# Patient Record
Sex: FEMALE | Race: BLACK OR AFRICAN AMERICAN | Employment: OTHER | URBAN - METROPOLITAN AREA
[De-identification: names, ages, dates, MRNs, and addresses within clinical notes are randomized per-mention and may not be internally consistent; named-entity substitution may affect disease eponyms.]

---

## 2018-03-29 ENCOUNTER — IP HISTORICAL/CONVERTED ENCOUNTER (OUTPATIENT)
Dept: OTHER | Age: 78
End: 2018-03-29

## 2018-10-29 ENCOUNTER — OP HISTORICAL/CONVERTED ENCOUNTER (OUTPATIENT)
Dept: OTHER | Age: 78
End: 2018-10-29

## 2018-11-16 ENCOUNTER — OP HISTORICAL/CONVERTED ENCOUNTER (OUTPATIENT)
Dept: OTHER | Age: 78
End: 2018-11-16

## 2020-01-01 ENCOUNTER — APPOINTMENT (OUTPATIENT)
Dept: INTERVENTIONAL RADIOLOGY/VASCULAR | Age: 80
DRG: 180 | End: 2020-01-01
Attending: INTERNAL MEDICINE
Payer: MEDICARE

## 2020-01-01 ENCOUNTER — APPOINTMENT (OUTPATIENT)
Dept: GENERAL RADIOLOGY | Age: 80
End: 2020-01-01
Attending: EMERGENCY MEDICINE
Payer: MEDICARE

## 2020-01-01 ENCOUNTER — APPOINTMENT (OUTPATIENT)
Dept: GENERAL RADIOLOGY | Age: 80
DRG: 180 | End: 2020-01-01
Attending: INTERNAL MEDICINE
Payer: MEDICARE

## 2020-01-01 ENCOUNTER — APPOINTMENT (OUTPATIENT)
Dept: GENERAL RADIOLOGY | Age: 80
DRG: 180 | End: 2020-01-01
Attending: EMERGENCY MEDICINE
Payer: MEDICARE

## 2020-01-01 ENCOUNTER — HOSPITAL ENCOUNTER (INPATIENT)
Age: 80
LOS: 28 days | Discharge: NURSING, REHAB OR LONG-TERM CARE | DRG: 180 | End: 2020-12-01
Attending: EMERGENCY MEDICINE | Admitting: FAMILY MEDICINE
Payer: MEDICARE

## 2020-01-01 ENCOUNTER — APPOINTMENT (OUTPATIENT)
Dept: NON INVASIVE DIAGNOSTICS | Age: 80
DRG: 180 | End: 2020-01-01
Attending: INTERNAL MEDICINE
Payer: MEDICARE

## 2020-01-01 ENCOUNTER — HOSPITAL ENCOUNTER (EMERGENCY)
Age: 80
Discharge: HOME | End: 2020-10-27
Payer: MEDICARE

## 2020-01-01 ENCOUNTER — APPOINTMENT (OUTPATIENT)
Dept: CT IMAGING | Age: 80
End: 2020-01-01
Attending: PHYSICIAN ASSISTANT
Payer: MEDICARE

## 2020-01-01 ENCOUNTER — APPOINTMENT (OUTPATIENT)
Dept: CT IMAGING | Age: 80
DRG: 180 | End: 2020-01-01
Attending: FAMILY MEDICINE
Payer: MEDICARE

## 2020-01-01 VITALS
DIASTOLIC BLOOD PRESSURE: 107 MMHG | WEIGHT: 140 LBS | OXYGEN SATURATION: 96 % | TEMPERATURE: 98.3 F | HEIGHT: 62 IN | HEART RATE: 110 BPM | BODY MASS INDEX: 25.76 KG/M2 | RESPIRATION RATE: 16 BRPM | SYSTOLIC BLOOD PRESSURE: 166 MMHG

## 2020-01-01 VITALS
OXYGEN SATURATION: 100 % | BODY MASS INDEX: 25.76 KG/M2 | SYSTOLIC BLOOD PRESSURE: 138 MMHG | TEMPERATURE: 98.8 F | HEART RATE: 79 BPM | DIASTOLIC BLOOD PRESSURE: 86 MMHG | HEIGHT: 62 IN | WEIGHT: 140 LBS | RESPIRATION RATE: 18 BRPM
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
## 2020-01-01 DIAGNOSIS — E87.1 HYPONATREMIA: Primary | ICD-10-CM

## 2020-01-01 DIAGNOSIS — M25.511 ACUTE PAIN OF RIGHT SHOULDER: ICD-10-CM

## 2020-01-01 DIAGNOSIS — J90 PLEURAL EFFUSION: ICD-10-CM

## 2020-01-01 LAB
ALBUMIN SERPL-MCNC: 1.5 G/DL (ref 3.5–5)
ALBUMIN SERPL-MCNC: 1.6 G/DL (ref 3.5–5)
ALBUMIN SERPL-MCNC: 1.7 G/DL (ref 3.5–5)
ALBUMIN SERPL-MCNC: 1.8 G/DL (ref 3.5–5)
ALBUMIN SERPL-MCNC: 2.1 G/DL (ref 3.5–5)
ALBUMIN SERPL-MCNC: 2.5 G/DL (ref 3.5–5)
ALBUMIN/GLOB SERPL: 0.3 {RATIO} (ref 1.1–2.2)
ALBUMIN/GLOB SERPL: 0.4 {RATIO} (ref 1.1–2.2)
ALBUMIN/GLOB SERPL: 0.5 {RATIO} (ref 1.1–2.2)
ALP SERPL-CCNC: 63 U/L (ref 45–117)
ALP SERPL-CCNC: 68 U/L (ref 45–117)
ALP SERPL-CCNC: 70 U/L (ref 45–117)
ALP SERPL-CCNC: 71 U/L (ref 45–117)
ALP SERPL-CCNC: 78 U/L (ref 45–117)
ALP SERPL-CCNC: 78 U/L (ref 45–117)
ALP SERPL-CCNC: 85 U/L (ref 45–117)
ALP SERPL-CCNC: 98 U/L (ref 45–117)
ALT SERPL-CCNC: 27 U/L (ref 12–78)
ALT SERPL-CCNC: 27 U/L (ref 12–78)
ALT SERPL-CCNC: 30 U/L (ref 12–78)
ALT SERPL-CCNC: 34 U/L (ref 12–78)
ALT SERPL-CCNC: 37 U/L (ref 12–78)
ALT SERPL-CCNC: 39 U/L (ref 12–78)
ALT SERPL-CCNC: 46 U/L (ref 12–78)
ALT SERPL-CCNC: 68 U/L (ref 12–78)
ANION GAP SERPL CALC-SCNC: 10 MMOL/L (ref 5–15)
ANION GAP SERPL CALC-SCNC: 11 MMOL/L (ref 5–15)
ANION GAP SERPL CALC-SCNC: 2 MMOL/L (ref 5–15)
ANION GAP SERPL CALC-SCNC: 4 MMOL/L (ref 5–15)
ANION GAP SERPL CALC-SCNC: 5 MMOL/L (ref 5–15)
ANION GAP SERPL CALC-SCNC: 6 MMOL/L (ref 5–15)
APPEARANCE UR: CLEAR
APPEARANCE UR: CLEAR
AST SERPL W P-5'-P-CCNC: 115 U/L (ref 15–37)
AST SERPL W P-5'-P-CCNC: 23 U/L (ref 15–37)
AST SERPL W P-5'-P-CCNC: 28 U/L (ref 15–37)
AST SERPL W P-5'-P-CCNC: 31 U/L (ref 15–37)
AST SERPL W P-5'-P-CCNC: 39 U/L (ref 15–37)
AST SERPL W P-5'-P-CCNC: 42 U/L (ref 15–37)
AST SERPL W P-5'-P-CCNC: 46 U/L (ref 15–37)
AST SERPL W P-5'-P-CCNC: 49 U/L (ref 15–37)
ATRIAL RATE: 121 BPM
ATRIAL RATE: 136 BPM
ATRIAL RATE: 357 BPM
ATRIAL RATE: 71 BPM
BACTERIA SPEC CULT: NORMAL
BACTERIA URNS QL MICRO: NEGATIVE /HPF
BACTERIA URNS QL MICRO: NEGATIVE /HPF
BASOPHILS # BLD: 0 K/UL (ref 0–0.1)
BASOPHILS # BLD: 0.1 K/UL (ref 0–0.1)
BASOPHILS NFR BLD: 0 % (ref 0–1)
BASOPHILS NFR BLD: 1 % (ref 0–1)
BASOPHILS NFR BLD: 1 % (ref 0–1)
BILIRUB SERPL-MCNC: 0.4 MG/DL (ref 0.2–1)
BILIRUB SERPL-MCNC: 0.5 MG/DL (ref 0.2–1)
BILIRUB SERPL-MCNC: 0.6 MG/DL (ref 0.2–1)
BILIRUB UR QL: NEGATIVE
BILIRUB UR QL: NEGATIVE
BNP SERPL-MCNC: 1127 PG/ML
BNP SERPL-MCNC: 267 PG/ML
BUN SERPL-MCNC: 14 MG/DL (ref 6–20)
BUN SERPL-MCNC: 14 MG/DL (ref 6–20)
BUN SERPL-MCNC: 15 MG/DL (ref 6–20)
BUN SERPL-MCNC: 16 MG/DL (ref 6–20)
BUN SERPL-MCNC: 16 MG/DL (ref 6–20)
BUN SERPL-MCNC: 20 MG/DL (ref 6–20)
BUN SERPL-MCNC: 5 MG/DL (ref 6–20)
BUN SERPL-MCNC: 6 MG/DL (ref 6–20)
BUN SERPL-MCNC: 6 MG/DL (ref 6–20)
BUN SERPL-MCNC: 7 MG/DL (ref 6–20)
BUN SERPL-MCNC: 9 MG/DL (ref 6–20)
BUN/CREAT SERPL: 10 (ref 12–20)
BUN/CREAT SERPL: 10 (ref 12–20)
BUN/CREAT SERPL: 12 (ref 12–20)
BUN/CREAT SERPL: 12 (ref 12–20)
BUN/CREAT SERPL: 15 (ref 12–20)
BUN/CREAT SERPL: 15 (ref 12–20)
BUN/CREAT SERPL: 16 (ref 12–20)
BUN/CREAT SERPL: 16 (ref 12–20)
BUN/CREAT SERPL: 19 (ref 12–20)
BUN/CREAT SERPL: 25 (ref 12–20)
BUN/CREAT SERPL: 9 (ref 12–20)
CA-I BLD-MCNC: 10.1 MG/DL (ref 8.5–10.1)
CA-I BLD-MCNC: 10.1 MG/DL (ref 8.5–10.1)
CA-I BLD-MCNC: 10.2 MG/DL (ref 8.5–10.1)
CA-I BLD-MCNC: 10.4 MG/DL (ref 8.5–10.1)
CA-I BLD-MCNC: 10.4 MG/DL (ref 8.5–10.1)
CA-I BLD-MCNC: 9.8 MG/DL (ref 8.5–10.1)
CA-I BLD-MCNC: 9.9 MG/DL (ref 8.5–10.1)
CALCULATED P AXIS, ECG09: 40 DEGREES
CALCULATED R AXIS, ECG10: 66 DEGREES
CALCULATED R AXIS, ECG10: 70 DEGREES
CALCULATED R AXIS, ECG10: 77 DEGREES
CALCULATED R AXIS, ECG10: 86 DEGREES
CALCULATED T AXIS, ECG11: -134 DEGREES
CALCULATED T AXIS, ECG11: 108 DEGREES
CALCULATED T AXIS, ECG11: 57 DEGREES
CALCULATED T AXIS, ECG11: 98 DEGREES
CHLORIDE SERPL-SCNC: 100 MMOL/L (ref 97–108)
CHLORIDE SERPL-SCNC: 103 MMOL/L (ref 97–108)
CHLORIDE SERPL-SCNC: 105 MMOL/L (ref 97–108)
CHLORIDE SERPL-SCNC: 105 MMOL/L (ref 97–108)
CHLORIDE SERPL-SCNC: 106 MMOL/L (ref 97–108)
CHLORIDE SERPL-SCNC: 107 MMOL/L (ref 97–108)
CHLORIDE SERPL-SCNC: 109 MMOL/L (ref 97–108)
CHLORIDE SERPL-SCNC: 82 MMOL/L (ref 97–108)
CHLORIDE SERPL-SCNC: 97 MMOL/L (ref 97–108)
CHLORIDE SERPL-SCNC: 98 MMOL/L (ref 97–108)
CHLORIDE SERPL-SCNC: 99 MMOL/L (ref 97–108)
CO2 SERPL-SCNC: 25 MMOL/L (ref 21–32)
CO2 SERPL-SCNC: 29 MMOL/L (ref 21–32)
CO2 SERPL-SCNC: 30 MMOL/L (ref 21–32)
CO2 SERPL-SCNC: 30 MMOL/L (ref 21–32)
CO2 SERPL-SCNC: 31 MMOL/L (ref 21–32)
CO2 SERPL-SCNC: 33 MMOL/L (ref 21–32)
CO2 SERPL-SCNC: 33 MMOL/L (ref 21–32)
COLOR UR: ABNORMAL
COLOR UR: ABNORMAL
CREAT SERPL-MCNC: 0.51 MG/DL (ref 0.55–1.02)
CREAT SERPL-MCNC: 0.53 MG/DL (ref 0.55–1.02)
CREAT SERPL-MCNC: 0.58 MG/DL (ref 0.55–1.02)
CREAT SERPL-MCNC: 0.62 MG/DL (ref 0.55–1.02)
CREAT SERPL-MCNC: 0.64 MG/DL (ref 0.55–1.02)
CREAT SERPL-MCNC: 0.72 MG/DL (ref 0.55–1.02)
CREAT SERPL-MCNC: 0.85 MG/DL (ref 0.55–1.02)
CREAT SERPL-MCNC: 0.87 MG/DL (ref 0.55–1.02)
CREAT SERPL-MCNC: 1 MG/DL (ref 0.55–1.02)
CREAT SERPL-MCNC: 1.2 MG/DL (ref 0.55–1.02)
CREAT SERPL-MCNC: 1.25 MG/DL (ref 0.55–1.02)
DIAGNOSIS, 93000: NORMAL
DIFFERENTIAL METHOD BLD: ABNORMAL
ECHO AV PEAK GRADIENT: 7 MMHG
ECHO EST RA PRESSURE: 3 MMHG
ECHO LV EDV A2C: 34.3 CM3
ECHO LV EJECTION FRACTION A2C: 27 %
ECHO LV EJECTION FRACTION BIPLANE: 60.1 % (ref 55–100)
ECHO LV ESV A2C: 13.7 CM3
ECHO LV INTERNAL DIMENSION DIASTOLIC: 3.25 CM (ref 3.9–5.3)
ECHO LV INTERNAL DIMENSION SYSTOLIC: 2.39 CM
ECHO LV IVSD: 1.03 CM (ref 0.6–0.9)
ECHO LV MASS 2D: 102.3 G (ref 67–162)
ECHO LV MASS INDEX 2D: 62.3 G/M2 (ref 43–95)
ECHO LV POSTERIOR WALL DIASTOLIC: 1.11 CM (ref 0.6–0.9)
ECHO LVOT PEAK GRADIENT: 3 MMHG
ECHO MV A VELOCITY: 103 CM/S
ECHO MV E DECELERATION TIME (DT): 137 MS
ECHO MV E VELOCITY: 60.5 CM/S
ECHO MV E/A RATIO: 0.59
ECHO PV PEAK INSTANTANEOUS GRADIENT SYSTOLIC: 4 MMHG
ECHO PV REGURGITANT MAX VELOCITY: 128 CM/S
ECHO PV REGURGITANT MAX VELOCITY: 87.6 CM/S
ECHO PV REGURGITANT MAX VELOCITY: 98 CM/S
ECHO PVEIN A DURATION: 123 MS
ECHO PVEIN A VELOCITY: 30.2 CM/S
ECHO RIGHT VENTRICULAR SYSTOLIC PRESSURE (RVSP): 39 MMHG
ECHO RV INTERNAL DIMENSION: 3.53 CM
ECHO TV MAX VELOCITY: 300 CM/S
ECHO TV REGURGITANT PEAK GRADIENT: 36 MMHG
EOSINOPHIL # BLD: 0 K/UL (ref 0–0.4)
EOSINOPHIL # BLD: 0.1 K/UL (ref 0–0.4)
EOSINOPHIL # BLD: 0.2 K/UL (ref 0–0.4)
EOSINOPHIL # BLD: 0.4 K/UL (ref 0–0.4)
EOSINOPHIL NFR BLD: 0 % (ref 0–7)
EOSINOPHIL NFR BLD: 1 % (ref 0–7)
EOSINOPHIL NFR BLD: 10 % (ref 0–7)
EOSINOPHIL NFR BLD: 4 % (ref 0–7)
EOSINOPHIL NFR FLD MANUAL: 0 %
ERYTHROCYTE [DISTWIDTH] IN BLOOD BY AUTOMATED COUNT: 16.4 % (ref 11.5–14.5)
ERYTHROCYTE [DISTWIDTH] IN BLOOD BY AUTOMATED COUNT: 16.7 % (ref 11.5–14.5)
ERYTHROCYTE [DISTWIDTH] IN BLOOD BY AUTOMATED COUNT: 16.9 % (ref 11.5–14.5)
ERYTHROCYTE [DISTWIDTH] IN BLOOD BY AUTOMATED COUNT: 17 % (ref 11.5–14.5)
ERYTHROCYTE [DISTWIDTH] IN BLOOD BY AUTOMATED COUNT: 17.1 % (ref 11.5–14.5)
ERYTHROCYTE [DISTWIDTH] IN BLOOD BY AUTOMATED COUNT: 18.2 % (ref 11.5–14.5)
ERYTHROCYTE [DISTWIDTH] IN BLOOD BY AUTOMATED COUNT: 18.3 % (ref 11.5–14.5)
ERYTHROCYTE [DISTWIDTH] IN BLOOD BY AUTOMATED COUNT: 18.3 % (ref 11.5–14.5)
ERYTHROCYTE [DISTWIDTH] IN BLOOD BY AUTOMATED COUNT: 18.6 % (ref 11.5–14.5)
GLOBULIN SER CALC-MCNC: 3.9 G/DL (ref 2–4)
GLOBULIN SER CALC-MCNC: 4 G/DL (ref 2–4)
GLOBULIN SER CALC-MCNC: 4 G/DL (ref 2–4)
GLOBULIN SER CALC-MCNC: 4.3 G/DL (ref 2–4)
GLOBULIN SER CALC-MCNC: 4.4 G/DL (ref 2–4)
GLOBULIN SER CALC-MCNC: 4.5 G/DL (ref 2–4)
GLOBULIN SER CALC-MCNC: 4.7 G/DL (ref 2–4)
GLOBULIN SER CALC-MCNC: 5.5 G/DL (ref 2–4)
GLUCOSE BLD STRIP.AUTO-MCNC: 110 MG/DL (ref 65–100)
GLUCOSE BLD STRIP.AUTO-MCNC: 220 MG/DL (ref 65–100)
GLUCOSE BLD STRIP.AUTO-MCNC: 70 MG/DL (ref 65–100)
GLUCOSE BLD STRIP.AUTO-MCNC: 72 MG/DL (ref 65–100)
GLUCOSE BLD STRIP.AUTO-MCNC: 75 MG/DL (ref 65–100)
GLUCOSE BLD STRIP.AUTO-MCNC: 79 MG/DL (ref 65–100)
GLUCOSE BLD STRIP.AUTO-MCNC: 80 MG/DL (ref 65–100)
GLUCOSE BLD STRIP.AUTO-MCNC: 81 MG/DL (ref 65–100)
GLUCOSE BLD STRIP.AUTO-MCNC: 84 MG/DL (ref 65–100)
GLUCOSE BLD STRIP.AUTO-MCNC: 84 MG/DL (ref 65–100)
GLUCOSE BLD STRIP.AUTO-MCNC: 86 MG/DL (ref 65–100)
GLUCOSE BLD STRIP.AUTO-MCNC: 98 MG/DL (ref 65–100)
GLUCOSE SERPL-MCNC: 128 MG/DL (ref 65–100)
GLUCOSE SERPL-MCNC: 132 MG/DL (ref 65–100)
GLUCOSE SERPL-MCNC: 66 MG/DL (ref 65–100)
GLUCOSE SERPL-MCNC: 67 MG/DL (ref 65–100)
GLUCOSE SERPL-MCNC: 69 MG/DL (ref 65–100)
GLUCOSE SERPL-MCNC: 75 MG/DL (ref 65–100)
GLUCOSE SERPL-MCNC: 78 MG/DL (ref 65–100)
GLUCOSE SERPL-MCNC: 91 MG/DL (ref 65–100)
GLUCOSE SERPL-MCNC: 91 MG/DL (ref 65–100)
GLUCOSE SERPL-MCNC: 92 MG/DL (ref 65–100)
GLUCOSE SERPL-MCNC: 94 MG/DL (ref 65–100)
GLUCOSE UR STRIP.AUTO-MCNC: NEGATIVE MG/DL
GLUCOSE UR STRIP.AUTO-MCNC: NORMAL MG/DL
GRAM STN SPEC: NORMAL
GRAM STN SPEC: NORMAL
HCT VFR BLD AUTO: 34.3 % (ref 35–47)
HCT VFR BLD AUTO: 35.8 % (ref 35–47)
HCT VFR BLD AUTO: 35.9 % (ref 35–47)
HCT VFR BLD AUTO: 36 % (ref 35–47)
HCT VFR BLD AUTO: 36.5 % (ref 35–47)
HCT VFR BLD AUTO: 38.4 % (ref 35–47)
HCT VFR BLD AUTO: 39.2 % (ref 35–47)
HCT VFR BLD AUTO: 40.2 % (ref 35–47)
HCT VFR BLD AUTO: 44.1 % (ref 35–47)
HGB BLD-MCNC: 10.8 G/DL (ref 11.5–16)
HGB BLD-MCNC: 11.3 G/DL (ref 11.5–16)
HGB BLD-MCNC: 11.3 G/DL (ref 11.5–16)
HGB BLD-MCNC: 11.4 G/DL (ref 11.5–16)
HGB BLD-MCNC: 11.6 G/DL (ref 11.5–16)
HGB BLD-MCNC: 12.4 G/DL (ref 11.5–16)
HGB BLD-MCNC: 12.7 G/DL (ref 11.5–16)
HGB BLD-MCNC: 13.2 G/DL (ref 11.5–16)
HGB BLD-MCNC: 15.2 G/DL (ref 11.5–16)
HGB UR QL STRIP: NEGATIVE
HGB UR QL STRIP: NEGATIVE
HYALINE CASTS URNS QL MICRO: ABNORMAL /LPF (ref 0–5)
HYALINE CASTS URNS QL MICRO: ABNORMAL /LPF (ref 0–5)
IMM GRANULOCYTES # BLD AUTO: 0 K/UL
IMM GRANULOCYTES # BLD AUTO: 0 K/UL (ref 0–0.04)
IMM GRANULOCYTES # BLD AUTO: 0.1 K/UL (ref 0–0.04)
IMM GRANULOCYTES # BLD AUTO: 0.1 K/UL (ref 0–0.04)
IMM GRANULOCYTES NFR BLD AUTO: 0 %
IMM GRANULOCYTES NFR BLD AUTO: 0 % (ref 0–0.5)
IMM GRANULOCYTES NFR BLD AUTO: 0 % (ref 0–0.5)
IMM GRANULOCYTES NFR BLD AUTO: 1 % (ref 0–0.5)
KETONES UR QL STRIP.AUTO: NEGATIVE MG/DL
KETONES UR QL STRIP.AUTO: NEGATIVE MG/DL
LEUKOCYTE ESTERASE UR QL STRIP.AUTO: NEGATIVE
LEUKOCYTE ESTERASE UR QL STRIP.AUTO: NEGATIVE
LYMPHOCYTES # BLD: 0.5 K/UL (ref 0.8–3.5)
LYMPHOCYTES # BLD: 0.5 K/UL (ref 0.8–3.5)
LYMPHOCYTES # BLD: 0.6 K/UL (ref 0.8–3.5)
LYMPHOCYTES # BLD: 0.7 K/UL (ref 0.8–3.5)
LYMPHOCYTES # BLD: 0.8 K/UL (ref 0.8–3.5)
LYMPHOCYTES # BLD: 1.3 K/UL (ref 0.8–3.5)
LYMPHOCYTES NFR BLD: 10 % (ref 12–49)
LYMPHOCYTES NFR BLD: 12 % (ref 12–49)
LYMPHOCYTES NFR BLD: 14 % (ref 12–49)
LYMPHOCYTES NFR BLD: 17 % (ref 12–49)
LYMPHOCYTES NFR BLD: 5 % (ref 12–49)
LYMPHOCYTES NFR BLD: 7 % (ref 12–49)
LYMPHOCYTES NFR FLD: 70 %
MAGNESIUM SERPL-MCNC: 1.9 MG/DL (ref 1.6–2.4)
MCH RBC QN AUTO: 24.5 PG (ref 26–34)
MCH RBC QN AUTO: 24.5 PG (ref 26–34)
MCH RBC QN AUTO: 24.9 PG (ref 26–34)
MCH RBC QN AUTO: 25 PG (ref 26–34)
MCH RBC QN AUTO: 25 PG (ref 26–34)
MCH RBC QN AUTO: 25.2 PG (ref 26–34)
MCH RBC QN AUTO: 25.3 PG (ref 26–34)
MCH RBC QN AUTO: 25.5 PG (ref 26–34)
MCH RBC QN AUTO: 25.9 PG (ref 26–34)
MCHC RBC AUTO-ENTMCNC: 31.4 G/DL (ref 30–36.5)
MCHC RBC AUTO-ENTMCNC: 31.5 G/DL (ref 30–36.5)
MCHC RBC AUTO-ENTMCNC: 31.6 G/DL (ref 30–36.5)
MCHC RBC AUTO-ENTMCNC: 31.8 G/DL (ref 30–36.5)
MCHC RBC AUTO-ENTMCNC: 31.8 G/DL (ref 30–36.5)
MCHC RBC AUTO-ENTMCNC: 32.3 G/DL (ref 30–36.5)
MCHC RBC AUTO-ENTMCNC: 32.4 G/DL (ref 30–36.5)
MCHC RBC AUTO-ENTMCNC: 32.8 G/DL (ref 30–36.5)
MCHC RBC AUTO-ENTMCNC: 34.5 G/DL (ref 30–36.5)
MCV RBC AUTO: 75 FL (ref 80–99)
MCV RBC AUTO: 77 FL (ref 80–99)
MCV RBC AUTO: 77.2 FL (ref 80–99)
MCV RBC AUTO: 77.2 FL (ref 80–99)
MCV RBC AUTO: 78 FL (ref 80–99)
MCV RBC AUTO: 78.7 FL (ref 80–99)
MCV RBC AUTO: 78.9 FL (ref 80–99)
MCV RBC AUTO: 79.5 FL (ref 80–99)
MCV RBC AUTO: 79.9 FL (ref 80–99)
MESOTHL CELL NFR FLD: 21 %
MONOCYTES # BLD: 0.5 K/UL (ref 0–1)
MONOCYTES # BLD: 0.7 K/UL (ref 0–1)
MONOCYTES # BLD: 0.8 K/UL (ref 0–1)
MONOCYTES # BLD: 0.8 K/UL (ref 0–1)
MONOCYTES # BLD: 1.3 K/UL (ref 0–1)
MONOCYTES # BLD: 1.3 K/UL (ref 0–1)
MONOCYTES NFR BLD: 10 % (ref 5–13)
MONOCYTES NFR BLD: 10 % (ref 5–13)
MONOCYTES NFR BLD: 12 % (ref 5–13)
MONOCYTES NFR BLD: 12 % (ref 5–13)
MONOCYTES NFR BLD: 14 % (ref 5–13)
MONOCYTES NFR BLD: 16 % (ref 5–13)
MONOCYTES NFR FLD: 7 %
NEUTROPHILS NFR FLD: 2 %
NEUTS BAND # FLD: 0 %
NEUTS SEG # BLD: 2.4 K/UL (ref 1.8–8)
NEUTS SEG # BLD: 2.8 K/UL (ref 1.8–8)
NEUTS SEG # BLD: 6.2 K/UL (ref 1.8–8)
NEUTS SEG # BLD: 6.8 K/UL (ref 1.8–8)
NEUTS SEG # BLD: 7.8 K/UL (ref 1.8–8)
NEUTS SEG # BLD: 8.6 K/UL (ref 1.8–8)
NEUTS SEG NFR BLD: 60 % (ref 32–75)
NEUTS SEG NFR BLD: 65 % (ref 32–75)
NEUTS SEG NFR BLD: 76 % (ref 32–75)
NEUTS SEG NFR BLD: 78 % (ref 32–75)
NEUTS SEG NFR BLD: 80 % (ref 32–75)
NEUTS SEG NFR BLD: 82 % (ref 32–75)
NITRITE UR QL STRIP.AUTO: NEGATIVE
NITRITE UR QL STRIP.AUTO: NEGATIVE
P-R INTERVAL, ECG05: 124 MS
P-R INTERVAL, ECG05: 224 MS
PERFORMED BY, TECHID: ABNORMAL
PERFORMED BY, TECHID: ABNORMAL
PERFORMED BY, TECHID: NORMAL
PH UR STRIP: 5 [PH] (ref 5–8)
PH UR STRIP: 6.5 [PH] (ref 5–8)
PLATELET # BLD AUTO: 278 K/UL (ref 150–400)
PLATELET # BLD AUTO: 280 K/UL (ref 150–400)
PLATELET # BLD AUTO: 296 K/UL (ref 150–400)
PLATELET # BLD AUTO: 321 K/UL (ref 150–400)
PLATELET # BLD AUTO: 322 K/UL (ref 150–400)
PLATELET # BLD AUTO: 326 K/UL (ref 150–400)
PLATELET # BLD AUTO: 330 K/UL (ref 150–400)
PLATELET # BLD AUTO: 367 K/UL (ref 150–400)
PLATELET # BLD AUTO: 432 K/UL (ref 150–400)
PMV BLD AUTO: 10 FL (ref 8.9–12.9)
PMV BLD AUTO: 10.1 FL (ref 8.9–12.9)
PMV BLD AUTO: 10.2 FL (ref 8.9–12.9)
PMV BLD AUTO: 10.3 FL (ref 8.9–12.9)
PMV BLD AUTO: 10.4 FL (ref 8.9–12.9)
PMV BLD AUTO: 10.7 FL (ref 8.9–12.9)
PMV BLD AUTO: 10.8 FL (ref 8.9–12.9)
PMV BLD AUTO: 11.4 FL (ref 8.9–12.9)
PMV BLD AUTO: 11.8 FL (ref 8.9–12.9)
POTASSIUM SERPL-SCNC: 2.4 MMOL/L (ref 3.5–5.1)
POTASSIUM SERPL-SCNC: 2.9 MMOL/L (ref 3.5–5.1)
POTASSIUM SERPL-SCNC: 3 MMOL/L (ref 3.5–5.1)
POTASSIUM SERPL-SCNC: 3.2 MMOL/L (ref 3.5–5.1)
POTASSIUM SERPL-SCNC: 3.3 MMOL/L (ref 3.5–5.1)
POTASSIUM SERPL-SCNC: 3.6 MMOL/L (ref 3.5–5.1)
POTASSIUM SERPL-SCNC: 3.8 MMOL/L (ref 3.5–5.1)
POTASSIUM SERPL-SCNC: 3.9 MMOL/L (ref 3.5–5.1)
POTASSIUM SERPL-SCNC: 4.3 MMOL/L (ref 3.5–5.1)
PROT SERPL-MCNC: 5.5 G/DL (ref 6.4–8.2)
PROT SERPL-MCNC: 5.7 G/DL (ref 6.4–8.2)
PROT SERPL-MCNC: 5.8 G/DL (ref 6.4–8.2)
PROT SERPL-MCNC: 6.2 G/DL (ref 6.4–8.2)
PROT SERPL-MCNC: 6.2 G/DL (ref 6.4–8.2)
PROT SERPL-MCNC: 6.3 G/DL (ref 6.4–8.2)
PROT SERPL-MCNC: 6.4 G/DL (ref 6.4–8.2)
PROT SERPL-MCNC: 8 G/DL (ref 6.4–8.2)
PROT UR STRIP-MCNC: NEGATIVE MG/DL
PROT UR STRIP-MCNC: NEGATIVE MG/DL
Q-T INTERVAL, ECG07: 308 MS
Q-T INTERVAL, ECG07: 324 MS
Q-T INTERVAL, ECG07: 344 MS
Q-T INTERVAL, ECG07: 400 MS
QRS DURATION, ECG06: 66 MS
QRS DURATION, ECG06: 68 MS
QRS DURATION, ECG06: 74 MS
QRS DURATION, ECG06: 76 MS
QTC CALCULATION (BEZET), ECG08: 434 MS
QTC CALCULATION (BEZET), ECG08: 457 MS
QTC CALCULATION (BEZET), ECG08: 463 MS
QTC CALCULATION (BEZET), ECG08: 488 MS
RBC # BLD AUTO: 4.4 M/UL (ref 3.8–5.2)
RBC # BLD AUTO: 4.48 M/UL (ref 3.8–5.2)
RBC # BLD AUTO: 4.53 M/UL (ref 3.8–5.2)
RBC # BLD AUTO: 4.56 M/UL (ref 3.8–5.2)
RBC # BLD AUTO: 4.74 M/UL (ref 3.8–5.2)
RBC # BLD AUTO: 4.87 M/UL (ref 3.8–5.2)
RBC # BLD AUTO: 5.08 M/UL (ref 3.8–5.2)
RBC # BLD AUTO: 5.21 M/UL (ref 3.8–5.2)
RBC # BLD AUTO: 5.88 M/UL (ref 3.8–5.2)
RBC #/AREA URNS HPF: ABNORMAL /HPF (ref 0–5)
RBC #/AREA URNS HPF: ABNORMAL /HPF (ref 0–5)
RBC MORPH BLD: ABNORMAL
RBC MORPH BLD: ABNORMAL
SARS-COV-2, COV2: NORMAL
SARS-COV-2, COV2NT: NOT DETECTED
SODIUM SERPL-SCNC: 123 MMOL/L (ref 136–145)
SODIUM SERPL-SCNC: 132 MMOL/L (ref 136–145)
SODIUM SERPL-SCNC: 135 MMOL/L (ref 136–145)
SODIUM SERPL-SCNC: 135 MMOL/L (ref 136–145)
SODIUM SERPL-SCNC: 136 MMOL/L (ref 136–145)
SODIUM SERPL-SCNC: 138 MMOL/L (ref 136–145)
SODIUM SERPL-SCNC: 139 MMOL/L (ref 136–145)
SODIUM SERPL-SCNC: 141 MMOL/L (ref 136–145)
SODIUM SERPL-SCNC: 141 MMOL/L (ref 136–145)
SODIUM SERPL-SCNC: 142 MMOL/L (ref 136–145)
SODIUM SERPL-SCNC: 146 MMOL/L (ref 136–145)
SP GR UR REFRACTOMETRY: 1.01 (ref 1–1.03)
SP GR UR REFRACTOMETRY: 1.02 (ref 1–1.03)
SPECIAL REQUESTS,SREQ: NORMAL
TROPONIN I SERPL-MCNC: 0.06 NG/ML
TROPONIN I SERPL-MCNC: 0.1 NG/ML
UA: UC IF INDICATED,UAUC: ABNORMAL
UROBILINOGEN UR QL STRIP.AUTO: 4 EU/DL (ref 0.1–1)
UROBILINOGEN UR QL STRIP.AUTO: NORMAL EU/DL (ref 0.1–1)
VENTRICULAR RATE, ECG03: 120 BPM
VENTRICULAR RATE, ECG03: 121 BPM
VENTRICULAR RATE, ECG03: 136 BPM
VENTRICULAR RATE, ECG03: 71 BPM
WBC # BLD AUTO: 11.2 K/UL (ref 3.6–11)
WBC # BLD AUTO: 4 K/UL (ref 3.6–11)
WBC # BLD AUTO: 4.2 K/UL (ref 3.6–11)
WBC # BLD AUTO: 7.7 K/UL (ref 3.6–11)
WBC # BLD AUTO: 7.7 K/UL (ref 3.6–11)
WBC # BLD AUTO: 7.9 K/UL (ref 3.6–11)
WBC # BLD AUTO: 8.2 K/UL (ref 3.6–11)
WBC # BLD AUTO: 8.9 K/UL (ref 3.6–11)
WBC # BLD AUTO: 9.6 K/UL (ref 3.6–11)
WBC URNS QL MICRO: ABNORMAL /HPF (ref 0–4)
WBC URNS QL MICRO: ABNORMAL /HPF (ref 0–4)

## 2020-01-01 PROCEDURE — 97530 THERAPEUTIC ACTIVITIES: CPT

## 2020-01-01 PROCEDURE — 97110 THERAPEUTIC EXERCISES: CPT

## 2020-01-01 PROCEDURE — 74011000258 HC RX REV CODE- 258: Performed by: FAMILY MEDICINE

## 2020-01-01 PROCEDURE — 87205 SMEAR GRAM STAIN: CPT

## 2020-01-01 PROCEDURE — 74011250637 HC RX REV CODE- 250/637: Performed by: FAMILY MEDICINE

## 2020-01-01 PROCEDURE — 74011250637 HC RX REV CODE- 250/637: Performed by: INTERNAL MEDICINE

## 2020-01-01 PROCEDURE — 36415 COLL VENOUS BLD VENIPUNCTURE: CPT

## 2020-01-01 PROCEDURE — 74011250636 HC RX REV CODE- 250/636: Performed by: FAMILY MEDICINE

## 2020-01-01 PROCEDURE — C1729 CATH, DRAINAGE: HCPCS

## 2020-01-01 PROCEDURE — 94640 AIRWAY INHALATION TREATMENT: CPT

## 2020-01-01 PROCEDURE — 80053 COMPREHEN METABOLIC PANEL: CPT

## 2020-01-01 PROCEDURE — 93005 ELECTROCARDIOGRAM TRACING: CPT

## 2020-01-01 PROCEDURE — 87086 URINE CULTURE/COLONY COUNT: CPT

## 2020-01-01 PROCEDURE — 88305 TISSUE EXAM BY PATHOLOGIST: CPT

## 2020-01-01 PROCEDURE — 71045 X-RAY EXAM CHEST 1 VIEW: CPT

## 2020-01-01 PROCEDURE — 73030 X-RAY EXAM OF SHOULDER: CPT

## 2020-01-01 PROCEDURE — 84484 ASSAY OF TROPONIN QUANT: CPT

## 2020-01-01 PROCEDURE — 77010033678 HC OXYGEN DAILY

## 2020-01-01 PROCEDURE — 65270000029 HC RM PRIVATE

## 2020-01-01 PROCEDURE — 81001 URINALYSIS AUTO W/SCOPE: CPT

## 2020-01-01 PROCEDURE — 74011250637 HC RX REV CODE- 250/637: Performed by: PSYCHIATRY & NEUROLOGY

## 2020-01-01 PROCEDURE — 82962 GLUCOSE BLOOD TEST: CPT

## 2020-01-01 PROCEDURE — 96374 THER/PROPH/DIAG INJ IV PUSH: CPT

## 2020-01-01 PROCEDURE — 74011000250 HC RX REV CODE- 250: Performed by: INTERNAL MEDICINE

## 2020-01-01 PROCEDURE — 70450 CT HEAD/BRAIN W/O DYE: CPT

## 2020-01-01 PROCEDURE — 94760 N-INVAS EAR/PLS OXIMETRY 1: CPT

## 2020-01-01 PROCEDURE — 97165 OT EVAL LOW COMPLEX 30 MIN: CPT

## 2020-01-01 PROCEDURE — 99283 EMERGENCY DEPT VISIT LOW MDM: CPT

## 2020-01-01 PROCEDURE — 97163 PT EVAL HIGH COMPLEX 45 MIN: CPT

## 2020-01-01 PROCEDURE — 74011250637 HC RX REV CODE- 250/637: Performed by: EMERGENCY MEDICINE

## 2020-01-01 PROCEDURE — 74011250636 HC RX REV CODE- 250/636: Performed by: INTERNAL MEDICINE

## 2020-01-01 PROCEDURE — 51798 US URINE CAPACITY MEASURE: CPT

## 2020-01-01 PROCEDURE — 85025 COMPLETE CBC W/AUTO DIFF WBC: CPT

## 2020-01-01 PROCEDURE — 88342 IMHCHEM/IMCYTCHM 1ST ANTB: CPT

## 2020-01-01 PROCEDURE — 80048 BASIC METABOLIC PNL TOTAL CA: CPT

## 2020-01-01 PROCEDURE — 83880 ASSAY OF NATRIURETIC PEPTIDE: CPT

## 2020-01-01 PROCEDURE — 83735 ASSAY OF MAGNESIUM: CPT

## 2020-01-01 PROCEDURE — 87635 SARS-COV-2 COVID-19 AMP PRB: CPT

## 2020-01-01 PROCEDURE — 93041 RHYTHM ECG TRACING: CPT

## 2020-01-01 PROCEDURE — 85027 COMPLETE CBC AUTOMATED: CPT

## 2020-01-01 PROCEDURE — 73502 X-RAY EXAM HIP UNI 2-3 VIEWS: CPT

## 2020-01-01 PROCEDURE — 88341 IMHCHEM/IMCYTCHM EA ADD ANTB: CPT

## 2020-01-01 PROCEDURE — 0W993ZZ DRAINAGE OF RIGHT PLEURAL CAVITY, PERCUTANEOUS APPROACH: ICD-10-PCS | Performed by: RADIOLOGY

## 2020-01-01 PROCEDURE — 93306 TTE W/DOPPLER COMPLETE: CPT

## 2020-01-01 PROCEDURE — 88108 CYTOPATH CONCENTRATE TECH: CPT

## 2020-01-01 PROCEDURE — 99285 EMERGENCY DEPT VISIT HI MDM: CPT

## 2020-01-01 PROCEDURE — 74011636637 HC RX REV CODE- 636/637: Performed by: INTERNAL MEDICINE

## 2020-01-01 PROCEDURE — 74011250636 HC RX REV CODE- 250/636: Performed by: EMERGENCY MEDICINE

## 2020-01-01 PROCEDURE — 71250 CT THORAX DX C-: CPT

## 2020-01-01 PROCEDURE — 71046 X-RAY EXAM CHEST 2 VIEWS: CPT

## 2020-01-01 RX ORDER — AMOXICILLIN AND CLAVULANATE POTASSIUM 875; 125 MG/1; MG/1
1 TABLET, FILM COATED ORAL 2 TIMES DAILY
Qty: 20 TAB | Refills: 0 | Status: SHIPPED | OUTPATIENT
Start: 2020-01-01

## 2020-01-01 RX ORDER — DEXTROSE MONOHYDRATE AND SODIUM CHLORIDE 5; .9 G/100ML; G/100ML
50 INJECTION, SOLUTION INTRAVENOUS CONTINUOUS
Status: DISCONTINUED | OUTPATIENT
Start: 2020-01-01 | End: 2020-01-01

## 2020-01-01 RX ORDER — POTASSIUM CHLORIDE 7.45 MG/ML
10 INJECTION INTRAVENOUS 4 TIMES DAILY
Status: DISCONTINUED | OUTPATIENT
Start: 2020-01-01 | End: 2020-01-01

## 2020-01-01 RX ORDER — ATORVASTATIN CALCIUM 10 MG/1
10 TABLET, FILM COATED ORAL DAILY
Qty: 30 TAB | Refills: 1 | Status: SHIPPED | OUTPATIENT
Start: 2020-01-01 | End: 2021-01-01 | Stop reason: ALTCHOICE

## 2020-01-01 RX ORDER — POTASSIUM CHLORIDE 750 MG/1
40 TABLET, FILM COATED, EXTENDED RELEASE ORAL
Status: COMPLETED | OUTPATIENT
Start: 2020-01-01 | End: 2020-01-01

## 2020-01-01 RX ORDER — IPRATROPIUM BROMIDE AND ALBUTEROL SULFATE 2.5; .5 MG/3ML; MG/3ML
3 SOLUTION RESPIRATORY (INHALATION)
Status: DISCONTINUED | OUTPATIENT
Start: 2020-01-01 | End: 2020-01-01

## 2020-01-01 RX ORDER — ATORVASTATIN CALCIUM 40 MG/1
40 TABLET, FILM COATED ORAL DAILY
COMMUNITY

## 2020-01-01 RX ORDER — DILTIAZEM HYDROCHLORIDE 60 MG/1
120 CAPSULE, EXTENDED RELEASE ORAL EVERY 12 HOURS
Status: DISCONTINUED | OUTPATIENT
Start: 2020-01-01 | End: 2020-01-01 | Stop reason: HOSPADM

## 2020-01-01 RX ORDER — PREDNISONE 10 MG/1
10 TABLET ORAL
Qty: 30 TAB | Refills: 0 | Status: SHIPPED | OUTPATIENT
Start: 2020-01-01 | End: 2021-01-01

## 2020-01-01 RX ORDER — IPRATROPIUM BROMIDE AND ALBUTEROL SULFATE 2.5; .5 MG/3ML; MG/3ML
3 SOLUTION RESPIRATORY (INHALATION)
Qty: 30 NEBULE | Refills: 0 | Status: SHIPPED | OUTPATIENT
Start: 2020-01-01 | End: 2021-01-01

## 2020-01-01 RX ORDER — SODIUM CHLORIDE 9 MG/ML
75 INJECTION, SOLUTION INTRAVENOUS CONTINUOUS
Status: DISCONTINUED | OUTPATIENT
Start: 2020-01-01 | End: 2020-01-01

## 2020-01-01 RX ORDER — FUROSEMIDE 10 MG/ML
40 INJECTION INTRAMUSCULAR; INTRAVENOUS ONCE
Status: COMPLETED | OUTPATIENT
Start: 2020-01-01 | End: 2020-01-01

## 2020-01-01 RX ORDER — ACETYLCYSTEINE 200 MG/ML
600 SOLUTION ORAL; RESPIRATORY (INHALATION)
Status: DISCONTINUED | OUTPATIENT
Start: 2020-01-01 | End: 2020-01-01

## 2020-01-01 RX ORDER — IPRATROPIUM BROMIDE AND ALBUTEROL SULFATE 2.5; .5 MG/3ML; MG/3ML
3 SOLUTION RESPIRATORY (INHALATION)
Qty: 30 NEBULE | Refills: 1 | Status: SHIPPED | OUTPATIENT
Start: 2020-01-01 | End: 2021-01-01 | Stop reason: ALTCHOICE

## 2020-01-01 RX ORDER — ONDANSETRON 2 MG/ML
4 INJECTION INTRAMUSCULAR; INTRAVENOUS
Status: CANCELLED | OUTPATIENT
Start: 2020-01-01

## 2020-01-01 RX ORDER — HYDRALAZINE HYDROCHLORIDE 25 MG/1
25 TABLET, FILM COATED ORAL 2 TIMES DAILY
Status: CANCELLED | OUTPATIENT
Start: 2020-01-01

## 2020-01-01 RX ORDER — MEGESTROL ACETATE 40 MG/ML
400 SUSPENSION ORAL 2 TIMES DAILY
Qty: 30 BOTTLE | Refills: 1 | Status: SHIPPED | OUTPATIENT
Start: 2020-01-01 | End: 2021-01-01 | Stop reason: ALTCHOICE

## 2020-01-01 RX ORDER — ATORVASTATIN CALCIUM 10 MG/1
10 TABLET, FILM COATED ORAL DAILY
Status: DISCONTINUED | OUTPATIENT
Start: 2020-01-01 | End: 2020-01-01 | Stop reason: HOSPADM

## 2020-01-01 RX ORDER — METOPROLOL TARTRATE 50 MG/1
50 TABLET ORAL EVERY 12 HOURS
Status: DISCONTINUED | OUTPATIENT
Start: 2020-01-01 | End: 2020-01-01 | Stop reason: HOSPADM

## 2020-01-01 RX ORDER — CARVEDILOL 3.12 MG/1
6.25 TABLET ORAL 2 TIMES DAILY WITH MEALS
Status: CANCELLED | OUTPATIENT
Start: 2020-01-01

## 2020-01-01 RX ORDER — HYDRALAZINE HYDROCHLORIDE 100 MG/1
25 TABLET, FILM COATED ORAL 2 TIMES DAILY
COMMUNITY
End: 2020-01-01

## 2020-01-01 RX ORDER — FERROUS SULFATE 325(65) MG
325 TABLET, DELAYED RELEASE (ENTERIC COATED) ORAL 2 TIMES DAILY
COMMUNITY

## 2020-01-01 RX ORDER — MEGESTROL ACETATE 40 MG/ML
400 SUSPENSION ORAL 2 TIMES DAILY
Status: DISCONTINUED | OUTPATIENT
Start: 2020-01-01 | End: 2020-01-01 | Stop reason: HOSPADM

## 2020-01-01 RX ORDER — ACETAMINOPHEN 325 MG/1
650 TABLET ORAL
Status: DISCONTINUED | OUTPATIENT
Start: 2020-01-01 | End: 2020-01-01 | Stop reason: HOSPADM

## 2020-01-01 RX ORDER — HEPARIN SODIUM 10000 [USP'U]/ML
5000 INJECTION, SOLUTION INTRAVENOUS; SUBCUTANEOUS EVERY 12 HOURS
Status: DISCONTINUED | OUTPATIENT
Start: 2020-01-01 | End: 2020-01-01

## 2020-01-01 RX ORDER — ENOXAPARIN SODIUM 100 MG/ML
40 INJECTION SUBCUTANEOUS DAILY
Status: CANCELLED | OUTPATIENT
Start: 2020-01-01

## 2020-01-01 RX ORDER — SPIRONOLACTONE 25 MG/1
25 TABLET ORAL DAILY
Status: CANCELLED | OUTPATIENT
Start: 2020-01-01

## 2020-01-01 RX ORDER — CARVEDILOL 6.25 MG/1
6.25 TABLET ORAL 2 TIMES DAILY WITH MEALS
COMMUNITY
End: 2021-01-01 | Stop reason: ALTCHOICE

## 2020-01-01 RX ORDER — ACETYLCYSTEINE 200 MG/ML
600 SOLUTION ORAL; RESPIRATORY (INHALATION) 2 TIMES DAILY
Status: DISCONTINUED | OUTPATIENT
Start: 2020-01-01 | End: 2020-01-01

## 2020-01-01 RX ORDER — PREDNISONE 5 MG/1
10 TABLET ORAL
Status: DISCONTINUED | OUTPATIENT
Start: 2020-01-01 | End: 2020-01-01 | Stop reason: HOSPADM

## 2020-01-01 RX ORDER — SPIRONOLACTONE 25 MG/1
25 TABLET ORAL DAILY
COMMUNITY
End: 2021-01-01

## 2020-01-01 RX ORDER — MIRTAZAPINE 15 MG/1
15 TABLET, ORALLY DISINTEGRATING ORAL
Qty: 30 TAB | Refills: 0 | Status: SHIPPED | OUTPATIENT
Start: 2020-01-01

## 2020-01-01 RX ORDER — GUAIFENESIN 600 MG/1
600 TABLET, EXTENDED RELEASE ORAL EVERY 12 HOURS
Status: DISCONTINUED | OUTPATIENT
Start: 2020-01-01 | End: 2020-01-01 | Stop reason: HOSPADM

## 2020-01-01 RX ORDER — ATORVASTATIN CALCIUM 40 MG/1
40 TABLET, FILM COATED ORAL DAILY
Status: CANCELLED | OUTPATIENT
Start: 2020-01-01

## 2020-01-01 RX ORDER — POLYETHYLENE GLYCOL 3350 17 G/17G
17 POWDER, FOR SOLUTION ORAL DAILY PRN
Status: CANCELLED | OUTPATIENT
Start: 2020-01-01

## 2020-01-01 RX ORDER — FUROSEMIDE 10 MG/ML
40 INJECTION INTRAMUSCULAR; INTRAVENOUS
Status: COMPLETED | OUTPATIENT
Start: 2020-01-01 | End: 2020-01-01

## 2020-01-01 RX ORDER — DEXTROSE MONOHYDRATE 50 MG/ML
50 INJECTION, SOLUTION INTRAVENOUS CONTINUOUS
Status: DISCONTINUED | OUTPATIENT
Start: 2020-01-01 | End: 2020-01-01 | Stop reason: CLARIF

## 2020-01-01 RX ORDER — MIRTAZAPINE 15 MG/1
15 TABLET, ORALLY DISINTEGRATING ORAL
Status: DISCONTINUED | OUTPATIENT
Start: 2020-01-01 | End: 2020-01-01 | Stop reason: HOSPADM

## 2020-01-01 RX ORDER — METOPROLOL TARTRATE 25 MG/1
25 TABLET, FILM COATED ORAL EVERY 12 HOURS
Status: DISCONTINUED | OUTPATIENT
Start: 2020-01-01 | End: 2020-01-01

## 2020-01-01 RX ORDER — ESCITALOPRAM OXALATE 5 MG/1
5 TABLET ORAL DAILY
Qty: 30 TAB | Refills: 1 | Status: SHIPPED | OUTPATIENT
Start: 2020-01-01 | End: 2021-01-01 | Stop reason: ALTCHOICE

## 2020-01-01 RX ORDER — IPRATROPIUM BROMIDE AND ALBUTEROL SULFATE 2.5; .5 MG/3ML; MG/3ML
3 SOLUTION RESPIRATORY (INHALATION)
Status: DISCONTINUED | OUTPATIENT
Start: 2020-01-01 | End: 2020-01-01 | Stop reason: HOSPADM

## 2020-01-01 RX ORDER — DILTIAZEM HYDROCHLORIDE 120 MG/1
120 CAPSULE, EXTENDED RELEASE ORAL EVERY 12 HOURS
Qty: 30 CAP | Refills: 0 | Status: SHIPPED | OUTPATIENT
Start: 2020-01-01 | End: 2021-01-01 | Stop reason: ALTCHOICE

## 2020-01-01 RX ORDER — LABETALOL HCL 20 MG/4 ML
20 SYRINGE (ML) INTRAVENOUS
Status: COMPLETED | OUTPATIENT
Start: 2020-01-01 | End: 2020-01-01

## 2020-01-01 RX ORDER — SODIUM CHLORIDE TAB 1 GM 1 G
1 TAB MISCELLANEOUS
Status: COMPLETED | OUTPATIENT
Start: 2020-01-01 | End: 2020-01-01

## 2020-01-01 RX ORDER — ACETAMINOPHEN 650 MG/1
650 SUPPOSITORY RECTAL
Status: CANCELLED | OUTPATIENT
Start: 2020-01-01

## 2020-01-01 RX ORDER — METOPROLOL TARTRATE 50 MG/1
50 TABLET ORAL EVERY 12 HOURS
Qty: 30 TAB | Refills: 1 | Status: SHIPPED | OUTPATIENT
Start: 2020-01-01 | End: 2021-01-01 | Stop reason: ALTCHOICE

## 2020-01-01 RX ORDER — ACETAMINOPHEN 325 MG/1
650 TABLET ORAL
Status: CANCELLED | OUTPATIENT
Start: 2020-01-01

## 2020-01-01 RX ORDER — POTASSIUM CHLORIDE 7.45 MG/ML
10 INJECTION INTRAVENOUS
Status: COMPLETED | OUTPATIENT
Start: 2020-01-01 | End: 2020-01-01

## 2020-01-01 RX ORDER — ESCITALOPRAM OXALATE 10 MG/1
5 TABLET ORAL DAILY
Status: DISCONTINUED | OUTPATIENT
Start: 2020-01-01 | End: 2020-01-01 | Stop reason: HOSPADM

## 2020-01-01 RX ORDER — FUROSEMIDE 40 MG/1
40 TABLET ORAL 2 TIMES DAILY
COMMUNITY
End: 2020-01-01

## 2020-01-01 RX ORDER — ONDANSETRON 4 MG/1
4 TABLET, ORALLY DISINTEGRATING ORAL
Status: CANCELLED | OUTPATIENT
Start: 2020-01-01

## 2020-01-01 RX ORDER — POTASSIUM CHLORIDE 750 MG/1
CAPSULE, EXTENDED RELEASE ORAL 2 TIMES DAILY
COMMUNITY
End: 2020-01-01

## 2020-01-01 RX ORDER — AMLODIPINE BESYLATE 5 MG/1
5 TABLET ORAL DAILY
Status: DISCONTINUED | OUTPATIENT
Start: 2020-01-01 | End: 2020-01-01

## 2020-01-01 RX ADMIN — HEPARIN SODIUM 5000 UNITS: 10000 INJECTION, SOLUTION INTRAVENOUS; SUBCUTANEOUS at 20:15

## 2020-01-01 RX ADMIN — PIPERACILLIN AND TAZOBACTAM 3.38 G: 3; .375 INJECTION, POWDER, LYOPHILIZED, FOR SOLUTION INTRAVENOUS at 05:53

## 2020-01-01 RX ADMIN — ESCITALOPRAM OXALATE 5 MG: 10 TABLET ORAL at 09:11

## 2020-01-01 RX ADMIN — PIPERACILLIN AND TAZOBACTAM 3.38 G: 3; .375 INJECTION, POWDER, LYOPHILIZED, FOR SOLUTION INTRAVENOUS at 05:01

## 2020-01-01 RX ADMIN — IPRATROPIUM BROMIDE AND ALBUTEROL SULFATE 3 ML: .5; 3 SOLUTION RESPIRATORY (INHALATION) at 08:17

## 2020-01-01 RX ADMIN — METOPROLOL TARTRATE 50 MG: 50 TABLET, FILM COATED ORAL at 08:32

## 2020-01-01 RX ADMIN — IPRATROPIUM BROMIDE AND ALBUTEROL SULFATE 3 ML: .5; 3 SOLUTION RESPIRATORY (INHALATION) at 08:30

## 2020-01-01 RX ADMIN — ACETYLCYSTEINE 600 MG: 200 SOLUTION ORAL; RESPIRATORY (INHALATION) at 01:25

## 2020-01-01 RX ADMIN — PIPERACILLIN AND TAZOBACTAM 3.38 G: 3; .375 INJECTION, POWDER, LYOPHILIZED, FOR SOLUTION INTRAVENOUS at 05:02

## 2020-01-01 RX ADMIN — PIPERACILLIN AND TAZOBACTAM 3.38 G: 3; .375 INJECTION, POWDER, LYOPHILIZED, FOR SOLUTION INTRAVENOUS at 04:59

## 2020-01-01 RX ADMIN — IPRATROPIUM BROMIDE AND ALBUTEROL SULFATE 3 ML: .5; 3 SOLUTION RESPIRATORY (INHALATION) at 08:40

## 2020-01-01 RX ADMIN — PIPERACILLIN AND TAZOBACTAM 3.38 G: 3; .375 INJECTION, POWDER, LYOPHILIZED, FOR SOLUTION INTRAVENOUS at 13:29

## 2020-01-01 RX ADMIN — METOPROLOL TARTRATE 50 MG: 50 TABLET, FILM COATED ORAL at 08:57

## 2020-01-01 RX ADMIN — PIPERACILLIN AND TAZOBACTAM 3.38 G: 3; .375 INJECTION, POWDER, LYOPHILIZED, FOR SOLUTION INTRAVENOUS at 20:11

## 2020-01-01 RX ADMIN — PIPERACILLIN AND TAZOBACTAM 3.38 G: 3; .375 INJECTION, POWDER, LYOPHILIZED, FOR SOLUTION INTRAVENOUS at 20:35

## 2020-01-01 RX ADMIN — IPRATROPIUM BROMIDE AND ALBUTEROL SULFATE 3 ML: .5; 3 SOLUTION RESPIRATORY (INHALATION) at 08:23

## 2020-01-01 RX ADMIN — PIPERACILLIN AND TAZOBACTAM 3.38 G: 3; .375 INJECTION, POWDER, LYOPHILIZED, FOR SOLUTION INTRAVENOUS at 21:36

## 2020-01-01 RX ADMIN — MEGESTROL ACETATE 400 MG: 40 SUSPENSION ORAL at 08:55

## 2020-01-01 RX ADMIN — ESCITALOPRAM OXALATE 5 MG: 10 TABLET ORAL at 18:18

## 2020-01-01 RX ADMIN — METOPROLOL TARTRATE 25 MG: 25 TABLET, FILM COATED ORAL at 20:35

## 2020-01-01 RX ADMIN — DILTIAZEM HYDROCHLORIDE 120 MG: 60 CAPSULE, EXTENDED RELEASE ORAL at 08:51

## 2020-01-01 RX ADMIN — HEPARIN SODIUM 5000 UNITS: 10000 INJECTION, SOLUTION INTRAVENOUS; SUBCUTANEOUS at 08:47

## 2020-01-01 RX ADMIN — GUAIFENESIN 600 MG: 600 TABLET, EXTENDED RELEASE ORAL at 20:49

## 2020-01-01 RX ADMIN — METOPROLOL TARTRATE 50 MG: 50 TABLET, FILM COATED ORAL at 08:37

## 2020-01-01 RX ADMIN — MOMETASONE FUROATE AND FORMOTEROL FUMARATE DIHYDRATE 2 PUFF: 200; 5 AEROSOL RESPIRATORY (INHALATION) at 08:03

## 2020-01-01 RX ADMIN — IPRATROPIUM BROMIDE AND ALBUTEROL SULFATE 3 ML: .5; 3 SOLUTION RESPIRATORY (INHALATION) at 07:29

## 2020-01-01 RX ADMIN — IPRATROPIUM BROMIDE AND ALBUTEROL SULFATE 3 ML: .5; 3 SOLUTION RESPIRATORY (INHALATION) at 10:19

## 2020-01-01 RX ADMIN — PREDNISONE 10 MG: 5 TABLET ORAL at 09:02

## 2020-01-01 RX ADMIN — METOPROLOL TARTRATE 25 MG: 25 TABLET, FILM COATED ORAL at 09:29

## 2020-01-01 RX ADMIN — PIPERACILLIN AND TAZOBACTAM 3.38 G: 3; .375 INJECTION, POWDER, LYOPHILIZED, FOR SOLUTION INTRAVENOUS at 21:22

## 2020-01-01 RX ADMIN — PIPERACILLIN AND TAZOBACTAM 3.38 G: 3; .375 INJECTION, POWDER, LYOPHILIZED, FOR SOLUTION INTRAVENOUS at 13:12

## 2020-01-01 RX ADMIN — MEGESTROL ACETATE 400 MG: 40 SUSPENSION ORAL at 22:23

## 2020-01-01 RX ADMIN — HEPARIN SODIUM 5000 UNITS: 10000 INJECTION, SOLUTION INTRAVENOUS; SUBCUTANEOUS at 09:00

## 2020-01-01 RX ADMIN — PREDNISONE 10 MG: 5 TABLET ORAL at 08:51

## 2020-01-01 RX ADMIN — METOPROLOL TARTRATE 50 MG: 50 TABLET, FILM COATED ORAL at 20:49

## 2020-01-01 RX ADMIN — IPRATROPIUM BROMIDE AND ALBUTEROL SULFATE 3 ML: .5; 3 SOLUTION RESPIRATORY (INHALATION) at 01:07

## 2020-01-01 RX ADMIN — PIPERACILLIN AND TAZOBACTAM 3.38 G: 3; .375 INJECTION, POWDER, LYOPHILIZED, FOR SOLUTION INTRAVENOUS at 20:15

## 2020-01-01 RX ADMIN — PREDNISONE 10 MG: 5 TABLET ORAL at 08:23

## 2020-01-01 RX ADMIN — PIPERACILLIN AND TAZOBACTAM 3.38 G: 3; .375 INJECTION, POWDER, LYOPHILIZED, FOR SOLUTION INTRAVENOUS at 05:49

## 2020-01-01 RX ADMIN — PIPERACILLIN AND TAZOBACTAM 3.38 G: 3; .375 INJECTION, POWDER, LYOPHILIZED, FOR SOLUTION INTRAVENOUS at 06:28

## 2020-01-01 RX ADMIN — ATORVASTATIN CALCIUM 10 MG: 10 TABLET, FILM COATED ORAL at 08:50

## 2020-01-01 RX ADMIN — DILTIAZEM HYDROCHLORIDE 120 MG: 60 CAPSULE, EXTENDED RELEASE ORAL at 11:07

## 2020-01-01 RX ADMIN — ACETYLCYSTEINE 600 MG: 200 SOLUTION ORAL; RESPIRATORY (INHALATION) at 19:29

## 2020-01-01 RX ADMIN — APIXABAN 2.5 MG: 2.5 TABLET, FILM COATED ORAL at 20:10

## 2020-01-01 RX ADMIN — METOPROLOL TARTRATE 50 MG: 50 TABLET, FILM COATED ORAL at 20:56

## 2020-01-01 RX ADMIN — PIPERACILLIN AND TAZOBACTAM 3.38 G: 3; .375 INJECTION, POWDER, LYOPHILIZED, FOR SOLUTION INTRAVENOUS at 22:33

## 2020-01-01 RX ADMIN — METOPROLOL TARTRATE 50 MG: 50 TABLET, FILM COATED ORAL at 09:16

## 2020-01-01 RX ADMIN — DILTIAZEM HYDROCHLORIDE 120 MG: 60 CAPSULE, EXTENDED RELEASE ORAL at 08:47

## 2020-01-01 RX ADMIN — POTASSIUM CHLORIDE 40 MEQ: 750 TABLET, FILM COATED, EXTENDED RELEASE ORAL at 17:33

## 2020-01-01 RX ADMIN — ACETYLCYSTEINE 200 MG: 200 SOLUTION ORAL; RESPIRATORY (INHALATION) at 10:19

## 2020-01-01 RX ADMIN — METOPROLOL TARTRATE 50 MG: 50 TABLET, FILM COATED ORAL at 20:41

## 2020-01-01 RX ADMIN — MOMETASONE FUROATE AND FORMOTEROL FUMARATE DIHYDRATE 2 PUFF: 200; 5 AEROSOL RESPIRATORY (INHALATION) at 19:32

## 2020-01-01 RX ADMIN — METOPROLOL TARTRATE 50 MG: 50 TABLET, FILM COATED ORAL at 21:01

## 2020-01-01 RX ADMIN — IPRATROPIUM BROMIDE AND ALBUTEROL SULFATE 3 ML: .5; 3 SOLUTION RESPIRATORY (INHALATION) at 14:30

## 2020-01-01 RX ADMIN — MEGESTROL ACETATE 400 MG: 40 SUSPENSION ORAL at 20:02

## 2020-01-01 RX ADMIN — HEPARIN SODIUM 5000 UNITS: 10000 INJECTION, SOLUTION INTRAVENOUS; SUBCUTANEOUS at 09:09

## 2020-01-01 RX ADMIN — MEGESTROL ACETATE 400 MG: 40 SUSPENSION ORAL at 09:16

## 2020-01-01 RX ADMIN — DILTIAZEM HYDROCHLORIDE 120 MG: 60 CAPSULE, EXTENDED RELEASE ORAL at 20:01

## 2020-01-01 RX ADMIN — METOPROLOL TARTRATE 50 MG: 50 TABLET, FILM COATED ORAL at 09:48

## 2020-01-01 RX ADMIN — PIPERACILLIN AND TAZOBACTAM 3.38 G: 3; .375 INJECTION, POWDER, LYOPHILIZED, FOR SOLUTION INTRAVENOUS at 04:40

## 2020-01-01 RX ADMIN — MEGESTROL ACETATE 400 MG: 40 SUSPENSION ORAL at 09:03

## 2020-01-01 RX ADMIN — METOPROLOL TARTRATE 50 MG: 50 TABLET, FILM COATED ORAL at 22:00

## 2020-01-01 RX ADMIN — IPRATROPIUM BROMIDE AND ALBUTEROL SULFATE 3 ML: .5; 3 SOLUTION RESPIRATORY (INHALATION) at 08:35

## 2020-01-01 RX ADMIN — ACETYLCYSTEINE 600 MG: 200 SOLUTION ORAL; RESPIRATORY (INHALATION) at 20:17

## 2020-01-01 RX ADMIN — ACETYLCYSTEINE 600 MG: 200 SOLUTION ORAL; RESPIRATORY (INHALATION) at 01:07

## 2020-01-01 RX ADMIN — HEPARIN SODIUM 5000 UNITS: 10000 INJECTION, SOLUTION INTRAVENOUS; SUBCUTANEOUS at 10:12

## 2020-01-01 RX ADMIN — METOPROLOL TARTRATE 50 MG: 50 TABLET, FILM COATED ORAL at 09:02

## 2020-01-01 RX ADMIN — MEGESTROL ACETATE 400 MG: 40 SUSPENSION ORAL at 23:51

## 2020-01-01 RX ADMIN — ACETYLCYSTEINE 600 MG: 200 SOLUTION ORAL; RESPIRATORY (INHALATION) at 08:17

## 2020-01-01 RX ADMIN — PIPERACILLIN AND TAZOBACTAM 3.38 G: 3; .375 INJECTION, POWDER, LYOPHILIZED, FOR SOLUTION INTRAVENOUS at 20:24

## 2020-01-01 RX ADMIN — ESCITALOPRAM OXALATE 5 MG: 10 TABLET ORAL at 09:12

## 2020-01-01 RX ADMIN — MEGESTROL ACETATE 400 MG: 40 SUSPENSION ORAL at 20:10

## 2020-01-01 RX ADMIN — PIPERACILLIN AND TAZOBACTAM 3.38 G: 3; .375 INJECTION, POWDER, LYOPHILIZED, FOR SOLUTION INTRAVENOUS at 15:11

## 2020-01-01 RX ADMIN — METOPROLOL TARTRATE 50 MG: 50 TABLET, FILM COATED ORAL at 08:23

## 2020-01-01 RX ADMIN — IPRATROPIUM BROMIDE AND ALBUTEROL SULFATE 3 ML: .5; 3 SOLUTION RESPIRATORY (INHALATION) at 14:15

## 2020-01-01 RX ADMIN — ATORVASTATIN CALCIUM 10 MG: 10 TABLET, FILM COATED ORAL at 11:07

## 2020-01-01 RX ADMIN — HEPARIN SODIUM 5000 UNITS: 10000 INJECTION, SOLUTION INTRAVENOUS; SUBCUTANEOUS at 12:54

## 2020-01-01 RX ADMIN — METOPROLOL TARTRATE 50 MG: 50 TABLET, FILM COATED ORAL at 20:45

## 2020-01-01 RX ADMIN — PIPERACILLIN AND TAZOBACTAM 3.38 G: 3; .375 INJECTION, POWDER, LYOPHILIZED, FOR SOLUTION INTRAVENOUS at 19:19

## 2020-01-01 RX ADMIN — IPRATROPIUM BROMIDE AND ALBUTEROL SULFATE 3 ML: .5; 3 SOLUTION RESPIRATORY (INHALATION) at 20:05

## 2020-01-01 RX ADMIN — MEGESTROL ACETATE 400 MG: 40 SUSPENSION ORAL at 09:28

## 2020-01-01 RX ADMIN — MEGESTROL ACETATE 400 MG: 40 SUSPENSION ORAL at 08:32

## 2020-01-01 RX ADMIN — HEPARIN SODIUM 5000 UNITS: 10000 INJECTION, SOLUTION INTRAVENOUS; SUBCUTANEOUS at 20:35

## 2020-01-01 RX ADMIN — PIPERACILLIN AND TAZOBACTAM 3.38 G: 3; .375 INJECTION, POWDER, LYOPHILIZED, FOR SOLUTION INTRAVENOUS at 05:22

## 2020-01-01 RX ADMIN — ATORVASTATIN CALCIUM 10 MG: 10 TABLET, FILM COATED ORAL at 08:24

## 2020-01-01 RX ADMIN — DILTIAZEM HYDROCHLORIDE 120 MG: 60 CAPSULE, EXTENDED RELEASE ORAL at 11:13

## 2020-01-01 RX ADMIN — DILTIAZEM HYDROCHLORIDE 120 MG: 60 CAPSULE, EXTENDED RELEASE ORAL at 20:49

## 2020-01-01 RX ADMIN — PIPERACILLIN AND TAZOBACTAM 3.38 G: 3; .375 INJECTION, POWDER, LYOPHILIZED, FOR SOLUTION INTRAVENOUS at 22:10

## 2020-01-01 RX ADMIN — MEGESTROL ACETATE 400 MG: 40 SUSPENSION ORAL at 21:09

## 2020-01-01 RX ADMIN — PIPERACILLIN AND TAZOBACTAM 3.38 G: 3; .375 INJECTION, POWDER, LYOPHILIZED, FOR SOLUTION INTRAVENOUS at 11:52

## 2020-01-01 RX ADMIN — PIPERACILLIN AND TAZOBACTAM 3.38 G: 3; .375 INJECTION, POWDER, LYOPHILIZED, FOR SOLUTION INTRAVENOUS at 16:07

## 2020-01-01 RX ADMIN — MOMETASONE FUROATE AND FORMOTEROL FUMARATE DIHYDRATE 2 PUFF: 200; 5 AEROSOL RESPIRATORY (INHALATION) at 09:27

## 2020-01-01 RX ADMIN — APIXABAN 2.5 MG: 2.5 TABLET, FILM COATED ORAL at 08:24

## 2020-01-01 RX ADMIN — METOPROLOL TARTRATE 50 MG: 50 TABLET, FILM COATED ORAL at 09:11

## 2020-01-01 RX ADMIN — PIPERACILLIN AND TAZOBACTAM 3.38 G: 3; .375 INJECTION, POWDER, LYOPHILIZED, FOR SOLUTION INTRAVENOUS at 12:18

## 2020-01-01 RX ADMIN — ACETYLCYSTEINE 600 MG: 200 SOLUTION ORAL; RESPIRATORY (INHALATION) at 14:09

## 2020-01-01 RX ADMIN — PIPERACILLIN AND TAZOBACTAM 3.38 G: 3; .375 INJECTION, POWDER, LYOPHILIZED, FOR SOLUTION INTRAVENOUS at 05:13

## 2020-01-01 RX ADMIN — ESCITALOPRAM OXALATE 5 MG: 10 TABLET ORAL at 08:51

## 2020-01-01 RX ADMIN — PIPERACILLIN AND TAZOBACTAM 3.38 G: 3; .375 INJECTION, POWDER, LYOPHILIZED, FOR SOLUTION INTRAVENOUS at 06:07

## 2020-01-01 RX ADMIN — HEPARIN SODIUM 5000 UNITS: 10000 INJECTION, SOLUTION INTRAVENOUS; SUBCUTANEOUS at 21:22

## 2020-01-01 RX ADMIN — PIPERACILLIN AND TAZOBACTAM 3.38 G: 3; .375 INJECTION, POWDER, LYOPHILIZED, FOR SOLUTION INTRAVENOUS at 15:01

## 2020-01-01 RX ADMIN — MEGESTROL ACETATE 400 MG: 40 SUSPENSION ORAL at 08:37

## 2020-01-01 RX ADMIN — PIPERACILLIN AND TAZOBACTAM 3.38 G: 3; .375 INJECTION, POWDER, LYOPHILIZED, FOR SOLUTION INTRAVENOUS at 14:06

## 2020-01-01 RX ADMIN — POTASSIUM CHLORIDE 40 MEQ: 750 TABLET, FILM COATED, EXTENDED RELEASE ORAL at 16:48

## 2020-01-01 RX ADMIN — PIPERACILLIN AND TAZOBACTAM 3.38 G: 3; .375 INJECTION, POWDER, LYOPHILIZED, FOR SOLUTION INTRAVENOUS at 20:36

## 2020-01-01 RX ADMIN — PIPERACILLIN AND TAZOBACTAM 3.38 G: 3; .375 INJECTION, POWDER, LYOPHILIZED, FOR SOLUTION INTRAVENOUS at 20:18

## 2020-01-01 RX ADMIN — APIXABAN 2.5 MG: 2.5 TABLET, FILM COATED ORAL at 20:01

## 2020-01-01 RX ADMIN — ESCITALOPRAM OXALATE 5 MG: 10 TABLET ORAL at 09:16

## 2020-01-01 RX ADMIN — ACETYLCYSTEINE 600 MG: 200 SOLUTION ORAL; RESPIRATORY (INHALATION) at 20:40

## 2020-01-01 RX ADMIN — ACETYLCYSTEINE 600 MG: 200 SOLUTION ORAL; RESPIRATORY (INHALATION) at 08:40

## 2020-01-01 RX ADMIN — METOPROLOL TARTRATE 50 MG: 50 TABLET, FILM COATED ORAL at 22:20

## 2020-01-01 RX ADMIN — PIPERACILLIN AND TAZOBACTAM 3.38 G: 3; .375 INJECTION, POWDER, LYOPHILIZED, FOR SOLUTION INTRAVENOUS at 12:57

## 2020-01-01 RX ADMIN — METOPROLOL TARTRATE 50 MG: 50 TABLET, FILM COATED ORAL at 22:16

## 2020-01-01 RX ADMIN — PIPERACILLIN AND TAZOBACTAM 3.38 G: 3; .375 INJECTION, POWDER, LYOPHILIZED, FOR SOLUTION INTRAVENOUS at 14:09

## 2020-01-01 RX ADMIN — ESCITALOPRAM OXALATE 5 MG: 10 TABLET ORAL at 11:07

## 2020-01-01 RX ADMIN — METOPROLOL TARTRATE 50 MG: 50 TABLET, FILM COATED ORAL at 08:27

## 2020-01-01 RX ADMIN — MEGESTROL ACETATE 400 MG: 40 SUSPENSION ORAL at 09:10

## 2020-01-01 RX ADMIN — PIPERACILLIN AND TAZOBACTAM 3.38 G: 3; .375 INJECTION, POWDER, LYOPHILIZED, FOR SOLUTION INTRAVENOUS at 20:45

## 2020-01-01 RX ADMIN — METOPROLOL TARTRATE 25 MG: 25 TABLET, FILM COATED ORAL at 22:33

## 2020-01-01 RX ADMIN — HEPARIN SODIUM 5000 UNITS: 10000 INJECTION, SOLUTION INTRAVENOUS; SUBCUTANEOUS at 11:00

## 2020-01-01 RX ADMIN — ESCITALOPRAM OXALATE 5 MG: 10 TABLET ORAL at 09:00

## 2020-01-01 RX ADMIN — PIPERACILLIN AND TAZOBACTAM 3.38 G: 3; .375 INJECTION, POWDER, LYOPHILIZED, FOR SOLUTION INTRAVENOUS at 12:54

## 2020-01-01 RX ADMIN — HEPARIN SODIUM 5000 UNITS: 10000 INJECTION, SOLUTION INTRAVENOUS; SUBCUTANEOUS at 22:21

## 2020-01-01 RX ADMIN — METOPROLOL TARTRATE 25 MG: 25 TABLET, FILM COATED ORAL at 10:21

## 2020-01-01 RX ADMIN — PIPERACILLIN AND TAZOBACTAM 3.38 G: 3; .375 INJECTION, POWDER, LYOPHILIZED, FOR SOLUTION INTRAVENOUS at 09:16

## 2020-01-01 RX ADMIN — DILTIAZEM HYDROCHLORIDE 120 MG: 60 CAPSULE, EXTENDED RELEASE ORAL at 08:50

## 2020-01-01 RX ADMIN — HEPARIN SODIUM 5000 UNITS: 10000 INJECTION, SOLUTION INTRAVENOUS; SUBCUTANEOUS at 08:32

## 2020-01-01 RX ADMIN — METOPROLOL TARTRATE 50 MG: 50 TABLET, FILM COATED ORAL at 18:18

## 2020-01-01 RX ADMIN — APIXABAN 2.5 MG: 2.5 TABLET, FILM COATED ORAL at 21:01

## 2020-01-01 RX ADMIN — Medication 1 G: at 21:36

## 2020-01-01 RX ADMIN — POTASSIUM CHLORIDE 10 MEQ: 7.46 INJECTION, SOLUTION INTRAVENOUS at 12:56

## 2020-01-01 RX ADMIN — HEPARIN SODIUM 5000 UNITS: 10000 INJECTION, SOLUTION INTRAVENOUS; SUBCUTANEOUS at 11:49

## 2020-01-01 RX ADMIN — HEPARIN SODIUM 5000 UNITS: 10000 INJECTION, SOLUTION INTRAVENOUS; SUBCUTANEOUS at 23:25

## 2020-01-01 RX ADMIN — SODIUM CHLORIDE 75 ML/HR: 9 INJECTION, SOLUTION INTRAVENOUS at 13:12

## 2020-01-01 RX ADMIN — MOMETASONE FUROATE AND FORMOTEROL FUMARATE DIHYDRATE 2 PUFF: 200; 5 AEROSOL RESPIRATORY (INHALATION) at 19:46

## 2020-01-01 RX ADMIN — IPRATROPIUM BROMIDE AND ALBUTEROL SULFATE 3 ML: .5; 3 SOLUTION RESPIRATORY (INHALATION) at 19:35

## 2020-01-01 RX ADMIN — MEGESTROL ACETATE 400 MG: 40 SUSPENSION ORAL at 08:28

## 2020-01-01 RX ADMIN — HEPARIN SODIUM 5000 UNITS: 10000 INJECTION, SOLUTION INTRAVENOUS; SUBCUTANEOUS at 22:00

## 2020-01-01 RX ADMIN — HEPARIN SODIUM 5000 UNITS: 10000 INJECTION, SOLUTION INTRAVENOUS; SUBCUTANEOUS at 09:28

## 2020-01-01 RX ADMIN — METOPROLOL TARTRATE 50 MG: 50 TABLET, FILM COATED ORAL at 08:51

## 2020-01-01 RX ADMIN — MEGESTROL ACETATE 400 MG: 40 SUSPENSION ORAL at 20:42

## 2020-01-01 RX ADMIN — MEGESTROL ACETATE 400 MG: 40 SUSPENSION ORAL at 11:05

## 2020-01-01 RX ADMIN — ACETYLCYSTEINE 600 MG: 200 SOLUTION ORAL; RESPIRATORY (INHALATION) at 07:29

## 2020-01-01 RX ADMIN — MEGESTROL ACETATE 400 MG: 40 SUSPENSION ORAL at 23:02

## 2020-01-01 RX ADMIN — MIRTAZAPINE 15 MG: 15 TABLET, ORALLY DISINTEGRATING ORAL at 22:35

## 2020-01-01 RX ADMIN — GUAIFENESIN 600 MG: 600 TABLET, EXTENDED RELEASE ORAL at 09:02

## 2020-01-01 RX ADMIN — HEPARIN SODIUM 5000 UNITS: 10000 INJECTION, SOLUTION INTRAVENOUS; SUBCUTANEOUS at 10:22

## 2020-01-01 RX ADMIN — METOPROLOL TARTRATE 50 MG: 50 TABLET, FILM COATED ORAL at 08:47

## 2020-01-01 RX ADMIN — PIPERACILLIN AND TAZOBACTAM 3.38 G: 3; .375 INJECTION, POWDER, LYOPHILIZED, FOR SOLUTION INTRAVENOUS at 18:18

## 2020-01-01 RX ADMIN — DILTIAZEM HYDROCHLORIDE 120 MG: 60 CAPSULE, EXTENDED RELEASE ORAL at 09:02

## 2020-01-01 RX ADMIN — AMLODIPINE BESYLATE 5 MG: 5 TABLET ORAL at 08:47

## 2020-01-01 RX ADMIN — HEPARIN SODIUM 5000 UNITS: 10000 INJECTION, SOLUTION INTRAVENOUS; SUBCUTANEOUS at 09:16

## 2020-01-01 RX ADMIN — ATORVASTATIN CALCIUM 10 MG: 10 TABLET, FILM COATED ORAL at 23:01

## 2020-01-01 RX ADMIN — MEGESTROL ACETATE 400 MG: 40 SUSPENSION ORAL at 08:51

## 2020-01-01 RX ADMIN — HEPARIN SODIUM 5000 UNITS: 10000 INJECTION, SOLUTION INTRAVENOUS; SUBCUTANEOUS at 11:02

## 2020-01-01 RX ADMIN — IPRATROPIUM BROMIDE AND ALBUTEROL SULFATE 3 ML: .5; 3 SOLUTION RESPIRATORY (INHALATION) at 20:17

## 2020-01-01 RX ADMIN — MEGESTROL ACETATE 400 MG: 40 SUSPENSION ORAL at 22:16

## 2020-01-01 RX ADMIN — FUROSEMIDE 40 MG: 10 INJECTION, SOLUTION INTRAMUSCULAR; INTRAVENOUS at 17:06

## 2020-01-01 RX ADMIN — POTASSIUM CHLORIDE 10 MEQ: 7.46 INJECTION, SOLUTION INTRAVENOUS at 14:50

## 2020-01-01 RX ADMIN — DILTIAZEM HYDROCHLORIDE 120 MG: 60 CAPSULE, EXTENDED RELEASE ORAL at 08:23

## 2020-01-01 RX ADMIN — ACETYLCYSTEINE 600 MG: 200 SOLUTION ORAL; RESPIRATORY (INHALATION) at 08:23

## 2020-01-01 RX ADMIN — SODIUM CHLORIDE 75 ML/HR: 9 INJECTION, SOLUTION INTRAVENOUS at 11:00

## 2020-01-01 RX ADMIN — ACETYLCYSTEINE 600 MG: 200 SOLUTION ORAL; RESPIRATORY (INHALATION) at 08:36

## 2020-01-01 RX ADMIN — GUAIFENESIN 600 MG: 600 TABLET, EXTENDED RELEASE ORAL at 08:23

## 2020-01-01 RX ADMIN — PIPERACILLIN AND TAZOBACTAM 3.38 G: 3; .375 INJECTION, POWDER, LYOPHILIZED, FOR SOLUTION INTRAVENOUS at 04:27

## 2020-01-01 RX ADMIN — IPRATROPIUM BROMIDE AND ALBUTEROL SULFATE 3 ML: .5; 3 SOLUTION RESPIRATORY (INHALATION) at 13:25

## 2020-01-01 RX ADMIN — IPRATROPIUM BROMIDE AND ALBUTEROL SULFATE 3 ML: .5; 3 SOLUTION RESPIRATORY (INHALATION) at 16:35

## 2020-01-01 RX ADMIN — DEXTROSE AND SODIUM CHLORIDE 50 ML/HR: 5; 900 INJECTION, SOLUTION INTRAVENOUS at 05:58

## 2020-01-01 RX ADMIN — HEPARIN SODIUM 5000 UNITS: 10000 INJECTION, SOLUTION INTRAVENOUS; SUBCUTANEOUS at 23:06

## 2020-01-01 RX ADMIN — APIXABAN 2.5 MG: 2.5 TABLET, FILM COATED ORAL at 09:02

## 2020-01-01 RX ADMIN — HEPARIN SODIUM 5000 UNITS: 10000 INJECTION, SOLUTION INTRAVENOUS; SUBCUTANEOUS at 22:33

## 2020-01-01 RX ADMIN — MOMETASONE FUROATE AND FORMOTEROL FUMARATE DIHYDRATE 2 PUFF: 200; 5 AEROSOL RESPIRATORY (INHALATION) at 20:14

## 2020-01-01 RX ADMIN — POTASSIUM CHLORIDE 10 MEQ: 7.46 INJECTION, SOLUTION INTRAVENOUS at 11:19

## 2020-01-01 RX ADMIN — METOPROLOL TARTRATE 50 MG: 50 TABLET, FILM COATED ORAL at 22:21

## 2020-01-01 RX ADMIN — PIPERACILLIN AND TAZOBACTAM 3.38 G: 3; .375 INJECTION, POWDER, LYOPHILIZED, FOR SOLUTION INTRAVENOUS at 22:00

## 2020-01-01 RX ADMIN — HEPARIN SODIUM 5000 UNITS: 10000 INJECTION, SOLUTION INTRAVENOUS; SUBCUTANEOUS at 21:09

## 2020-01-01 RX ADMIN — HEPARIN SODIUM 5000 UNITS: 10000 INJECTION, SOLUTION INTRAVENOUS; SUBCUTANEOUS at 23:09

## 2020-01-01 RX ADMIN — MEGESTROL ACETATE 400 MG: 40 SUSPENSION ORAL at 09:12

## 2020-01-01 RX ADMIN — PIPERACILLIN AND TAZOBACTAM 3.38 G: 3; .375 INJECTION, POWDER, LYOPHILIZED, FOR SOLUTION INTRAVENOUS at 23:02

## 2020-01-01 RX ADMIN — HEPARIN SODIUM 5000 UNITS: 10000 INJECTION, SOLUTION INTRAVENOUS; SUBCUTANEOUS at 08:56

## 2020-01-01 RX ADMIN — ESCITALOPRAM OXALATE 5 MG: 10 TABLET ORAL at 09:28

## 2020-01-01 RX ADMIN — ACETYLCYSTEINE 600 MG: 200 SOLUTION ORAL; RESPIRATORY (INHALATION) at 20:44

## 2020-01-01 RX ADMIN — METOPROLOL TARTRATE 25 MG: 25 TABLET, FILM COATED ORAL at 20:23

## 2020-01-01 RX ADMIN — PIPERACILLIN AND TAZOBACTAM 3.38 G: 3; .375 INJECTION, POWDER, LYOPHILIZED, FOR SOLUTION INTRAVENOUS at 05:47

## 2020-01-01 RX ADMIN — HEPARIN SODIUM 5000 UNITS: 10000 INJECTION, SOLUTION INTRAVENOUS; SUBCUTANEOUS at 20:45

## 2020-01-01 RX ADMIN — IPRATROPIUM BROMIDE AND ALBUTEROL SULFATE 3 ML: .5; 3 SOLUTION RESPIRATORY (INHALATION) at 15:06

## 2020-01-01 RX ADMIN — METOPROLOL TARTRATE 50 MG: 50 TABLET, FILM COATED ORAL at 20:35

## 2020-01-01 RX ADMIN — GUAIFENESIN 600 MG: 600 TABLET, EXTENDED RELEASE ORAL at 21:01

## 2020-01-01 RX ADMIN — APIXABAN 2.5 MG: 2.5 TABLET, FILM COATED ORAL at 20:49

## 2020-01-01 RX ADMIN — METOPROLOL TARTRATE 50 MG: 50 TABLET, FILM COATED ORAL at 20:10

## 2020-01-01 RX ADMIN — ACETYLCYSTEINE 600 MG: 200 SOLUTION ORAL; RESPIRATORY (INHALATION) at 14:15

## 2020-01-01 RX ADMIN — HEPARIN SODIUM 5000 UNITS: 10000 INJECTION, SOLUTION INTRAVENOUS; SUBCUTANEOUS at 11:13

## 2020-01-01 RX ADMIN — ESCITALOPRAM OXALATE 5 MG: 10 TABLET ORAL at 08:24

## 2020-01-01 RX ADMIN — METOPROLOL TARTRATE 50 MG: 50 TABLET, FILM COATED ORAL at 09:08

## 2020-01-01 RX ADMIN — METOPROLOL TARTRATE 50 MG: 50 TABLET, FILM COATED ORAL at 09:12

## 2020-01-01 RX ADMIN — DILTIAZEM HYDROCHLORIDE 120 MG: 60 CAPSULE, EXTENDED RELEASE ORAL at 20:41

## 2020-01-01 RX ADMIN — HEPARIN SODIUM 5000 UNITS: 10000 INJECTION, SOLUTION INTRAVENOUS; SUBCUTANEOUS at 08:37

## 2020-01-01 RX ADMIN — MEGESTROL ACETATE 400 MG: 40 SUSPENSION ORAL at 08:46

## 2020-01-01 RX ADMIN — PIPERACILLIN AND TAZOBACTAM 3.38 G: 3; .375 INJECTION, POWDER, LYOPHILIZED, FOR SOLUTION INTRAVENOUS at 16:24

## 2020-01-01 RX ADMIN — METOPROLOL TARTRATE 50 MG: 50 TABLET, FILM COATED ORAL at 20:01

## 2020-01-01 RX ADMIN — HEPARIN SODIUM 5000 UNITS: 10000 INJECTION, SOLUTION INTRAVENOUS; SUBCUTANEOUS at 20:23

## 2020-01-01 RX ADMIN — MOMETASONE FUROATE AND FORMOTEROL FUMARATE DIHYDRATE 2 PUFF: 200; 5 AEROSOL RESPIRATORY (INHALATION) at 08:13

## 2020-01-01 RX ADMIN — IPRATROPIUM BROMIDE AND ALBUTEROL SULFATE 3 ML: .5; 3 SOLUTION RESPIRATORY (INHALATION) at 01:25

## 2020-01-01 RX ADMIN — AMLODIPINE BESYLATE 5 MG: 5 TABLET ORAL at 23:01

## 2020-01-01 RX ADMIN — METOPROLOL TARTRATE 25 MG: 25 TABLET, FILM COATED ORAL at 20:14

## 2020-01-01 RX ADMIN — METOPROLOL TARTRATE 50 MG: 50 TABLET, FILM COATED ORAL at 21:46

## 2020-01-01 RX ADMIN — METOPROLOL TARTRATE 25 MG: 25 TABLET, FILM COATED ORAL at 08:56

## 2020-01-01 RX ADMIN — MEGESTROL ACETATE 400 MG: 40 SUSPENSION ORAL at 08:27

## 2020-01-01 RX ADMIN — POTASSIUM CHLORIDE 10 MEQ: 7.46 INJECTION, SOLUTION INTRAVENOUS at 15:00

## 2020-01-01 RX ADMIN — METOPROLOL TARTRATE 25 MG: 25 TABLET, FILM COATED ORAL at 09:28

## 2020-01-01 RX ADMIN — ESCITALOPRAM OXALATE 5 MG: 10 TABLET ORAL at 08:47

## 2020-01-01 RX ADMIN — IPRATROPIUM BROMIDE AND ALBUTEROL SULFATE 3 ML: .5; 3 SOLUTION RESPIRATORY (INHALATION) at 20:40

## 2020-01-01 RX ADMIN — HEPARIN SODIUM 5000 UNITS: 10000 INJECTION, SOLUTION INTRAVENOUS; SUBCUTANEOUS at 09:11

## 2020-01-01 RX ADMIN — IPRATROPIUM BROMIDE AND ALBUTEROL SULFATE 3 ML: .5; 3 SOLUTION RESPIRATORY (INHALATION) at 20:44

## 2020-01-01 RX ADMIN — HEPARIN SODIUM 5000 UNITS: 10000 INJECTION, SOLUTION INTRAVENOUS; SUBCUTANEOUS at 09:12

## 2020-01-01 RX ADMIN — PIPERACILLIN AND TAZOBACTAM 3.38 G: 3; .375 INJECTION, POWDER, LYOPHILIZED, FOR SOLUTION INTRAVENOUS at 05:58

## 2020-01-01 RX ADMIN — MEGESTROL ACETATE 400 MG: 40 SUSPENSION ORAL at 20:56

## 2020-01-01 RX ADMIN — HEPARIN SODIUM 5000 UNITS: 10000 INJECTION, SOLUTION INTRAVENOUS; SUBCUTANEOUS at 23:01

## 2020-01-01 RX ADMIN — ACETYLCYSTEINE 600 MG: 200 SOLUTION ORAL; RESPIRATORY (INHALATION) at 19:35

## 2020-01-01 RX ADMIN — HEPARIN SODIUM 5000 UNITS: 10000 INJECTION, SOLUTION INTRAVENOUS; SUBCUTANEOUS at 20:20

## 2020-01-01 RX ADMIN — APIXABAN 2.5 MG: 2.5 TABLET, FILM COATED ORAL at 08:51

## 2020-01-01 RX ADMIN — METOPROLOL TARTRATE 50 MG: 50 TABLET, FILM COATED ORAL at 09:28

## 2020-01-01 RX ADMIN — PIPERACILLIN AND TAZOBACTAM 3.38 G: 3; .375 INJECTION, POWDER, LYOPHILIZED, FOR SOLUTION INTRAVENOUS at 04:09

## 2020-01-01 RX ADMIN — POTASSIUM CHLORIDE 40 MEQ: 750 TABLET, FILM COATED, EXTENDED RELEASE ORAL at 12:27

## 2020-01-01 RX ADMIN — ATORVASTATIN CALCIUM 10 MG: 10 TABLET, FILM COATED ORAL at 08:47

## 2020-01-01 RX ADMIN — METOPROLOL TARTRATE 25 MG: 25 TABLET, FILM COATED ORAL at 11:01

## 2020-01-01 RX ADMIN — GUAIFENESIN 600 MG: 600 TABLET, EXTENDED RELEASE ORAL at 08:51

## 2020-01-01 RX ADMIN — PIPERACILLIN AND TAZOBACTAM 3.38 G: 3; .375 INJECTION, POWDER, LYOPHILIZED, FOR SOLUTION INTRAVENOUS at 05:19

## 2020-01-01 RX ADMIN — PIPERACILLIN AND TAZOBACTAM 3.38 G: 3; .375 INJECTION, POWDER, LYOPHILIZED, FOR SOLUTION INTRAVENOUS at 15:14

## 2020-01-01 RX ADMIN — ACETYLCYSTEINE 600 MG: 200 SOLUTION ORAL; RESPIRATORY (INHALATION) at 08:31

## 2020-01-01 RX ADMIN — ATORVASTATIN CALCIUM 10 MG: 10 TABLET, FILM COATED ORAL at 09:02

## 2020-01-01 RX ADMIN — METOPROLOL TARTRATE 50 MG: 50 TABLET, FILM COATED ORAL at 23:01

## 2020-01-01 RX ADMIN — METOPROLOL TARTRATE 25 MG: 25 TABLET, FILM COATED ORAL at 10:12

## 2020-01-01 RX ADMIN — GUAIFENESIN 600 MG: 600 TABLET, EXTENDED RELEASE ORAL at 20:01

## 2020-01-01 RX ADMIN — METOPROLOL TARTRATE 50 MG: 50 TABLET, FILM COATED ORAL at 20:15

## 2020-01-01 RX ADMIN — FUROSEMIDE 40 MG: 10 INJECTION, SOLUTION INTRAMUSCULAR; INTRAVENOUS at 20:20

## 2020-01-01 RX ADMIN — ESCITALOPRAM OXALATE 5 MG: 10 TABLET ORAL at 11:13

## 2020-01-01 RX ADMIN — HEPARIN SODIUM 5000 UNITS: 10000 INJECTION, SOLUTION INTRAVENOUS; SUBCUTANEOUS at 09:48

## 2020-01-01 RX ADMIN — DILTIAZEM HYDROCHLORIDE 120 MG: 60 CAPSULE, EXTENDED RELEASE ORAL at 21:01

## 2020-01-01 RX ADMIN — IPRATROPIUM BROMIDE AND ALBUTEROL SULFATE 3 ML: .5; 3 SOLUTION RESPIRATORY (INHALATION) at 14:20

## 2020-01-01 RX ADMIN — MEGESTROL ACETATE 400 MG: 40 SUSPENSION ORAL at 21:57

## 2020-01-01 RX ADMIN — ACETYLCYSTEINE 600 MG: 200 SOLUTION ORAL; RESPIRATORY (INHALATION) at 20:05

## 2020-01-01 RX ADMIN — IPRATROPIUM BROMIDE AND ALBUTEROL SULFATE 3 ML: .5; 3 SOLUTION RESPIRATORY (INHALATION) at 19:27

## 2020-01-01 RX ADMIN — PIPERACILLIN AND TAZOBACTAM 3.38 G: 3; .375 INJECTION, POWDER, LYOPHILIZED, FOR SOLUTION INTRAVENOUS at 15:13

## 2020-01-01 RX ADMIN — MEGESTROL ACETATE 400 MG: 40 SUSPENSION ORAL at 21:49

## 2020-01-01 RX ADMIN — METOPROLOL TARTRATE 50 MG: 50 TABLET, FILM COATED ORAL at 11:07

## 2020-01-01 RX ADMIN — IPRATROPIUM BROMIDE AND ALBUTEROL SULFATE 3 ML: .5; 3 SOLUTION RESPIRATORY (INHALATION) at 14:09

## 2020-01-01 RX ADMIN — HEPARIN SODIUM 5000 UNITS: 10000 INJECTION, SOLUTION INTRAVENOUS; SUBCUTANEOUS at 13:12

## 2020-01-01 RX ADMIN — ATORVASTATIN CALCIUM 10 MG: 10 TABLET, FILM COATED ORAL at 08:51

## 2020-01-01 RX ADMIN — PIPERACILLIN AND TAZOBACTAM 3.38 G: 3; .375 INJECTION, POWDER, LYOPHILIZED, FOR SOLUTION INTRAVENOUS at 12:21

## 2020-01-01 RX ADMIN — AMLODIPINE BESYLATE 5 MG: 5 TABLET ORAL at 11:07

## 2020-01-01 RX ADMIN — ACETYLCYSTEINE 600 MG: 200 SOLUTION ORAL; RESPIRATORY (INHALATION) at 15:05

## 2020-01-01 RX ADMIN — MEGESTROL ACETATE 400 MG: 40 SUSPENSION ORAL at 21:01

## 2020-01-01 RX ADMIN — DILTIAZEM HYDROCHLORIDE 120 MG: 60 CAPSULE, EXTENDED RELEASE ORAL at 22:16

## 2020-01-01 RX ADMIN — METOPROLOL TARTRATE 25 MG: 25 TABLET, FILM COATED ORAL at 20:11

## 2020-01-01 RX ADMIN — LABETALOL HYDROCHLORIDE 20 MG: 5 INJECTION, SOLUTION INTRAVENOUS at 11:38

## 2020-01-01 RX ADMIN — METOPROLOL TARTRATE 50 MG: 50 TABLET, FILM COATED ORAL at 21:49

## 2020-01-01 RX ADMIN — METOPROLOL TARTRATE 50 MG: 50 TABLET, FILM COATED ORAL at 11:49

## 2020-01-01 RX ADMIN — ESCITALOPRAM OXALATE 5 MG: 10 TABLET ORAL at 08:27

## 2020-01-01 RX ADMIN — METOPROLOL TARTRATE 25 MG: 25 TABLET, FILM COATED ORAL at 09:00

## 2020-01-01 RX ADMIN — MEGESTROL ACETATE 400 MG: 40 SUSPENSION ORAL at 21:46

## 2020-01-01 RX ADMIN — DILTIAZEM HYDROCHLORIDE 120 MG: 60 CAPSULE, EXTENDED RELEASE ORAL at 20:10

## 2020-01-01 RX ADMIN — PIPERACILLIN AND TAZOBACTAM 3.38 G: 3; .375 INJECTION, POWDER, LYOPHILIZED, FOR SOLUTION INTRAVENOUS at 20:22

## 2020-01-01 RX ADMIN — METOPROLOL TARTRATE 25 MG: 25 TABLET, FILM COATED ORAL at 19:19

## 2020-10-26 NOTE — ED TRIAGE NOTES
[facility] was called today d/t a fall that was unwitnessed around 10 am; c/o right shoulder pain unknown LOC and unsure if on blood thinners BG 105fa

## 2020-10-26 NOTE — ED PROVIDER NOTES
EMERGENCY DEPARTMENT HISTORY AND PHYSICAL EXAM 
 
 
Date: 10/26/2020 Patient Name: Destini Burton History of Presenting Illness Chief Complaint Patient presents with  Fall  Shoulder Pain History Provided By: Patient HPI: Destini Burton, 79 y.o. female with a past medical history significant for dementia, a-fib on eliquis, CAD, CHF, HTN, hyperlipidemia presents to the ED with cc of gradual worsening, constant right shoulder pain which started around 10 AM today status post unwitnessed ground-level fall. Patient reports she tripped and fell. Symptoms exacerbated with movement. Alleviated with rest.  She has not taken any medications for symptoms. She is not having any other complaints at this time. Per chart review, patient is on Eliquis for A. fib. Denies any tobacco, all, illicit drug use. Patient denies fever, chills, chest pain, shortness of breath, nausea, vomiting, diarrhea, back pain, neck pain. There are no other complaints, changes, or physical findings at this time. PCP: Gary Carpenter MD 
 
No current facility-administered medications on file prior to encounter. No current outpatient medications on file prior to encounter. Past History Past Medical History: 
Past Medical History:  
Diagnosis Date  Dementia ([facility] [address]  Heart failure ([facility] [address] Past Surgical History: No past surgical history on file. Family History: No family history on file. Social History: 
Social History Tobacco Use  Smoking status: Not on file Substance Use Topics  Alcohol use: Not on file  Drug use: Not on file Allergies: 
No Known Allergies Review of Systems Review of Systems Constitutional: Negative for chills, fatigue and fever. HENT: Negative. Respiratory: Negative for cough, chest tightness, shortness of breath and wheezing. Cardiovascular: Negative for chest pain and palpitations. Gastrointestinal: Negative for abdominal pain, diarrhea, nausea and vomiting. Genitourinary: Negative for frequency and urgency. Musculoskeletal: Positive for arthralgias (R shoulder). Negative for back pain, neck pain and neck stiffness. Skin: Negative for rash. Neurological: Negative for dizziness, weakness, light-headedness and headaches. Psychiatric/Behavioral: Negative. All other systems reviewed and are negative. Physical Exam  
 
Physical Exam 
Vitals signs and nursing note reviewed. Constitutional:   
   General: She is not in acute distress. Appearance: Normal appearance. She is well-developed. She is not ill-appearing, toxic-appearing or diaphoretic. Comments: Elderly AA female, A&Ox3 HENT:  
   Head: Normocephalic and atraumatic. Nose: Nose normal. No congestion or rhinorrhea. Mouth/Throat:  
   Mouth: Mucous membranes are moist.  
   Pharynx: Oropharynx is clear. No oropharyngeal exudate or posterior oropharyngeal erythema. Eyes:  
   General: No scleral icterus. Conjunctiva/sclera: Conjunctivae normal.  
   Pupils: Pupils are equal, round, and reactive to light. Neck: Musculoskeletal: Normal range of motion and neck supple. No neck rigidity or muscular tenderness. Comments: No midline tenderness. No step-off. No paraspinal tenderness Cardiovascular:  
   Rate and Rhythm: Normal rate and regular rhythm. Pulses:     
     Radial pulses are 2+ on the right side and 2+ on the left side. Dorsalis pedis pulses are 2+ on the right side and 2+ on the left side. Heart sounds: No murmur. No friction rub. No gallop. Pulmonary:  
   Effort: Pulmonary effort is normal. No tachypnea, accessory muscle usage, respiratory distress or retractions. Breath sounds: Normal breath sounds. No stridor. No decreased breath sounds, wheezing, rhonchi or rales. Chest:  
   Chest wall: No tenderness. Abdominal: General: Bowel sounds are normal. There is no distension. Palpations: Abdomen is soft. There is no mass. Tenderness: There is no abdominal tenderness. There is no right CVA tenderness, left CVA tenderness, guarding or rebound. Musculoskeletal: Normal range of motion. General: No deformity. Right lower leg: No edema. Left lower leg: No edema. Comments: Back: No midline tenderness. No step-off. No paraspinal tenderness Right shoulder tenderness to palpation with slight decreased range of motion secondary to pain. No swelling. No tenderness to the right clavicle. 2+ radial pulse bilaterally. Capillary fill less than 2 seconds. Sensation intact distally Gross palpation of all other extremities reveal no tenderness with good range of motion. No tenderness to hips. Able to move both legs off of the bed and move all extremities spontaneously without tenderness. Skin: 
   General: Skin is warm and dry. Capillary Refill: Capillary refill takes less than 2 seconds. Coloration: Skin is not jaundiced or pale. Findings: No bruising, erythema or rash. Neurological:  
   General: No focal deficit present. Mental Status: She is alert and oriented to person, place, and time. Mental status is at baseline. Sensory: Sensation is intact. Motor: Motor function is intact. Psychiatric:     
   Mood and Affect: Mood normal.     
   Behavior: Behavior normal. Behavior is cooperative. Thought Content: Thought content normal.     
   Judgment: Judgment normal.  
 
 
 
Diagnostic Study Results Labs - No results found for this or any previous visit (from the past 12 hour(s)). Radiologic Studies -  
CT Results  (Last 48 hours) 10/26/20 1957  CT HEAD WO CONT Final result Impression:  Impression: No acute abnormality demonstrated. Narrative:  Dose Reduction: All CT scans at this facility are performed using dose reduction optimization  
techniques as appropriate to a performed exam including the following: Automated  
exposure control, adjustments of the mA and/or kV according to patient size, or  
use of iterative reconstruction technique. CT of the head without contrast. Axial images of the head were obtained and  
enhanced in sagittal and coronal reconstructions are created. No prior films for  
comparison. Ventricles are normal in size shape and position. No shift of the  
midline or mass is seen. No pathologic extra-axial fluid collection is  
identified. There is no evidence of acute hemorrhage or infarction. No bony  
abnormality is seen. An empty sella is incidentally noted. The exam is otherwise  
unremarkable. XR Results (most recent): 
Results from Hospital Encounter encounter on 10/26/20 XR SHOULDER RT AP/LAT MIN 2 V Narrative 3 views of the right shoulder reveal diffuse osteopenia. There is mild 
degenerative subluxation of the humerus with respect to the glenoid. No fracture 
or dislocation is identified. Impression IMPRESSION: Degenerative changes. Medical Decision Making I am the first provider for this patient. I reviewed the vital signs, available nursing notes, past medical history, past surgical history, family history and social history. Vital Signs-Reviewed the patient's vital signs. Patient Vitals for the past 12 hrs: 
 Temp Pulse Resp BP SpO2  
10/26/20 2124 98.3 °F (36.8 °C) (!) 110 16 (!) 166/107 96 % 10/26/20 1611 97.9 °F (36.6 °C) (!) 114 16 (!) 202/116 93 % Records Reviewed: Nursing Notes and Old Medical Records The patient presents with R shoulder pain, fall with a differential diagnosis of fx, sprain, strain, contusion, ICH Provider Notes (Medical Decision Making): MDM Number of Diagnoses or Management Options Acute pain of right shoulder:  
Fall, initial encounter: Diagnosis management comments:  
 
67-year-old here for evaluation of right shoulder pain after fall. History of dementia but is ANO x3 and able to give me a very complete history. Is on Eliquis. X-ray right shoulder which was unremarkable. CT head was also unremarkable. Will place patient in right upper extremity sling and discharged home. Recommend Tylenol for symptomatic relief. Amount and/or Complexity of Data Reviewed Tests in the radiology section of CPT®: ordered and reviewed Review and summarize past medical records: yes Patient Progress Patient progress: stable ED Course:  
Initial assessment performed. The patients presenting problems have been discussed, and they are in agreement with the care plan formulated and outlined with them. I have encouraged them to ask questions as they arise throughout their visit. PLAN: 
1. There are no discharge medications for this patient. 2.  
Follow-up Information Follow up With Specialties Details Why Contact [address] EMERGENCY DEPT Emergency Medicine  As needed, If symptoms worsen [address] [address] [phone] Primary Care  In 2 days As needed Return to ED if worse Diagnosis Clinical Impression: 1. Fall, initial encounter 2. Acute pain of right shoulder DISCHARGE NOTE: 
Pt has been reexamined. Pt has no new complaints, changes, or physical findings. Care plan outlined and precautions discussed. All available results reviewed with pt. All medications reviewed with pt. All of pts questions and concerns addressed. Pt agrees to f/u as instructed and agrees to return to ED upon further deterioration. Pt is ready to go home. Please note that this dictation was completed with [employer] the [employer] voice recognition software.   Quite often unanticipated grammatical, syntax, homophones, and other interpretive errors are inadvertently transcribed by the computer software. Please disregard these errors. Please excuse any errors that have escaped final proofreading. Thank you.  
HUSEYIN Rock

## 2020-10-27 NOTE — ED NOTES
Past Medical History:  
Diagnosis Date  Dementia ([facility] [address]  Heart failure ([facility] [address] No past surgical history on file. Pt with PMH of dementia presents to ED for right arm and shoulder pain s/p fall, oriented to room and call bell, assessment completed, provider at bedside for eval, xray completed, plan of care reviewed, call bell within reach, side rails up x2, will continue to monitor. 9:32 PM 
Sling applied to right arm as ordered.

## 2020-11-03 PROBLEM — E87.1 HYPONATREMIA: Status: ACTIVE | Noted: 2020-01-01

## 2020-11-03 NOTE — Clinical Note
One step catheter removed, manual pressure held at site, bandaid applied, dry and intact. Total amt drained 1260ml.

## 2020-11-03 NOTE — PROGRESS NOTES
CM asked to see pt by MD. Family requesting placement in a SNF for rehab, d/y weakness & decreased mobility. Pt lives alone & unable to care for herself. Choice form & list reviewed & signed. Pt/family requesting Stephanie d/t pt being there before & facility close to family. Referral sent via [employer].

## 2020-11-03 NOTE — ED PROVIDER NOTES
EMERGENCY DEPARTMENT HISTORY AND PHYSICAL EXAM 
 
 
 
Date: 11/3/2020 Patient Name: Clarence Bolaños History of Presenting Illness Chief Complaint Patient presents with  Lethargy History Provided By: Patient HPI: Clarence Bolaños, 79 y.o. female with past medical history of dementia, CHF, hyperlipidemia, and hypertension who presents with generalized lethargy. Patient fell about a week ago and was seen here. She was discharged home in a sling on her right arm. Having failure to thrive. She has not been eating or drinking as much. She is not been able to stand on her own to get to the bathroom. She lives alone currently but does have family that stops by however they feel it is unsafe for her to be alone in her home. PCP: Andrew Rasmussen MD 
 
 
 
Past History Past Medical History: 
Past Medical History:  
Diagnosis Date  Anemia  Dementia ([address]  Heart failure ([facility] [address]  High cholesterol  Hypertension  Low blood potassium Past Surgical History: 
History reviewed. No pertinent surgical history. Family History: 
History reviewed. No pertinent family history. Social History: 
Social History Tobacco Use  Smoking status: Never Smoker  Smokeless tobacco: Never Used Substance Use Topics  Alcohol use: Not Currently  Drug use: Never Allergies: 
No Known Allergies Review of Systems Review of Systems Unable to perform ROS: Dementia Physical Exam  
General: No acute distressed. Well-nourished. Skin: No rash. Head: Normocephalic. Atraumatic. Eye: No proptosis or conjunctival injections. Respiratory: No apparent respiratory distress. Gastrointestinal: Nondistended. Musculoskeletal: No obvious bony deformities. Psychiatric: Cooperative. Appropriate mood and affect. Diagnostic Study Results Labs - Recent Results (from the past 24 hour(s)) URINALYSIS W/ REFLEX CULTURE  Collection Time: 11/03/20  2:45 PM  
 Specimen: Urine Result Value Ref Range Color Yellow/Straw Appearance Clear Clear Specific gravity 1.010 1.003 - 1.030    
 pH (UA) 6.5 5.0 - 8.0 Protein Negative Negative mg/dL Glucose Normal (A) Negative mg/dL Ketone Negative Negative mg/dL Bilirubin Negative Negative Blood Negative Negative Urobilinogen Normal 0.1 - 1.0 EU/dL Nitrites Negative Negative Leukocyte Esterase Negative Negative WBC 0-4 0 - 4 /hpf  
 RBC 0-5 0 - 5 /hpf Bacteria Negative Negative /hpf  
 UA:UC IF INDICATED Culture not indicated by UA result Culture not indicated by UA result Hyaline cast 5-10 0 - 5 /lpf  
CBC WITH AUTOMATED DIFF Collection Time: 11/03/20  3:00 PM  
Result Value Ref Range WBC 9.6 3.6 - 11.0 K/uL  
 RBC 5.88 (H) 3.80 - 5.20 M/uL  
 HGB 15.2 11.5 - 16.0 g/dL HCT 44.1 35.0 - 47.0 % MCV 75.0 (L) 80.0 - 99.0 FL  
 MCH 25.9 (L) 26.0 - 34.0 PG  
 MCHC 34.5 30.0 - 36.5 g/dL  
 RDW 16.4 (H) 11.5 - 14.5 % PLATELET 752 (H) 985 - 400 K/uL MPV 10.4 8.9 - 12.9 FL  
 NEUTROPHILS 80 (H) 32 - 75 % LYMPHOCYTES 5 (L) 12 - 49 % MONOCYTES 14 (H) 5 - 13 % EOSINOPHILS 0 0 - 7 % BASOPHILS 0 0 - 1 % IMMATURE GRANULOCYTES 1 (H) 0.0 - 0.5 % ABS. NEUTROPHILS 7.8 1.8 - 8.0 K/UL  
 ABS. LYMPHOCYTES 0.5 (L) 0.8 - 3.5 K/UL  
 ABS. MONOCYTES 1.3 (H) 0.0 - 1.0 K/UL  
 ABS. EOSINOPHILS 0.0 0.0 - 0.4 K/UL  
 ABS. BASOPHILS 0.0 0.0 - 0.1 K/UL  
 ABS. IMM. GRANS. 0.1 (H) 0.00 - 0.04 K/UL  
 DF AUTOMATED METABOLIC PANEL, COMPREHENSIVE Collection Time: 11/03/20  3:00 PM  
Result Value Ref Range Sodium 123 (L) 136 - 145 mmol/L Potassium 4.3 3.5 - 5.1 mmol/L Chloride 82 (L) 97 - 108 mmol/L  
 CO2 30 21 - 32 mmol/L Anion gap 11 5 - 15 mmol/L Glucose 91 65 - 100 mg/dL BUN 14 6 - 20 mg/dL Creatinine 1.20 (H) 0.55 - 1.02 mg/dL BUN/Creatinine ratio 12 12 - 20 GFR est AA 53 (L) >60 ml/min/1.73m2 GFR est non-AA 43 (L) >60 ml/min/1.73m2 Calcium 10.4 (H) 8.5 - 10.1 mg/dL Bilirubin, total 0.6 0.2 - 1.0 mg/dL AST (SGOT) 115 (H) 15 - 37 U/L  
 ALT (SGPT) 68 12 - 78 U/L Alk. phosphatase 98 45 - 117 U/L Protein, total 8.0 6.4 - 8.2 g/dL Albumin 2.5 (L) 3.5 - 5.0 g/dL Globulin 5.5 (H) 2.0 - 4.0 g/dL A-G Ratio 0.5 (L) 1.1 - 2.2 Radiologic Studies -  
XR CHEST SNGL V Final Result XR HIP RT W OR WO PELV 2-3 VWS    (Results Pending) CT Results  (Last 48 hours) None CXR Results  (Last 48 hours) 11/03/20 1433  XR CHEST SNGL V Final result Narrative:  Chest single view. Comparison single view chest March 29, 2018. Right-sided Port-A-Cath stable position. New opacification of the lower two thirds right hemithorax. This is likely  
related to a combination of large volume pleural fluid and atelectatic lung. Left lung is aerated. Cardiac and mediastinal structures unchanged. No  
pneumothorax. Nonacute rib fractures noted. Medical Decision Making and ED Course I reviewed the available vital signs, nursing notes, past medical history, past surgical history, family history, and social history. Vital Signs - Reviewed the patient's vital signs. Patient Vitals for the past 12 hrs: 
 Pulse Resp BP SpO2  
11/03/20 1407 72 30 117/75 96 % Medical Decision Making:  
Presented with lethargy and failure to thrive. The  differential diagnosis is urinary tract infection, electrolyte abnormality, deconditioning. Work-up shows hyponatremia. Patient given 500 cc bolus of normal saline for this. Seen by case management as well with plan to go to possible nursing home. Patient discussed with hospital doctor will be admitted for further evaluation care. Patient also has a large opacity on the right side of the chest which is likely the pleural effusion. Will hold off on IV fluids for now. Disposition Admitted Diagnosis Clinical impression: 1. Hyponatremia 2. Pleural effusion Attestation: 
Please note that this dictation was completed with [employer], the computer voice recognition software. Quite often unanticipated grammatical, syntax, homophones, and other interpretive errors are inadvertently transcribed by the computer software. Please disregard these errors. Please excuse any errors that have escaped final proofreading. Thank you.  
Vitaliy Lamar, DO

## 2020-11-03 NOTE — ED TRIAGE NOTES
Reported to have fall the end of October, pt was seen in ER. Family reports pt has worsening weakness, is unable to get out of bed, incontinent, not eating. Reports pt generalized weakness.

## 2020-11-03 NOTE — Clinical Note
One step needle inserted to R pleural space using ultrasound guidance, draining serous fluid, specimens collected

## 2020-11-04 NOTE — PROGRESS NOTES
Pt arrived on unit Pt has pure wick in place Two nurse skin assessment performed with myself, Abril BROWN RN and Noemi Ordonez RN; skin intact Admission questions answered Dr Yanna Gonzalez called to verify fluid and diet orders Verbal orders are as follows\" D5 NS at 50 ml Regular diet Patient made comfortable Call bell with in reach Will continue to monitor

## 2020-11-04 NOTE — PROGRESS NOTES
Patient has pure wick in place but did not urinate since arrival  
Patient was bladder scanned showing 366 mL Patient was straight cathed to collect urine sample Total of 300 mL was expelled Patient stated she did not feel the urge to urinate

## 2020-11-04 NOTE — ROUTINE PROCESS
TRANSFER - OUT REPORT: 
 
Verbal report given to Marlene(name) on Trevor Bis  being transferred to (unit) for routine progression of care Report consisted of patients Situation, Background, Assessment and  
Recommendations(SBAR). Information from the following report(s) SBAR was reviewed with the receiving nurse. Lines:  
Peripheral IV 11/03/20 Left Arm (Active) Site Assessment Clean, dry, & intact 11/03/20 1518 Phlebitis Assessment 0 11/03/20 1518 Infiltration Assessment 0 11/03/20 1518 Dressing Status Clean, dry, & intact 11/03/20 1518 Dressing Type Tape;Transparent 11/03/20 1518 Hub Color/Line Status Pink;Flushed 11/03/20 1518 Action Taken Blood drawn 11/03/20 1518 Alcohol Cap Used Yes 11/03/20 1518 Opportunity for questions and clarification was provided. Patient transported with: 
 Monitor Tech

## 2020-11-04 NOTE — H&P
History and Physical 
 
NAME: Trina Cruz :  1940 MRN:  [id number] Date/Time:  2020 10:21 AM 
 
Patient PCP: Marylou Lin MD 
______________________________________________________________________ Subjective: CHIEF COMPLAINT:  
 
Lethargy HISTORY OF PRESENT ILLNESS:    
 
Patient is a 79y.o. year old female with a past medical history of dementia, CHF, hyperlipidemia, and hypertension who presents to the ER for generalized lethargy. Pt fell a week ago and was seen here. She was discharged home with a sling on her right arm. Since the fall she has difficulty standing on her own. She has not been eating or drinking. Her family reports she is unable to get out of bed, noticeable increased weakness, and incontinent. ER work up shows hyponatremia (123). Pt was given 500cc bolus of NS. Admitted for further evaluation and care. Started on D5 NS at 50ml/hr and zosyn on [address].  
 
Pt notes that she is feeling somewhat better this AM. She slept well. She has not attempted to eat breakfast yet at this time. Chest Xray: New opacification of the lower two thirds right hemithorax. This is likely related to a combination of large volume pleural fluid and atelectatic lung Left hip Xray: No acute fracture or dislocation. Deformity of the left femur trochanter and proximal diaphysis, appears chronic. No radiopaque foreign body. 
  
 
Past Medical History:  
Diagnosis Date  Anemia  Dementia ([address]  Heart failure ([address]  High cholesterol  Hypertension  Low blood potassium History reviewed. No pertinent surgical history. Social History Tobacco Use  Smoking status: Never Smoker  Smokeless tobacco: Never Used Substance Use Topics  Alcohol use: Not Currently History reviewed. No pertinent family history. No Known Allergies Prior to Admission medications Medication Sig Start Date End Date Taking? Authorizing Provider  
carvediloL (COREG) 6.25 mg tablet Take 6.25 mg by mouth two (2) times daily (with meals). Yes Bryson, MD Liliana  
potassium chloride SA (MICRO-K) 10 mEq capsule Take  by mouth two (2) times a day. Yes Liliana Massey MD  
ferrous sulfate (IRON) 325 mg (65 mg iron) EC tablet Take 325 mg by mouth two (2) times a day. Yes Liliana Massey MD  
spironolactone (Aldactone) 25 mg tablet Take 25 mg by mouth daily. Take 1/2 daily   Yes Liliana Massey MD  
hydrALAZINE (APRESOLINE) 100 mg tablet Take 25 mg by mouth two (2) times a day. Yes Liliana Massey MD  
atorvastatin (Lipitor) 40 mg tablet Take 40 mg by mouth daily. Yes Liliana Massey MD  
furosemide (Lasix) 40 mg tablet Take 40 mg by mouth two (2) times a day. Yes Liliana Massey MD  
 
 
 
Current Facility-Administered Medications:  
  dextrose 5% and 0.9% NaCl infusion, 50 mL/hr, IntraVENous, CONTINUOUS, Jesus Maldonado MD, Last Rate: 50 mL/hr at 11/04/20 0558, 50 mL/hr at 11/04/20 0558 
  0.9% sodium chloride infusion, 75 mL/hr, IntraVENous, CONTINUOUS, Bonnie Maldonado MD 
  heparin (porcine) injection 5,000 Units, 5,000 Units, SubCUTAneous, Q12H, Bonnie Maldonado MD 
  piperacillin-tazobactam (ZOSYN) 3.375 g in 0.9% sodium chloride (MBP/ADV) 100 mL MBP, 3.375 g, IntraVENous, Q8H, Jesus Maldonado MD, Last Rate: 200 mL/hr at 11/04/20 0558, 3.375 g at 11/04/20 7088 LAB DATA REVIEWED:   
Recent Results (from the past 24 hour(s)) URINALYSIS W/ REFLEX CULTURE Collection Time: 11/03/20  2:45 PM  
 Specimen: Urine Result Value Ref Range Color Yellow/Straw Appearance Clear Clear Specific gravity 1.010 1.003 - 1.030    
 pH (UA) 6.5 5.0 - 8.0 Protein Negative Negative mg/dL Glucose Normal (A) Negative mg/dL Ketone Negative Negative mg/dL Bilirubin Negative Negative Blood Negative Negative Urobilinogen Normal 0.1 - 1.0 EU/dL Nitrites Negative Negative Leukocyte Esterase Negative Negative WBC 0-4 0 - 4 /hpf  
 RBC 0-5 0 - 5 /hpf Bacteria Negative Negative /hpf  
 UA:UC IF INDICATED Culture not indicated by UA result Culture not indicated by UA result Hyaline cast 5-10 0 - 5 /lpf  
CBC WITH AUTOMATED DIFF Collection Time: 11/03/20  3:00 PM  
Result Value Ref Range WBC 9.6 3.6 - 11.0 K/uL  
 RBC 5.88 (H) 3.80 - 5.20 M/uL  
 HGB 15.2 11.5 - 16.0 g/dL HCT 44.1 35.0 - 47.0 % MCV 75.0 (L) 80.0 - 99.0 FL  
 MCH 25.9 (L) 26.0 - 34.0 PG  
 MCHC 34.5 30.0 - 36.5 g/dL  
 RDW 16.4 (H) 11.5 - 14.5 % PLATELET 596 (H) 799 - 400 K/uL MPV 10.4 8.9 - 12.9 FL  
 NEUTROPHILS 80 (H) 32 - 75 % LYMPHOCYTES 5 (L) 12 - 49 % MONOCYTES 14 (H) 5 - 13 % EOSINOPHILS 0 0 - 7 % BASOPHILS 0 0 - 1 % IMMATURE GRANULOCYTES 1 (H) 0.0 - 0.5 % ABS. NEUTROPHILS 7.8 1.8 - 8.0 K/UL  
 ABS. LYMPHOCYTES 0.5 (L) 0.8 - 3.5 K/UL  
 ABS. MONOCYTES 1.3 (H) 0.0 - 1.0 K/UL  
 ABS. EOSINOPHILS 0.0 0.0 - 0.4 K/UL  
 ABS. BASOPHILS 0.0 0.0 - 0.1 K/UL  
 ABS. IMM. GRANS. 0.1 (H) 0.00 - 0.04 K/UL  
 DF AUTOMATED METABOLIC PANEL, COMPREHENSIVE Collection Time: 11/03/20  3:00 PM  
Result Value Ref Range Sodium 123 (L) 136 - 145 mmol/L Potassium 4.3 3.5 - 5.1 mmol/L Chloride 82 (L) 97 - 108 mmol/L  
 CO2 30 21 - 32 mmol/L Anion gap 11 5 - 15 mmol/L Glucose 91 65 - 100 mg/dL BUN 14 6 - 20 mg/dL Creatinine 1.20 (H) 0.55 - 1.02 mg/dL BUN/Creatinine ratio 12 12 - 20 GFR est AA 53 (L) >60 ml/min/1.73m2 GFR est non-AA 43 (L) >60 ml/min/1.73m2 Calcium 10.4 (H) 8.5 - 10.1 mg/dL Bilirubin, total 0.6 0.2 - 1.0 mg/dL AST (SGOT) 115 (H) 15 - 37 U/L  
 ALT (SGPT) 68 12 - 78 U/L Alk. phosphatase 98 45 - 117 U/L Protein, total 8.0 6.4 - 8.2 g/dL Albumin 2.5 (L) 3.5 - 5.0 g/dL Globulin 5.5 (H) 2.0 - 4.0 g/dL A-G Ratio 0.5 (L) 1.1 - 2.2 SARS-COV-2 Collection Time: 11/03/20  5:30 PM  
Result Value Ref Range SARS-CoV-2 Please find results under separate order GLUCOSE, POC Collection Time: 11/04/20  1:26 AM  
Result Value Ref Range Glucose (POC) 84 65 - 100 mg/dL Performed by [place] HECTOR Results (most recent): 
Results from Hospital Encounter encounter on 11/03/20 XR HIP LT W OR WO PELV 2-3 VWS Narrative Fall 1 week ago. No comparison. FINDINGS: AP pelvis and frog-leg view of the left hip. No acute fracture or 
dislocation. Deformity of the left femur trochanter and proximal diaphysis, 
appears chronic. No radiopaque foreign body. Impression IMPRESSION: 
1. No acute bony findings. XR HIP LT W OR WO PELV 2-3 VWS Final Result IMPRESSION:  
1. No acute bony findings. XR CHEST SNGL V Final Result CT CHEST WO CONT    (Results Pending) Review of Systems: 
Constitutional: Negative for chills and fever. HENT: Negative. Eyes: Negative. Respiratory: Negative. Cardiovascular: Negative. Gastrointestinal: Negative for abdominal pain and nausea. Skin: Negative. Neurological: Negative. Objective: VITALS:   
Visit Vitals /72 (BP 1 Location: Right arm, BP Patient Position: At rest) Pulse 76 Temp 97.7 °F (36.5 °C) Resp 20 Ht 5' 2\" (1.575 m) Wt 63.5 kg (140 lb) SpO2 94% BMI 25.61 kg/m² Physical Exam:  
Constitutional: pt is oriented to person, place, and time. HENT:  
Head: Normocephalic and atraumatic. Eyes: Pupils are equal, round, and reactive to light. EOM are normal.  
Cardiovascular: Normal rate, regular rhythm and normal heart sounds. Pulmonary/Chest: No apparent respiratory distress. Decreased breath sounds on right side. Exhibits no tenderness. Abdominal: Soft. Bowel sounds are normal. There is no abdominal tenderness. There is no rebound and no guarding. Musculoskeletal: Normal range of motion. Neurological: pt is alert and oriented to person, place, and time.  Normal strength. No cranial nerve deficit or sensory deficit. Displays a negative Romberg sign. Appropriate mood and affect. ASSESSMENT & PLAN: 
 
1. Dementia 2. Hx of Congestive Heart Failure 3. Hyponatremia/likely from diuretic 
- 500 cc bolus NS given Start normal saline at 75 cc/h -  Repeat labs 4. Hypertension 5. Hyperlipidemia 6. Failure to Thrive 7. Chest x-ray shows opacities/pleural effusion We will start Zosyn after blood cultures sputum culture and order CT scan of the chest without contrast 
Start normal saline at 75 cc/h Monitor sodium level Order BNP 
PT OT consult Dietitian consult DVT prophylaxis with heparin subcu Current Facility-Administered Medications:  
  dextrose 5% and 0.9% NaCl infusion, 50 mL/hr, IntraVENous, CONTINUOUS, Jesus Maldonado MD, Last Rate: 50 mL/hr at 11/04/20 0558, 50 mL/hr at 11/04/20 0558 
  0.9% sodium chloride infusion, 75 mL/hr, IntraVENous, CONTINUOUS, Hipolito Maldonaod MD 
  heparin (porcine) injection 5,000 Units, 5,000 Units, SubCUTAneous, Q12H, Hipolito Maldonado MD 
  piperacillin-tazobactam (ZOSYN) 3.375 g in 0.9% sodium chloride (MBP/ADV) 100 mL MBP, 3.375 g, IntraVENous, Q8H, Jesus Maldonado MD, Last Rate: 200 mL/hr at 11/04/20 0558, 3.375 g at 11/04/20 0558 
 
________________________________________________________________________ Signed: Hoang Cottrell MD

## 2020-11-04 NOTE — H&P
History and Physical 
 
NAME: Trinidad Gaytan :  1940 MRN:  [id number] Date/Time:  2020 10:21 AM 
 
Patient PCP: Errol Brothers MD 
______________________________________________________________________ Subjective: CHIEF COMPLAINT:  
 
Lethargy HISTORY OF PRESENT ILLNESS:    
 
Patient is a 79y.o. year old female with a past medical history of dementia, CHF, hyperlipidemia, and hypertension who presents to the ER for generalized lethargy. Pt fell a week ago and was seen here. She was discharged home with a sling on her right arm. Since the fall she has difficulty standing on her own. She has not been eating or drinking. Her family reports she is unable to get out of bed, noticeable increased weakness, and incontinent. ER work up shows hyponatremia (123). Pt was given 500cc bolus of NS. Admitted for further evaluation and care. Started on D5 NS at 50ml/hr and zosyn on [address].  
 
Pt notes that she is feeling somewhat better this AM. She slept well. She has not attempted to eat breakfast yet at this time. Chest Xray: New opacification of the lower two thirds right hemithorax. This is likely related to a combination of large volume pleural fluid and atelectatic lung Left hip Xray: No acute fracture or dislocation. Deformity of the left femur trochanter and proximal diaphysis, appears chronic. No radiopaque foreign body. 
  
 
Past Medical History:  
Diagnosis Date  Anemia  Dementia ([address]  Heart failure [address]  High cholesterol  Hypertension  Low blood potassium History reviewed. No pertinent surgical history. Social History Tobacco Use  Smoking status: Never Smoker  Smokeless tobacco: Never Used Substance Use Topics  Alcohol use: Not Currently History reviewed. No pertinent family history. No Known Allergies Prior to Admission medications Medication Sig Start Date End Date Taking? Authorizing Provider  
carvediloL (COREG) 6.25 mg tablet Take 6.25 mg by mouth two (2) times daily (with meals). Yes Bryson, MD Liliana  
potassium chloride SA (MICRO-K) 10 mEq capsule Take  by mouth two (2) times a day. Yes Liliana Massey MD  
ferrous sulfate (IRON) 325 mg (65 mg iron) EC tablet Take 325 mg by mouth two (2) times a day. Yes Liliana Massey MD  
spironolactone (Aldactone) 25 mg tablet Take 25 mg by mouth daily. Take 1/2 daily   Yes Liliana Massey MD  
hydrALAZINE (APRESOLINE) 100 mg tablet Take 25 mg by mouth two (2) times a day. Yes Liliana Massey MD  
atorvastatin (Lipitor) 40 mg tablet Take 40 mg by mouth daily. Yes Liliana Massey MD  
furosemide (Lasix) 40 mg tablet Take 40 mg by mouth two (2) times a day. Yes Liliana Massey MD  
 
 
 
Current Facility-Administered Medications:  
  dextrose 5% and 0.9% NaCl infusion, 50 mL/hr, IntraVENous, CONTINUOUS, Jesus Maldonado MD, Last Rate: 50 mL/hr at 11/04/20 0558, 50 mL/hr at 11/04/20 5916   piperacillin-tazobactam (ZOSYN) 3.375 g in 0.9% sodium chloride (MBP/ADV) 100 mL MBP, 3.375 g, IntraVENous, Q8H, Jesus Maldonado MD, Last Rate: 200 mL/hr at 11/04/20 0558, 3.375 g at 11/04/20 6608 LAB DATA REVIEWED:   
Recent Results (from the past 24 hour(s)) URINALYSIS W/ REFLEX CULTURE Collection Time: 11/03/20  2:45 PM  
 Specimen: Urine Result Value Ref Range Color Yellow/Straw Appearance Clear Clear Specific gravity 1.010 1.003 - 1.030    
 pH (UA) 6.5 5.0 - 8.0 Protein Negative Negative mg/dL Glucose Normal (A) Negative mg/dL Ketone Negative Negative mg/dL Bilirubin Negative Negative Blood Negative Negative Urobilinogen Normal 0.1 - 1.0 EU/dL Nitrites Negative Negative Leukocyte Esterase Negative Negative WBC 0-4 0 - 4 /hpf  
 RBC 0-5 0 - 5 /hpf  Bacteria Negative Negative /hpf  
 UA:UC IF INDICATED Culture not indicated by UA result Culture not indicated by UA result Hyaline cast 5-10 0 - 5 /lpf  
CBC WITH AUTOMATED DIFF Collection Time: 11/03/20  3:00 PM  
Result Value Ref Range WBC 9.6 3.6 - 11.0 K/uL  
 RBC 5.88 (H) 3.80 - 5.20 M/uL  
 HGB 15.2 11.5 - 16.0 g/dL HCT 44.1 35.0 - 47.0 % MCV 75.0 (L) 80.0 - 99.0 FL  
 MCH 25.9 (L) 26.0 - 34.0 PG  
 MCHC 34.5 30.0 - 36.5 g/dL  
 RDW 16.4 (H) 11.5 - 14.5 % PLATELET 721 (H) 096 - 400 K/uL MPV 10.4 8.9 - 12.9 FL  
 NEUTROPHILS 80 (H) 32 - 75 % LYMPHOCYTES 5 (L) 12 - 49 % MONOCYTES 14 (H) 5 - 13 % EOSINOPHILS 0 0 - 7 % BASOPHILS 0 0 - 1 % IMMATURE GRANULOCYTES 1 (H) 0.0 - 0.5 % ABS. NEUTROPHILS 7.8 1.8 - 8.0 K/UL  
 ABS. LYMPHOCYTES 0.5 (L) 0.8 - 3.5 K/UL  
 ABS. MONOCYTES 1.3 (H) 0.0 - 1.0 K/UL  
 ABS. EOSINOPHILS 0.0 0.0 - 0.4 K/UL  
 ABS. BASOPHILS 0.0 0.0 - 0.1 K/UL  
 ABS. IMM. GRANS. 0.1 (H) 0.00 - 0.04 K/UL  
 DF AUTOMATED METABOLIC PANEL, COMPREHENSIVE Collection Time: 11/03/20  3:00 PM  
Result Value Ref Range Sodium 123 (L) 136 - 145 mmol/L Potassium 4.3 3.5 - 5.1 mmol/L Chloride 82 (L) 97 - 108 mmol/L  
 CO2 30 21 - 32 mmol/L Anion gap 11 5 - 15 mmol/L Glucose 91 65 - 100 mg/dL BUN 14 6 - 20 mg/dL Creatinine 1.20 (H) 0.55 - 1.02 mg/dL BUN/Creatinine ratio 12 12 - 20 GFR est AA 53 (L) >60 ml/min/1.73m2 GFR est non-AA 43 (L) >60 ml/min/1.73m2 Calcium 10.4 (H) 8.5 - 10.1 mg/dL Bilirubin, total 0.6 0.2 - 1.0 mg/dL AST (SGOT) 115 (H) 15 - 37 U/L  
 ALT (SGPT) 68 12 - 78 U/L Alk. phosphatase 98 45 - 117 U/L Protein, total 8.0 6.4 - 8.2 g/dL Albumin 2.5 (L) 3.5 - 5.0 g/dL Globulin 5.5 (H) 2.0 - 4.0 g/dL A-G Ratio 0.5 (L) 1.1 - 2.2 SARS-COV-2 Collection Time: 11/03/20  5:30 PM  
Result Value Ref Range SARS-CoV-2 Please find results under separate order GLUCOSE, POC Collection Time: 11/04/20  1:26 AM  
Result Value Ref Range Glucose (POC) 84 65 - 100 mg/dL Performed by Ashish Mills   
 
 
XR Results (most recent): 
Results from Hospital Encounter encounter on 11/03/20 XR HIP LT W OR WO PELV 2-3 VWS Narrative Fall 1 week ago. No comparison. FINDINGS: AP pelvis and frog-leg view of the left hip. No acute fracture or 
dislocation. Deformity of the left femur trochanter and proximal diaphysis, 
appears chronic. No radiopaque foreign body. Impression IMPRESSION: 
1. No acute bony findings. XR HIP LT W OR WO PELV 2-3 VWS Final Result IMPRESSION:  
1. No acute bony findings. XR CHEST SNGL V Final Result Review of Systems: 
Constitutional: Negative for chills and fever. HENT: Negative. Eyes: Negative. Respiratory: Negative. Cardiovascular: Negative. Gastrointestinal: Negative for abdominal pain and nausea. Skin: Negative. Neurological: Negative. Objective: VITALS:   
Visit Vitals /72 (BP 1 Location: Right arm, BP Patient Position: At rest) Pulse 76 Temp 97.7 °F (36.5 °C) Resp 20 Ht 5' 2\" (1.575 m) Wt 63.5 kg (140 lb) SpO2 94% BMI 25.61 kg/m² Physical Exam:  
Constitutional: pt is oriented to person, place, and time. HENT:  
Head: Normocephalic and atraumatic. Eyes: Pupils are equal, round, and reactive to light. EOM are normal.  
Cardiovascular: Normal rate, regular rhythm and normal heart sounds. Pulmonary/Chest: No apparent respiratory distress. Decreased breath sounds on right side. Exhibits no tenderness. Abdominal: Soft. Bowel sounds are normal. There is no abdominal tenderness. There is no rebound and no guarding. Musculoskeletal: Normal range of motion. Neurological: pt is alert and oriented to person, place, and time. Normal strength. No cranial nerve deficit or sensory deficit. Displays a negative Romberg sign. Appropriate mood and affect. ASSESSMENT & PLAN: 
 
1. Dementia 2. Congestive Heart Failure - Consult Cardiology 3. Hyponatremia - 500 cc bolus NS given - D5 NS 50ml/hr -  Repeat labs 4. Hypertension 5. Hyperlipidemia 6. Failure to Thrive Obtain home medication list 
________________________________________________________________________ Signed: Alison Brandt

## 2020-11-05 NOTE — PROGRESS NOTES
General Daily Progress Note Patient Name: Odin Hills YOB: 1940 Age: 
79 y.o. Admit Date: 11/3/2020 Subjective:  
 
 
Patient is a 79y.o. year old female with a past medical history of dementia, CHF, hyperlipidemia, and hypertension who presents to the ER for generalized lethargy. Pt fell a week ago and was seen here. She was discharged home with a sling on her right arm. Since the fall she has difficulty standing on her own. She has not been eating or drinking. Her family reports she is unable to get out of bed, noticeable increased weakness, and incontinent. She is resting comfortably in bed this morning. She states that she \"feels better. \" She has not ate yet. She denies dizziness, chest pain, shortness of breath, and cough. BNP on 11/4 is 267. Chest CT: Near complete collapse of the right lung secondary to large pleural 
effusion Objective:  
 
Visit Vitals BP (P) 122/79 Pulse (P) 80 Temp (P) 97.5 °F (36.4 °C) Resp (P) 20 Ht 5' 2\" (1.575 m) Wt 63.5 kg (140 lb) SpO2 97% BMI 25.61 kg/m² Recent Results (from the past 24 hour(s)) BNP Collection Time: 11/04/20  5:40 PM  
Result Value Ref Range NT pro- <450 pg/mL GLUCOSE, POC Collection Time: 11/04/20  5:48 PM  
Result Value Ref Range Glucose (POC) 81 65 - 100 mg/dL Performed by Quinten Garcia, POC Collection Time: 11/05/20  8:58 AM  
Result Value Ref Range Glucose (POC) 70 65 - 100 mg/dL Performed by Neo Villatoro   
 
[unfilled] Review of Systems Constitutional: Negative for chills and fever. HENT: Negative. Eyes: Negative. Respiratory: Negative. Cardiovascular: Negative. Gastrointestinal: Negative for abdominal pain and nausea. Skin: Negative. Neurological: Negative. Physical Exam:   
 
Constitutional: pt is oriented to person, place, and time.   
HENT:  
 Head: Normocephalic and atraumatic. Eyes: Pupils are equal, round, and reactive to light. EOM are normal.  
Cardiovascular: Normal rate, regular rhythm and normal heart sounds. Pulmonary/Chest: Decreased breath sounds on right lung. Exhibits no tenderness. Abdominal: Soft. Bowel sounds are normal. There is no abdominal tenderness. There is no rebound and no guarding. Musculoskeletal: Normal range of motion. Neurological: pt is alert and oriented to person, place, and time. CT CHEST WO CONT Final Result IMPRESSION: Near complete collapse of the right lung secondary to large pleural  
effusion XR HIP LT W OR WO PELV 2-3 VWS Final Result IMPRESSION:  
1. No acute bony findings. XR CHEST SNGL V Final Result Recent Results (from the past 24 hour(s)) BNP Collection Time: 11/04/20  5:40 PM  
Result Value Ref Range NT pro- <450 pg/mL GLUCOSE, POC Collection Time: 11/04/20  5:48 PM  
Result Value Ref Range Glucose (POC) 81 65 - 100 mg/dL Performed by Drake Pereira, POC Collection Time: 11/05/20  8:58 AM  
Result Value Ref Range Glucose (POC) 70 65 - 100 mg/dL Performed by Kristofer Poe Results Procedure Component Value Units Date/Time Alan Langston [[id number]] Collected:  11/04/20 0750 Order Status:  Completed Specimen:  Urine Updated:  11/04/20 0757 CULTURE, RESPIRATORY/SPUTUM/BRONCH Janelle Nguyen STAIN [[id number]] Order Status:  Sent Specimen:  Sputum CULTURE, BLOOD, LINE [[id number]] Order Status:  Sent Specimen:  Blood Labs:  
 
Recent Labs 11/03/20 
1500 WBC 9.6 HGB 15.2 HCT 44.1 * Recent Labs 11/03/20 
1500 * K 4.3 CL 82* CO2 30 BUN 14  
CREA 1.20* GLU 91  
CA 10.4* Recent Labs 11/03/20 
1500 ALT 68 AP 98 TBILI 0.6 TP 8.0 ALB 2.5*  
GLOB 5.5* No results for input(s): INR, PTP, APTT, INREXT in the last 72 hours. No results for input(s): FE, TIBC, PSAT, FERR in the last 72 hours. No results found for: FOL, RBCF No results for input(s): PH, PCO2, PO2 in the last 72 hours. No results for input(s): CPK, CKNDX, TROIQ in the last 72 hours. No lab exists for component: CPKMB No results found for: CHOL, CHOLX, CHLST, CHOLV, HDL, HDLP, LDL, LDLC, DLDLP, TGLX, TRIGL, TRIGP, CHHD, CHHDX Lab Results Component Value Date/Time Glucose (POC) 70 11/05/2020 08:58 AM  
 Glucose (POC) 81 11/04/2020 05:48 PM  
 Glucose (POC) 84 11/04/2020 01:26 AM  
 
Lab Results Component Value Date/Time Color Yellow/Straw 11/03/2020 02:45 PM  
 Appearance Clear 11/03/2020 02:45 PM  
 Specific gravity 1.010 11/03/2020 02:45 PM  
 pH (UA) 6.5 11/03/2020 02:45 PM  
 Protein Negative 11/03/2020 02:45 PM  
 Glucose Normal (A) 11/03/2020 02:45 PM  
 Ketone Negative 11/03/2020 02:45 PM  
 Bilirubin Negative 11/03/2020 02:45 PM  
 Urobilinogen Normal 11/03/2020 02:45 PM  
 Nitrites Negative 11/03/2020 02:45 PM  
 Leukocyte Esterase Negative 11/03/2020 02:45 PM  
 Bacteria Negative 11/03/2020 02:45 PM  
 WBC 0-4 11/03/2020 02:45 PM  
 RBC 0-5 11/03/2020 02:45 PM  
 
   
Assessment:  
 
  
1. Dementia 2. Hx of Congestive Heart Failure 3. Hyponatremia/likely from diuretic 4. Hypertension 5. Hyperlipidemia 6. Failure to Thrive 7. Chest x-ray shows opacities/pleural effusion - Chest CT shows Near complete collapse of the right lung secondary to large pleural 
effusion Plan:  
 
Continue Zosyn after blood cultures sputum culture Continue normal saline at 75 cc/h Monitor sodium level BNP is 267 on 11/4 Chest CT results: Noncontrast study shows large right pleural effusion. Right middle and lower lobe are completely collapsed, with very few air bronchograms. The upper lobe is almost completely collapsed, sparingly anterior segment only. Mainstem bronchus is open.  Proximal lobar bronchi are open. Left lung is clear. Multiple calcified mediastinal hilar lymph nodes. Small left pleural effusion. Small pericardial effusion. Normal caliber aorta. No significant upper abdominal or chest wall finding. Pronounced soft tissue edema in the visualized portion of the left arm PT OT consult Dietitian consult Pulmonology consult DVT prophylaxis with heparin subcu Repeat labs Current Facility-Administered Medications:  
  0.9% sodium chloride infusion, 75 mL/hr, IntraVENous, CONTINUOUS, Jesus Maldonado MD, Last Rate: 75 mL/hr at 11/04/20 1100, 75 mL/hr at 11/04/20 1100 
  heparin porcine injection 5,000 Units, 5,000 Units, SubCUTAneous, Q12H, Princess Sherrell Maldonado MD, Stopped at 11/04/20 2300   piperacillin-tazobactam (ZOSYN) 3.375 g in 0.9% sodium chloride (MBP/ADV) 100 mL MBP, 3.375 g, IntraVENous, Q8H, Jesus Maldonado MD, Last Rate: 200 mL/hr at 11/05/20 0522, 3.375 g at 11/05/20 0522

## 2020-11-05 NOTE — CONSULTS
PULMONARY CONSULT 
[facility] Name: Ayde Brady MRN: [id number] : 1940 Hospital: [address] [place] [address] Date: 2020  Admission date: 11/3/2020 Hospital Day: 3 HPI:  
 
Hospital Problems  Never Reviewed Codes Class Noted POA Hyponatremia ICD-10-CM: E87.1 ICD-9-CM: 276.1  11/3/2020 Unknown  
   
  
 
 
 
  
[x] High complexity decision making was performed [x] See my orders for details Subjective/Initial History:  
 
I was asked by Elise Ordonez MD to see Ayde Brady  a 79 y.o.  female in consultation Excerpts from admission 11/3/2020 or consult notes as follows:  
81-year-old lady came in because of generalized weakness lethargy past medical history of dementia congestive heart failure hypertension hyperlipidemia patient was discharged from home with a sling on her right arm as she fell. Now chest x-ray and CAT scan of the chest was done which shows right lower lobe collapse with pleural effusion unable to get much history out of the patient so pulmonary consult was called for further evaluation No Known Allergies MAR reviewed and pertinent medications noted or modified as needed Current Facility-Administered Medications Medication  0.9% sodium chloride infusion  heparin porcine injection 5,000 Units  piperacillin-tazobactam (ZOSYN) 3.375 g in 0.9% sodium chloride (MBP/ADV) 100 mL MBP Patient PCP: David Mock MD 
PMH:  has a past medical history of Anemia, Dementia ([address] Heart failure ([address] High cholesterol, Hypertension, and Low blood potassium. PSH:   has no past surgical history on file. FHX: family history is not on file. SHX:  reports that she has never smoked. She has never used smokeless tobacco. She reports previous alcohol use. She reports that she does not use drugs. ROS: 
 
Unable to obtain Objective:  
 
Vital Signs: Telemetry:    normal sinus rhythm Intake/Output: Visit Vitals BP (P) 122/79 Pulse (P) 80 Temp (P) 97.5 °F (36.4 °C) Resp (P) 20 Ht 5' 2.01\" (1.575 m) Wt 63.5 kg (140 lb) SpO2 97% BMI 25.60 kg/m² Temp (24hrs), Av.5 °F (36.4 °C), Min:97.5 °F (36.4 °C), Max:97.5 °F (36.4 °C) O2 Device: Room air Wt Readings from Last 4 Encounters:  
20 63.5 kg (140 lb) 10/26/20 63.5 kg (140 lb) No intake or output data in the 24 hours ending 20 1550 Last shift:      No intake/output data recorded. Last 3 shifts: No intake/output data recorded. Physical Exam:  
 
Physical Exam  
Constitutional:  
Awake HENT:  
Head: Normocephalic and atraumatic. Eyes: Pupils are equal, round, and reactive to light. Neck: Normal range of motion. Neck supple. Cardiovascular: Normal rate and regular rhythm. Pulmonary/Chest:  
Diminished breath sound on the right side with dullness on percussion Abdominal: Soft. Bowel sounds are normal.  
Musculoskeletal: Normal range of motion. Skin: Skin is warm. Labs: 
 
Recent Labs 20 
1340 20 
1500 WBC 11.2* 9.6 HGB 12.7 15.2  432* Recent Labs 20 
1340 20 
1500 * 123* K 3.9 4.3 CL 97 82* CO2 25 30 GLU 67 91 BUN 20 14 CREA 1.25* 1.20* CA 9.9 10.4* ALB  --  2.5* ALT  --  68 No results for input(s): PH, PCO2, PO2, HCO3, FIO2 in the last 72 hours. No results for input(s): CPK, CKNDX, TROIQ in the last 72 hours. No lab exists for component: CPKMB No results found for: BNPP, BNP No results found for: CULTNo results found for: TSH, TSHEXT Imaging: CXR Results  (Last 48 hours) None Results from Hospital Encounter encounter on 20 XR HIP LT W OR WO PELV 2-3 VWS Narrative Fall 1 week ago. No comparison. FINDINGS: AP pelvis and frog-leg view of the left hip. No acute fracture or 
dislocation.  Deformity of the left femur trochanter and proximal diaphysis, 
 appears chronic. No radiopaque foreign body. Impression IMPRESSION: 
1. No acute bony findings. XR CHEST SNGL V  
 Narrative Chest single view. Comparison single view chest March 29, 2018. Right-sided Port-A-Cath stable position. New opacification of the lower two thirds right hemithorax. This is likely 
related to a combination of large volume pleural fluid and atelectatic lung. Left lung is aerated. Cardiac and mediastinal structures unchanged. No 
pneumothorax. Nonacute rib fractures noted. Results from [facility] Encounter encounter on 10/26/20 XR SHOULDER RT AP/LAT MIN 2 V Narrative 3 views of the right shoulder reveal diffuse osteopenia. There is mild 
degenerative subluxation of the humerus with respect to the glenoid. No fracture 
or dislocation is identified. Impression IMPRESSION: Degenerative changes. Results from Hospital Encounter encounter on 11/03/20 CT CHEST WO CONT Narrative CT dose reduction was achieved through use of a standardized protocol tailored 
for this examination and automatic exposure control for dose modulation. Noncontrast study shows large right pleural effusion. Right middle and lower 
lobe are completely collapsed, with very few air bronchograms. The upper lobe is 
almost completely collapsed, sparingly anterior segment only. Mainstem bronchus 
is open. Proximal lobar bronchi are open. Left lung is clear. Multiple calcified 
mediastinal hilar lymph nodes. Small left pleural effusion. Small pericardial 
effusion. Normal caliber aorta. No significant upper abdominal or chest wall 
finding. Pronounced soft tissue edema in the visualized portion of the left arm Impression IMPRESSION: Near complete collapse of the right lung secondary to large pleural 
effusion IMPRESSION:  
1. RLL collapse Atelectasis 2. Pleural Effusion Right side 3.  Chronic Obstructive Pulmonary Disease with Severe Acute Exacerbation requiring inpatient hospitalization and management; has very poor airway clearance. Increased work of breathing 4. Body mass index is 25.6 kg/m². 5.   History of heart failure hyponatremia 5. Prognosis guarded RECOMMENDATIONS/PLAN:  
 
1. BIPAP for non invasive ventilatory life support to prevent Atelectasis 2. Will start IPPB Gearldean Due 3. Nebulizer treatment with Mucomyst 
4. We will get IR for possible diagnostic and therapeutic thoracentesis 5. Will chest x-ray in a.m. 
6. Supplemental O2 to keep sats > 93% 7. Aspiration precautions 8. Labs to follow electrolytes, renal function and and blood counts 9. Glucose monitoring and SSI 10. Bronchial hygiene with respiratory therapy techniques, bronchodilators 11. DVT, SUP prophylaxis Clarence Elizabeth MD

## 2020-11-05 NOTE — PROGRESS NOTES
General Daily Progress Note Patient Name: Lety Wilder YOB: 1940 Age: 
79 y.o. Admit Date: 11/3/2020 Subjective:  
 
 
Patient is a 79y.o. year old female with a past medical history of dementia, CHF, hyperlipidemia, and hypertension who presents to the ER for generalized lethargy. Pt fell a week ago and was seen here. She was discharged home with a sling on her right arm. Since the fall she has difficulty standing on her own. She has not been eating or drinking. Her family reports she is unable to get out of bed, noticeable increased weakness, and incontinent. She is resting comfortably in bed this morning. She states that she \"feels better. \" She has not ate yet. She denies dizziness, chest pain, shortness of breath, and cough. BNP on 11/4 is 267. Chest CT: Near complete collapse of the right lung secondary to large pleural 
effusion Objective:  
 
Visit Vitals BP (P) 122/79 Pulse (P) 80 Temp (P) 97.5 °F (36.4 °C) Resp (P) 20 Ht 5' 2\" (1.575 m) Wt 63.5 kg (140 lb) SpO2 97% BMI 25.61 kg/m² Recent Results (from the past 24 hour(s)) BNP Collection Time: 11/04/20  5:40 PM  
Result Value Ref Range NT pro- <450 pg/mL GLUCOSE, POC Collection Time: 11/04/20  5:48 PM  
Result Value Ref Range Glucose (POC) 81 65 - 100 mg/dL Performed by Darwin Fuentes, POC Collection Time: 11/05/20  8:58 AM  
Result Value Ref Range Glucose (POC) 70 65 - 100 mg/dL Performed by David Romero   
 
[unfilled] Review of Systems Constitutional: Negative for chills and fever. HENT: Negative. Eyes: Negative. Respiratory: Negative. Cardiovascular: Negative. Gastrointestinal: Negative for abdominal pain and nausea. Skin: Negative. Neurological: Negative. Physical Exam:   
 
Constitutional: pt is oriented to person, place, and time.   
HENT:  
 Head: Normocephalic and atraumatic. Eyes: Pupils are equal, round, and reactive to light. EOM are normal.  
Cardiovascular: Normal rate, regular rhythm and normal heart sounds. Pulmonary/Chest: Decreased breath sounds on right lung. Exhibits no tenderness. Abdominal: Soft. Bowel sounds are normal. There is no abdominal tenderness. There is no rebound and no guarding. Musculoskeletal: Normal range of motion. Neurological: pt is alert and oriented to person, place, and time. CT CHEST WO CONT Final Result IMPRESSION: Near complete collapse of the right lung secondary to large pleural  
effusion XR HIP LT W OR WO PELV 2-3 VWS Final Result IMPRESSION:  
1. No acute bony findings. XR CHEST SNGL V Final Result Recent Results (from the past 24 hour(s)) BNP Collection Time: 11/04/20  5:40 PM  
Result Value Ref Range NT pro- <450 pg/mL GLUCOSE, POC Collection Time: 11/04/20  5:48 PM  
Result Value Ref Range Glucose (POC) 81 65 - 100 mg/dL Performed by Julian Horan, POC Collection Time: 11/05/20  8:58 AM  
Result Value Ref Range Glucose (POC) 70 65 - 100 mg/dL Performed by Lorena Waddell Results Procedure Component Value Units Date/Time [facility] [[id number]] Collected:  11/04/20 0750 Order Status:  Completed Specimen:  Urine Updated:  11/04/20 0757 CULTURE, RESPIRATORY/SPUTUM/BRONCH Dewane Peace STAIN [[id number]] Order Status:  Sent Specimen:  Sputum CULTURE, BLOOD, LINE [[id number]] Order Status:  Sent Specimen:  Blood Labs:  
 
Recent Labs 11/03/20 
1500 WBC 9.6 HGB 15.2 HCT 44.1 * Recent Labs 11/03/20 
1500 * K 4.3 CL 82* CO2 30 BUN 14  
CREA 1.20* GLU 91  
CA 10.4* Recent Labs 11/03/20 
1500 ALT 68 AP 98 TBILI 0.6 TP 8.0 ALB 2.5*  
GLOB 5.5* No results for input(s): INR, PTP, APTT, INREXT, INREXT in the last 72 hours. No results for input(s): FE, TIBC, PSAT, FERR in the last 72 hours. No results found for: FOL, RBCF No results for input(s): PH, PCO2, PO2 in the last 72 hours. No results for input(s): CPK, CKNDX, TROIQ in the last 72 hours. No lab exists for component: CPKMB No results found for: CHOL, CHOLX, CHLST, CHOLV, HDL, HDLP, LDL, LDLC, DLDLP, TGLX, TRIGL, TRIGP, CHHD, CHHDX Lab Results Component Value Date/Time Glucose (POC) 70 11/05/2020 08:58 AM  
 Glucose (POC) 81 11/04/2020 05:48 PM  
 Glucose (POC) 84 11/04/2020 01:26 AM  
 
Lab Results Component Value Date/Time Color Yellow/Straw 11/03/2020 02:45 PM  
 Appearance Clear 11/03/2020 02:45 PM  
 Specific gravity 1.010 11/03/2020 02:45 PM  
 pH (UA) 6.5 11/03/2020 02:45 PM  
 Protein Negative 11/03/2020 02:45 PM  
 Glucose Normal (A) 11/03/2020 02:45 PM  
 Ketone Negative 11/03/2020 02:45 PM  
 Bilirubin Negative 11/03/2020 02:45 PM  
 Urobilinogen Normal 11/03/2020 02:45 PM  
 Nitrites Negative 11/03/2020 02:45 PM  
 Leukocyte Esterase Negative 11/03/2020 02:45 PM  
 Bacteria Negative 11/03/2020 02:45 PM  
 WBC 0-4 11/03/2020 02:45 PM  
 RBC 0-5 11/03/2020 02:45 PM  
 
   
Assessment:  
 
  
1. Dementia 2. Hx of Congestive Heart Failure 3. Hyponatremia/likely from diuretic 4. Hypertension 5. Hyperlipidemia 6. Failure to Thrive 7. Chest x-ray shows opacities/pleural effusion - Chest CT shows Near complete collapse of the right lung secondary to large pleural 
effusion Plan:  
 
Continue Zosyn after blood cultures sputum culture Continue normal saline at 75 cc/h Monitor sodium level BNP is 267 on 11/4 Chest CT results: Noncontrast study shows large right pleural effusion. Right middle and lower lobe are completely collapsed, with very few air bronchograms. The upper lobe is almost completely collapsed, sparingly anterior segment only. Mainstem bronchus is open.  Proximal lobar bronchi are open. Left lung is clear. Multiple calcified mediastinal hilar lymph nodes. Small left pleural effusion. Small pericardial effusion. Normal caliber aorta. No significant upper abdominal or chest wall finding. Pronounced soft tissue edema in the visualized portion of the left arm PT OT consult Dietitian consult Pulmonology consult DVT prophylaxis with heparin subcu Repeat labs No labs for today Current Facility-Administered Medications:  
  0.9% sodium chloride infusion, 75 mL/hr, IntraVENous, CONTINUOUS, Jesus Maldonado MD, Last Rate: 75 mL/hr at 11/04/20 1100, 75 mL/hr at 11/04/20 1100 
  heparin porcine injection 5,000 Units, 5,000 Units, SubCUTAneous, Q12H, Franky Maldonado MD, Stopped at 11/04/20 2300   piperacillin-tazobactam (ZOSYN) 3.375 g in 0.9% sodium chloride (MBP/ADV) 100 mL MBP, 3.375 g, IntraVENous, Q8H, Jesus Maldonado MD, Last Rate: 200 mL/hr at 11/05/20 0522, 3.375 g at 11/05/20 0522

## 2020-11-05 NOTE — CONSULTS
Comprehensive Nutrition Assessment Type and Reason for Visit: Initial, Consult(poor PO) Nutrition Recommendations/Plan:  
Continue current regular diet, adding soft restriction Adding Ensure Enlive TID Allow snacks as desired Adjust insulin to promote euglycemia Please document % of all meal/snack consumed, wts, BMs 
 
Nutrition Assessment:  Admitted for FTT, needs SNF placement, noted pt in ED 10/26 for fall, R fx. RD interviewed at bedside however pt very lethargic, flat affect and appeared to not answer questions truthfully. Stated she has had a good appetite inpatient but admited to eating no B, L tray in room untouched. RD offered to set up tray and move toward pt, however pt made no attempts to eat, RD did not feed as pt likely inappropriate with lethargy at this time. Pt agreeable to receiving supplements however RD ? Pt motivation to try and drink them. Per RN, pt not eating despite attempts to feed, asking for pt to receive supplements, RD to order. No recent labs to assess. Meds: Ns at 75ml/hr, heparin, Zosyn. Malnutrition Assessment: 
Malnutrition Status:  Mild malnutrition Context:  Acute illness Findings of the 6 clinical characteristics of malnutrition:  
Energy Intake:  1 - 75% or less of est energy req for 7 or more days(no PO today, poor po per MD pta) Weight Loss:  Unable to assess Body Fat Loss:  No significant body fat loss, Muscle Mass Loss:  1 - Mild muscle mass loss, Calf Fluid Accumulation:  No significant fluid accumulation,   
 
Estimated Daily Nutrient Needs: 
Energy (kcal): 1232kcals (MSJ x1.2); Weight Used for Energy Requirements: Current Protein (g): 70g protein (1.1g/kg); Weight Used for Protein Requirements: Current Fluid (ml/day): 1500ml; Method Used for Fluid Requirements: Other (comment)(Adult minimum) Needs for wt maintenance Nutrition Related Findings:  NFPE finding mild muscle wasing. No edema.  No N/V/D/C per pt, last BM pta as none documented. Pt endorses no issues with c/s, no swallow evals in EMR. Wounds:   
None Current Nutrition Therapies: DIET NUTRITIONAL SUPPLEMENTS Dinner, Lunch, Breakfast; Ensure [employer] DIET REGULAR Anthropometric Measures: 
· Height:  5' 2.01\" (157.5 cm) · Current Body Wt:  59.9 kg (132 lb 0.9 oz)(11/5) · Admission Body Wt:  139 lb 15.9 oz(11/3) · Usual Body Wt:  54.4 kg (120 lb 0.2 oz)(per pt, stable) · Ideal Body Wt:  110 lbs:  120.1 % · BMI Category:  Normal weight (BMI 22.0-24.9) age over 72 No wt hx to assess, UBW less that pt current BW per EMR, unable to assess recent wt loss. Nutrition Diagnosis:  
· Inadequate oral intake related to cognitive or neurological impairment(lethargy) as evidenced by intake 0-25% Nutrition Interventions:  
Food and/or Nutrient Delivery: Continue current diet, Modify current diet, Start oral nutrition supplement(Adjust to soft solids, add Ensure Enlive) Nutrition Education and Counseling: Education not indicated Coordination of Nutrition Care: Continue to monitor while inpatient, Feeding assistance/environmental change, Swallow evaluation Goals: 
Intakes >/=50% of EENs 
wt maintenance within +/-0.5kg Nutrition Monitoring and Evaluation:  
Behavioral-Environmental Outcomes: None identified Food/Nutrient Intake Outcomes: Food and nutrient intake, Supplement intake Physical Signs/Symptoms Outcomes: Chewing or swallowing, Weight, Meal time behavior Discharge Planning: Too soon to determine Electronically signed by [address] on 11/5/2020 at 2:22 PM 
 
Contact: Ext [phone]

## 2020-11-05 NOTE — PROGRESS NOTES
Problem: Self Care Deficits Care Plan (Adult) Goal: *Acute Goals and Plan of Care (Insert Text) Description: Pt will be SBA sup<->sit in prep for EOB ADL's Pt will be SBA  LB dressing EOB level Pt will be min A  sit<-> prep for toilet transfer Pt will be min A toilet transfer with LRAD Pt will be min A  toileting/cloth mgmt LRAD Pt will be min A  grooming standing sink Pt will be mod A bathing sitting/standing sink LRAD Pt will be MI brandon UE HEP in prep for self care tasks Outcome: Not Met OCCUPATIONAL THERAPY EVALUATION Patient: Jovanny Jha (79 y.o. female) Date: 11/5/2020 Primary Diagnosis: Hyponatremia [E87.1] Precautions: falls ASSESSMENT Pt is 80 y/o female came to [facility] with lethargy, increased weakness and lethargy since fall and adm 11/3/2020 for hypernatremia, anemia, low potassium, failure to thrive. fall 1 week prior went home with right arm in sling. Pt has hx of dementia, CHF HLD, HTN. Pt received semi supine in bed A&O to self, knows she is in a hospital however believes she is in [facility], and knows it is 2020 however believes it is june and agreeable for OT eval/tx. Per pt report, pt lives alone in 1 story home with ramp to enter and is independent for self care and MI for functional transfers/mobility using cane. Pt currently presents with decreased balance, decreased activity tolerance, generalized weakness, decreased safety awareness and increaseed need for assist with self care (max A LB dressing EOB, SBA simple grooming EOB, total A toileting/cloth mgmt bed level simulated) and functional transfers/mobility (mod A roll bed level, max A sup<->sit, total Ax2 scooting bed level). Pt educated on and completed brandon UE HEP for 1 set of 10 reps in prep for self care tasks. Pt would benefit from skilled OT services while at [facility] in order to increase safety and independence with self care and functional transfers/mobility.   Recommend discharge to SNF when medically appropriate. Other factors to consider for discharge: time since onset, severity of deficits PLAN : 
Recommendations and Planned Interventions: self care training, functional mobility training, therapeutic exercise, balance training, therapeutic activities, endurance activities, patient education, and home safety training Frequency/Duration: Patient will be followed by occupational therapy 5 times a week to address goals. Recommendation for discharge: (in order for the patient to meet his/her long term goals) SNF This discharge recommendation: 
Has been made in collaboration with the attending provider and/or case management IF patient discharges home will need the following DME: TBD SUBJECTIVE:  
Patient stated Tracy Montaño I do that later when asked if pt would stand with therapy OBJECTIVE DATA SUMMARY:  
HISTORY:  
Past Medical History:  
Diagnosis Date Anemia Dementia ([facility] Heart failure ([facility] High cholesterol Hypertension Low blood potassium History reviewed. No pertinent surgical history. Expanded or extensive additional review of patient history:  
 
Home Situation Home Environment: Private residence Wheelchair Ramp: Yes One/Two Story Residence: One story Living Alone: Yes Support Systems: Family member(s) Patient Expects to be Discharged to[de-identified] Skilled nursing facility Current DME Used/Available at Home: (cane) PLOF: Pt stating she is independent for ADLS/IADLS, MI with mobility prior to admission. EXAMINATION OF PERFORMANCE DEFICITS: 
Cognitive/Behavioral Status: 
Neurologic State: Alert Orientation Level: Oriented to person(knows she is in a hospital and it is 2020) Hearing: Auditory Auditory Impairment: None Range of Motion: 
 
AROM: Generally decreased, functional 
PROM: Generally decreased, functional 
  
Strength: 
 
Strength: Generally decreased, functional 
  
RUE Strength Observation: grossly observed to be 2+/5 LUE Strength Observation: grossly observed to be 2+/5 Tone & Sensation: 
  
Sensation: Intact Balance: 
Sitting: Impaired; With support Functional Mobility and Transfers for ADLs: 
Bed Mobility: 
Rolling: Moderate assistance Supine to Sit: Maximum assistance Sit to Supine: Maximum assistance Scooting: Total assistance;Assist x2 Transfers: ADL Assessment: 
 Oral Facial Hygiene/Grooming: Stand-by assistance Lower Body Dressing: Maximum assistance Toileting: Total assistance(bed level) ADL Intervention and task modifications: 
 Grooming Grooming Assistance: Set-up; Stand-by assistance Position Performed: Seated edge of bed Lower Body Dressing Assistance Socks: Maximum assistance Position Performed: Seated edge of bed Toileting Toileting Assistance: Total assistance(dependent) Cognitive Retraining Orientation Retraining: Reorienting [address] [place] [id number] AM-PAC \"6 Clicks\" Daily Activity Inpatient Short Form How much help from another person does the patient currently need. .. Total; A Lot A Little None 1. Putting on and taking off regular lower body clothing? []  1 [x]  2 []  3 []  4  
2. Bathing (including washing, rinsing, drying)? []  1 [x]  2 []  3 []  4  
3. Toileting, which includes using toilet, bedpan or urinal? [x] 1 []  2 []  3 []  4  
4. Putting on and taking off regular upper body clothing? []  1 [x]  2 []  3 []  4  
5. Taking care of personal grooming such as brushing teeth? []  1 []  2 [x]  3 []  4  
6. Eating meals? []  1 []  2 [x]  3 []  4  
© 2007, Trustees of [address] [place] [address], under license to [employer]. All rights reserved Score: 13/24 Interpretation of Tool:  Represents clinically-significant functional categories (i.e. Activities of daily living).  
Percentage of Impairment CH 
 
0% 
 CI 
 
1-19% CJ 
 
20-39% CK 
 
40-59% CL 
 
 60-79% CM 
 
80-99% CN  
 
100% [facility] Score 6-24 24 23 20-22 15-19 10-14 7-9 6 Occupational Therapy Evaluation Charge Determination History Examination Decision-Making LOW Complexity : Brief history review  MEDIUM Complexity : 3-5 performance deficits relating to physical, cognitive , or psychosocial skils that result in activity limitations and / or participation restrictions MEDIUM Complexity : Patient may present with comorbidities that affect occupational performnce. Miniml to moderate modification of tasks or assistance (eg, physical or verbal ) with assesment(s) is necessary to enable patient to complete evaluation Based on the above components, the patient evaluation is determined to be of the following complexity level: LOW Pain Rating: 
No pain reported Activity Tolerance:  
Fair and requires rest breaks Please refer to the flowsheet for vital signs taken during this treatment. After treatment patient left in no apparent distress:   
Supine in bed, Call bell within reach, and Bed / chair alarm activated COMMUNICATION/EDUCATION:  
The patients plan of care was discussed with: Registered nurse. Home safety education was provided and the patient/caregiver indicated understanding. and Patient/family have participated as able in goal setting and plan of care. This patients plan of care is appropriate for delegation to [facility]. Thank you for this referral. 
Grant Vasquez Time Calculation: 16 mins

## 2020-11-05 NOTE — PROGRESS NOTES
Problem: Mobility Impaired (Adult and Pediatric) Goal: *Acute Goals and Plan of Care (Insert Text) Description: Patient will move from supine to sit and sit to supine , scoot up and down, and roll side to side in bed with moderate assistance  within 7 day(s). Patient will transfer from bed to chair and chair to bed with moderate assistance  using the least restrictive device within 7 day(s). Patient will improve static sitting balance to minimal assistance within 1 week(s). Patient will ambulate 30 feet with minimal assistance with least restrictive device within 1 weeks. Outcome: Not Met PHYSICAL THERAPY EVALUATION Patient: Lety Wilder (79 y.o. female) Date: 11/5/2020 Primary Diagnosis: Hyponatremia [E87.1] Precautions: fall ASSESSMENT Based on the objective data described below, Patient is a 79y.o. year old female with a past medical history of dementia, CHF, hyperlipidemia, and hypertension who presents to the ER for generalized lethargy. Pt fell a week ago and was seen here. She was discharged home with a sling on her right arm. Since the fall she has difficulty standing on her own. She has not been eating or drinking. Her family reports she is unable to get out of bed, noticeable increased weakness, and incontinent. Patient lives alone and needing assist now. Bret claudia unable to keep her eyes open during the session. therapist assisted patient with cleaning her face and eyes with wet clothes to wake her up a little. patient opened her eyes x 3.patient required max assist for rolling side to side and supine to sit attampt and back to supine . patient was incontinent. had not eaten her dinner or breakfast so CNA assisted patient to drink ice tea with sugar as patient's sugar was 70 upon accucheck. patietnappeared to felt better after that. but still unable to actively participate with therapist.decresaed alertness. Current Level of Function Impacting Discharge (mobility/balance): decreased cognitivity, alertness, mobility, rom, strength and endurance Other factors to consider for discharge: lives alone and needs a lot of assist at this time. Will need snf placement for now. Patient will benefit from skilled therapy intervention to address the above noted impairments. PLAN : 
Recommendations and Planned Interventions: bed mobility training, transfer training, gait training, therapeutic exercises, patient and family training/education, and therapeutic activities Frequency/Duration: Patient will be followed by physical therapy:  5 times a week to address goals. Recommendation for discharge: (in order for the patient to meet his/her long term goals) Therapy up to 5 days/week in SNF setting This discharge recommendation: 
Has been made in collaboration with the attending provider and/or case management IF patient discharges home will need the following DME: to be determined (TBD) SUBJECTIVE:  
Patient stated patient unable to talk.  OBJECTIVE DATA SUMMARY:  
HISTORY:   
Past Medical History:  
Diagnosis Date Anemia Dementia ([facility]) Heart failure ([facility]) High cholesterol Hypertension Low blood potassium History reviewed. No pertinent surgical history. Personal factors and/or comorbidities impacting plan of care:  
Home Situation Home Environment: Private residence One/Two Story Residence: One story Living Alone: Yes Support Systems: Family member(s) Patient Expects to be Discharged to[de-identified] Skilled nursing facility Current DME Used/Available at Home: Cane, quad EXAMINATION/PRESENTATION/DECISION MAKING:  
Critical Behavior: 
  
  
  
  
 
  
Range Of Motion: 
AROM: Grossly decreased, non-functional 
  
  
  
PROM: Generally decreased, functional 
  
  
  
Strength:   
Strength: Grossly decreased, non-functional(patient lethargic) Tone & Sensation: Sensation: Intact Coordination: 
  
Vision:  
  
Functional Mobility: 
Bed Mobility: 
Rolling: Total assistance;Assist x2 Supine to Sit: Total assistance;Assist x2 Sit to Supine: Total assistance;Assist x2 Scooting: Total assistance;Assist x2 Transfers: 
  
  
     
  
     
  
  
Balance:  
  
Ambulation/Gait Training: 
  
  
  
  
  
  
  
  
  
  
  
  
  
  
  
  
   
 
Therapeutic Exercises:  
Passive ROM PRIMO les. Functional Measure: 
[address] [place] [id number] AM-PAC 6 Clicks Basic Mobility Inpatient Short Form How much difficulty does the patient currently have. .. Unable A Lot A Little None 1. Turning over in bed (including adjusting bedclothes, sheets and blankets)? [] 1   [x] 2   [] 3   [] 4  
2. Sitting down on and standing up from a chair with arms ( e.g., wheelchair, bedside commode, etc.)   [x] 1   [] 2   [] 3   [] 4  
3. Moving from lying on back to sitting on the side of the bed? [x] 1   [] 2   [] 3   [] 4 How much help from another person does the patient currently need. .. Total A Lot A Little None 4. Moving to and from a bed to a chair (including a wheelchair)? [x] 1   [] 2   [] 3   [] 4  
5. Need to walk in hospital room? [x] 1   [] 2   [] 3   [] 4  
6. Climbing 3-5 steps with a railing? [x] 1   [] 2   [] 3   [] 4  
© 2007, Trustees of [address] [place] [id number], under license to [employer]. All rights reserved Score:  Initial: 24 Most Recent: X (Date: 11/05/2020 ) Interpretation of Tool:  Represents activities that are increasingly more difficult (i.e. Bed mobility, Transfers, Gait). Score 24 23 22-20 19-15 14-10 9-7 6 Modifier CH CI CJ CK CL CM CN Physical Therapy Evaluation Charge Determination History Examination Presentation Decision-Making HIGH Complexity :3+ comorbidities / personal factors will impact the outcome/ POC  HIGH Complexity : 4+ Standardized tests and measures addressing body structure, function, activity limitation and / or participation in recreation  MEDIUM Complexity : Evolving with changing characteristics  MEDIUM Complexity : FOTO score of 26-74 Based on the above components, the patient evaluation is determined to be of the following complexity level: MEDIUM Pain Rating: 
Patient didn't say Activity Tolerance:  
Fair Please refer to the flowsheet for vital signs taken during this treatment. After treatment patient left in no apparent distress:  
Supine in bed, Call bell within reach, Bed / chair alarm activated, and Caregiver / family present COMMUNICATION/EDUCATION:  
The patients plan of care was discussed with: Registered nurse. Patient is unable to participate in goal setting and plan of care. Thank you for this referral. 
Patrica Perkins PT Time Calculation: 38 mins

## 2020-11-06 NOTE — PROGRESS NOTES
General Daily Progress Note Patient Name: Denton Koyanagi YOB: 1940 Age: 
79 y.o. Admit Date: 11/3/2020 Subjective:  
 
 
Patient is a 79y.o. year old female with a past medical history of dementia, CHF, hyperlipidemia, and hypertension who presents to the ER for generalized lethargy. Pt fell a week ago and was seen here. She was discharged home with a sling on her right arm. Since the fall she has difficulty standing on her own. She has not been eating or drinking. Her family reports she is unable to get out of bed, noticeable increased weakness, and incontinent. She is resting comfortably in bed this morning. She states that she \"feels better. \" She has not ate yet. She denies dizziness, chest pain, shortness of breath, and cough. BNP on 11/4 is 267. Chest CT: Near complete collapse of the right lung secondary to large pleural 
effusion Objective:  
 
Visit Vitals BP (!) 151/102 Pulse 70 Temp 97.8 °F (36.6 °C) Resp 18 Ht 5' 2.01\" (1.575 m) Wt 63.5 kg (140 lb) SpO2 97% BMI 25.60 kg/m² Recent Results (from the past 24 hour(s)) GLUCOSE, POC Collection Time: 11/05/20 11:59 AM  
Result Value Ref Range Glucose (POC) 72 65 - 100 mg/dL Performed by Adrian Dyson   
CBC WITH AUTOMATED DIFF Collection Time: 11/05/20  1:40 PM  
Result Value Ref Range WBC 11.2 (H) 3.6 - 11.0 K/uL  
 RBC 5.08 3.80 - 5.20 M/uL  
 HGB 12.7 11.5 - 16.0 g/dL HCT 39.2 35.0 - 47.0 % MCV 77.2 (L) 80.0 - 99.0 FL  
 MCH 25.0 (L) 26.0 - 34.0 PG  
 MCHC 32.4 30.0 - 36.5 g/dL  
 RDW 16.7 (H) 11.5 - 14.5 % PLATELET 627 963 - 887 K/uL MPV 10.1 8.9 - 12.9 FL  
 NEUTROPHILS 76 (H) 32 - 75 % LYMPHOCYTES 12 12 - 49 % MONOCYTES 12 5 - 13 % EOSINOPHILS 0 0 - 7 % BASOPHILS 0 0 - 1 % IMMATURE GRANULOCYTES 0 %  
 ABS. NEUTROPHILS 8.6 (H) 1.8 - 8.0 K/UL  
 ABS. LYMPHOCYTES 1.3 0.8 - 3.5 K/UL  
 ABS. MONOCYTES 1.3 (H) 0.0 - 1.0 K/UL ABS. EOSINOPHILS 0.0 0.0 - 0.4 K/UL  
 ABS. BASOPHILS 0.0 0.0 - 0.1 K/UL  
 ABS. IMM. GRANS. 0.0 K/UL  
 DF AUTOMATED    
 RBC COMMENTS Target Cells 1+ RBC COMMENTS Hypochromia 1+ METABOLIC PANEL, BASIC Collection Time: 11/05/20  1:40 PM  
Result Value Ref Range Sodium 132 (L) 136 - 145 mmol/L Potassium 3.9 3.5 - 5.1 mmol/L Chloride 97 97 - 108 mmol/L  
 CO2 25 21 - 32 mmol/L Anion gap 10 5 - 15 mmol/L Glucose 67 65 - 100 mg/dL BUN 20 6 - 20 mg/dL Creatinine 1.25 (H) 0.55 - 1.02 mg/dL BUN/Creatinine ratio 16 12 - 20 GFR est AA 50 (L) >60 ml/min/1.73m2 GFR est non-AA 41 (L) >60 ml/min/1.73m2 Calcium 9.9 8.5 - 10.1 mg/dL GLUCOSE, POC Collection Time: 11/05/20  9:34 PM  
Result Value Ref Range Glucose (POC) 79 65 - 100 mg/dL Performed by Valerie Gonzalez, POC Collection Time: 11/06/20  7:55 AM  
Result Value Ref Range Glucose (POC) 84 65 - 100 mg/dL Performed by Leighann Street   
 
[unfilled] Review of Systems Constitutional: Negative for chills and fever. HENT: Negative. Eyes: Negative. Respiratory: Negative. Cardiovascular: Negative. Gastrointestinal: Negative for abdominal pain and nausea. Skin: Negative. Neurological: Negative. Physical Exam:   
 
Constitutional: pt is oriented to person, place, and time. HENT:  
Head: Normocephalic and atraumatic. Eyes: Pupils are equal, round, and reactive to light. EOM are normal.  
Cardiovascular: Normal rate, regular rhythm and normal heart sounds. Pulmonary/Chest: Decreased breath sounds on right lung. Exhibits no tenderness. Abdominal: Soft. Bowel sounds are normal. There is no abdominal tenderness. There is no rebound and no guarding. Musculoskeletal: Normal range of motion. Neurological: pt is alert and oriented to person, place, and time. CT CHEST WO CONT Final Result IMPRESSION: Near complete collapse of the right lung secondary to large pleural  
effusion XR HIP LT W OR WO PELV 2-3 VWS Final Result IMPRESSION:  
1. No acute bony findings. XR CHEST SNGL V Final Result IR THORACENTESIS NDL PUNC ASP W IMAGE    (Results Pending) XR CHEST PORT    (Results Pending) Recent Results (from the past 24 hour(s)) GLUCOSE, POC Collection Time: 11/05/20 11:59 AM  
Result Value Ref Range Glucose (POC) 72 65 - 100 mg/dL Performed by Norma Ba   
CBC WITH AUTOMATED DIFF Collection Time: 11/05/20  1:40 PM  
Result Value Ref Range WBC 11.2 (H) 3.6 - 11.0 K/uL  
 RBC 5.08 3.80 - 5.20 M/uL  
 HGB 12.7 11.5 - 16.0 g/dL HCT 39.2 35.0 - 47.0 % MCV 77.2 (L) 80.0 - 99.0 FL  
 MCH 25.0 (L) 26.0 - 34.0 PG  
 MCHC 32.4 30.0 - 36.5 g/dL  
 RDW 16.7 (H) 11.5 - 14.5 % PLATELET 374 102 - 313 K/uL MPV 10.1 8.9 - 12.9 FL  
 NEUTROPHILS 76 (H) 32 - 75 % LYMPHOCYTES 12 12 - 49 % MONOCYTES 12 5 - 13 % EOSINOPHILS 0 0 - 7 % BASOPHILS 0 0 - 1 % IMMATURE GRANULOCYTES 0 %  
 ABS. NEUTROPHILS 8.6 (H) 1.8 - 8.0 K/UL  
 ABS. LYMPHOCYTES 1.3 0.8 - 3.5 K/UL  
 ABS. MONOCYTES 1.3 (H) 0.0 - 1.0 K/UL  
 ABS. EOSINOPHILS 0.0 0.0 - 0.4 K/UL  
 ABS. BASOPHILS 0.0 0.0 - 0.1 K/UL  
 ABS. IMM. GRANS. 0.0 K/UL  
 DF AUTOMATED    
 RBC COMMENTS Target Cells 1+ RBC COMMENTS Hypochromia 1+ METABOLIC PANEL, BASIC Collection Time: 11/05/20  1:40 PM  
Result Value Ref Range Sodium 132 (L) 136 - 145 mmol/L Potassium 3.9 3.5 - 5.1 mmol/L Chloride 97 97 - 108 mmol/L  
 CO2 25 21 - 32 mmol/L Anion gap 10 5 - 15 mmol/L Glucose 67 65 - 100 mg/dL BUN 20 6 - 20 mg/dL Creatinine 1.25 (H) 0.55 - 1.02 mg/dL BUN/Creatinine ratio 16 12 - 20 GFR est AA 50 (L) >60 ml/min/1.73m2 GFR est non-AA 41 (L) >60 ml/min/1.73m2 Calcium 9.9 8.5 - 10.1 mg/dL GLUCOSE, POC Collection Time: 11/05/20  9:34 PM  
Result Value Ref Range Glucose (POC) 79 65 - 100 mg/dL Performed by Jose Luis Banerjee, POC Collection Time: 11/06/20  7:55 AM  
Result Value Ref Range Glucose (POC) 84 65 - 100 mg/dL Performed by Cortney Wood Results Procedure Component Value Units Date/Time Caitlyn Coats [[id number]] Collected:  11/04/20 0750 Order Status:  Completed Specimen:  Urine Updated:  11/06/20 1778 Special Requests: No Special Requests Culture result: No Growth (<1000 cfu/mL) CULTURE, RESPIRATORY/SPUTUM/BRONCH Silas Loss [[id number]] Collected:  11/03/20 2130 Order Status:  Canceled Specimen:  Sputum CULTURE, BLOOD, LINE [[id number]] Collected:  11/03/20 2130 Order Status:  Canceled Specimen:  Blood Labs:  
 
Recent Labs 11/05/20 
1340 11/03/20 
1500 WBC 11.2* 9.6 HGB 12.7 15.2 HCT 39.2 44.1  432* Recent Labs 11/05/20 
1340 11/03/20 
1500 * 123* K 3.9 4.3 CL 97 82* CO2 25 30 BUN 20 14 CREA 1.25* 1.20* GLU 67 91  
CA 9.9 10.4* Recent Labs 11/03/20 
1500 ALT 68 AP 98 TBILI 0.6 TP 8.0 ALB 2.5*  
GLOB 5.5* No results for input(s): INR, PTP, APTT, INREXT, INREXT in the last 72 hours. No results for input(s): FE, TIBC, PSAT, FERR in the last 72 hours. No results found for: FOL, RBCF No results for input(s): PH, PCO2, PO2 in the last 72 hours. No results for input(s): CPK, CKNDX, TROIQ in the last 72 hours. No lab exists for component: CPKMB No results found for: CHOL, CHOLX, CHLST, CHOLV, HDL, HDLP, LDL, LDLC, DLDLP, TGLX, TRIGL, TRIGP, CHHD, CHHDX Lab Results Component Value Date/Time Glucose (POC) 84 11/06/2020 07:55 AM  
 Glucose (POC) 79 11/05/2020 09:34 PM  
 Glucose (POC) 72 11/05/2020 11:59 AM  
 Glucose (POC) 70 11/05/2020 08:58 AM  
 Glucose (POC) 81 11/04/2020 05:48 PM  
 
Lab Results Component Value Date/Time  Color Yellow/Straw 11/03/2020 02:45 PM  
 Appearance Clear 11/03/2020 02:45 PM  
 Specific gravity 1.010 11/03/2020 02:45 PM  
 pH (UA) 6.5 11/03/2020 02:45 PM  
 Protein Negative 11/03/2020 02:45 PM  
 Glucose Normal (A) 11/03/2020 02:45 PM  
 Ketone Negative 11/03/2020 02:45 PM  
 Bilirubin Negative 11/03/2020 02:45 PM  
 Urobilinogen Normal 11/03/2020 02:45 PM  
 Nitrites Negative 11/03/2020 02:45 PM  
 Leukocyte Esterase Negative 11/03/2020 02:45 PM  
 Bacteria Negative 11/03/2020 02:45 PM  
 WBC 0-4 11/03/2020 02:45 PM  
 RBC 0-5 11/03/2020 02:45 PM  
 
   
Assessment:  
 
  
1. Dementia 2. Hx of Congestive Heart Failure 3. Hyponatremia/likely from diuretic 4. Hypertension 5. Hyperlipidemia 6. Failure to Thrive 7. Chest x-ray shows opacities/pleural effusion - Chest CT shows Near complete collapse of the right lung secondary to large pleural 
effusion Plan:  
 
Continue Zosyn after blood cultures sputum culture Continue normal saline at 75 cc/h Monitor sodium level BNP is 267 on 11/4 Need thoracocentesis IR consulted for thoracocentesis waiting for consent Chest CT results: Noncontrast study shows large right pleural effusion. Right middle and lower lobe are completely collapsed, with very few air bronchograms. The upper lobe is almost completely collapsed, sparingly anterior segment only. Mainstem bronchus is open. Proximal lobar bronchi are open. Left lung is clear. Multiple calcified mediastinal hilar lymph nodes. Small left pleural effusion. Small pericardial effusion. Normal caliber aorta. No significant upper abdominal or chest wall finding. Pronounced soft tissue edema in the visualized portion of the left arm PT OT consult Dietitian consult Pulmonology consult DVT prophylaxis with heparin subcu Repeat labs No labs for today Current Facility-Administered Medications:  
  acetylcysteine (MUCOMYST) 200 mg/mL (20 %) solution 600 mg, 600 mg, Nebulization, BID, Asim Red MD, 600 mg at 11/06/20 0096   albuterol-ipratropium (DUO-NEB) 2.5 MG-0.5 MG/3 ML, 3 mL, Nebulization, Q6HWA RT, Asim Red MD, 3 mL at 11/06/20 7284   heparin porcine injection 5,000 Units, 5,000 Units, SubCUTAneous, Q12H, Jesus Maldonado MD, 5,000 Units at 11/05/20 2122   piperacillin-tazobactam (ZOSYN) 3.375 g in 0.9% sodium chloride (MBP/ADV) 100 mL MBP, 3.375 g, IntraVENous, Q8H, Jesus Maldonado MD, Last Rate: 200 mL/hr at 11/06/20 0549, 3.375 g at 11/06/20 0549

## 2020-11-06 NOTE — CONSULTS
Interventional Radiology Post Procedure Note 
 
11/6/2020 Indications: Large right pleural effusion Procedure(s): US-guided Tx & Dx thoracentesis (total 1260 mL fluid drained) Specimen: 60 mL serous fluid sent for lab analysis Preliminary Findings (full report to follow): Large effusion Fluoro Time: 0 Contrast: None Complications: None Follow Up: PRN

## 2020-11-06 NOTE — PROGRESS NOTES
Patient is still pending review with [facility]. CM will continue to follow. Patient has a negative COVID 11/3.

## 2020-11-06 NOTE — PROGRESS NOTES
Pulmonary Progress Note Daily Progress Note: 2020 Subjective: The patient is seen for follow  up. Excerpts from admission 11/3/2020 or consult notes as follows:  
67-year-old lady came in because of generalized weakness lethargy past medical history of dementia congestive heart failure hypertension hyperlipidemia patient was discharged from home with a sling on her right arm as she fell. Now chest x-ray and CAT scan of the chest was done which shows right lower lobe collapse with pleural effusion unable to get much history out of the patient so pulmonary consult was called for further evaluation 
  
Patient is on room air. Refused BIPAP last night. She denies cough and SOB. She denies headache and chest pain She says she feels good. Problem List: 
Problem List as of 2020 Never Reviewed Codes Class Noted - Resolved Hyponatremia ICD-10-CM: E87.1 ICD-9-CM: 276.1  11/3/2020 - Present Medications reviewed Current Facility-Administered Medications Medication Dose Route Frequency  acetylcysteine (MUCOMYST) 200 mg/mL (20 %) solution 600 mg  600 mg Nebulization BID  albuterol-ipratropium (DUO-NEB) 2.5 MG-0.5 MG/3 ML  3 mL Nebulization Q6HWA RT  
 heparin porcine injection 5,000 Units  5,000 Units SubCUTAneous Q12H  piperacillin-tazobactam (ZOSYN) 3.375 g in 0.9% sodium chloride (MBP/ADV) 100 mL MBP  3.375 g IntraVENous Q8H Review of Systems: A comprehensive review of systems was negative except for that written in the HPI. Objective:  
Physical Exam:  
 
Visit Vitals BP (!) 151/102 Pulse 70 Temp 97.8 °F (36.6 °C) Resp 18 Ht 5' 2.01\" (1.575 m) Wt 140 lb (63.5 kg) SpO2 97% BMI 25.60 kg/m² O2 Device: Room air Temp (24hrs), Av.7 °F (36.5 °C), Min:97.2 °F (36.2 °C), Max:98 °F (36.7 °C) No intake/output data recorded.  1901 -  0700 In: -  
Out: 934 [[id number]] Constitutional:  
Awake HENT:  
 Head: Normocephalic and atraumatic. Eyes: Pupils are equal, round, and reactive to light. Neck: Normal range of motion. Neck supple. Cardiovascular: Normal rate and regular rhythm. Pulmonary/Chest:  
Diminished breath sound on the right side with dullness on percussion Abdominal: Soft. Bowel sounds are normal.  
Musculoskeletal: Normal range of motion. Skin: Skin is warm. Data Review:  
   
Recent Days: 
Recent Labs 11/05/20 
1340 11/03/20 
1500 WBC 11.2* 9.6 HGB 12.7 15.2 HCT 39.2 44.1  432* Recent Labs 11/05/20 
1340 11/03/20 
1500 * 123* K 3.9 4.3 CL 97 82* CO2 25 30 GLU 67 91 BUN 20 14 CREA 1.25* 1.20* CA 9.9 10.4* ALB  --  2.5* TBILI  --  0.6 ALT  --  68 No results for input(s): PH, PCO2, PO2, HCO3, FIO2 in the last 72 hours. 24 Hour Results: 
Recent Results (from the past 24 hour(s)) GLUCOSE, POC Collection Time: 11/05/20 11:59 AM  
Result Value Ref Range Glucose (POC) 72 65 - 100 mg/dL Performed by Norma Ba   
CBC WITH AUTOMATED DIFF Collection Time: 11/05/20  1:40 PM  
Result Value Ref Range WBC 11.2 (H) 3.6 - 11.0 K/uL  
 RBC 5.08 3.80 - 5.20 M/uL  
 HGB 12.7 11.5 - 16.0 g/dL HCT 39.2 35.0 - 47.0 % MCV 77.2 (L) 80.0 - 99.0 FL  
 MCH 25.0 (L) 26.0 - 34.0 PG  
 MCHC 32.4 30.0 - 36.5 g/dL  
 RDW 16.7 (H) 11.5 - 14.5 % PLATELET 619 196 - 204 K/uL MPV 10.1 8.9 - 12.9 FL  
 NEUTROPHILS 76 (H) 32 - 75 % LYMPHOCYTES 12 12 - 49 % MONOCYTES 12 5 - 13 % EOSINOPHILS 0 0 - 7 % BASOPHILS 0 0 - 1 % IMMATURE GRANULOCYTES 0 %  
 ABS. NEUTROPHILS 8.6 (H) 1.8 - 8.0 K/UL  
 ABS. LYMPHOCYTES 1.3 0.8 - 3.5 K/UL  
 ABS. MONOCYTES 1.3 (H) 0.0 - 1.0 K/UL  
 ABS. EOSINOPHILS 0.0 0.0 - 0.4 K/UL  
 ABS. BASOPHILS 0.0 0.0 - 0.1 K/UL  
 ABS. IMM. GRANS. 0.0 K/UL  
 DF AUTOMATED    
 RBC COMMENTS Target Cells 1+ RBC COMMENTS Hypochromia 1+ METABOLIC PANEL, BASIC  
 Collection Time: 11/05/20  1:40 PM  
Result Value Ref Range Sodium 132 (L) 136 - 145 mmol/L Potassium 3.9 3.5 - 5.1 mmol/L Chloride 97 97 - 108 mmol/L  
 CO2 25 21 - 32 mmol/L Anion gap 10 5 - 15 mmol/L Glucose 67 65 - 100 mg/dL BUN 20 6 - 20 mg/dL Creatinine 1.25 (H) 0.55 - 1.02 mg/dL BUN/Creatinine ratio 16 12 - 20 GFR est AA 50 (L) >60 ml/min/1.73m2 GFR est non-AA 41 (L) >60 ml/min/1.73m2 Calcium 9.9 8.5 - 10.1 mg/dL GLUCOSE, POC Collection Time: 11/05/20  9:34 PM  
Result Value Ref Range Glucose (POC) 79 65 - 100 mg/dL Performed by Ira Goodpasture, POC Collection Time: 11/06/20  7:55 AM  
Result Value Ref Range Glucose (POC) 84 65 - 100 mg/dL Performed by Lencho Hancock Imaging: 
  
   
CXR Results  (Last 48 hours)  
  None  
   
  
    
Results from Hospital Encounter encounter on 11/03/20 XR HIP LT W OR WO PELV 2-3 VWS  
  Narrative Fall 1 week ago. 
  
No comparison. 
  
FINDINGS: AP pelvis and frog-leg view of the left hip. No acute fracture or 
dislocation. Deformity of the left femur trochanter and proximal diaphysis, 
appears chronic. No radiopaque foreign body. 
   
  Impression IMPRESSION: 
1. No acute bony findings. XR CHEST SNGL V  
  Narrative Chest single view. 
  
Comparison single view chest March 29, 2018. 
  
Right-sided Port-A-Cath stable position. New opacification of the lower two thirds right hemithorax. This is likely 
related to a combination of large volume pleural fluid and atelectatic lung. Left lung is aerated. Cardiac and mediastinal structures unchanged. No 
pneumothorax. Nonacute rib fractures noted. [place] [facility] [place] XR SHOULDER RT AP/LAT MIN 2 V  
  Narrative 3 views of the right shoulder reveal diffuse osteopenia. There is mild 
degenerative subluxation of the humerus with respect to the glenoid.  No fracture 
or dislocation is identified. 
   
   Impression IMPRESSION: Degenerative changes.  
  
    
Results from Hospital Encounter encounter on 11/03/20 CT CHEST WO CONT  
  Narrative CT dose reduction was achieved through use of a standardized protocol tailored 
for this examination and automatic exposure control for dose modulation. 
  
Noncontrast study shows large right pleural effusion. Right middle and lower 
lobe are completely collapsed, with very few air bronchograms. The upper lobe is 
almost completely collapsed, sparingly anterior segment only. Mainstem bronchus 
is open. Proximal lobar bronchi are open. Left lung is clear. Multiple calcified 
mediastinal hilar lymph nodes. Small left pleural effusion. Small pericardial 
effusion. Normal caliber aorta. No significant upper abdominal or chest wall 
finding. Pronounced soft tissue edema in the visualized portion of the left arm 
   
  Impression IMPRESSION: Near complete collapse of the right lung secondary to large pleural 
effusion  
  
 
  
IMPRESSION:  
1. RLL collapse Atelectasis 2. Pleural Effusion Right side 3. Chronic Obstructive Pulmonary Disease with Severe Acute Exacerbation requiring inpatient hospitalization and management; has very poor airway clearance. Increased work of breathing 4. Body mass index is 25.6 kg/m². 5.   History of heart failure hyponatremia 5. Prognosis guarded   
   
RECOMMENDATIONS/PLAN:  
  
1. BIPAP for non invasive ventilatory life support to prevent Atelectasis 2. She is refusing IPPB [place] 3. She is on Zosyn 4. Nebulizer treatment with Mucomyst 
5. We will get IR for possible diagnostic and therapeutic thoracentesis 6. Will chest x-ray in a.m. 
7. Supplemental O2 to keep sats > 93% 8. Aspiration precautions 9. Labs to follow electrolytes, renal function and and blood counts 10. Glucose monitoring and SSI 11. Bronchial hygiene with respiratory therapy techniques, bronchodilators 12.  DVT, SUP prophylaxis 
   
  
  
 
 
 Care Plan discussed with: Patient/Family Total time spent with patient: 30 minutes. Eligah Moment

## 2020-11-06 NOTE — PROGRESS NOTES
Pulmonary Progress Note Daily Progress Note: 2020 Subjective: The patient is seen for follow  up. Excerpts from admission 11/3/2020 or consult notes as follows:  
67-year-old lady came in because of generalized weakness lethargy past medical history of dementia congestive heart failure hypertension hyperlipidemia patient was discharged from home with a sling on her right arm as she fell. Now chest x-ray and CAT scan of the chest was done which shows right lower lobe collapse with pleural effusion unable to get much history out of the patient so pulmonary consult was called for further evaluation 
  
Patient is on room air. Refused BIPAP last night. She denies cough and SOB. She denies headache and chest pain She says she feels good. Problem List: 
Problem List as of 2020 Never Reviewed Codes Class Noted - Resolved Hyponatremia ICD-10-CM: E87.1 ICD-9-CM: 276.1  11/3/2020 - Present Medications reviewed Current Facility-Administered Medications Medication Dose Route Frequency  acetylcysteine (MUCOMYST) 200 mg/mL (20 %) solution 600 mg  600 mg Nebulization BID  albuterol-ipratropium (DUO-NEB) 2.5 MG-0.5 MG/3 ML  3 mL Nebulization Q6HWA RT  
 heparin porcine injection 5,000 Units  5,000 Units SubCUTAneous Q12H  piperacillin-tazobactam (ZOSYN) 3.375 g in 0.9% sodium chloride (MBP/ADV) 100 mL MBP  3.375 g IntraVENous Q8H Review of Systems: A comprehensive review of systems was negative except for that written in the HPI. Objective:  
Physical Exam:  
 
Visit Vitals BP (!) 151/102 Pulse 70 Temp 97.8 °F (36.6 °C) Resp 18 Ht 5' 2.01\" (1.575 m) Wt 63.5 kg (140 lb) SpO2 97% BMI 25.60 kg/m² O2 Device: Room air Temp (24hrs), Av.7 °F (36.5 °C), Min:97.2 °F (36.2 °C), Max:98 °F (36.7 °C) No intake/output data recorded.  1901 -  0700 In: -  
Out: 130 [[id number]] Constitutional:  
Awake HENT:  
 Head: Normocephalic and atraumatic. Eyes: Pupils are equal, round, and reactive to light. Neck: Normal range of motion. Neck supple. Cardiovascular: Normal rate and regular rhythm. Pulmonary/Chest:  
Diminished breath sound on the right side with dullness on percussion Abdominal: Soft. Bowel sounds are normal.  
Musculoskeletal: Normal range of motion. Skin: Skin is warm. Data Review:  
   
Recent Days: 
Recent Labs 11/05/20 
1340 11/03/20 
1500 WBC 11.2* 9.6 HGB 12.7 15.2 HCT 39.2 44.1  432* Recent Labs 11/05/20 
1340 11/03/20 
1500 * 123* K 3.9 4.3 CL 97 82* CO2 25 30 GLU 67 91 BUN 20 14 CREA 1.25* 1.20* CA 9.9 10.4* ALB  --  2.5* TBILI  --  0.6 ALT  --  68 No results for input(s): PH, PCO2, PO2, HCO3, FIO2 in the last 72 hours. 24 Hour Results: 
Recent Results (from the past 24 hour(s)) GLUCOSE, POC Collection Time: 11/05/20 11:59 AM  
Result Value Ref Range Glucose (POC) 72 65 - 100 mg/dL Performed by Agustin Smith   
CBC WITH AUTOMATED DIFF Collection Time: 11/05/20  1:40 PM  
Result Value Ref Range WBC 11.2 (H) 3.6 - 11.0 K/uL  
 RBC 5.08 3.80 - 5.20 M/uL  
 HGB 12.7 11.5 - 16.0 g/dL HCT 39.2 35.0 - 47.0 % MCV 77.2 (L) 80.0 - 99.0 FL  
 MCH 25.0 (L) 26.0 - 34.0 PG  
 MCHC 32.4 30.0 - 36.5 g/dL  
 RDW 16.7 (H) 11.5 - 14.5 % PLATELET 047 878 - 005 K/uL MPV 10.1 8.9 - 12.9 FL  
 NEUTROPHILS 76 (H) 32 - 75 % LYMPHOCYTES 12 12 - 49 % MONOCYTES 12 5 - 13 % EOSINOPHILS 0 0 - 7 % BASOPHILS 0 0 - 1 % IMMATURE GRANULOCYTES 0 %  
 ABS. NEUTROPHILS 8.6 (H) 1.8 - 8.0 K/UL  
 ABS. LYMPHOCYTES 1.3 0.8 - 3.5 K/UL  
 ABS. MONOCYTES 1.3 (H) 0.0 - 1.0 K/UL  
 ABS. EOSINOPHILS 0.0 0.0 - 0.4 K/UL  
 ABS. BASOPHILS 0.0 0.0 - 0.1 K/UL  
 ABS. IMM. GRANS. 0.0 K/UL  
 DF AUTOMATED    
 RBC COMMENTS Target Cells 1+ RBC COMMENTS Hypochromia 1+ METABOLIC PANEL, BASIC  
 Collection Time: 11/05/20  1:40 PM  
Result Value Ref Range Sodium 132 (L) 136 - 145 mmol/L Potassium 3.9 3.5 - 5.1 mmol/L Chloride 97 97 - 108 mmol/L  
 CO2 25 21 - 32 mmol/L Anion gap 10 5 - 15 mmol/L Glucose 67 65 - 100 mg/dL BUN 20 6 - 20 mg/dL Creatinine 1.25 (H) 0.55 - 1.02 mg/dL BUN/Creatinine ratio 16 12 - 20 GFR est AA 50 (L) >60 ml/min/1.73m2 GFR est non-AA 41 (L) >60 ml/min/1.73m2 Calcium 9.9 8.5 - 10.1 mg/dL GLUCOSE, POC Collection Time: 11/05/20  9:34 PM  
Result Value Ref Range Glucose (POC) 79 65 - 100 mg/dL Performed by Rene Mathis, POC Collection Time: 11/06/20  7:55 AM  
Result Value Ref Range Glucose (POC) 84 65 - 100 mg/dL Performed by Asia Figueroa Imaging: 
  
   
CXR Results  (Last 48 hours)  
  None  
   
  
    
Results from Hospital Encounter encounter on 11/03/20 XR HIP LT W OR WO PELV 2-3 VWS  
  Narrative Fall 1 week ago. 
  
No comparison. 
  
FINDINGS: AP pelvis and frog-leg view of the left hip. No acute fracture or 
dislocation. Deformity of the left femur trochanter and proximal diaphysis, 
appears chronic. No radiopaque foreign body. 
   
  Impression IMPRESSION: 
1. No acute bony findings. XR CHEST SNGL V  
  Narrative Chest single view. 
  
Comparison single view chest March 29, 2018. 
  
Right-sided Port-A-Cath stable position. New opacification of the lower two thirds right hemithorax. This is likely 
related to a combination of large volume pleural fluid and atelectatic lung. Left lung is aerated. Cardiac and mediastinal structures unchanged. No 
pneumothorax. Nonacute rib fractures noted. [place] [facility] [place] XR SHOULDER RT AP/LAT MIN 2 V  
  Narrative 3 views of the right shoulder reveal diffuse osteopenia. There is mild 
degenerative subluxation of the humerus with respect to the glenoid.  No fracture 
or dislocation is identified. 
   
   Impression IMPRESSION: Degenerative changes.  
  
    
Results from Hospital Encounter encounter on 11/03/20 CT CHEST WO CONT  
  Narrative CT dose reduction was achieved through use of a standardized protocol tailored 
for this examination and automatic exposure control for dose modulation. 
  
Noncontrast study shows large right pleural effusion. Right middle and lower 
lobe are completely collapsed, with very few air bronchograms. The upper lobe is 
almost completely collapsed, sparingly anterior segment only. Mainstem bronchus 
is open. Proximal lobar bronchi are open. Left lung is clear. Multiple calcified 
mediastinal hilar lymph nodes. Small left pleural effusion. Small pericardial 
effusion. Normal caliber aorta. No significant upper abdominal or chest wall 
finding. Pronounced soft tissue edema in the visualized portion of the left arm 
   
  Impression IMPRESSION: Near complete collapse of the right lung secondary to large pleural 
effusion  
  
 
  
IMPRESSION:  
1. RLL collapse Atelectasis 2. Pleural Effusion Right side 3. Chronic Obstructive Pulmonary Disease with Severe Acute Exacerbation requiring inpatient hospitalization and management; has very poor airway clearance. Increased work of breathing 4. Body mass index is 25.6 kg/m². 5.   History of heart failure hyponatremia 5. Prognosis guarded   
   
RECOMMENDATIONS/PLAN:  
  
1. BIPAP for non invasive ventilatory life support to prevent Atelectasis 2. She is refusing IPPB [place] 3. She is on Zosyn 4. Nebulizer treatment with Mucomyst 
5. We will get IR for possible diagnostic and therapeutic thoracentesis 6. Will chest x-ray in a.m. 
7. Supplemental O2 to keep sats > 93% 8. Aspiration precautions 9. Labs to follow electrolytes, renal function and and blood counts 10. Glucose monitoring and SSI 11. Bronchial hygiene with respiratory therapy techniques, bronchodilators 12.  DVT, SUP prophylaxis 
   
  
 Try to reach the family voicemail left with the daughter regarding thoracentesis Mortimer Polio, MD

## 2020-11-06 NOTE — PROGRESS NOTES
PT tx attempted at 0850 however pt with high BP of 115/107 thus will hold PT tx at this time. Will continue to follow patient and attempt PT tx at a later time when medically appropriate. Thank you.

## 2020-11-06 NOTE — PROGRESS NOTES
OT tx attempted at 0850 however pt with high BP of 115/107 thus will hold OT tx at this time. Will continue to follow patient and attempt OT tx at a later time when medically appropriate. Thank you.

## 2020-11-07 NOTE — PROGRESS NOTES
Pulmonary Progress Note Daily Progress Note: 2020 Subjective: The patient is seen for follow  up. Excerpts from admission 11/3/2020 or consult notes as follows:  
30-year-old lady came in because of generalized weakness lethargy past medical history of dementia congestive heart failure hypertension hyperlipidemia patient was discharged from home with a sling on her right arm as she fell. Now chest x-ray and CAT scan of the chest was done which shows right lower lobe collapse with pleural effusion unable to get much history out of the patient so pulmonary consult was called for further evaluation 
  
Patient is on room air. Refused BIPAP last night. She denies cough and SOB. She denies headache and chest pain She says she feels good. Problem List: 
Problem List as of 2020 Never Reviewed Codes Class Noted - Resolved Hyponatremia ICD-10-CM: E87.1 ICD-9-CM: 276.1  11/3/2020 - Present Medications reviewed Current Facility-Administered Medications Medication Dose Route Frequency  metoprolol tartrate (LOPRESSOR) tablet 25 mg  25 mg Oral Q12H  
 acetylcysteine (MUCOMYST) 200 mg/mL (20 %) solution 600 mg  600 mg Nebulization BID  albuterol-ipratropium (DUO-NEB) 2.5 MG-0.5 MG/3 ML  3 mL Nebulization Q6HWA RT  
 heparin porcine injection 5,000 Units  5,000 Units SubCUTAneous Q12H  piperacillin-tazobactam (ZOSYN) 3.375 g in 0.9% sodium chloride (MBP/ADV) 100 mL MBP  3.375 g IntraVENous Q8H Review of Systems: A comprehensive review of systems was negative except for that written in the HPI. Objective:  
Physical Exam:  
 
Visit Vitals BP (!) 125/90 (BP 1 Location: Right arm, BP Patient Position: At rest) Pulse 63 Temp 98.7 °F (37.1 °C) Resp 20 Ht 5' 2.01\" (1.575 m) Wt 63.5 kg (140 lb) SpO2 95% BMI 25.60 kg/m² O2 Device: Room air Temp (24hrs), Av.5 °F (36.9 °C), Min:97.9 °F (36.6 °C), Max:99 °F (37.2 °C) No intake/output data recorded. 11/05 1901 - 11/07 0700 In: -  
Out: 569 [CEARR:292] Constitutional:  
Awake HENT:  
Head: Normocephalic and atraumatic. Eyes: Pupils are equal, round, and reactive to light. Neck: Normal range of motion. Neck supple. Cardiovascular: Normal rate and regular rhythm. Pulmonary/Chest:  
Diminished breath sound on the right side with dullness on percussion Abdominal: Soft. Bowel sounds are normal.  
Musculoskeletal: Normal range of motion. Skin: Skin is warm. Data Review:  
   
Recent Days: 
Recent Labs 11/06/20 
1207 11/05/20 
1340 WBC 7.9 11.2* HGB 13.2 12.7 HCT 40.2 39.2  321 Recent Labs 11/06/20 
1207 11/05/20 
1340 * 132* K 3.3* 3.9 CL 98 97 CO2 31 25 GLU 66 67 BUN 15 20 CREA 1.00 1.25* CA 9.9 9.9 ALB 2.1*  --   
TBILI 0.6  --   
ALT 39  -- No results for input(s): PH, PCO2, PO2, HCO3, FIO2 in the last 72 hours. 24 Hour Results: 
Recent Results (from the past 24 hour(s)) CBC WITH AUTOMATED DIFF Collection Time: 11/06/20 12:07 PM  
Result Value Ref Range WBC 7.9 3.6 - 11.0 K/uL  
 RBC 5.21 (H) 3.80 - 5.20 M/uL  
 HGB 13.2 11.5 - 16.0 g/dL HCT 40.2 35.0 - 47.0 % MCV 77.2 (L) 80.0 - 99.0 FL  
 MCH 25.3 (L) 26.0 - 34.0 PG  
 MCHC 32.8 30.0 - 36.5 g/dL  
 RDW 17.0 (H) 11.5 - 14.5 % PLATELET 598 770 - 677 K/uL MPV 10.2 8.9 - 12.9 FL  
 NEUTROPHILS 78 (H) 32 - 75 % LYMPHOCYTES 10 (L) 12 - 49 % MONOCYTES 10 5 - 13 % EOSINOPHILS 1 0 - 7 % BASOPHILS 0 0 - 1 % IMMATURE GRANULOCYTES 1 (H) 0.0 - 0.5 % ABS. NEUTROPHILS 6.2 1.8 - 8.0 K/UL  
 ABS. LYMPHOCYTES 0.8 0.8 - 3.5 K/UL  
 ABS. MONOCYTES 0.8 0.0 - 1.0 K/UL  
 ABS. EOSINOPHILS 0.1 0.0 - 0.4 K/UL  
 ABS. BASOPHILS 0.0 0.0 - 0.1 K/UL  
 ABS. IMM. GRANS. 0.0 0.00 - 0.04 K/UL  
 DF AUTOMATED METABOLIC PANEL, COMPREHENSIVE Collection Time: 11/06/20 12:07 PM  
Result Value Ref Range Sodium 135 (L) 136 - 145 mmol/L Potassium 3.3 (L) 3.5 - 5.1 mmol/L Chloride 98 97 - 108 mmol/L  
 CO2 31 21 - 32 mmol/L Anion gap 6 5 - 15 mmol/L Glucose 66 65 - 100 mg/dL BUN 15 6 - 20 mg/dL Creatinine 1.00 0.55 - 1.02 mg/dL BUN/Creatinine ratio 15 12 - 20 GFR est AA >60 >60 ml/min/1.73m2 GFR est non-AA 53 (L) >60 ml/min/1.73m2 Calcium 9.9 8.5 - 10.1 mg/dL Bilirubin, total 0.6 0.2 - 1.0 mg/dL AST (SGOT) 49 (H) 15 - 37 U/L  
 ALT (SGPT) 39 12 - 78 U/L Alk. phosphatase 70 45 - 117 U/L Protein, total 6.4 6.4 - 8.2 g/dL Albumin 2.1 (L) 3.5 - 5.0 g/dL Globulin 4.3 (H) 2.0 - 4.0 g/dL A-G Ratio 0.5 (L) 1.1 - 2.2 GLUCOSE, POC Collection Time: 11/06/20  3:12 PM  
Result Value Ref Range Glucose (POC) 75 65 - 100 mg/dL Performed by Ramon Caballero FLUID DIFFERENTIAL Collection Time: 11/06/20  4:45 PM  
Result Value Ref Range FLD NEUTROPHILS 2 % FLD BANDS 0 % FLD LYMPHS 70 % FLD MONOCYTES 7 % FLD EOSINS 0 % FLUID MESOTHELIAL 21 % GLUCOSE, POC Collection Time: 11/06/20  5:29 PM  
Result Value Ref Range Glucose (POC) 86 65 - 100 mg/dL Performed by [employer] [facility] Imaging: 
  
   
CXR Results  (Last 48 hours)  
  None  
   
  
    
Results from Hospital Encounter encounter on 11/03/20 XR HIP LT W OR WO PELV 2-3 VWS  
  Narrative Fall 1 week ago. 
  
No comparison. 
  
FINDINGS: AP pelvis and frog-leg view of the left hip. No acute fracture or 
dislocation. Deformity of the left femur trochanter and proximal diaphysis, 
appears chronic. No radiopaque foreign body. 
   
  Impression IMPRESSION: 
1. No acute bony findings. XR CHEST SNGL V  
  Narrative Chest single view. 
  
Comparison single view chest March 29, 2018. 
  
Right-sided Port-A-Cath stable position. New opacification of the lower two thirds right hemithorax. This is likely 
related to a combination of large volume pleural fluid and atelectatic lung. Left lung is aerated. Cardiac and mediastinal structures unchanged. No 
pneumothorax. Nonacute rib fractures noted. [place] [facility] [place]20 XR SHOULDER RT AP/LAT MIN 2 V  
  Narrative 3 views of the right shoulder reveal diffuse osteopenia. There is mild 
degenerative subluxation of the humerus with respect to the glenoid. No fracture 
or dislocation is identified. 
   
  Impression IMPRESSION: Degenerative changes.  
  
    
Results from Hospital Encounter encounter on 11/03/20 CT CHEST WO CONT  
  Narrative CT dose reduction was achieved through use of a standardized protocol tailored 
for this examination and automatic exposure control for dose modulation. 
  
Noncontrast study shows large right pleural effusion. Right middle and lower 
lobe are completely collapsed, with very few air bronchograms. The upper lobe is 
almost completely collapsed, sparingly anterior segment only. Mainstem bronchus 
is open. Proximal lobar bronchi are open. Left lung is clear. Multiple calcified 
mediastinal hilar lymph nodes. Small left pleural effusion. Small pericardial 
effusion. Normal caliber aorta. No significant upper abdominal or chest wall 
finding. Pronounced soft tissue edema in the visualized portion of the left arm 
   
  Impression IMPRESSION: Near complete collapse of the right lung secondary to large pleural 
effusion  
  
 
  
IMPRESSION:  
1. RLL collapse Atelectasis 2. Pleural Effusion Right side status post thoracentesis by IR 
3. Chronic Obstructive Pulmonary Disease with Severe Acute Exacerbation requiring inpatient hospitalization and management; has very poor airway clearance. Increased work of breathing 4. A. fib with RVR 5. History of heart failure hyponatremia 5. Prognosis guarded   
   
RECOMMENDATIONS/PLAN:  
  
1. Patient refused to be on BiPAP 2.  Status post thoracentesis by IR about 1260 cc of serous fluid removed Cytology and culture pending x-ray shows improvement right lung still has basal atelectasis with small pleural effusion 3. She is on Zosyn 4. Started on metoprolol by cardiology 5. Nebulizer treatment with Mucomyst 
6. Supplemental O2 to keep sats > 93% 7. Aspiration precautions 8. Labs to follow electrolytes, renal function and and blood counts 9. Glucose monitoring and SSI 10. Bronchial hygiene with respiratory therapy techniques, bronchodilators 11. DVT, SUP prophylaxis 
   
  
Try to reach the family voicemail left with the daughter regarding thoracentesis Fidelina Nascimento MD

## 2020-11-07 NOTE — PROGRESS NOTES
Problem: Falls - Risk of 
Goal: *Absence of Falls Description: Document Brain Chery Fall Risk and appropriate interventions in the flowsheet. Outcome: Progressing Towards Goal 
Note: Fall Risk Interventions: 
Mobility Interventions: Bed/chair exit alarm Mentation Interventions: Adequate sleep, hydration, pain control Elimination Interventions: Call light in reach History of Falls Interventions: Bed/chair exit alarm Problem: Pressure Injury - Risk of 
Goal: *Prevention of pressure injury Description: Document Cruz Scale and appropriate interventions in the flowsheet. Outcome: Progressing Towards Goal 
Note: Pressure Injury Interventions: 
Sensory Interventions: Assess changes in LOC Moisture Interventions: Absorbent underpads Activity Interventions: PT/OT evaluation Mobility Interventions: Turn and reposition approx. every two hours(pillow and wedges) Nutrition Interventions: Document food/fluid/supplement intake, Offer support with meals,snacks and hydration Friction and Shear Interventions: Apply protective barrier, creams and emollients

## 2020-11-07 NOTE — PROGRESS NOTES
General Daily Progress Note Patient Name: Destini Burton YOB: 1940 Age: 
79 y.o. Admit Date: 11/3/2020 Subjective:  
 
Patient more alert awake this morning Objective:  
 
Visit Vitals BP (!) 125/90 (BP 1 Location: Right arm, BP Patient Position: At rest) Pulse (!) 110 Temp 98.7 °F (37.1 °C) Resp 20 Ht 5' 2.01\" (1.575 m) Wt 63.5 kg (140 lb) SpO2 95% BMI 25.60 kg/m² Recent Results (from the past 24 hour(s)) GLUCOSE, POC Collection Time: 11/06/20  3:12 PM  
Result Value Ref Range Glucose (POC) 75 65 - 100 mg/dL Performed by Jae Meyer FLUID DIFFERENTIAL Collection Time: 11/06/20  4:45 PM  
Result Value Ref Range FLD NEUTROPHILS 2 % FLD BANDS 0 % FLD LYMPHS 70 % FLD MONOCYTES 7 % FLD EOSINS 0 % FLUID MESOTHELIAL 21 % GLUCOSE, POC Collection Time: 11/06/20  5:29 PM  
Result Value Ref Range Glucose (POC) 86 65 - 100 mg/dL Performed by Marek Hernandez   
CBC W/O DIFF Collection Time: 11/07/20 10:25 AM  
Result Value Ref Range WBC 8.9 3.6 - 11.0 K/uL  
 RBC 4.74 3.80 - 5.20 M/uL  
 HGB 11.6 11.5 - 16.0 g/dL HCT 36.5 35.0 - 47.0 % MCV 77.0 (L) 80.0 - 99.0 FL  
 MCH 24.5 (L) 26.0 - 34.0 PG  
 MCHC 31.8 30.0 - 36.5 g/dL  
 RDW 16.9 (H) 11.5 - 14.5 % PLATELET 456 033 - 673 K/uL MPV 10.0 8.9 - 28.1 FL  
METABOLIC PANEL, COMPREHENSIVE Collection Time: 11/07/20 10:25 AM  
Result Value Ref Range Sodium 136 136 - 145 mmol/L Potassium 3.3 (L) 3.5 - 5.1 mmol/L Chloride 99 97 - 108 mmol/L  
 CO2 33 (H) 21 - 32 mmol/L Anion gap 4 (L) 5 - 15 mmol/L Glucose 91 65 - 100 mg/dL BUN 16 6 - 20 mg/dL Creatinine 0.85 0.55 - 1.02 mg/dL BUN/Creatinine ratio 19 12 - 20 GFR est AA >60 >60 ml/min/1.73m2 GFR est non-AA >60 >60 ml/min/1.73m2 Calcium 9.8 8.5 - 10.1 mg/dL Bilirubin, total 0.6 0.2 - 1.0 mg/dL  AST (SGOT) 42 (H) 15 - 37 U/L  
 ALT (SGPT) 37 12 - 78 U/L Alk. phosphatase 63 45 - 117 U/L Protein, total 5.7 (L) 6.4 - 8.2 g/dL Albumin 1.8 (L) 3.5 - 5.0 g/dL Globulin 3.9 2.0 - 4.0 g/dL A-G Ratio 0.5 (L) 1.1 - 2.2 MAGNESIUM Collection Time: 11/07/20 10:25 AM  
Result Value Ref Range Magnesium 1.9 1.6 - 2.4 mg/dL  
 
[unfilled] Review of Systems Constitutional: Negative for chills and fever. HENT: Negative. Eyes: Negative. Respiratory: Negative. Cardiovascular: Negative. Gastrointestinal: Negative for abdominal pain and nausea. Skin: Negative. Neurological: Negative. Physical Exam:   
 
Constitutional: pt is oriented to person, place, and time. HENT:  
Head: Normocephalic and atraumatic. Eyes: Pupils are equal, round, and reactive to light. EOM are normal.  
Cardiovascular: Normal rate, regular rhythm and normal heart sounds. Pulmonary/Chest: Breath sounds normal. No wheezes. No rales. Exhibits no tenderness. Abdominal: Soft. Bowel sounds are normal. There is no abdominal tenderness. There is no rebound and no guarding. Musculoskeletal: Normal range of motion. Neurological: pt is alert and oriented to person, place, and time. XR CHEST PORT Final Result IMPRESSION: Diffuse airspace disease, right hemithorax, with improved aeration  
from previous. Differential diagnosis includes pneumonia, atelectasis, and/or  
reexpansion pulmonary edema. No pneumothorax. [address] Final Result IMPRESSION:   
Successful ultrasound-guided thoracentesis of right large pleural effusion, with  
approximately 1.3 L serous pleural fluid removed. Pleural fluid sample was also  
sent for lab analysis. CT CHEST WO CONT Final Result IMPRESSION: Near complete collapse of the right lung secondary to large pleural  
effusion XR HIP LT W OR WO PELV 2-3 VWS Final Result IMPRESSION:  
1. No acute bony findings.   
  
XR CHEST SNGL V  
 Final Result Recent Results (from the past 24 hour(s)) GLUCOSE, POC Collection Time: 11/06/20  3:12 PM  
Result Value Ref Range Glucose (POC) 75 65 - 100 mg/dL Performed by Toño Lara FLUID DIFFERENTIAL Collection Time: 11/06/20  4:45 PM  
Result Value Ref Range FLD NEUTROPHILS 2 % FLD BANDS 0 % FLD LYMPHS 70 % FLD MONOCYTES 7 % FLD EOSINS 0 % FLUID MESOTHELIAL 21 % GLUCOSE, POC Collection Time: 11/06/20  5:29 PM  
Result Value Ref Range Glucose (POC) 86 65 - 100 mg/dL Performed by Cortney Wood   
CBC W/O DIFF Collection Time: 11/07/20 10:25 AM  
Result Value Ref Range WBC 8.9 3.6 - 11.0 K/uL  
 RBC 4.74 3.80 - 5.20 M/uL  
 HGB 11.6 11.5 - 16.0 g/dL HCT 36.5 35.0 - 47.0 % MCV 77.0 (L) 80.0 - 99.0 FL  
 MCH 24.5 (L) 26.0 - 34.0 PG  
 MCHC 31.8 30.0 - 36.5 g/dL  
 RDW 16.9 (H) 11.5 - 14.5 % PLATELET 147 770 - 482 K/uL MPV 10.0 8.9 - 43.8 FL  
METABOLIC PANEL, COMPREHENSIVE Collection Time: 11/07/20 10:25 AM  
Result Value Ref Range Sodium 136 136 - 145 mmol/L Potassium 3.3 (L) 3.5 - 5.1 mmol/L Chloride 99 97 - 108 mmol/L  
 CO2 33 (H) 21 - 32 mmol/L Anion gap 4 (L) 5 - 15 mmol/L Glucose 91 65 - 100 mg/dL BUN 16 6 - 20 mg/dL Creatinine 0.85 0.55 - 1.02 mg/dL BUN/Creatinine ratio 19 12 - 20 GFR est AA >60 >60 ml/min/1.73m2 GFR est non-AA >60 >60 ml/min/1.73m2 Calcium 9.8 8.5 - 10.1 mg/dL Bilirubin, total 0.6 0.2 - 1.0 mg/dL AST (SGOT) 42 (H) 15 - 37 U/L  
 ALT (SGPT) 37 12 - 78 U/L Alk. phosphatase 63 45 - 117 U/L Protein, total 5.7 (L) 6.4 - 8.2 g/dL Albumin 1.8 (L) 3.5 - 5.0 g/dL Globulin 3.9 2.0 - 4.0 g/dL A-G Ratio 0.5 (L) 1.1 - 2.2 MAGNESIUM Collection Time: 11/07/20 10:25 AM  
Result Value Ref Range Magnesium 1.9 1.6 - 2.4 mg/dL Results Procedure Component Value Units Date/Time CULTURE, BODY FLUID Estelle Dodd [[id number]] Collected:  11/06/20 1730 Order Status:  Completed Specimen: Body Fluid from Thoracentesis Updated:  11/06/20 1747 Car Austin [[id number]] Collected:  11/04/20 0750 Order Status:  Completed Specimen:  Urine Updated:  11/06/20 7135 Special Requests: No Special Requests Culture result: No Growth (<1000 cfu/mL) CULTURE, RESPIRATORY/SPUTUM/BRONCH Estelle Dodd [[id number]] Collected:  11/03/20 2130 Order Status:  Canceled Specimen:  Sputum CULTURE, BLOOD, LINE [[id number]] Collected:  11/03/20 2130 Order Status:  Canceled Specimen:  Blood Labs:  
 
Recent Labs 11/07/20 
1025 11/06/20 
1207 WBC 8.9 7.9 HGB 11.6 13.2 HCT 36.5 40.2  326 Recent Labs 11/07/20 
1025 11/06/20 
1207 11/05/20 
1340  135* 132* K 3.3* 3.3* 3.9 CL 99 98 97 CO2 33* 31 25 BUN 16 15 20 CREA 0.85 1.00 1. 25* GLU 91 66 67  
CA 9.8 9.9 9.9 MG 1.9  --   --   
 
Recent Labs 11/07/20 
1025 11/06/20 
1207 ALT 37 39 AP 63 70 TBILI 0.6 0.6 TP 5.7* 6.4 ALB 1.8* 2.1*  
GLOB 3.9 4.3* No results for input(s): INR, PTP, APTT, INREXT in the last 72 hours. No results for input(s): FE, TIBC, PSAT, FERR in the last 72 hours. No results found for: FOL, RBCF No results for input(s): PH, PCO2, PO2 in the last 72 hours. No results for input(s): CPK, CKNDX, TROIQ in the last 72 hours. No lab exists for component: CPKMB No results found for: CHOL, CHOLX, CHLST, CHOLV, HDL, HDLP, LDL, LDLC, DLDLP, TGLX, TRIGL, TRIGP, CHHD, CHHDX Lab Results Component Value Date/Time Glucose (POC) 86 11/06/2020 05:29 PM  
 Glucose (POC) 75 11/06/2020 03:12 PM  
 Glucose (POC) 84 11/06/2020 07:55 AM  
 Glucose (POC) 79 11/05/2020 09:34 PM  
 Glucose (POC) 72 11/05/2020 11:59 AM  
 
Lab Results Component Value Date/Time  Color Yellow/Straw 11/03/2020 02:45 PM  
 Appearance Clear 11/03/2020 02:45 PM  
 Specific gravity 1.010 11/03/2020 02:45 PM  
 pH (UA) 6.5 11/03/2020 02:45 PM  
 Protein Negative 11/03/2020 02:45 PM  
 Glucose Normal (A) 11/03/2020 02:45 PM  
 Ketone Negative 11/03/2020 02:45 PM  
 Bilirubin Negative 11/03/2020 02:45 PM  
 Urobilinogen Normal 11/03/2020 02:45 PM  
 Nitrites Negative 11/03/2020 02:45 PM  
 Leukocyte Esterase Negative 11/03/2020 02:45 PM  
 Bacteria Negative 11/03/2020 02:45 PM  
 WBC 0-4 11/03/2020 02:45 PM  
 RBC 0-5 11/03/2020 02:45 PM  
 
   
Assessment: 1. Dementia 2. Hx of Congestive Heart Failure 3. Hyponatremia/likely from diuretic 4. Hypertension 5. Hyperlipidemia 6. Failure to Thrive  
7.  Chest x-ray shows opacities/pleural effusion - Chest CT shows Near complete collapse of the right lung secondary to large pleural 
Effusion status post thoracocentesis 8. Paroxysmal A. Fib 9. Covid screening negative 10. Hypokalemia Plan:  
 
Static post thoracocentesis On metoprolol 25 mg twice a day Replace potassium Repeat the labs PT OT consult Discharge planning Current Facility-Administered Medications:  
  metoprolol tartrate (LOPRESSOR) tablet 25 mg, 25 mg, Oral, Q12H, Deven Barry MD, 25 mg at 11/07/20 1021   acetylcysteine (MUCOMYST) 200 mg/mL (20 %) solution 600 mg, 600 mg, Nebulization, BID, Asim eRd MD, 600 mg at 11/07/20 0831 
  albuterol-ipratropium (DUO-NEB) 2.5 MG-0.5 MG/3 ML, 3 mL, Nebulization, Q6HWA RT, Asim Red MD, 3 mL at 11/07/20 1420   heparin porcine injection 5,000 Units, 5,000 Units, SubCUTAneous, Q12H, Jesus Maldonado MD, 5,000 Units at 11/07/20 1022   piperacillin-tazobactam (ZOSYN) 3.375 g in 0.9% sodium chloride (MBP/ADV) 100 mL MBP, 3.375 g, IntraVENous, Q8H, Jesus Maldonado MD, Last Rate: 200 mL/hr at 11/07/20 0440, 3.375 g at 11/07/20 0440

## 2020-11-07 NOTE — CONSULTS
Consult Patient: Duncan Brewster MRN: [id number]  SSN: [id number] YOB: 1940  Age: 79 y.o. Sex: female Subjective: Duncan Brewster is a 79 y.o. female who is being seen for atrial fibrillation. Patient with history of dementia, CHF, hyperlipidemia, hypertension presented with generalized weakness. She had one episode of ground-level fall. She missed a step and she fell down but denied loss of consciousness. Sodium level was 123. Her diuretic was discontinued. She received IV fluid with improvement of sodium level. She underwent thoracentesis with total of 1260 cc fluid removed. Past Medical History:  
Diagnosis Date  Anemia  Dementia ([address]  Heart failure ([address]  High cholesterol  Hypertension  Low blood potassium Past Surgical History:  
Procedure Laterality Date  IR THORACENTESIS NDL PUNC ASP W IMAGE  11/6/2020 History reviewed. No pertinent family history. Social History Tobacco Use  Smoking status: Never Smoker  Smokeless tobacco: Never Used Substance Use Topics  Alcohol use: Not Currently Current Facility-Administered Medications Medication Dose Route Frequency Provider Last Rate Last Dose  acetylcysteine (MUCOMYST) 200 mg/mL (20 %) solution 600 mg  600 mg Nebulization BID Asim Red MD   600 mg at 11/06/20 1929  
 albuterol-ipratropium (DUO-NEB) 2.5 MG-0.5 MG/3 ML  3 mL Nebulization Q6HWA RT Asim Red MD   3 mL at 11/06/20 1927  
 heparin porcine injection 5,000 Units  5,000 Units SubCUTAneous Q12H Barb Maldonado MD   5,000 Units at 11/06/20 2309  piperacillin-tazobactam (ZOSYN) 3.375 g in 0.9% sodium chloride (MBP/ADV) 100 mL MBP  3.375 g IntraVENous Q8H Jesus Maldonado  mL/hr at 11/07/20 0440 3.375 g at 11/07/20 0440 No Known Allergies Review of Systems: A comprehensive review of systems was negative except for that written in the History of Present Illness. Objective:  
 
Vitals:  
 11/06/20 1932 11/06/20 1933 11/06/20 2302 11/07/20 8671 BP: (!) 121/91  122/67 (!) 125/90 Pulse: (!) 112  92 63 Resp: 22 18 20 Temp: 99 °F (37.2 °C)  98.3 °F (36.8 °C) 98.7 °F (37.1 °C) SpO2: 98% 97% 100% 98% Weight:      
Height:      
  
 
Physical Exam: 
General:  Alert, cooperative, no distress, appears stated age. Eyes:  Conjunctivae/corneas clear. PERRL, EOMs intact. Fundi benign Ears:  Normal TMs and external ear canals both ears. Nose: Nares normal. Septum midline. Mucosa normal. No drainage or sinus tenderness. Mouth/Throat: Lips, mucosa, and tongue normal. Teeth and gums normal.  
Neck: Supple, symmetrical, trachea midline, no adenopathy, thyroid: no enlargment/tenderness/nodules, no carotid bruit and no JVD. Back:   Symmetric, no curvature. ROM normal. No CVA tenderness. Lungs:   Clear to auscultation bilaterally. Heart:  Regular rate and rhythm, S1, S2 normal, no murmur, click, rub or gallop. Abdomen:   Soft, non-tender. Bowel sounds normal. No masses,  No organomegaly. Extremities: Extremities normal, atraumatic, no cyanosis or edema. Pulses: 2+ and symmetric all extremities. Skin: Skin color, texture, turgor normal. No rashes or lesions Lymph nodes: Cervical, supraclavicular, and axillary nodes normal.  
Neurologic: CNII-XII intact. Normal strength, sensation and reflexes throughout. Assessment:  
 
Hospital Problems  Never Reviewed Codes Class Noted POA Hyponatremia ICD-10-CM: E87.1 ICD-9-CM: 276.1  11/3/2020 Unknown Patient is 80-year-old -American female with: 
1. Paroxysmal atrial fibrillation 2. Anemia 3. Dementia 4. Hypertension 5. Transaminitis, improved 6. Near complete collapse of right lung 7. Hyponatremia 8. Failure to thrive 9. Hypertension Plan:  
 
I personally reviewed telemetry that showed atrial fibrillation with RVR. I will go ahead and start patient on metoprolol 25 mg twice a day. Hemoglobin of 13.2 and platelets of 889. Potassium 3.3, sodium 135 and creatinine 1.0, BUN 15. Liver function test was elevated but has improved. Her a AST was 115 down to 49 and ALT was 68 down to 39. She tested negative for COVID-19. CT chest showed near complete collapse of right lung secondary to large pleural effusion. CT head was nonacute. Pulmonary following patient. With her dementia and previous history of anemia hesitant to start chronic anticoagulation. We will revisit this at a later stage. We will check an echocardiogram. 
 
Thank you  For morning Mrs. Connor's care. I will follow Signed By: Nelly Broussard MD   
 November 7, 2020

## 2020-11-08 NOTE — PROGRESS NOTES
Progress Note Patient: Julian Sauceda MRN: [id number]  SSN: [id number] YOB: 1940  Age: 79 y.o. Sex: female Admit Date: 11/3/2020 LOS: 5 days Subjective: No acute events overnight. She denied having any chest pain or shortness of breath. Objective:  
 
Vitals:  
 11/08/20 0817 11/08/20 0828 11/08/20 1430 11/08/20 1658 BP:  130/80  121/87 Pulse:  (!) 111  (!) 113 Resp:  20  18 Temp:  98.6 °F (37 °C)  98.2 °F (36.8 °C) SpO2: 97% 97% 99% 98% Weight:      
Height:      
  
 
Intake and Output: 
Current Shift: No intake/output data recorded. Last three shifts: 11/06 1901 - 11/08 0700 In: -  
Out: 450 [Urine:450] Physical Exam:  
General:  Alert, cooperative, no distress, appears stated age. Eyes:  Conjunctivae/corneas clear. PERRL, EOMs intact. Fundi benign Ears:  Normal TMs and external ear canals both ears. Nose: Nares normal. Septum midline. Mucosa normal. No drainage or sinus tenderness. Mouth/Throat: Lips, mucosa, and tongue normal. Teeth and gums normal.  
Neck: Supple, symmetrical, trachea midline, no adenopathy, thyroid: no enlargment/tenderness/nodules, no carotid bruit and no JVD. Back:   Symmetric, no curvature. ROM normal. No CVA tenderness. Lungs:   Clear to auscultation bilaterally. Heart:  Regular rate and rhythm, S1, S2 normal, no murmur, click, rub or gallop. Abdomen:   Soft, non-tender. Bowel sounds normal. No masses,  No organomegaly. Extremities: Extremities normal, atraumatic, no cyanosis or edema. Pulses: 2+ and symmetric all extremities. Skin: Skin color, texture, turgor normal. No rashes or lesions Lymph nodes: Cervical, supraclavicular, and axillary nodes normal.  
Neurologic: CNII-XII intact. Normal strength, sensation and reflexes throughout. Lab/Data Review: All lab results for the last 24 hours reviewed. Assessment:  
 
Active Problems: Hyponatremia (11/3/2020) Patient is 26-year-old -American female with: 
1. Paroxysmal atrial fibrillation 2. Anemia 3. Dementia 4. Hypertension 5. Transaminitis, improved 6. Near complete collapse of right lung 7. Hyponatremia 8. Failure to thrive 9. Hypertension Plan:  
 
Currently in sinus rhythm. She appears to be euvolemic. Her labs today showed hemoglobin 10.8, platelet 067. Potassium 3.8, sodium 135, calcium 10.1 and creatinine 0.8. AST and ALT mildly elevated. Albumin 1.8. Blood pressures well controlled. Currently on metoprolol 25 mg twice a day. Signed By: Talib Cramer MD   
 November 8, 2020

## 2020-11-08 NOTE — PROGRESS NOTES
General Daily Progress Note Patient Name: Maya Salgado YOB: 1940 Age: 
79 y.o. Admit Date: 11/3/2020 Subjective:  
 
Patient more alert awake this morning Objective:  
 
Visit Vitals /80 (BP 1 Location: Left arm, BP Patient Position: At rest) Pulse (!) 111 Temp 98.6 °F (37 °C) Resp 20 Ht 5' 2.01\" (1.575 m) Wt 63.5 kg (140 lb) SpO2 97% BMI 25.60 kg/m² Recent Results (from the past 24 hour(s)) GLUCOSE, POC Collection Time: 11/08/20  8:26 AM  
Result Value Ref Range Glucose (POC) 110 (H) 65 - 100 mg/dL Performed by Geremias THAPA   
ECHO ADULT COMPLETE Collection Time: 11/08/20  9:06 AM  
Result Value Ref Range Pulmonic Regurgitant End Max Velocity 128.00 cm/s AoV PG 7.00 mmHg IVSd 1.03 (A) 0.6 - 0.9 cm LVIDd 3.25 (A) 3.9 - 5.3 cm LVIDs 2.39 cm Pulmonic Regurgitant End Max Velocity 87.60 cm/s LVOT Peak Gradient 3.00 mmHg LVPWd 1.11 (A) 0.6 - 0.9 cm  
 LV ED Vol A2C 34.30 cm3 BP EF 60.1 55 - 100 % LV ES Vol A2C 13.70 cm3 LV Ejection Fraction MOD 2C 27 % Mitral Valve E Wave Deceleration Time 137.00 ms MV A Abdirahman 103.00 cm/s  
 MV E Abdirahman 60.50 cm/s  
 MV E/A 0.59 Pulmonic Regurgitant End Max Velocity 98.00 cm/s Pulmonic Valve Systolic Peak Instantaneous Gradient 4.00 mmHg P Vein A Dur 123.00 ms Pulmonary Vein \"A\" Wave Velocity 30.20 cm/s  
 Est. RA Pressure 3.00 mmHg RVIDd 3.53 cm RVSP 39.00 mmHg Tricuspid Valve Max Velocity 300.00 cm/s Triscuspid Valve Regurgitation Peak Gradient 36.00 mmHg LV Mass .3 67 - 162 g  
 LV Mass AL Index 62.3 43 - 95 g/m2 GLUCOSE, POC Collection Time: 11/08/20 11:01 AM  
Result Value Ref Range Glucose (POC) 220 (H) 65 - 100 mg/dL Performed by [address] [place] [address]   
 
[unfilled] Review of Systems Constitutional: Negative for chills and fever. HENT: Negative. Eyes: Negative. Respiratory: Negative. Cardiovascular: Negative. Gastrointestinal: Negative for abdominal pain and nausea. Skin: Negative. Neurological: Negative. Physical Exam:   
 
Constitutional: pt is oriented to person, place, and time. HENT:  
Head: Normocephalic and atraumatic. Eyes: Pupils are equal, round, and reactive to light. EOM are normal.  
Cardiovascular: Normal rate, regular rhythm and normal heart sounds. Pulmonary/Chest: Breath sounds normal. No wheezes. No rales. Exhibits no tenderness. Abdominal: Soft. Bowel sounds are normal. There is no abdominal tenderness. There is no rebound and no guarding. Musculoskeletal: Normal range of motion. Neurological: pt is alert and oriented to person, place, and time. XR CHEST PORT Final Result IMPRESSION: Diffuse airspace disease, right hemithorax, with improved aeration  
from previous. Differential diagnosis includes pneumonia, atelectasis, and/or  
reexpansion pulmonary edema. No pneumothorax. [address] Final Result IMPRESSION:   
Successful ultrasound-guided thoracentesis of right large pleural effusion, with  
approximately 1.3 L serous pleural fluid removed. Pleural fluid sample was also  
sent for lab analysis. CT CHEST WO CONT Final Result IMPRESSION: Near complete collapse of the right lung secondary to large pleural  
effusion XR HIP LT W OR WO PELV 2-3 VWS Final Result IMPRESSION:  
1. No acute bony findings. XR CHEST SNGL V Final Result Recent Results (from the past 24 hour(s)) GLUCOSE, POC Collection Time: 11/08/20  8:26 AM  
Result Value Ref Range Glucose (POC) 110 (H) 65 - 100 mg/dL Performed by Milan THAPA   
ECHO ADULT COMPLETE Collection Time: 11/08/20  9:06 AM  
Result Value Ref Range Pulmonic Regurgitant End Max Velocity 128.00 cm/s AoV PG 7.00 mmHg IVSd 1.03 (A) 0.6 - 0.9 cm  LVIDd 3.25 (A) 3.9 - 5.3 cm  
 LVIDs 2.39 cm Pulmonic Regurgitant End Max Velocity 87.60 cm/s LVOT Peak Gradient 3.00 mmHg LVPWd 1.11 (A) 0.6 - 0.9 cm  
 LV ED Vol A2C 34.30 cm3 BP EF 60.1 55 - 100 % LV ES Vol A2C 13.70 cm3 LV Ejection Fraction MOD 2C 27 % Mitral Valve E Wave Deceleration Time 137.00 ms MV A Abdirahman 103.00 cm/s  
 MV E Abdirahman 60.50 cm/s  
 MV E/A 0.59 Pulmonic Regurgitant End Max Velocity 98.00 cm/s Pulmonic Valve Systolic Peak Instantaneous Gradient 4.00 mmHg P Vein A Dur 123.00 ms Pulmonary Vein \"A\" Wave Velocity 30.20 cm/s  
 Est. RA Pressure 3.00 mmHg RVIDd 3.53 cm RVSP 39.00 mmHg Tricuspid Valve Max Velocity 300.00 cm/s Triscuspid Valve Regurgitation Peak Gradient 36.00 mmHg LV Mass .3 67 - 162 g  
 LV Mass AL Index 62.3 43 - 95 g/m2 GLUCOSE, POC Collection Time: 11/08/20 11:01 AM  
Result Value Ref Range Glucose (POC) 220 (H) 65 - 100 mg/dL Performed by [employer] Results Procedure Component Value Units Date/Time CULTURE, BODY FLUID Dereck Oropeza [[id number]] Collected:  11/06/20 1730 Order Status:  Completed Specimen: Body Fluid from Swab Updated:  11/07/20 2346 Special Requests: No Special Requests GRAM STAIN Few WBCs seen No organisms seen Culture result: PENDING  
 CULTURE, URINE [[id number]] Collected:  11/04/20 0750 Order Status:  Completed Specimen:  Urine Updated:  11/06/20 1675 Special Requests: No Special Requests Culture result: No Growth (<1000 cfu/mL) CULTURE, RESPIRATORY/SPUTUM/BRONCH Dereck Oropeza [[id number]] Collected:  11/03/20 2130 Order Status:  Canceled Specimen:  Sputum CULTURE, BLOOD, LINE [[id number]] Collected:  11/03/20 2130 Order Status:  Canceled Specimen:  Blood Labs:  
 
Recent Labs 11/07/20 
1025 11/06/20 
1207 WBC 8.9 7.9 HGB 11.6 13.2 HCT 36.5 40.2  326 Recent Labs 11/07/20 
1025 11/06/20 
1207 11/05/20 
7390  135* 132* K 3.3* 3.3* 3.9 CL 99 98 97 CO2 33* 31 25 BUN 16 15 20 CREA 0.85 1.00 1. 25* GLU 91 66 67  
CA 9.8 9.9 9.9 MG 1.9  --   --   
 
Recent Labs 11/07/20 
1025 11/06/20 
1207 ALT 37 39 AP 63 70 TBILI 0.6 0.6 TP 5.7* 6.4 ALB 1.8* 2.1*  
GLOB 3.9 4.3* No results for input(s): INR, PTP, APTT, INREXT, INREXT in the last 72 hours. No results for input(s): FE, TIBC, PSAT, FERR in the last 72 hours. No results found for: FOL, RBCF No results for input(s): PH, PCO2, PO2 in the last 72 hours. No results for input(s): CPK, CKNDX, TROIQ in the last 72 hours. No lab exists for component: CPKMB No results found for: CHOL, CHOLX, CHLST, CHOLV, HDL, HDLP, LDL, LDLC, DLDLP, TGLX, TRIGL, TRIGP, CHHD, CHHDX Lab Results Component Value Date/Time Glucose (POC) 220 (H) 11/08/2020 11:01 AM  
 Glucose (POC) 110 (H) 11/08/2020 08:26 AM  
 Glucose (POC) 86 11/06/2020 05:29 PM  
 Glucose (POC) 75 11/06/2020 03:12 PM  
 Glucose (POC) 84 11/06/2020 07:55 AM  
 
Lab Results Component Value Date/Time Color Yellow/Straw 11/03/2020 02:45 PM  
 Appearance Clear 11/03/2020 02:45 PM  
 Specific gravity 1.010 11/03/2020 02:45 PM  
 pH (UA) 6.5 11/03/2020 02:45 PM  
 Protein Negative 11/03/2020 02:45 PM  
 Glucose Normal (A) 11/03/2020 02:45 PM  
 Ketone Negative 11/03/2020 02:45 PM  
 Bilirubin Negative 11/03/2020 02:45 PM  
 Urobilinogen Normal 11/03/2020 02:45 PM  
 Nitrites Negative 11/03/2020 02:45 PM  
 Leukocyte Esterase Negative 11/03/2020 02:45 PM  
 Bacteria Negative 11/03/2020 02:45 PM  
 WBC 0-4 11/03/2020 02:45 PM  
 RBC 0-5 11/03/2020 02:45 PM  
 
   
Assessment: 1. Dementia 2. Hx of Congestive Heart Failure 3. Hyponatremia/likely from diuretic 4. Hypertension 5. Hyperlipidemia 6.  Failure to Thrive  
7.  Chest x-ray shows opacities/pleural effusion - Chest CT shows Near complete collapse of the right lung secondary to large pleural 
 Effusion status post thoracocentesis 8. Paroxysmal A. Fib 9. Covid screening negative 10. Hypokalemia Plan:  
 
Static post thoracocentesis On metoprolol 25 mg twice a day Replace potassium Repeat the labs/pending PT OT consult Discharge planning Patient can be discharged to the nursing home Current Facility-Administered Medications:  
  metoprolol tartrate (LOPRESSOR) tablet 25 mg, 25 mg, Oral, Q12H, Deven Barry MD, 25 mg at 11/08/20 1101   acetylcysteine (MUCOMYST) 200 mg/mL (20 %) solution 600 mg, 600 mg, Nebulization, BID, Asim Red MD, 600 mg at 11/08/20 0817 
  albuterol-ipratropium (DUO-NEB) 2.5 MG-0.5 MG/3 ML, 3 mL, Nebulization, Q6HWA RT, Asim Red MD, 3 mL at 11/08/20 0817 
  heparin porcine injection 5,000 Units, 5,000 Units, SubCUTAneous, Q12H, Jesus Maldonado MD, 5,000 Units at 11/08/20 1102   piperacillin-tazobactam (ZOSYN) 3.375 g in 0.9% sodium chloride (MBP/ADV) 100 mL MBP, 3.375 g, IntraVENous, Q8H, Jesus Maldonado MD, Last Rate: 200 mL/hr at 11/08/20 0427, 3.375 g at 11/08/20 0427

## 2020-11-08 NOTE — PROGRESS NOTES
Pulmonary Progress Note Daily Progress Note: 2020 Subjective: The patient is seen for follow  up. Excerpts from admission 11/3/2020 or consult notes as follows:  
71-year-old lady came in because of generalized weakness lethargy past medical history of dementia congestive heart failure hypertension hyperlipidemia patient was discharged from home with a sling on her right arm as she fell. Now chest x-ray and CAT scan of the chest was done which shows right lower lobe collapse with pleural effusion unable to get much history out of the patient so pulmonary consult was called for further evaluation  denies cough or shortness of breath remains on room air Problem List: 
Problem List as of 2020 Never Reviewed Codes Class Noted - Resolved Hyponatremia ICD-10-CM: E87.1 ICD-9-CM: 276.1  11/3/2020 - Present Medications reviewed Current Facility-Administered Medications Medication Dose Route Frequency  acetylcysteine (MUCOMYST) 200 mg/mL (20 %) solution 600 mg  600 mg Nebulization Q6H RT  
 metoprolol tartrate (LOPRESSOR) tablet 25 mg  25 mg Oral Q12H  
 albuterol-ipratropium (DUO-NEB) 2.5 MG-0.5 MG/3 ML  3 mL Nebulization Q6HWA RT  
 heparin porcine injection 5,000 Units  5,000 Units SubCUTAneous Q12H  piperacillin-tazobactam (ZOSYN) 3.375 g in 0.9% sodium chloride (MBP/ADV) 100 mL MBP  3.375 g IntraVENous Q8H Review of Systems: A comprehensive review of systems was negative except for that written in the HPI. Objective:  
Physical Exam:  
 
Visit Vitals /87 Pulse (!) 113 Temp 98.2 °F (36.8 °C) Resp 18 Ht 5' 2.01\" (1.575 m) Wt 63.5 kg (140 lb) SpO2 98% BMI 25.60 kg/m² O2 Device: Room air Temp (24hrs), Av.4 °F (36.9 °C), Min:97.8 °F (36.6 °C), Max:98.9 °F (37.2 °C) No intake/output data recorded.  1901 -  0700 In: -  
Out: 450 [Urine:450] Constitutional:  
 Awake alert seems oriented HENT:  
Head: Normocephalic and atraumatic. Eyes: Pupils are equal, round, and reactive to light. Extraocular movements intact Neck: Normal range of motion. Neck supple. No definite JVD Cardiovascular: Normal rate and regular rhythm. Pulmonary/Chest:  
 Clear anteriorly and left lateral with fairly good breath sounds right lateral and right posterior no definite wheezes or rales Abdominal: Soft. Bowel sounds are normal.  
Musculoskeletal: Normal range of motion. Skin: Skin is warm. Data Review:  
   
Recent Days: 
Recent Labs 11/08/20 
1315 11/07/20 
1025 11/06/20 
1207 WBC 7.7 8.9 7.9 HGB 10.8* 11.6 13.2 HCT 34.3* 36.5 40.2  296 326 Recent Labs 11/08/20 
1315 11/07/20 
1025 11/06/20 
1207 * 136 135* K 3.8 3.3* 3.3*  
 99 98  
CO2 33* 33* 31  
GLU 94 91 66 BUN 14 16 15 CREA 0.87 0.85 1.00  
CA 10.1 9.8 9.9 MG  --  1.9  --   
ALB 1.8* 1.8* 2.1* TBILI 0.5 0.6 0.6 ALT 46 37 39 Room air oxygen saturation 98% 24 Hour Results: 
Recent Results (from the past 24 hour(s)) GLUCOSE, POC Collection Time: 11/08/20  8:26 AM  
Result Value Ref Range Glucose (POC) 110 (H) 65 - 100 mg/dL Performed by Alexandra THAPA   
ECHO ADULT COMPLETE Collection Time: 11/08/20  9:06 AM  
Result Value Ref Range Pulmonic Regurgitant End Max Velocity 128.00 cm/s AoV PG 7.00 mmHg IVSd 1.03 (A) 0.6 - 0.9 cm LVIDd 3.25 (A) 3.9 - 5.3 cm LVIDs 2.39 cm Pulmonic Regurgitant End Max Velocity 87.60 cm/s LVOT Peak Gradient 3.00 mmHg LVPWd 1.11 (A) 0.6 - 0.9 cm  
 LV ED Vol A2C 34.30 cm3 BP EF 60.1 55 - 100 % LV ES Vol A2C 13.70 cm3 LV Ejection Fraction MOD 2C 27 % Mitral Valve E Wave Deceleration Time 137.00 ms MV A Abdirahman 103.00 cm/s  
 MV E Abdirahman 60.50 cm/s  
 MV E/A 0.59 Pulmonic Regurgitant End Max Velocity 98.00 cm/s Pulmonic Valve Systolic Peak Instantaneous Gradient 4.00 mmHg P Vein A Dur 123.00 ms Pulmonary Vein \"A\" Wave Velocity 30.20 cm/s  
 Est. RA Pressure 3.00 mmHg RVIDd 3.53 cm RVSP 39.00 mmHg Tricuspid Valve Max Velocity 300.00 cm/s Triscuspid Valve Regurgitation Peak Gradient 36.00 mmHg LV Mass .3 67 - 162 g  
 LV Mass AL Index 62.3 43 - 95 g/m2 GLUCOSE, POC Collection Time: 11/08/20 11:01 AM  
Result Value Ref Range Glucose (POC) 220 (H) 65 - 100 mg/dL Performed by Linda THAPA   
CBC W/O DIFF Collection Time: 11/08/20  1:15 PM  
Result Value Ref Range WBC 7.7 3.6 - 11.0 K/uL  
 RBC 4.40 3.80 - 5.20 M/uL  
 HGB 10.8 (L) 11.5 - 16.0 g/dL HCT 34.3 (L) 35.0 - 47.0 % MCV 78.0 (L) 80.0 - 99.0 FL  
 MCH 24.5 (L) 26.0 - 34.0 PG  
 MCHC 31.5 30.0 - 36.5 g/dL  
 RDW 17.1 (H) 11.5 - 14.5 % PLATELET 053 792 - 093 K/uL MPV 10.3 8.9 - 26.6 FL  
METABOLIC PANEL, COMPREHENSIVE Collection Time: 11/08/20  1:15 PM  
Result Value Ref Range Sodium 135 (L) 136 - 145 mmol/L Potassium 3.8 3.5 - 5.1 mmol/L Chloride 100 97 - 108 mmol/L  
 CO2 33 (H) 21 - 32 mmol/L Anion gap 2 (L) 5 - 15 mmol/L Glucose 94 65 - 100 mg/dL BUN 14 6 - 20 mg/dL Creatinine 0.87 0.55 - 1.02 mg/dL BUN/Creatinine ratio 16 12 - 20 GFR est AA >60 >60 ml/min/1.73m2 GFR est non-AA >60 >60 ml/min/1.73m2 Calcium 10.1 8.5 - 10.1 mg/dL Bilirubin, total 0.5 0.2 - 1.0 mg/dL AST (SGOT) 46 (H) 15 - 37 U/L  
 ALT (SGPT) 46 12 - 78 U/L Alk. phosphatase 68 45 - 117 U/L Protein, total 5.8 (L) 6.4 - 8.2 g/dL Albumin 1.8 (L) 3.5 - 5.0 g/dL Globulin 4.0 2.0 - 4.0 g/dL A-G Ratio 0.5 (L) 1.1 - 2.2 Imaging: 
  
   
CXR Results  (Last 48 hours)  
  None  
   
  
    
Results from Hospital Encounter encounter on 11/03/20 XR HIP LT W OR WO PELV 2-3 VWS  
  Narrative Fall 1 week ago. 
  
No comparison. 
  
FINDINGS: AP pelvis and frog-leg view of the left hip. No acute fracture or 
dislocation.  Deformity of the left femur trochanter and proximal diaphysis, 
appears chronic. No radiopaque foreign body. 
   
  Impression IMPRESSION: 
1. No acute bony findings. XR CHEST SNGL V  
  Narrative Chest single view. 
  
Comparison single view chest March 29, 2018. 
  
Right-sided Port-A-Cath stable position. New opacification of the lower two thirds right hemithorax. This is likely 
related to a combination of large volume pleural fluid and atelectatic lung. Left lung is aerated. Cardiac and mediastinal structures unchanged. No 
pneumothorax. Nonacute rib fractures noted. Results from [facility] Encounter encounter on 10/26/20 XR SHOULDER RT AP/LAT MIN 2 V  
  Narrative 3 views of the right shoulder reveal diffuse osteopenia. There is mild 
degenerative subluxation of the humerus with respect to the glenoid. No fracture 
or dislocation is identified. 
   
  Impression IMPRESSION: Degenerative changes.  
  
    
Results from Hospital Encounter encounter on 11/03/20 CT CHEST WO CONT  
  Narrative CT dose reduction was achieved through use of a standardized protocol tailored 
for this examination and automatic exposure control for dose modulation. 
  
Noncontrast study shows large right pleural effusion. Right middle and lower 
lobe are completely collapsed, with very few air bronchograms. The upper lobe is 
almost completely collapsed, sparingly anterior segment only. Mainstem bronchus 
is open. Proximal lobar bronchi are open. Left lung is clear. Multiple calcified 
mediastinal hilar lymph nodes. Small left pleural effusion. Small pericardial 
effusion. Normal caliber aorta. No significant upper abdominal or chest wall 
finding. Pronounced soft tissue edema in the visualized portion of the left arm 
   
  Impression IMPRESSION: Near complete collapse of the right lung secondary to large pleural 
effusion  
  
 
  
IMPRESSION:  
1. RLL collapse Atelectasis markedly improved on x-ray 11/7 and on exam today 2. Pleural Effusion Right side status post thoracentesis by IR 
3. Chronic Obstructive Pulmonary Disease with Severe Acute Exacerbation requiring inpatient hospitalization and management; no wheezing today 4. A. fib with RVR 5. History of heart failure hyponatremia 5. Prognosis guarded   
   
RECOMMENDATIONS/PLAN:  
 Status post thoracentesis by IR about 1260 cc of serous fluid removed  Cytology and culture pending x-ray shows improvement right lung still has basal atelectasis with small pleural effusion 1. Status post thoracentesis via IR 1260 cc fluid removed cytology pending chest x-ray 11/7 still shows atelectasis will repeat x-ray in a.m.  
2. Nebulizer treatment with Mucomyst 
3.  
   
  
 
 
 
 
Juana Kapoor MD

## 2020-11-09 NOTE — DISCHARGE SUMMARY
Discharge Summary PATIENT ID: Ayde Brady MRN: [id number] YOB: 1940 DATE OF ADMISSION: 11/3/2020  2:03 PM   
DATE OF DISCHARGE:  
PRIMARY CARE PROVIDER: David Mock MD  
 
ATTENDING PHYSICIAN: Elise Ordonez DISCHARGING PROVIDER: Elise Ordonez CONSULTATIONS: IP CONSULT TO HOSPITALIST 
IP CONSULT TO PULMONOLOGY 
IP CONSULT TO INTERVENTIONAL RADIOLOGY 
IP CONSULT TO CARDIOLOGY PROCEDURES/SURGERIES: * No surgery found * ADMITTING DIAGNOSES:   
Patient Active Problem List  
 Diagnosis Date Noted  Hyponatremia 11/03/2020 DISCHARGE DIAGNOSES / PLAN:   
1. Dementia 2. Hx of Congestive Heart Failure 3. Hyponatremia/likely from diuretic 4. Hypertension 5. Hyperlipidemia 6. Failure to Thrive  
7.  Chest x-ray shows opacities/pleural effusion - Chest CT shows Near complete collapse of the right lung secondary to large pleural 
Effusion status post thoracocentesis 8. Paroxysmal A. Fib 9. Covid screening negative 10. Hypokalemia 1. ADDITIONAL CARE RECOMMENDATIONS:  
 
 
 
DISCHARGE MEDICATIONS: 
Current Discharge Medication List  
  
START taking these medications Details  
albuterol-ipratropium (DUO-NEB) 2.5 mg-0.5 mg/3 ml nebu 3 mL by Nebulization route every six (6) hours as needed for Wheezing. Qty: 30 Nebule, Refills: 1  
  
amoxicillin-clavulanate (Augmentin) 875-125 mg per tablet Take 1 Tab by mouth two (2) times a day. Qty: 20 Tab, Refills: 0 CONTINUE these medications which have NOT CHANGED Details  
carvediloL (COREG) 6.25 mg tablet Take 6.25 mg by mouth two (2) times daily (with meals). potassium chloride SA (MICRO-K) 10 mEq capsule Take  by mouth two (2) times a day. ferrous sulfate (IRON) 325 mg (65 mg iron) EC tablet Take 325 mg by mouth two (2) times a day. spironolactone (Aldactone) 25 mg tablet Take 25 mg by mouth daily. Take 1/2 daily atorvastatin (Lipitor) 40 mg tablet Take 40 mg by mouth daily. furosemide (Lasix) 40 mg tablet Take 40 mg by mouth two (2) times a day. STOP taking these medications  
  
 hydrALAZINE (APRESOLINE) 100 mg tablet Comments:  
Reason for Stopping:   
   
  
 
 
 
NOTIFY YOUR PHYSICIAN FOR ANY OF THE FOLLOWING:  
Fever over 101 degrees for 24 hours. Chest pain, shortness of breath, fever, chills, nausea, vomiting, diarrhea, change in mentation, falling, weakness, bleeding. Severe pain or pain not relieved by medications. Or, any other signs or symptoms that you may have questions about. DISPOSITION: 
x  Home With: 
 OT  PT  HH  RN  
  
 Long term SNF/Inpatient Rehab Independent/assisted living Hospice Other:  
 
 
PATIENT CONDITION AT DISCHARGE: Stable PHYSICAL EXAMINATION AT DISCHARGE: 
General:          Alert, cooperative, no distress, appears stated age. HEENT:           Atraumatic, anicteric sclerae, pink conjunctivae No oral ulcers, mucosa moist, throat clear, dentition fair Neck:               Supple, symmetrical 
Lungs:             Clear to auscultation bilaterally. No Wheezing or Rhonchi. No rales. Chest wall:      No tenderness  No Accessory muscle use. Heart:              Regular  rhythm,  No  murmur   No edema Abdomen:        Soft, non-tender. Not distended. Bowel sounds normal 
Extremities:     No cyanosis. No clubbing,   
                        Skin turgor normal, Capillary refill normal 
Skin:                Not pale. Not Jaundiced  No rashes Psych:             Not anxious or agitated. Neurologic:      Alert, moves all extremities, answers questions appropriately and responds to commands XR CHEST PORT Final Result IMPRESSION: Diffuse airspace disease, right hemithorax, with improved aeration  
from previous. Differential diagnosis includes pneumonia, atelectasis, and/or  
reexpansion pulmonary edema. No pneumothorax. [address] Final Result IMPRESSION:   
Successful ultrasound-guided thoracentesis of right large pleural effusion, with  
approximately 1.3 L serous pleural fluid removed. Pleural fluid sample was also  
sent for lab analysis. CT CHEST WO CONT Final Result IMPRESSION: Near complete collapse of the right lung secondary to large pleural  
effusion XR HIP LT W OR WO PELV 2-3 VWS Final Result IMPRESSION:  
1. No acute bony findings. XR CHEST SNGL V Final Result XR CHEST PA LAT    (Results Pending) Recent Results (from the past 24 hour(s)) CBC W/O DIFF Collection Time: 11/08/20  1:15 PM  
Result Value Ref Range WBC 7.7 3.6 - 11.0 K/uL  
 RBC 4.40 3.80 - 5.20 M/uL  
 HGB 10.8 (L) 11.5 - 16.0 g/dL HCT 34.3 (L) 35.0 - 47.0 % MCV 78.0 (L) 80.0 - 99.0 FL  
 MCH 24.5 (L) 26.0 - 34.0 PG  
 MCHC 31.5 30.0 - 36.5 g/dL  
 RDW 17.1 (H) 11.5 - 14.5 % PLATELET 974 371 - 116 K/uL MPV 10.3 8.9 - 22.0 FL  
METABOLIC PANEL, COMPREHENSIVE Collection Time: 11/08/20  1:15 PM  
Result Value Ref Range Sodium 135 (L) 136 - 145 mmol/L Potassium 3.8 3.5 - 5.1 mmol/L Chloride 100 97 - 108 mmol/L  
 CO2 33 (H) 21 - 32 mmol/L Anion gap 2 (L) 5 - 15 mmol/L Glucose 94 65 - 100 mg/dL BUN 14 6 - 20 mg/dL Creatinine 0.87 0.55 - 1.02 mg/dL BUN/Creatinine ratio 16 12 - 20 GFR est AA >60 >60 ml/min/1.73m2 GFR est non-AA >60 >60 ml/min/1.73m2 Calcium 10.1 8.5 - 10.1 mg/dL Bilirubin, total 0.5 0.2 - 1.0 mg/dL AST (SGOT) 46 (H) 15 - 37 U/L  
 ALT (SGPT) 46 12 - 78 U/L Alk. phosphatase 68 45 - 117 U/L Protein, total 5.8 (L) 6.4 - 8.2 g/dL Albumin 1.8 (L) 3.5 - 5.0 g/dL Globulin 4.0 2.0 - 4.0 g/dL A-G Ratio 0.5 (L) 1.1 - 2.2 HOSPITAL COURSE: 
History of present illness precipitation physical at the time of admission as the patient was admitted because of altered mental status history of dementia history of CHF hyponatremia likely from diuretic chest x-ray shows large pleural effusion CT scan shows complete collapse of the right lung secondary to large pleural effusion seen by the pulmonologist and the cardiologist during this admission patient have thoracocentesis done and remove 1200 cc patient tolerated the procedure well patient have paroxysmal A. fib fib seen by the cardiology patient converted to normal sinus rhythm patient echo done shows ejection fraction about 71% stage I diastolic dysfunction, this morning patient was alert awake denies any chest pain shortness of breath nausea no vomiting only concern about his poor appetite but is improving Patient denies any chest pain or shortness of breath at all Patient can be discharged to skilled care rehab Medication reconciliation done Time to discharge patient 35 minutes 50% time spent on counseling and coordination of care Signed: Kaiden Carranza MD 
11/9/2020 
12:04 PM

## 2020-11-09 NOTE — PROGRESS NOTES
Problem: Falls - Risk of 
Goal: *Absence of Falls Description: Document Veatrice Marker Fall Risk and appropriate interventions in the flowsheet. Outcome: Progressing Towards Goal 
Note: Fall Risk Interventions: 
Mobility Interventions: Bed/chair exit alarm Mentation Interventions: Door open when patient unattended Elimination Interventions: Call light in reach History of Falls Interventions: Bed/chair exit alarm Problem: Pressure Injury - Risk of 
Goal: *Prevention of pressure injury Description: Document Cruz Scale and appropriate interventions in the flowsheet. Outcome: Progressing Towards Goal 
Note: Pressure Injury Interventions: 
Sensory Interventions: Assess changes in LOC Moisture Interventions: Absorbent underpads Activity Interventions: Increase time out of bed Mobility Interventions: Turn and reposition approx. every two hours(pillow and wedges) Nutrition Interventions: Offer support with meals,snacks and hydration Friction and Shear Interventions: Minimize layers Problem: Patient Education: Go to Patient Education Activity Goal: Patient/Family Education Outcome: Progressing Towards Goal 
  
Problem: Patient Education: Go to Patient Education Activity Goal: Patient/Family Education Outcome: Progressing Towards Goal

## 2020-11-09 NOTE — PROGRESS NOTES
PHYSICAL THERAPY TREATMENT Patient: Chino Cruz (79 y.o. female) Date: 11/9/2020 Diagnosis: Hyponatremia [E87.1] <principal problem not specified> Precautions:   
Chart, physical therapy assessment, plan of care and goals were reviewed. ASSESSMENT Patient continues with skilled PT services and is progressing towards goals. Pt. Semi supine in bed upon arrival and agreeable to therapy session. Very weak and lethargic with therapy session. Pt. Urinated self and had BM. Had help from SN with bed mobility and transfers to get patient cleaned. Recommend DC to SNF. Left patient semi supine in bed with callbell and all needs met. Neelima Quinonez Current Level of Function Impacting Discharge (mobility/balance): endurance participation Other factors to consider for discharge: TBD PLAN : 
Patient continues to benefit from skilled intervention to address the above impairments. Continue treatment per established plan of care. to address goals. Recommendation for discharge: (in order for the patient to meet his/her long term goals) Therapy up to 5 days/week in SNF setting This discharge recommendation: 
Has been made in collaboration with the attending provider and/or case management IF patient discharges home will need the following DME: rolling walker SUBJECTIVE:  
Patient stated IM OKAY.  
 
OBJECTIVE DATA SUMMARY:  
Critical Behavior: 
Neurologic State: Alert Orientation Level: Oriented to person Cognition: Follows commands, Memory loss Functional Mobility Training: 
Bed Mobility: 
Rolling: Moderate assistance Supine to Sit: Moderate assistance Sit to Supine: Moderate assistance Scooting: Moderate assistance Transfers: 
  
  
     
  
     
  
  
  
  
Balance: 
  
Ambulation/Gait Training: 
  
  
  
  
  
  
  
  
  
  
  
  
  
  
  
  
  
  
Stairs: Therapeutic Exercises:  
Therapeutic Exercises:  
 
 
EXERCISE Sets Reps Active Active Assist  
 Passive Self ROM Comments Ankle Pumps  10 [x] [] [] [] Quad Sets/Glut Sets   [] [] [] [] Hamstring Sets   [] [] [] [] Short Arc Quads   [] [] [] [] Heel Slides  10 [x] [] [] [] Straight Leg Raises   [] [] [] [] Hip abd/add  10 [x] [] [] [] Long Arc Quads   [] [] [] [] Marching   [] [] [] []   
   [] [] [] []   
 
 
Pain Ratin Activity Tolerance:  
Poor Please refer to the flowsheet for vital signs taken during this treatment. After treatment patient left in no apparent distress:  
Supine in bed COMMUNICATION/COLLABORATION:  
The patients plan of care was discussed with: Physical therapy assistantHero Link Time Calculation: 12 mins

## 2020-11-09 NOTE — PROGRESS NOTES
Physician Progress Note PATIENTTacy Gear 
CSN #:                  [id number] :                       1940 ADMIT DATE:       11/3/2020 2:03 PM 
[address] [place] DATE: 
RESPONDING 
PROVIDER #:        Ulices WASHINGTON MD 
 
 
 
 
QUERY TEXT: 
 
Pt admitted with increased weakness and has CHF documented. If possible, please document in progress notes and discharge summary further specificity regarding the type and acuity of CHF: 
 
 
 
The medical record reflects the following: 
Risk Factors: 79year old female Clinical Indicators:  H&P - Hx of Congestive Heart Failure. , 1127.   ECHO - EF 55-60%.  CT Chest - Near complete collapse of the right lung secondary to large pleural effusion. Thoracentesis: US-guided Tx & Dx thoracentesis (total 1260 mL fluid drained). Large right pleural effusion Treatment: IV Lasix 40mg Please call [phone] with any questions Options provided: 
-- Acute on Chronic Diastolic CHF/HFpEF 
-- Acute Diastolic CHF/HFpEF 
-- Other - I will add my own diagnosis -- Disagree - Not applicable / Not valid -- Disagree - Clinically unable to determine / Unknown 
-- Refer to Clinical Documentation Reviewer PROVIDER RESPONSE TEXT: 
 
This patient is in acute on chronic diastolic CHF/HFpEF. Query created by:  John Bethea on 2020 11:23 AM 
 
 
Electronically signed by:  Ulices Houston MD 2020 2:07 PM

## 2020-11-09 NOTE — PROGRESS NOTES
Pulmonary Progress Note Daily Progress Note: 11/9/2020 Subjective: The patient is seen for follow  up. Excerpts from admission 11/3/2020 or consult notes as follows:  
52-year-old lady came in because of generalized weakness lethargy past medical history of dementia congestive heart failure hypertension hyperlipidemia patient was discharged from home with a sling on her right arm as she fell. Now chest x-ray and CAT scan of the chest was done which shows right lower lobe collapse with pleural effusion unable to get much history out of the patient so pulmonary consult was called for further evaluation 11/8 denies cough or shortness of breath remains on room air 11/09 Today she denies cough and shortness of breath. She had a chest Xray this morning, awaiting read. She is on room air breathing well. Problem List: 
Problem List as of 11/9/2020 Never Reviewed Codes Class Noted - Resolved Hyponatremia ICD-10-CM: E87.1 ICD-9-CM: 276.1  11/3/2020 - Present Medications reviewed Current Facility-Administered Medications Medication Dose Route Frequency  acetylcysteine (MUCOMYST) 200 mg/mL (20 %) solution 600 mg  600 mg Nebulization Q6H RT  
 albuterol-ipratropium (DUO-NEB) 2.5 MG-0.5 MG/3 ML  3 mL Nebulization Q4H PRN  
 metoprolol tartrate (LOPRESSOR) tablet 25 mg  25 mg Oral Q12H  
 albuterol-ipratropium (DUO-NEB) 2.5 MG-0.5 MG/3 ML  3 mL Nebulization Q6HWA RT  
 heparin porcine injection 5,000 Units  5,000 Units SubCUTAneous Q12H  piperacillin-tazobactam (ZOSYN) 3.375 g in 0.9% sodium chloride (MBP/ADV) 100 mL MBP  3.375 g IntraVENous Q8H Review of Systems: A comprehensive review of systems was negative except for that written in the HPI. Objective:  
Physical Exam:  
 
Visit Vitals /64 Pulse 74 Temp 98.1 °F (36.7 °C) Resp 16 Ht 5' 2.01\" (1.575 m) Wt 140 lb (63.5 kg) SpO2 96% BMI 25.60 kg/m² O2 Device: Room air Temp (24hrs), Av.1 °F (36.7 °C), Min:97.9 °F (36.6 °C), Max:98.2 °F (36.8 °C) No intake/output data recorded. No intake/output data recorded. Constitutional:  
 Awake alert seems oriented HENT:  
Head: Normocephalic and atraumatic. Eyes: Pupils are equal, round, and reactive to light. Extraocular movements intact Neck: Normal range of motion. Neck supple. No definite JVD Cardiovascular: Normal rate and regular rhythm. Pulmonary/Chest:  
 Clear anteriorly and left lateral with fairly good breath sounds right lateral and right posterior no definite wheezes or rales Abdominal: Soft. Bowel sounds are normal.  
Musculoskeletal: Normal range of motion. Skin: Skin is warm. Data Review:  
   
Recent Days: 
Recent Labs 20 
1315 20 
1025 20 
1207 WBC 7.7 8.9 7.9 HGB 10.8* 11.6 13.2 HCT 34.3* 36.5 40.2  296 326 Recent Labs 20 
1315 20 
1025 20 
1207 * 136 135* K 3.8 3.3* 3.3*  
 99 98  
CO2 33* 33* 31  
GLU 94 91 66 BUN 14 16 15 CREA 0.87 0.85 1.00  
CA 10.1 9.8 9.9 MG  --  1.9  --   
ALB 1.8* 1.8* 2.1* TBILI 0.5 0.6 0.6 ALT 46 37 39 Room air oxygen saturation 98% 24 Hour Results: 
Recent Results (from the past 24 hour(s)) GLUCOSE, POC Collection Time: 20 11:01 AM  
Result Value Ref Range Glucose (POC) 220 (H) 65 - 100 mg/dL Performed by Benjamin THAPA   
CBC W/O DIFF Collection Time: 20  1:15 PM  
Result Value Ref Range WBC 7.7 3.6 - 11.0 K/uL  
 RBC 4.40 3.80 - 5.20 M/uL  
 HGB 10.8 (L) 11.5 - 16.0 g/dL HCT 34.3 (L) 35.0 - 47.0 % MCV 78.0 (L) 80.0 - 99.0 FL  
 MCH 24.5 (L) 26.0 - 34.0 PG  
 MCHC 31.5 30.0 - 36.5 g/dL  
 RDW 17.1 (H) 11.5 - 14.5 % PLATELET 249 846 - 995 K/uL MPV 10.3 8.9 - 14.5 FL  
METABOLIC PANEL, COMPREHENSIVE Collection Time: 20  1:15 PM  
Result Value Ref Range  Sodium 135 (L) 136 - 145 mmol/L  
 Potassium 3.8 3.5 - 5.1 mmol/L Chloride 100 97 - 108 mmol/L  
 CO2 33 (H) 21 - 32 mmol/L Anion gap 2 (L) 5 - 15 mmol/L Glucose 94 65 - 100 mg/dL BUN 14 6 - 20 mg/dL Creatinine 0.87 0.55 - 1.02 mg/dL BUN/Creatinine ratio 16 12 - 20 GFR est AA >60 >60 ml/min/1.73m2 GFR est non-AA >60 >60 ml/min/1.73m2 Calcium 10.1 8.5 - 10.1 mg/dL Bilirubin, total 0.5 0.2 - 1.0 mg/dL AST (SGOT) 46 (H) 15 - 37 U/L  
 ALT (SGPT) 46 12 - 78 U/L Alk. phosphatase 68 45 - 117 U/L Protein, total 5.8 (L) 6.4 - 8.2 g/dL Albumin 1.8 (L) 3.5 - 5.0 g/dL Globulin 4.0 2.0 - 4.0 g/dL A-G Ratio 0.5 (L) 1.1 - 2.2 Imaging: 
  
   
CXR Results  (Last 48 hours)  
  None  
   
  
    
Results from Hospital Encounter encounter on 11/03/20 XR HIP LT W OR WO PELV 2-3 VWS  
  Narrative Fall 1 week ago. 
  
No comparison. 
  
FINDINGS: AP pelvis and frog-leg view of the left hip. No acute fracture or 
dislocation. Deformity of the left femur trochanter and proximal diaphysis, 
appears chronic. No radiopaque foreign body. 
   
  Impression IMPRESSION: 
1. No acute bony findings. XR CHEST SNGL V  
  Narrative Chest single view. 
  
Comparison single view chest March 29, 2018. 
  
Right-sided Port-A-Cath stable position. New opacification of the lower two thirds right hemithorax. This is likely 
related to a combination of large volume pleural fluid and atelectatic lung. Left lung is aerated. Cardiac and mediastinal structures unchanged. No 
pneumothorax. Nonacute rib fractures noted. Results from [facility] Encounter encounter on 10/26/20 XR SHOULDER RT AP/LAT MIN 2 V  
  Narrative 3 views of the right shoulder reveal diffuse osteopenia. There is mild 
degenerative subluxation of the humerus with respect to the glenoid. No fracture 
or dislocation is identified. 
   
  Impression IMPRESSION: Degenerative changes.  
  
    
Results from Hospital Encounter encounter on 11/03/20 CT CHEST WO CONT  
  Narrative CT dose reduction was achieved through use of a standardized protocol tailored 
for this examination and automatic exposure control for dose modulation. 
  
Noncontrast study shows large right pleural effusion. Right middle and lower 
lobe are completely collapsed, with very few air bronchograms. The upper lobe is 
almost completely collapsed, sparingly anterior segment only. Mainstem bronchus 
is open. Proximal lobar bronchi are open. Left lung is clear. Multiple calcified 
mediastinal hilar lymph nodes. Small left pleural effusion. Small pericardial 
effusion. Normal caliber aorta. No significant upper abdominal or chest wall 
finding. Pronounced soft tissue edema in the visualized portion of the left arm 
   
  Impression IMPRESSION: Near complete collapse of the right lung secondary to large pleural 
effusion  
  
 
  
IMPRESSION:  
1. RLL collapse Atelectasis markedly improved on x-ray 11/7 and on exam today 2. Pleural Effusion Right side status post thoracentesis 11/06 by IR 
3. Chronic Obstructive Pulmonary Disease with Severe Acute Exacerbation requiring inpatient hospitalization and management; no wheezing today 4. A. fib with RVR 5. History of heart failure hyponatremia 5. Prognosis guarded   
   
RECOMMENDATIONS/PLAN:  
 Status post thoracentesis 11/06 by IR about 1260 cc of serous fluid removed  Cytology and culture pending x-ray shows improvement right lung still has basal atelectasis with small pleural effusion 1. Status post thoracentesis via IR 1260 cc fluid removed cytology pending chest x-ray 11/7 still shows atelectasis. Chest Xray this morning awaiting read. 2. Nebulizer treatment with Mucomyst 
3. On AA Party

## 2020-11-09 NOTE — PROGRESS NOTES
Pulmonary Progress Note Daily Progress Note: 11/9/2020 Subjective: The patient is seen for follow  up. Excerpts from admission 11/3/2020 or consult notes as follows:  
67-year-old lady came in because of generalized weakness lethargy past medical history of dementia congestive heart failure hypertension hyperlipidemia patient was discharged from home with a sling on her right arm as she fell. Now chest x-ray and CAT scan of the chest was done which shows right lower lobe collapse with pleural effusion unable to get much history out of the patient so pulmonary consult was called for further evaluation 11/8 denies cough or shortness of breath remains on room air 11/09 Today she denies cough and shortness of breath. She had a chest Xray this morning, awaiting read. She is on room air breathing well. Problem List: 
Problem List as of 11/9/2020 Never Reviewed Codes Class Noted - Resolved Hyponatremia ICD-10-CM: E87.1 ICD-9-CM: 276.1  11/3/2020 - Present Medications reviewed Current Facility-Administered Medications Medication Dose Route Frequency  acetylcysteine (MUCOMYST) 200 mg/mL (20 %) solution 600 mg  600 mg Nebulization Q6H RT  
 albuterol-ipratropium (DUO-NEB) 2.5 MG-0.5 MG/3 ML  3 mL Nebulization Q4H PRN  
 metoprolol tartrate (LOPRESSOR) tablet 25 mg  25 mg Oral Q12H  
 albuterol-ipratropium (DUO-NEB) 2.5 MG-0.5 MG/3 ML  3 mL Nebulization Q6HWA RT  
 heparin porcine injection 5,000 Units  5,000 Units SubCUTAneous Q12H  piperacillin-tazobactam (ZOSYN) 3.375 g in 0.9% sodium chloride (MBP/ADV) 100 mL MBP  3.375 g IntraVENous Q8H Review of Systems: A comprehensive review of systems was negative except for that written in the HPI. Objective:  
Physical Exam:  
 
Visit Vitals /75 Pulse 70 Temp 98.5 °F (36.9 °C) Resp 18 Ht 5' 2.01\" (1.575 m) Wt 63.5 kg (140 lb) SpO2 95% BMI 25.60 kg/m² O2 Device: Room air Temp (24hrs), Av.2 °F (36.8 °C), Min:97.9 °F (36.6 °C), Max:98.5 °F (36.9 °C) No intake/output data recorded. No intake/output data recorded. Constitutional:  
 Awake alert seems oriented HENT:  
Head: Normocephalic and atraumatic. Eyes: Pupils are equal, round, and reactive to light. Extraocular movements intact Neck: Normal range of motion. Neck supple. No definite JVD Cardiovascular: Normal rate and regular rhythm. Pulmonary/Chest:  
 Clear anteriorly and left lateral with fairly good breath sounds right lateral and right posterior no definite wheezes or rales Abdominal: Soft. Bowel sounds are normal.  
Musculoskeletal: Normal range of motion. Skin: Skin is warm. Data Review:  
   
Recent Days: 
Recent Labs 20 
1315 20 
1025 20 
1207 WBC 7.7 8.9 7.9 HGB 10.8* 11.6 13.2 HCT 34.3* 36.5 40.2  296 326 Recent Labs 20 
1315 20 
1025 20 
1207 * 136 135* K 3.8 3.3* 3.3*  
 99 98  
CO2 33* 33* 31  
GLU 94 91 66 BUN 14 16 15 CREA 0.87 0.85 1.00  
CA 10.1 9.8 9.9 MG  --  1.9  --   
ALB 1.8* 1.8* 2.1* TBILI 0.5 0.6 0.6 ALT 46 37 39 Room air oxygen saturation 98% 24 Hour Results: 
Recent Results (from the past 24 hour(s)) GLUCOSE, POC Collection Time: 20 11:01 AM  
Result Value Ref Range Glucose (POC) 220 (H) 65 - 100 mg/dL Performed by Vinita THAPA   
CBC W/O DIFF Collection Time: 20  1:15 PM  
Result Value Ref Range WBC 7.7 3.6 - 11.0 K/uL  
 RBC 4.40 3.80 - 5.20 M/uL  
 HGB 10.8 (L) 11.5 - 16.0 g/dL HCT 34.3 (L) 35.0 - 47.0 % MCV 78.0 (L) 80.0 - 99.0 FL  
 MCH 24.5 (L) 26.0 - 34.0 PG  
 MCHC 31.5 30.0 - 36.5 g/dL  
 RDW 17.1 (H) 11.5 - 14.5 % PLATELET 904 194 - 136 K/uL MPV 10.3 8.9 - 79.5 FL  
METABOLIC PANEL, COMPREHENSIVE Collection Time: 20  1:15 PM  
Result Value Ref Range  Sodium 135 (L) 136 - 145 mmol/L  
 Potassium 3.8 3.5 - 5.1 mmol/L Chloride 100 97 - 108 mmol/L  
 CO2 33 (H) 21 - 32 mmol/L Anion gap 2 (L) 5 - 15 mmol/L Glucose 94 65 - 100 mg/dL BUN 14 6 - 20 mg/dL Creatinine 0.87 0.55 - 1.02 mg/dL BUN/Creatinine ratio 16 12 - 20 GFR est AA >60 >60 ml/min/1.73m2 GFR est non-AA >60 >60 ml/min/1.73m2 Calcium 10.1 8.5 - 10.1 mg/dL Bilirubin, total 0.5 0.2 - 1.0 mg/dL AST (SGOT) 46 (H) 15 - 37 U/L  
 ALT (SGPT) 46 12 - 78 U/L Alk. phosphatase 68 45 - 117 U/L Protein, total 5.8 (L) 6.4 - 8.2 g/dL Albumin 1.8 (L) 3.5 - 5.0 g/dL Globulin 4.0 2.0 - 4.0 g/dL A-G Ratio 0.5 (L) 1.1 - 2.2 Imaging: 
  
   
CXR Results  (Last 48 hours)  
  None  
   
  
    
Results from Hospital Encounter encounter on 11/03/20 XR HIP LT W OR WO PELV 2-3 VWS  
  Narrative Fall 1 week ago. 
  
No comparison. 
  
FINDINGS: AP pelvis and frog-leg view of the left hip. No acute fracture or 
dislocation. Deformity of the left femur trochanter and proximal diaphysis, 
appears chronic. No radiopaque foreign body. 
   
  Impression IMPRESSION: 
1. No acute bony findings. XR CHEST SNGL V  
  Narrative Chest single view. 
  
Comparison single view chest March 29, 2018. 
  
Right-sided Port-A-Cath stable position. New opacification of the lower two thirds right hemithorax. This is likely 
related to a combination of large volume pleural fluid and atelectatic lung. Left lung is aerated. Cardiac and mediastinal structures unchanged. No 
pneumothorax. Nonacute rib fractures noted. Results from [facility] Encounter encounter on 10/26/20 XR SHOULDER RT AP/LAT MIN 2 V  
  Narrative 3 views of the right shoulder reveal diffuse osteopenia. There is mild 
degenerative subluxation of the humerus with respect to the glenoid. No fracture 
or dislocation is identified. 
   
  Impression IMPRESSION: Degenerative changes.  
  
    
Results from Hospital Encounter encounter on 11/03/20 CT CHEST WO CONT  
  Narrative CT dose reduction was achieved through use of a standardized protocol tailored 
for this examination and automatic exposure control for dose modulation. 
  
Noncontrast study shows large right pleural effusion. Right middle and lower 
lobe are completely collapsed, with very few air bronchograms. The upper lobe is 
almost completely collapsed, sparingly anterior segment only. Mainstem bronchus 
is open. Proximal lobar bronchi are open. Left lung is clear. Multiple calcified 
mediastinal hilar lymph nodes. Small left pleural effusion. Small pericardial 
effusion. Normal caliber aorta. No significant upper abdominal or chest wall 
finding. Pronounced soft tissue edema in the visualized portion of the left arm 
   
  Impression IMPRESSION: Near complete collapse of the right lung secondary to large pleural 
effusion  
  
 
  
IMPRESSION:  
1. RLL collapse Atelectasis markedly improved on x-ray 11/7 and on exam  
2. Pleural Effusion Right side status post thoracentesis 11/06 by IR 
3. Chronic Obstructive Pulmonary Disease with Severe Acute Exacerbation requiring inpatient hospitalization and management; no wheezing today 4. A. fib with RVR 5. History of heart failure hyponatremia 5. Prognosis guarded   
   
RECOMMENDATIONS/PLAN:  
 Status post thoracentesis 11/06 by IR about 1260 cc of serous fluid removed  Cytology and culture pending x-ray shows improvement right lung still has basal atelectasis with small pleural effusion 1. Status post thoracentesis via IR 1260 cc fluid removed cytology pending chest x-ray 11/7 still shows atelectasis. Chest Xray this morning awaiting read. Pleural fluid culture negative 2. Nebulizer treatment with Mucomyst 
3. On zosyn 
    Chest x-ray pending  
  Veleta Severe, MD

## 2020-11-09 NOTE — PROGRESS NOTES
Comprehensive Nutrition Assessment Type and Reason for Visit: Reassess(Interim) Nutrition Recommendations/Plan:  
Continue current regular, soft solids diet Continue Ensure Enlive TID Adding Magic Cup daily Allow snacks as desired Adjust insulin to promote euglycemia Please document % of all meal/snack consumed, wts, BMs 
 
Nutrition Assessment:  Admitted for FTT, needs SNF placement, noted pt in ED 10/26 for fall, R fx. Pt now s/p thoracentesis with 1.26L removed (11/6), now plans to d/c to [address] once auth accepted. At initial interview pt lethargic, flat affect, unreliable historian. Today, RD observed 0% B and L tray consumed, 50% of Ensure from this AM consumed. pt endorses liking Ensure and states that she is avle ot drink maybe 2/day. RD encouraged pt to try and eat L however pt stated she was not hungry and did not want food, denied wanting RD to open containers, cut food, etc. Also unable to give further food preferences. RD placed D order for Chix noodle soup for pt, will also trial Magic cup. Appetite stimulant unlikely to help as pt with dementia. Labs: Na 135, BG generally wnl, AST 46. Meds: NS at 75ml/hr, heparin, Zosyn. Malnutrition Assessment: 
Malnutrition Status:  Mild malnutrition Context:  Acute illness Estimated Daily Nutrient Needs: 
Energy (kcal): 1232kcals (MSJ x1.2); Weight Used for Energy Requirements: Current Protein (g): 70g protein (1.1g/kg); Weight Used for Protein Requirements: Current Fluid (ml/day): 1500ml; Method Used for Fluid Requirements: Other (comment)(Adult minimum) Needs for maintenance Nutrition Related Findings:  NFPE finding mild muscle wasing. No edema. No N/V/D/C per pt, last BM 11/7. Pt endorses no issues with c/s, no swallow evals in EMR. Wounds:   
None Current Nutrition Therapies: DIET NUTRITIONAL SUPPLEMENTS Dinner, Lunch, Breakfast; Ensure [employer] DIET REGULAR Anthropometric Measures: · Height:  5' 2.01\" (157.5 cm) · Current Body Wt:  62.3 kg (137 lb 5.6 oz)(11/9) · Admission Body Wt:  139 lb 15.9 oz(11/3) · Usual Body Wt:  54.4 kg (120 lb 0.2 oz)(per pt, stable) · Ideal Body Wt:  110 lbs:  124.9 % · BMI Category:  Normal weight (BMI 22.0-24.9) age over 72 Wt hx: 59.9kg (11/5), 63.6kg (11/3) No prior wt hx to assess, UBW less than pt current BW per EMR, unable to assess recent wt loss, appears fairly stable so far inpatient. Nutrition Diagnosis:  
· Inadequate oral intake related to cognitive or neurological impairment(lethargy) as evidenced by intake 0-25% Nutrition Interventions:  
Food and/or Nutrient Delivery: Continue current diet, Modify oral nutrition supplement(Adding magic Cup daily) Nutrition Education and Counseling: Education not indicated Coordination of Nutrition Care: Continue to monitor while inpatient, Feeding assistance/environmental change Goals: 
Intakes >/=50% of EENs (not progressing) 
wt maintenance within +/-0.5kg (not progressing) Nutrition Monitoring and Evaluation:  
Behavioral-Environmental Outcomes: None identified Food/Nutrient Intake Outcomes: Food and nutrient intake, Supplement intake Physical Signs/Symptoms Outcomes: Weight, Nutrition focused physical findings, Chewing or swallowing Discharge Planning: Too soon to determine Electronically signed by Rakesh Suarez RD on 11/9/2020 at 4:04 PM 
 
Contact: Ext [phone]

## 2020-11-09 NOTE — PROGRESS NOTES
Patient is pending review with [facility]. Writer spoke with Enrique [place] with intake @ [facility] who states they are just trying to verify patient's insurance and will call CM once they know. CM will continue to follow. ADDENDUM Writer verified with Julia Calles that the Medicare number we have for this patient is accurate. CM has informed Shnita @ [facility] this is the correct medicare number. CM will continue to follow for discharge planning.

## 2020-11-09 NOTE — PROGRESS NOTES
General Daily Progress Note Patient Name: Farhat Patel YOB: 1940 Age: 
79 y.o. Admit Date: 11/3/2020 Subjective:  
 
Pt is alert and awake this morning. She is laying in bed comfortably. No acute distress. Denies cough, shortness of breath, and chest pain. Denies weakness and dizziness. Pt in sinus rhythm yesterday per cardiologist 
 
Post thoracentesis 1260 cc of serous fluid removed. Cytology and culturing pending. Repeat chest x ray pending. Hgb 10.8 on 11/8, trending down from 15.2 on 11/3 Sodium is 135 on 11/8. 136 on 11/7 Potassium 3.8 Objective:  
 
Visit Vitals /75 Pulse 70 Temp 98.5 °F (36.9 °C) Resp 18 Ht 5' 2.01\" (1.575 m) Wt 63.5 kg (140 lb) SpO2 95% BMI 25.60 kg/m² Recent Results (from the past 24 hour(s)) GLUCOSE, POC Collection Time: 11/08/20 11:01 AM  
Result Value Ref Range Glucose (POC) 220 (H) 65 - 100 mg/dL Performed by Corby THAPA   
CBC W/O DIFF Collection Time: 11/08/20  1:15 PM  
Result Value Ref Range WBC 7.7 3.6 - 11.0 K/uL  
 RBC 4.40 3.80 - 5.20 M/uL  
 HGB 10.8 (L) 11.5 - 16.0 g/dL HCT 34.3 (L) 35.0 - 47.0 % MCV 78.0 (L) 80.0 - 99.0 FL  
 MCH 24.5 (L) 26.0 - 34.0 PG  
 MCHC 31.5 30.0 - 36.5 g/dL  
 RDW 17.1 (H) 11.5 - 14.5 % PLATELET 331 462 - 428 K/uL MPV 10.3 8.9 - 89.2 FL  
METABOLIC PANEL, COMPREHENSIVE Collection Time: 11/08/20  1:15 PM  
Result Value Ref Range Sodium 135 (L) 136 - 145 mmol/L Potassium 3.8 3.5 - 5.1 mmol/L Chloride 100 97 - 108 mmol/L  
 CO2 33 (H) 21 - 32 mmol/L Anion gap 2 (L) 5 - 15 mmol/L Glucose 94 65 - 100 mg/dL BUN 14 6 - 20 mg/dL Creatinine 0.87 0.55 - 1.02 mg/dL BUN/Creatinine ratio 16 12 - 20 GFR est AA >60 >60 ml/min/1.73m2 GFR est non-AA >60 >60 ml/min/1.73m2 Calcium 10.1 8.5 - 10.1 mg/dL Bilirubin, total 0.5 0.2 - 1.0 mg/dL  AST (SGOT) 46 (H) 15 - 37 U/L  
 ALT (SGPT) 46 12 - 78 U/L Alk. phosphatase 68 45 - 117 U/L Protein, total 5.8 (L) 6.4 - 8.2 g/dL Albumin 1.8 (L) 3.5 - 5.0 g/dL Globulin 4.0 2.0 - 4.0 g/dL A-G Ratio 0.5 (L) 1.1 - 2.2    
 
[unfilled] Review of Systems Constitutional: Negative for chills and fever. HENT: Negative. Eyes: Negative. Respiratory: Negative. Cardiovascular: Negative. Gastrointestinal: Negative for abdominal pain and nausea. Skin: Negative. Neurological: Negative. Physical Exam:   
 
Constitutional: pt is oriented to person, place, and time. HENT:  
Head: Normocephalic and atraumatic. Eyes: Pupils are equal, round, and reactive to light. EOM are normal.  
Cardiovascular: Normal rate, regular rhythm and normal heart sounds. Pulmonary/Chest: Breath sounds normal. No wheezes. No rales. Exhibits no tenderness. Abdominal: Soft. Bowel sounds are normal. There is no abdominal tenderness. There is no rebound and no guarding. Musculoskeletal: Normal range of motion. Neurological: pt is alert and oriented to person, place, and time. XR CHEST PORT Final Result IMPRESSION: Diffuse airspace disease, right hemithorax, with improved aeration  
from previous. Differential diagnosis includes pneumonia, atelectasis, and/or  
reexpansion pulmonary edema. No pneumothorax. [address] Final Result IMPRESSION:   
Successful ultrasound-guided thoracentesis of right large pleural effusion, with  
approximately 1.3 L serous pleural fluid removed. Pleural fluid sample was also  
sent for lab analysis. CT CHEST WO CONT Final Result IMPRESSION: Near complete collapse of the right lung secondary to large pleural  
effusion XR HIP LT W OR WO PELV 2-3 VWS Final Result IMPRESSION:  
1. No acute bony findings. XR CHEST SNGL V Final Result XR CHEST PA LAT    (Results Pending) Recent Results (from the past 24 hour(s)) GLUCOSE, POC Collection Time: 11/08/20 11:01 AM  
Result Value Ref Range Glucose (POC) 220 (H) 65 - 100 mg/dL Performed by Linda THAPA   
CBC W/O DIFF Collection Time: 11/08/20  1:15 PM  
Result Value Ref Range WBC 7.7 3.6 - 11.0 K/uL  
 RBC 4.40 3.80 - 5.20 M/uL  
 HGB 10.8 (L) 11.5 - 16.0 g/dL HCT 34.3 (L) 35.0 - 47.0 % MCV 78.0 (L) 80.0 - 99.0 FL  
 MCH 24.5 (L) 26.0 - 34.0 PG  
 MCHC 31.5 30.0 - 36.5 g/dL  
 RDW 17.1 (H) 11.5 - 14.5 % PLATELET 136 448 - 739 K/uL MPV 10.3 8.9 - 89.1 FL  
METABOLIC PANEL, COMPREHENSIVE Collection Time: 11/08/20  1:15 PM  
Result Value Ref Range Sodium 135 (L) 136 - 145 mmol/L Potassium 3.8 3.5 - 5.1 mmol/L Chloride 100 97 - 108 mmol/L  
 CO2 33 (H) 21 - 32 mmol/L Anion gap 2 (L) 5 - 15 mmol/L Glucose 94 65 - 100 mg/dL BUN 14 6 - 20 mg/dL Creatinine 0.87 0.55 - 1.02 mg/dL BUN/Creatinine ratio 16 12 - 20 GFR est AA >60 >60 ml/min/1.73m2 GFR est non-AA >60 >60 ml/min/1.73m2 Calcium 10.1 8.5 - 10.1 mg/dL Bilirubin, total 0.5 0.2 - 1.0 mg/dL AST (SGOT) 46 (H) 15 - 37 U/L  
 ALT (SGPT) 46 12 - 78 U/L Alk. phosphatase 68 45 - 117 U/L Protein, total 5.8 (L) 6.4 - 8.2 g/dL Albumin 1.8 (L) 3.5 - 5.0 g/dL Globulin 4.0 2.0 - 4.0 g/dL A-G Ratio 0.5 (L) 1.1 - 2.2 Results Procedure Component Value Units Date/Time CULTURE, BODY FLUID Lucian Dire [[id number]] Collected:  11/06/20 1730 Order Status:  Completed Specimen: Body Fluid from Swab Updated:  11/09/20 1018 Special Requests: No Special Requests GRAM STAIN Few WBCs seen No organisms seen Culture result:    
  culture performed on fluid swab specimen No growth 2 days Rene Tobin [[id number]] Collected:  11/04/20 0750 Order Status:  Completed Specimen:  Urine Updated:  11/06/20 9344 Special Requests: No Special Requests Culture result: No Growth (<1000 cfu/mL) CULTURE, RESPIRATORY/SPUTUM/BRONCH Silas Loss [[id number]] Collected:  11/03/20 2130 Order Status:  Canceled Specimen:  Sputum CULTURE, BLOOD, LINE [[id number]] Collected:  11/03/20 2130 Order Status:  Canceled Specimen:  Blood Labs:  
 
Recent Labs 11/08/20 
1315 11/07/20 
1025 WBC 7.7 8.9 HGB 10.8* 11.6 HCT 34.3* 36.5  296 Recent Labs 11/08/20 
1315 11/07/20 
1025 11/06/20 
1207 * 136 135* K 3.8 3.3* 3.3*  
 99 98  
CO2 33* 33* 31 BUN 14 16 15 CREA 0.87 0.85 1.00 GLU 94 91 66 CA 10.1 9.8 9.9 MG  --  1.9  --   
 
Recent Labs 11/08/20 
1315 11/07/20 
1025 11/06/20 
1207 ALT 46 37 39 AP 68 63 70 TBILI 0.5 0.6 0.6 TP 5.8* 5.7* 6.4 ALB 1.8* 1.8* 2.1*  
GLOB 4.0 3.9 4.3* No results for input(s): INR, PTP, APTT, INREXT, INREXT in the last 72 hours. No results for input(s): FE, TIBC, PSAT, FERR in the last 72 hours. No results found for: FOL, RBCF No results for input(s): PH, PCO2, PO2 in the last 72 hours. No results for input(s): CPK, CKNDX, TROIQ in the last 72 hours. No lab exists for component: CPKMB No results found for: CHOL, CHOLX, CHLST, CHOLV, HDL, HDLP, LDL, LDLC, DLDLP, TGLX, TRIGL, TRIGP, CHHD, CHHDX Lab Results Component Value Date/Time Glucose (POC) 220 (H) 11/08/2020 11:01 AM  
 Glucose (POC) 110 (H) 11/08/2020 08:26 AM  
 Glucose (POC) 86 11/06/2020 05:29 PM  
 Glucose (POC) 75 11/06/2020 03:12 PM  
 Glucose (POC) 84 11/06/2020 07:55 AM  
 
Lab Results Component Value Date/Time  Color Yellow/Straw 11/03/2020 02:45 PM  
 Appearance Clear 11/03/2020 02:45 PM  
 Specific gravity 1.010 11/03/2020 02:45 PM  
 pH (UA) 6.5 11/03/2020 02:45 PM  
 Protein Negative 11/03/2020 02:45 PM  
 Glucose Normal (A) 11/03/2020 02:45 PM  
 Ketone Negative 11/03/2020 02:45 PM  
 Bilirubin Negative 11/03/2020 02:45 PM  
 Urobilinogen Normal 11/03/2020 02:45 PM  
 Nitrites Negative 11/03/2020 02:45 PM  
 Leukocyte Esterase Negative 11/03/2020 02:45 PM  
 Bacteria Negative 11/03/2020 02:45 PM  
 WBC 0-4 11/03/2020 02:45 PM  
 RBC 0-5 11/03/2020 02:45 PM  
 
   
Assessment: 1. Dementia 2. Hx of Congestive Heart Failure 3. Hyponatremia/likely from diuretic 4. Hypertension 5. Hyperlipidemia 6. Failure to Thrive  
7.  Chest x-ray shows opacities/pleural effusion - Chest CT shows Near complete collapse of the right lung secondary to large pleural 
Effusion status post thoracocentesis 8. Paroxysmal A. Fib 9. Covid screening negative 10. Hypokalemia Plan:  
 
Static post thoracocentesis - culture and cytology pending On metoprolol 25 mg twice a day Monitor potassium Repeat the labs/pending PT OT consult Discharge planning Patient can be discharged to the nursing home Current Facility-Administered Medications:  
  acetylcysteine (MUCOMYST) 200 mg/mL (20 %) solution 600 mg, 600 mg, Nebulization, Q6H RT, Victor Manuel Pritchard MD, 600 mg at 11/09/20 0107 
  albuterol-ipratropium (DUO-NEB) 2.5 MG-0.5 MG/3 ML, 3 mL, Nebulization, Q4H PRN, Victor Manuel Pritchard MD, 3 mL at 11/09/20 0107   metoprolol tartrate (LOPRESSOR) tablet 25 mg, 25 mg, Oral, Q12H, Deven Barry MD, 25 mg at 11/09/20 1012   albuterol-ipratropium (DUO-NEB) 2.5 MG-0.5 MG/3 ML, 3 mL, Nebulization, Q6HWA RT, Asim Red MD, Stopped at 11/08/20 2034   heparin porcine injection 5,000 Units, 5,000 Units, SubCUTAneous, Q12H, Jesus Maldonado MD, 5,000 Units at 11/09/20 1012   piperacillin-tazobactam (ZOSYN) 3.375 g in 0.9% sodium chloride (MBP/ADV) 100 mL MBP, 3.375 g, IntraVENous, Q8H, Jesus Maldonado MD, Last Rate: 200 mL/hr at 11/09/20 0522, 3.375 g at 11/09/20 0522

## 2020-11-10 NOTE — PROGRESS NOTES
General Daily Progress Note Patient Name: Marita Abraham YOB: 1940 Age: 
79 y.o. Admit Date: 11/3/2020 Subjective:  
 
Pt is alert and awake this morning. She is laying in bed comfortably. No acute distress. Denies cough, shortness of breath, and chest pain. Denies weakness and dizziness. Post thoracentesis 1260 cc of serous fluid removed. Cytology results positive for metastatic adenocarcinoma. Negative cultures. Hgb 10.8 on 11/8, trending down from 15.2 on 11/3 Sodium is 135 on 11/8. 136 on 11/7 Potassium 3.8 Repeat Chest Xray on 11/9:  
Impression: Large right-sided pleural effusion and associated 
compression/consolidation of the right lung, increased. Mild atelectasis at the 
left base. Possible small left pleural effusion. Objective:  
 
Visit Vitals /65 Pulse 75 Temp 98.1 °F (36.7 °C) Resp 18 Ht 5' 2.01\" (1.575 m) Wt 63.5 kg (140 lb) SpO2 97% BMI 25.60 kg/m² No results found for this or any previous visit (from the past 24 hour(s)). [unfilled] Review of Systems Constitutional: Negative for chills and fever. HENT: Negative. Eyes: Negative. Respiratory: Negative. Cardiovascular: Negative. Gastrointestinal: Negative for abdominal pain and nausea. Skin: Negative. Neurological: Negative. Physical Exam:   
 
Constitutional: pt is oriented to person, place, and time. HENT:  
Head: Normocephalic and atraumatic. Eyes: Pupils are equal, round, and reactive to light. EOM are normal.  
Cardiovascular: Normal rate, regular rhythm and normal heart sounds. Pulmonary/Chest: Breath sounds normal. No wheezes. No rales. Exhibits no tenderness. Abdominal: Soft. Bowel sounds are normal. There is no abdominal tenderness. There is no rebound and no guarding. Musculoskeletal: Normal range of motion. Neurological: pt is alert and oriented to person, place, and time. XR CHEST PA LAT Final Result Impression: Large right-sided pleural effusion and associated  
compression/consolidation of the right lung, increased. Mild atelectasis at the  
left base. Possible small left pleural effusion. XR CHEST PORT Final Result IMPRESSION: Diffuse airspace disease, right hemithorax, with improved aeration  
from previous. Differential diagnosis includes pneumonia, atelectasis, and/or  
reexpansion pulmonary edema. No pneumothorax. [address] Final Result IMPRESSION:   
Successful ultrasound-guided thoracentesis of right large pleural effusion, with  
approximately 1.3 L serous pleural fluid removed. Pleural fluid sample was also  
sent for lab analysis. CT CHEST WO CONT Final Result IMPRESSION: Near complete collapse of the right lung secondary to large pleural  
effusion XR HIP LT W OR WO PELV 2-3 VWS Final Result IMPRESSION:  
1. No acute bony findings. XR CHEST SNGL V Final Result No results found for this or any previous visit (from the past 24 hour(s)). Results Procedure Component Value Units Date/Time CULTURE, BODY FLUID Leonard Garylen [[id number]] Collected:  11/06/20 1730 Order Status:  Completed Specimen: Body Fluid from Swab Updated:  11/10/20 1133 Special Requests: No Special Requests GRAM STAIN Few WBCs seen No organisms seen Culture result:    
  culture performed on fluid swab specimen No growth 3 days Ross Centeno [[id number]] Collected:  11/04/20 0750 Order Status:  Completed Specimen:  Urine Updated:  11/06/20 9650 Special Requests: No Special Requests Culture result: No Growth (<1000 cfu/mL) CULTURE, RESPIRATORY/SPUTUM/BRONCH Leonard Garylen [[id number]] Collected:  11/03/20 2130 Order Status:  Canceled Specimen:  Sputum CULTURE, BLOOD, LINE [[id number]] Collected:  11/03/20 2130 Order Status:  Canceled Specimen:  Blood Labs:  
 
Recent Labs 11/08/20 
1315 WBC 7.7 HGB 10.8* HCT 34.3*  
 Recent Labs 11/08/20 
1315 * K 3.8  CO2 33* BUN 14  
CREA 0.87 GLU 94  
CA 10.1 Recent Labs 11/08/20 
1315 ALT 46 AP 68 TBILI 0.5 TP 5.8* ALB 1.8*  
GLOB 4.0 No results for input(s): INR, PTP, APTT, INREXT, INREXT in the last 72 hours. No results for input(s): FE, TIBC, PSAT, FERR in the last 72 hours. No results found for: FOL, RBCF No results for input(s): PH, PCO2, PO2 in the last 72 hours. No results for input(s): CPK, CKNDX, TROIQ in the last 72 hours. No lab exists for component: CPKMB No results found for: CHOL, CHOLX, CHLST, CHOLV, HDL, HDLP, LDL, LDLC, DLDLP, TGLX, TRIGL, TRIGP, CHHD, CHHDX Lab Results Component Value Date/Time Glucose (POC) 220 (H) 11/08/2020 11:01 AM  
 Glucose (POC) 110 (H) 11/08/2020 08:26 AM  
 Glucose (POC) 86 11/06/2020 05:29 PM  
 Glucose (POC) 75 11/06/2020 03:12 PM  
 Glucose (POC) 84 11/06/2020 07:55 AM  
 
Lab Results Component Value Date/Time Color Yellow/Straw 11/03/2020 02:45 PM  
 Appearance Clear 11/03/2020 02:45 PM  
 Specific gravity 1.010 11/03/2020 02:45 PM  
 pH (UA) 6.5 11/03/2020 02:45 PM  
 Protein Negative 11/03/2020 02:45 PM  
 Glucose Normal (A) 11/03/2020 02:45 PM  
 Ketone Negative 11/03/2020 02:45 PM  
 Bilirubin Negative 11/03/2020 02:45 PM  
 Urobilinogen Normal 11/03/2020 02:45 PM  
 Nitrites Negative 11/03/2020 02:45 PM  
 Leukocyte Esterase Negative 11/03/2020 02:45 PM  
 Bacteria Negative 11/03/2020 02:45 PM  
 WBC 0-4 11/03/2020 02:45 PM  
 RBC 0-5 11/03/2020 02:45 PM  
 
   
Assessment: 1. Dementia 2. Hx of Congestive Heart Failure 3. Hyponatremia/likely from diuretic 4. Hypertension 5. Hyperlipidemia 6.  Failure to Thrive  
7.  Chest x-ray shows opacities/pleural effusion - Chest CT shows Near complete collapse of the right lung secondary to large pleural 
Effusion status post thoracocentesis 8. Paroxysmal A. Fib 9. Covid screening negative 10. Hypokalemia 11. Metastatic Adenocarcinoma Plan:  
 
Static post thoracocentesis - cytology results positive for metastatic adenocarcinoma; culture negative Oncology consult On metoprolol 25 mg twice a day Monitor potassium Repeat the labs/pending PT recommends 5/days week in SNF 
OT consult Try to call the family regarding update of the biopsy report No one picked up the phone and mailbox is full Unable to left any message Current Facility-Administered Medications:  
  acetylcysteine (MUCOMYST) 200 mg/mL (20 %) solution 600 mg, 600 mg, Nebulization, Q6H RT, Trudi Singh MD, 600 mg at 11/10/20 1409 
  albuterol-ipratropium (DUO-NEB) 2.5 MG-0.5 MG/3 ML, 3 mL, Nebulization, Q4H PRN, Trudi Singh MD, 3 mL at 11/10/20 0125 
  metoprolol tartrate (LOPRESSOR) tablet 25 mg, 25 mg, Oral, Q12H, Deven Barry MD, 25 mg at 11/10/20 0929 
  albuterol-ipratropium (DUO-NEB) 2.5 MG-0.5 MG/3 ML, 3 mL, Nebulization, Q6HWA RT, Asim Red MD, 3 mL at 11/10/20 1409   heparin porcine injection 5,000 Units, 5,000 Units, SubCUTAneous, Q12H, Jesus Maldonado MD, 5,000 Units at 11/10/20 1254   piperacillin-tazobactam (ZOSYN) 3.375 g in 0.9% sodium chloride (MBP/ADV) 100 mL MBP, 3.375 g, IntraVENous, Q8H, Jesus Maldonado MD, Last Rate: 200 mL/hr at 11/10/20 1254, 3.375 g at 11/10/20 1254

## 2020-11-10 NOTE — PROGRESS NOTES
Attempted to visit patient in [facility]th surgical during rounds. Only the patient was present during the visit. Patient seemed to be resting and did not respond to my verbal greeting. I provided silent prayer and support. Chaplains will follow as able and/or needed. Chaplain Niru Pimentel M.Div.  can be reached by calling the  at [facility] 
[phone]

## 2020-11-10 NOTE — PROGRESS NOTES
Pulmonary Progress Note Daily Progress Note: 11/10/2020 Subjective: The patient is seen for follow  up. Excerpts from admission 11/3/2020 or consult notes as follows:  
78-year-old lady came in because of generalized weakness lethargy past medical history of dementia congestive heart failure hypertension hyperlipidemia patient was discharged from home with a sling on her right arm as she fell. Now chest x-ray and CAT scan of the chest was done which shows right lower lobe collapse with pleural effusion unable to get much history out of the patient so pulmonary consult was called for further evaluation 11/8 denies cough or shortness of breath remains on room air 11/09 Today she denies cough and shortness of breath. She had a chest Xray this morning, awaiting read. She is on room air breathing well. 
11/10 She says she feels great and denies SOB and cough. CXR yesterday shows enlarging right pleural effusion and atelectasis and atelectasis of left base. Problem List: 
Problem List as of 11/10/2020 Never Reviewed Codes Class Noted - Resolved Hyponatremia ICD-10-CM: E87.1 ICD-9-CM: 276.1  11/3/2020 - Present Medications reviewed Current Facility-Administered Medications Medication Dose Route Frequency  acetylcysteine (MUCOMYST) 200 mg/mL (20 %) solution 600 mg  600 mg Nebulization Q6H RT  
 albuterol-ipratropium (DUO-NEB) 2.5 MG-0.5 MG/3 ML  3 mL Nebulization Q4H PRN  
 metoprolol tartrate (LOPRESSOR) tablet 25 mg  25 mg Oral Q12H  
 albuterol-ipratropium (DUO-NEB) 2.5 MG-0.5 MG/3 ML  3 mL Nebulization Q6HWA RT  
 heparin porcine injection 5,000 Units  5,000 Units SubCUTAneous Q12H  piperacillin-tazobactam (ZOSYN) 3.375 g in 0.9% sodium chloride (MBP/ADV) 100 mL MBP  3.375 g IntraVENous Q8H Review of Systems: A comprehensive review of systems was negative except for that written in the HPI. Objective:  
Physical Exam:  
 
Visit Vitals /65 Pulse 75 Temp 98.1 °F (36.7 °C) Resp 18 Ht 5' 2.01\" (1.575 m) Wt 140 lb (63.5 kg) SpO2 94% BMI 25.60 kg/m² O2 Device: Room air Temp (24hrs), Av.1 °F (36.7 °C), Min:97.4 °F (36.3 °C), Max:98.5 °F (36.9 °C) No intake/output data recorded.  1901 - 11/10 0700 In: -  
Out: 150 [Urine:150] Constitutional:  
 Awake alert seems oriented HENT:  
Head: Normocephalic and atraumatic. Eyes: Pupils are equal, round, and reactive to light. Extraocular movements intact Neck: Normal range of motion. Neck supple. No definite JVD Cardiovascular: Normal rate and regular rhythm. Pulmonary/Chest:  
 Clear anteriorly and left lateral with fairly good breath sounds right lateral and right posterior no definite wheezes or rales Abdominal: Soft. Bowel sounds are normal.  
Musculoskeletal: Normal range of motion. Skin: Skin is warm. Data Review:  
   
Recent Days: 
Recent Labs 20 
1315 20 
1025 WBC 7.7 8.9 HGB 10.8* 11.6 HCT 34.3* 36.5  296 Recent Labs 20 
1315 20 
1025 * 136  
K 3.8 3.3*  
 99 CO2 33* 33* GLU 94 91 BUN 14 16 CREA 0.87 0.85 CA 10.1 9.8 MG  --  1.9 ALB 1.8* 1.8* TBILI 0.5 0.6 ALT 46 37 Room air oxygen saturation 98% 24 Hour Results: No results found for this or any previous visit (from the past 24 hour(s)). Imaging: 
  
   
CXR Results  (Last 48 hours)  
  None  
   
  
    
Results from Hospital Encounter encounter on 20 XR HIP LT W OR WO PELV 2-3 VWS  
  Narrative Fall 1 week ago. 
  
No comparison. 
  
FINDINGS: AP pelvis and frog-leg view of the left hip. No acute fracture or 
dislocation. Deformity of the left femur trochanter and proximal diaphysis, 
appears chronic. No radiopaque foreign body. 
   
  Impression IMPRESSION: 
1. No acute bony findings.   
XR CHEST SNGL V  
  Narrative Chest single view. 
  
Comparison single view chest 2018. 
  
 Right-sided Port-A-Cath stable position. New opacification of the lower two thirds right hemithorax. This is likely 
related to a combination of large volume pleural fluid and atelectatic lung. Left lung is aerated. Cardiac and mediastinal structures unchanged. No 
pneumothorax. Nonacute rib fractures noted. Results from [facility] Encounter encounter on 10/26/20 XR SHOULDER RT AP/LAT MIN 2 V  
  Narrative 3 views of the right shoulder reveal diffuse osteopenia. There is mild 
degenerative subluxation of the humerus with respect to the glenoid. No fracture 
or dislocation is identified. 
   
  Impression IMPRESSION: Degenerative changes.  
  
    
Results from Hospital Encounter encounter on 11/03/20 CT CHEST WO CONT  
  Narrative CT dose reduction was achieved through use of a standardized protocol tailored 
for this examination and automatic exposure control for dose modulation. 
  
Noncontrast study shows large right pleural effusion. Right middle and lower 
lobe are completely collapsed, with very few air bronchograms. The upper lobe is 
almost completely collapsed, sparingly anterior segment only. Mainstem bronchus 
is open. Proximal lobar bronchi are open. Left lung is clear. Multiple calcified 
mediastinal hilar lymph nodes. Small left pleural effusion. Small pericardial 
effusion. Normal caliber aorta. No significant upper abdominal or chest wall 
finding. Pronounced soft tissue edema in the visualized portion of the left arm 
   
  Impression IMPRESSION: Near complete collapse of the right lung secondary to large pleural 
effusion  
  
 
  
IMPRESSION:  
1. RLL collapse Atelectasis markedly improved on x-ray 11/7 and on exam  
2. Pleural Effusion Right side status post thoracentesis 11/06 by IR 
3. Chronic Obstructive Pulmonary Disease with Severe Acute Exacerbation requiring inpatient hospitalization and management; no wheezing today 4. A. fib with RVR 5. History of heart failure hyponatremia 5. Prognosis guarded   
   
RECOMMENDATIONS/PLAN:  
 
1. Status post thoracentesis via IR 1260 cc fluid removed. Pleural fluid culture negative. Chest Xray 11/09 showed increased large right pleural effusion and atelectasis, as well as atelectasis of the left base and possible small left pleural effusion. 2. Nebulizer treatment with Mucomyst 
3. On [employer]

## 2020-11-10 NOTE — PROGRESS NOTES
Problem: Self Care Deficits Care Plan (Adult) Goal: *Acute Goals and Plan of Care (Insert Text) Description: Pt will be SBA sup<->sit in prep for EOB ADL's Pt will be SBA  LB dressing EOB level Pt will be min A  sit<-> prep for toilet transfer Pt will be min A toilet transfer with LRAD Pt will be min A  toileting/cloth mgmt LRAD Pt will be min A  grooming standing sink Pt will be mod A bathing sitting/standing sink LRAD Pt will be MI brandon UE HEP in prep for self care tasks Outcome: Progressing Towards Goal 
 OCCUPATIONAL THERAPY TREATMENT Patient: Denton Koyanagi (79 y.o. female) Date: 11/10/2020 Diagnosis: Hyponatremia [E87.1] <principal problem not specified> Precautions:   
Chart, occupational therapy assessment, plan of care, and goals were reviewed. ASSESSMENT Patient continues with skilled OT services and is progressing towards goals. Pt. Received semi-supine in bed and required increased encouragement to perform in therapy session. Pt. Performed bed mobility with Mod A x1. Pt performed good static sitting balance while seated at EOB for 15 mins. Pt. Performed UE therex and attempted sit->  prep for OOB mobility Mod A x1. Pt unable to get to full stance on 1st attempt, on second attempt pt able to fully standing and attempt to perform side steps towards [facility], however unable to perform side steps and impulsively sat at EOB. While standing pt holding on to IV pole for increased stability. Self feeding while sitting at EOB with set-up assistance. Pt required mod A for supine -> sit  and Mod A a x2 for scoot up to [facility]. PLAN : 
Patient continues to benefit from skilled intervention to address the above impairments. Continue treatment per established plan of care. to address goals. Recommendation for discharge: (in order for the patient to meet his/her long term goals) Therapy up to 5 days/week in SNF setting This discharge recommendation: Has been made in collaboration with the attending provider and/or case management IF patient discharges home will need the following DME: to be determined SUBJECTIVE:  
Patient stated it's cold in here.  OBJECTIVE DATA SUMMARY:  
Cognitive/Behavioral Status: 
Neurologic State: Alert Orientation Level: Oriented to person;Oriented to place;Oriented to situation Cognition: Follows commands Functional Mobility and Transfers for ADLs: 
Bed Mobility: 
Rolling: Moderate assistance;Assist x1 Supine to Sit: Adaptive equipment;Assist x1 Sit to Supine: Moderate assistance;Assist x1 Scooting: Moderate assistance;Assist x1 Transfers: 
Sit to Stand: Moderate assistance;Assist x1 Balance: 
Sitting: Intact; Without support Sitting - Static: Good (unsupported) Sitting - Dynamic: Fair (occasional) Standing: Impaired; With support Standing - Static: Constant support Standing - Dynamic : Constant support ADL Intervention: 
Feeding Feeding Assistance: Set-up Food to Mouth: Independent Therapeutic Exercises:  
1 set of 15 reps shoulder flex/ex 1 set of 15 reps elbow flex/ex Pain: 
0/0 pain reported Activity Tolerance:  
Fair and Poor Please refer to the flowsheet for vital signs taken during this treatment. After treatment patient left in no apparent distress:  
Supine in bed and Call bell within reach COMMUNICATION/COLLABORATION:  
The patients plan of care was discussed with: Registered nurseHero Ocampo Time Calculation: 34 mins

## 2020-11-10 NOTE — PROGRESS NOTES
General Daily Progress Note Patient Name: Liseth Brock YOB: 1940 Age: 
79 y.o. Admit Date: 11/3/2020 Subjective:  
 
Pt is alert and awake this morning. She is laying in bed comfortably. No acute distress. Denies cough, shortness of breath, and chest pain. Denies weakness and dizziness. Post thoracentesis 1260 cc of serous fluid removed. Cytology results positive for metastatic adenocarcinoma. Negative cultures. Hgb 10.8 on 11/8, trending down from 15.2 on 11/3 Sodium is 135 on 11/8. 136 on 11/7 Potassium 3.8 Repeat Chest Xray on 11/9:  
Impression: Large right-sided pleural effusion and associated 
compression/consolidation of the right lung, increased. Mild atelectasis at the 
left base. Possible small left pleural effusion. Objective:  
 
Visit Vitals /65 Pulse 75 Temp 98.1 °F (36.7 °C) Resp 18 Ht 5' 2.01\" (1.575 m) Wt 63.5 kg (140 lb) SpO2 94% BMI 25.60 kg/m² No results found for this or any previous visit (from the past 24 hour(s)). [unfilled] Review of Systems Constitutional: Negative for chills and fever. HENT: Negative. Eyes: Negative. Respiratory: Negative. Cardiovascular: Negative. Gastrointestinal: Negative for abdominal pain and nausea. Skin: Negative. Neurological: Negative. Physical Exam:   
 
Constitutional: pt is oriented to person, place, and time. HENT:  
Head: Normocephalic and atraumatic. Eyes: Pupils are equal, round, and reactive to light. EOM are normal.  
Cardiovascular: Normal rate, regular rhythm and normal heart sounds. Pulmonary/Chest: Breath sounds normal. No wheezes. No rales. Exhibits no tenderness. Abdominal: Soft. Bowel sounds are normal. There is no abdominal tenderness. There is no rebound and no guarding. Musculoskeletal: Normal range of motion. Neurological: pt is alert and oriented to person, place, and time.   
 
XR CHEST PA LAT  
Final Result Impression: Large right-sided pleural effusion and associated  
compression/consolidation of the right lung, increased. Mild atelectasis at the  
left base. Possible small left pleural effusion. XR CHEST PORT Final Result IMPRESSION: Diffuse airspace disease, right hemithorax, with improved aeration  
from previous. Differential diagnosis includes pneumonia, atelectasis, and/or  
reexpansion pulmonary edema. No pneumothorax. [address] Final Result IMPRESSION:   
Successful ultrasound-guided thoracentesis of right large pleural effusion, with  
approximately 1.3 L serous pleural fluid removed. Pleural fluid sample was also  
sent for lab analysis. CT CHEST WO CONT Final Result IMPRESSION: Near complete collapse of the right lung secondary to large pleural  
effusion XR HIP LT W OR WO PELV 2-3 VWS Final Result IMPRESSION:  
1. No acute bony findings. XR CHEST SNGL V Final Result No results found for this or any previous visit (from the past 24 hour(s)). Results Procedure Component Value Units Date/Time CULTURE, BODY FLUID Rita Smith [[id number]] Collected:  11/06/20 1730 Order Status:  Completed Specimen: Body Fluid from Swab Updated:  11/09/20 1018 Special Requests: No Special Requests GRAM STAIN Few WBCs seen No organisms seen Culture result:    
  culture performed on fluid swab specimen No growth 2 days Abhaydanutajessy Sheehan [[id number]] Collected:  11/04/20 0750 Order Status:  Completed Specimen:  Urine Updated:  11/06/20 9054 Special Requests: No Special Requests Culture result: No Growth (<1000 cfu/mL) CULTURE, RESPIRATORY/SPUTUM/BRONCH Rita Smith [[id number]] Collected:  11/03/20 2130 Order Status:  Canceled Specimen:  Sputum CULTURE, BLOOD, LINE [[id number]] Collected:  11/03/20 2130  Order Status:  Canceled Specimen:  Blood Labs:  
 
Recent Labs 11/08/20 
1315 WBC 7.7 HGB 10.8* HCT 34.3*  
 Recent Labs 11/08/20 
1315 * K 3.8  CO2 33* BUN 14  
CREA 0.87 GLU 94  
CA 10.1 Recent Labs 11/08/20 
1315 ALT 46 AP 68 TBILI 0.5 TP 5.8* ALB 1.8*  
GLOB 4.0 No results for input(s): INR, PTP, APTT, INREXT, INREXT in the last 72 hours. No results for input(s): FE, TIBC, PSAT, FERR in the last 72 hours. No results found for: FOL, RBCF No results for input(s): PH, PCO2, PO2 in the last 72 hours. No results for input(s): CPK, CKNDX, TROIQ in the last 72 hours. No lab exists for component: CPKMB No results found for: CHOL, CHOLX, CHLST, CHOLV, HDL, HDLP, LDL, LDLC, DLDLP, TGLX, TRIGL, TRIGP, CHHD, CHHDX Lab Results Component Value Date/Time Glucose (POC) 220 (H) 11/08/2020 11:01 AM  
 Glucose (POC) 110 (H) 11/08/2020 08:26 AM  
 Glucose (POC) 86 11/06/2020 05:29 PM  
 Glucose (POC) 75 11/06/2020 03:12 PM  
 Glucose (POC) 84 11/06/2020 07:55 AM  
 
Lab Results Component Value Date/Time Color Yellow/Straw 11/03/2020 02:45 PM  
 Appearance Clear 11/03/2020 02:45 PM  
 Specific gravity 1.010 11/03/2020 02:45 PM  
 pH (UA) 6.5 11/03/2020 02:45 PM  
 Protein Negative 11/03/2020 02:45 PM  
 Glucose Normal (A) 11/03/2020 02:45 PM  
 Ketone Negative 11/03/2020 02:45 PM  
 Bilirubin Negative 11/03/2020 02:45 PM  
 Urobilinogen Normal 11/03/2020 02:45 PM  
 Nitrites Negative 11/03/2020 02:45 PM  
 Leukocyte Esterase Negative 11/03/2020 02:45 PM  
 Bacteria Negative 11/03/2020 02:45 PM  
 WBC 0-4 11/03/2020 02:45 PM  
 RBC 0-5 11/03/2020 02:45 PM  
 
   
Assessment: 1. Dementia 2. Hx of Congestive Heart Failure 3. Hyponatremia/likely from diuretic 4. Hypertension 5. Hyperlipidemia 6.  Failure to Thrive  
7.  Chest x-ray shows opacities/pleural effusion - Chest CT shows Near complete collapse of the right lung secondary to large pleural 
Effusion status post thoracocentesis 8. Paroxysmal A. Fib 9. Covid screening negative 10. Hypokalemia 11. Metastatic Adenocarcinoma Plan:  
 
Static post thoracocentesis - cytology results positive for metastatic adenocarcinoma; culture negative Oncology consult On metoprolol 25 mg twice a day Monitor potassium Repeat the labs/pending PT recommends 5/days week in SNF 
OT consult Current Facility-Administered Medications:  
  acetylcysteine (MUCOMYST) 200 mg/mL (20 %) solution 600 mg, 600 mg, Nebulization, Q6H RT, Cecelia Vanegas MD, 600 mg at 11/10/20 0836 
  albuterol-ipratropium (DUO-NEB) 2.5 MG-0.5 MG/3 ML, 3 mL, Nebulization, Q4H PRN, Cecelia Vanegas MD, 3 mL at 11/10/20 0125 
  metoprolol tartrate (LOPRESSOR) tablet 25 mg, 25 mg, Oral, Q12H, Deven Barry MD, 25 mg at 11/10/20 0929 
  albuterol-ipratropium (DUO-NEB) 2.5 MG-0.5 MG/3 ML, 3 mL, Nebulization, Q6HWA RT, Asim Red MD, 3 mL at 11/10/20 9108   heparin porcine injection 5,000 Units, 5,000 Units, SubCUTAneous, Q12H, Jesus Maldonado MD, 5,000 Units at 11/09/20 2023   piperacillin-tazobactam (ZOSYN) 3.375 g in 0.9% sodium chloride (MBP/ADV) 100 mL MBP, 3.375 g, IntraVENous, Q8H, Jesus Maldonado MD, Last Rate: 200 mL/hr at 11/10/20 0502, 3.375 g at 11/10/20 0502

## 2020-11-10 NOTE — PROGRESS NOTES
Problem: Mobility Impaired (Adult and Pediatric) Goal: *Acute Goals and Plan of Care (Insert Text) Description: Patient will move from supine to sit and sit to supine , scoot up and down, and roll side to side in bed with moderate assistance  within 7 day(s). Patient will transfer from bed to chair and chair to bed with moderate assistance  using the least restrictive device within 7 day(s). Patient will improve static sitting balance to minimal assistance within 1 week(s). - goal met Patient will ambulate 30 feet with minimal assistance with least restrictive device within 1 weeks. Outcome: Progressing Towards Goal 
 
PHYSICAL THERAPY TREATMENT: WEEKLY REASSESSMENT Patient: Jovanny Jha (79 y.o. female) Date: 11/10/2020 Primary Diagnosis: Hyponatremia [E87.1] Precautions: fall ASSESSMENT Patient continues with skilled PT services and is progressing towards goals. Pt supine in bed upon PT arrival, agreeable to work with therapy initially, then declined OOB activity x 3 attempts once seated up on EOB. Pt was agreeable to participate with LE exercises once seated EOB. Pt req mod A with supine to sit EOB. Pt was able to maintain static sitting balance without difficulty while performing exercises. Pt reported fatigue post session. Overall pt has improved with bed mob. May still req 2 person assist for standing activity. Pt continues to demo decreased bed mob, transfers, LE strength, gt, activity tolerance, and overall functional mobility. Pt may benefit from skilled PT to address her functional deficits. Recommend SNF upon d/c at this time. PLAN : 
Goals have been updated based on progression since last assessment. Patient continues to benefit from skilled intervention to address the above impairments.  
 
Recommendations and Planned Interventions: bed mobility training, transfer training, gait training, therapeutic exercises, patient and family training/education and therapeutic activities Frequency/Duration: Patient will be followed by physical therapy:  5 times a week to address goals. Recommendation for discharge: (in order for the patient to meet his/her long term goals) SNF SUBJECTIVE:  
Patient stated she was doing ok this morning. OBJECTIVE DATA SUMMARY:  
HISTORY:   
Past Medical History:  
Diagnosis Date  Anemia  Dementia ([facility]  Heart failure ([facility]  High cholesterol  Hypertension  Low blood potassium Past Surgical History:  
Procedure Laterality Date  IR THORACENTESIS NDL PUNC ASP W IMAGE  11/6/2020 Personal factors and/or comorbidities impacting plan of care: 
 
Home Situation Home Environment: Private residence Wheelchair Ramp: Yes One/Two Story Residence: One story Living Alone: Yes Support Systems: Family member(s) Patient Expects to be Discharged to[de-identified] Skilled nursing facility Current DME Used/Available at Home: (cane) EXAMINATION/PRESENTATION/DECISION MAKING:  
Critical Behavior: 
Neurologic State: Alert Orientation Level: Oriented to person Cognition: Follows commands Hearing: Auditory Auditory Impairment: None Functional Mobility: 
Bed Mobility: 
Rolling: Moderate assistance;Assist x1 Supine to Sit: Moderate assistance;Assist x1 Sit to Supine: Moderate assistance;Assist x1 Scooting: Contact guard assistance;Assist x1 Transfers: 
Pt declined OOB activity this session Balance:  
Sitting: Intact; With support Therapeutic Exercises:  
Therapeutic Exercises: pt completed each exercise B LE 
 
 
EXERCISE Sets Reps Active Active Assist  
Passive Self ROM Comments Ankle Pumps  10 [x] [] [] [] Quad Sets/Glut Sets   [] [] [] [] Hamstring Sets   [] [] [] [] Short Arc Quads   [] [] [] [] Heel Slides  10 [] [x] [] [] Straight Leg Raises  10 [] [x] [] [] Hip abd/add   [] [] [] [] Long Arc Quads  10 [x] [] [] [] Janelle  10 [x] [] [] []   
   [] [] [] []   
 
 
 
 
Pain Rating: 
No c/o pain during session today Activity Tolerance:  
Fair Please refer to the flowsheet for vital signs taken during this treatment. After treatment patient left in no apparent distress:  
Supine in bed, Call bell within reach and Bed / chair alarm activated COMMUNICATION/EDUCATION:  
The patients plan of care was discussed with: Case management. Thank you for this referral. 
Serafin Pedraza Time Calculation: 12 mins

## 2020-11-10 NOTE — PROGRESS NOTES
Problem: Falls - Risk of 
Goal: *Absence of Falls Description: Document Claire Chase Fall Risk and appropriate interventions in the flowsheet. Outcome: Progressing Towards Goal 
Note: Fall Risk Interventions: 
Mobility Interventions: Bed/chair exit alarm, Patient to call before getting OOB, Strengthening exercises (ROM-active/passive) Mentation Interventions: Adequate sleep, hydration, pain control, Bed/chair exit alarm Medication Interventions: Bed/chair exit alarm Elimination Interventions: Bed/chair exit alarm History of Falls Interventions: Bed/chair exit alarm, Door open when patient unattended Problem: Patient Education: Go to Patient Education Activity Goal: Patient/Family Education Outcome: Progressing Towards Goal 
  
Problem: Pressure Injury - Risk of 
Goal: *Prevention of pressure injury Description: Document Cruz Scale and appropriate interventions in the flowsheet. Outcome: Progressing Towards Goal 
Note: Pressure Injury Interventions: 
Sensory Interventions: Minimize linen layers, Turn and reposition approx. every two hours (pillows and wedges if needed), Keep linens dry and wrinkle-free, Discuss PT/OT consult with provider Moisture Interventions: Apply protective barrier, creams and emollients, Check for incontinence Q2 hours and as needed Activity Interventions: PT/OT evaluation Mobility Interventions: PT/OT evaluation Nutrition Interventions: Document food/fluid/supplement intake Friction and Shear Interventions: Apply protective barrier, creams and emollients Problem: Patient Education: Go to Patient Education Activity Goal: Patient/Family Education Outcome: Progressing Towards Goal 
  
Problem: Patient Education: Go to Patient Education Activity Goal: Patient/Family Education Outcome: Progressing Towards Goal 
  
Problem: Patient Education: Go to Patient Education Activity Goal: Patient/Family Education Outcome: Progressing Towards Goal

## 2020-11-10 NOTE — PROGRESS NOTES
Pulmonary Progress Note Daily Progress Note: 11/10/2020 Subjective: The patient is seen for follow  up. Excerpts from admission 11/3/2020 or consult notes as follows:  
71-year-old lady came in because of generalized weakness lethargy past medical history of dementia congestive heart failure hypertension hyperlipidemia patient was discharged from home with a sling on her right arm as she fell. Now chest x-ray and CAT scan of the chest was done which shows right lower lobe collapse with pleural effusion unable to get much history out of the patient so pulmonary consult was called for further evaluation 11/8 denies cough or shortness of breath remains on room air 11/09 Today she denies cough and shortness of breath. She had a chest Xray this morning, awaiting read. She is on room air breathing well. 
11/10 She says she feels great and denies SOB and cough. CXR yesterday shows enlarging right pleural effusion and atelectasis and atelectasis of left base. Problem List: 
Problem List as of 11/10/2020 Never Reviewed Codes Class Noted - Resolved Hyponatremia ICD-10-CM: E87.1 ICD-9-CM: 276.1  11/3/2020 - Present Medications reviewed Current Facility-Administered Medications Medication Dose Route Frequency  acetylcysteine (MUCOMYST) 200 mg/mL (20 %) solution 600 mg  600 mg Nebulization Q6H RT  
 albuterol-ipratropium (DUO-NEB) 2.5 MG-0.5 MG/3 ML  3 mL Nebulization Q4H PRN  
 metoprolol tartrate (LOPRESSOR) tablet 25 mg  25 mg Oral Q12H  
 albuterol-ipratropium (DUO-NEB) 2.5 MG-0.5 MG/3 ML  3 mL Nebulization Q6HWA RT  
 heparin porcine injection 5,000 Units  5,000 Units SubCUTAneous Q12H  piperacillin-tazobactam (ZOSYN) 3.375 g in 0.9% sodium chloride (MBP/ADV) 100 mL MBP  3.375 g IntraVENous Q8H Review of Systems: A comprehensive review of systems was negative except for that written in the HPI. Objective:  
Physical Exam:  
 
Visit Vitals /65 Pulse 75 Temp 98.1 °F (36.7 °C) Resp 18 Ht 5' 2.01\" (1.575 m) Wt 63.5 kg (140 lb) SpO2 94% BMI 25.60 kg/m² O2 Device: Room air Temp (24hrs), Av °F (36.7 °C), Min:97.4 °F (36.3 °C), Max:98.5 °F (36.9 °C) No intake/output data recorded.  1901 - 11/10 0700 In: -  
Out: 150 [Urine:150] Constitutional:  
 Awake alert seems oriented HENT:  
Head: Normocephalic and atraumatic. Eyes: Pupils are equal, round, and reactive to light. Extraocular movements intact Neck: Normal range of motion. Neck supple. No definite JVD Cardiovascular: Normal rate and regular rhythm. Pulmonary/Chest:  
 Clear anteriorly and left lateral with fairly good breath sounds right lateral and right posterior no definite wheezes or rales Abdominal: Soft. Bowel sounds are normal.  
Musculoskeletal: Normal range of motion. Skin: Skin is warm. Data Review:  
   
Recent Days: 
Recent Labs 20 
1315 WBC 7.7 HGB 10.8* HCT 34.3*  
 Recent Labs 20 
1315 * K 3.8  CO2 33* GLU 94 BUN 14  
CREA 0.87 CA 10.1 ALB 1.8* TBILI 0.5 ALT 46 Room air oxygen saturation 98% 24 Hour Results: No results found for this or any previous visit (from the past 24 hour(s)). Imaging: 
  
   
CXR Results  (Last 48 hours)  
  None  
   
  
    
Results from Hospital Encounter encounter on 20 XR HIP LT W OR WO PELV 2-3 VWS  
  Narrative Fall 1 week ago. 
  
No comparison. 
  
FINDINGS: AP pelvis and frog-leg view of the left hip. No acute fracture or 
dislocation. Deformity of the left femur trochanter and proximal diaphysis, 
appears chronic. No radiopaque foreign body. 
   
  Impression IMPRESSION: 
1. No acute bony findings. XR CHEST SNGL V  
  Narrative Chest single view. 
  
Comparison single view chest 2018. 
  
Right-sided Port-A-Cath stable position. New opacification of the lower two thirds right hemithorax. This is likely 
related to a combination of large volume pleural fluid and atelectatic lung. Left lung is aerated. Cardiac and mediastinal structures unchanged. No 
pneumothorax. Nonacute rib fractures noted. [place] [facility] [place]20 XR SHOULDER RT AP/LAT MIN 2 V  
  Narrative 3 views of the right shoulder reveal diffuse osteopenia. There is mild 
degenerative subluxation of the humerus with respect to the glenoid. No fracture 
or dislocation is identified. 
   
  Impression IMPRESSION: Degenerative changes.  
  
    
Results from Hospital Encounter encounter on 11/03/20 CT CHEST WO CONT  
  Narrative CT dose reduction was achieved through use of a standardized protocol tailored 
for this examination and automatic exposure control for dose modulation. 
  
Noncontrast study shows large right pleural effusion. Right middle and lower 
lobe are completely collapsed, with very few air bronchograms. The upper lobe is 
almost completely collapsed, sparingly anterior segment only. Mainstem bronchus 
is open. Proximal lobar bronchi are open. Left lung is clear. Multiple calcified 
mediastinal hilar lymph nodes. Small left pleural effusion. Small pericardial 
effusion. Normal caliber aorta. No significant upper abdominal or chest wall 
finding. Pronounced soft tissue edema in the visualized portion of the left arm 
   
  Impression IMPRESSION: Near complete collapse of the right lung secondary to large pleural 
effusion  
  
 
  
IMPRESSION:  
1. RLL collapse Atelectasis markedly improved on x-ray 11/7 and on exam  
2. Malignant pleural Effusion Right side status post thoracentesis 11/06 by IR 
3. Chronic Obstructive Pulmonary Disease with Severe Acute Exacerbation requiring inpatient hospitalization and management; no wheezing today 4. A. fib with RVR 5. History of heart failure hyponatremia 5.  Prognosis guarded   
   
 RECOMMENDATIONS/PLAN:  
 
 
 Many groups of abnormal epithelial cells are noted, with large nuclei and cytoplasmic vacuoles, indicative of metastatic adenocarcinoma. 1. Status post thoracentesis via IR 1260 cc fluid removed. Pleural fluid culture negative. Chest Xray 11/09 showed increased large right pleural effusion and atelectasis, as well as atelectasis of the left base and possible small left pleural effusion. 2. Nebulizer treatment with Mucomyst 
3. On zosyn We will discuss with the family regarding her condition and prognosis possible she needs to be in hospice  
  Lawrence Ibarra MD

## 2020-11-10 NOTE — CONSULTS
Hematology/Oncology Consult Patient: Nery Nelson MRN: [id number] YOB: 1940  Age: 79 y.o. Sex: female HPI Nery Nelson is a 79 y.o. female who is being seen for malignant pleural effusion. Ms. Rochelle Heredia is a 79 yr old female patient with h/o dementia and CHF was admitted on 11/4 after family members brought her to the hospital due to generalized weakness. The patient appears to be confused, although she talks very clearly. She reportedly lives alone and still drives, which is not likely true as the admission note reports that the patient has been bed bound recently. She does report h/o lung cancer which was treated in [place] 'many years ago'. CT on admission showed a near complete collapse of the right lung due to a large pleural effusion. She had a thoracentesis on 11/6 with removal of 1260 cc of fluid with cytology showing adenocarcinoma. Attempts to contact family members have been unsuccessful. Past Medical History:  
Diagnosis Date  Anemia  Dementia ([address]  Heart failure ([address]  High cholesterol  Hypertension  Low blood potassium Past Surgical History:  
Procedure Laterality Date  IR THORACENTESIS NDL PUNC ASP W IMAGE  11/6/2020 History reviewed. No pertinent family history. Social History Tobacco Use  Smoking status: Never Smoker  Smokeless tobacco: Never Used Substance Use Topics  Alcohol use: Not Currently Current Facility-Administered Medications Medication Dose Route Frequency Provider Last Rate Last Dose  acetylcysteine (MUCOMYST) 200 mg/mL (20 %) solution 600 mg  600 mg Nebulization Q6H RT Shreya Abraham MD   600 mg at 11/10/20 1409  
 albuterol-ipratropium (DUO-NEB) 2.5 MG-0.5 MG/3 ML  3 mL Nebulization Q4H PRN Shreya Abraham MD   3 mL at 11/10/20 0125  
 metoprolol tartrate (LOPRESSOR) tablet 25 mg  25 mg Oral Q12H Deven Barry MD   25 mg at 11/10/20 1660  albuterol-ipratropium (DUO-NEB) 2.5 MG-0.5 MG/3 ML  3 mL Nebulization Q6HWA RT Asim Red MD   3 mL at 11/10/20 1409  heparin porcine injection 5,000 Units  5,000 Units SubCUTAneous Q12H Jesus Maldonado MD   5,000 Units at 11/10/20 1254  piperacillin-tazobactam (ZOSYN) 3.375 g in 0.9% sodium chloride (MBP/ADV) 100 mL MBP  3.375 g IntraVENous Q8H Jesus Maldonado  mL/hr at 11/10/20 1254 3.375 g at 11/10/20 1254 No Known Allergies Review of Systems: 
Constitutional No fevers, chills, night sweats, excessive fatigue or weight loss. Allergic/Immunologic No recent allergic reactions Eyes No significant visual difficulties. No diplopia. ENMT No problems with hearing, no sore throat, no sinus drainage. Endocrine No hot flashes or night sweats. No cold intolerance, polyuria, or polydipsia Hematologic/Lymphatic No easy bruising or bleeding. The patient denies any tender or palpable lymph nodes Breasts No abnormal masses of breast, nipple discharge or pain. Respiratory No dyspnea on exertion, orthopnea, chest pain, cough or hemoptysis. Cardiovascular No anginal chest pain, irregular heart beat, tachycardia, palpitations or orthopnea. Gastrointestinal No nausea, vomiting, diarrhea, constipation, cramping, dysphagia, reflux, heartburn, GI bleeding, or early satiety. No change in bowel habits. Genitourinary (M) No hematuria, dysuria, increased frequency, urgency, hesitancy or incontinence. Musculoskeletal No joint pain, swelling or redness. No decreased range of motion. Integumentary No chronic rashes, inflammation, ulcerations, pruritus, petechiae, purpura, ecchymoses, or skin changes. Neurologic No headache, blurred vision, and no areas of focal weakness or numbness. Normal gait. No sensory problems. Psychiatric No insomnia, depression, sherry or mood swings. No psychotropic drugs. Objective:  
 
Vitals: 11/10/20 0749 11/10/20 0837 11/10/20 1410 11/10/20 1614 BP: 123/65   128/78 Pulse: 75   60 Resp: 18   18 Temp: 98.1 °F (36.7 °C)   97.8 °F (36.6 °C) SpO2: 98% 94% 97% 98% Weight:      
Height:      
  
 
Physical Exam: 
Constitutional Confused Head Normocephalic; no scars Eyes Conjunctivae and sclerae are clear and without icterus. Pupils are reactive and equal.  
ENMT Sinuses are nontender. No oral exudates, ulcers, masses, thrush or mucositis. Oropharynx clear. Tongue normal.  
Neck Supple without masses or thyromegaly. No jugular venous distension. Hematologic/Lymphatic No petechiae or purpura. No tender or palpable lymph nodes in the cervical, supraclavicular, axillary or inguinal area. Respiratory Lungs are clear to auscultation without rhonchi or wheezing. Cardiovascular Regular rate and rhythm of heart without murmurs, gallops or rubs. Chest / Line Site Chest is symmetric with no chest wall deformities. Abdomen Non-tender, non-distended, no masses, ascites or hepatosplenomegaly. Good bowel sounds. No guarding or rebound tenderness. No pulsatile masses. Musculoskeletal No tenderness or swelling, normal range of motion without obvious weakness. Extremities No visible deformities, no cyanosis, clubbing or edema. Skin No rashes, scars, or lesions suggestive of malignancy. No petechiae, purpura, or ecchymoses. No excoriations. Neurologic Confused. Conversant. Psychiatric Alert and oriented times three. Coherent speech. Verbalizes understanding of our discussions today. Lab/Data Review: 
Recent Labs 11/08/20 
1315 WBC 7.7 HGB 10.8* HCT 34.3*  
 Recent Labs 11/08/20 
1315 * K 3.8  CO2 33* GLU 94 BUN 14  
CREA 0.87 CA 10.1 ALB 1.8* TBILI 0.5 ALT 46 No results for input(s): PH, PCO2, PO2, HCO3, FIO2 in the last 72 hours. No results found for this or any previous visit (from the past 24 hour(s)). Xr Chest Pa Lat Result Date: 11/9/2020 Impression: Large right-sided pleural effusion and associated compression/consolidation of the right lung, increased. Mild atelectasis at the left base. Possible small left pleural effusion. Xr Hip Lt W Or Wo Pelv 2-3 Vws Result Date: 11/3/2020 IMPRESSION: 1. No acute bony findings. Ct Chest Wo Cont Result Date: 11/4/2020 IMPRESSION: Near complete collapse of the right lung secondary to large pleural effusion Xr Chest [facility] Result Date: 11/7/2020 IMPRESSION: Diffuse airspace disease, right hemithorax, with improved aeration from previous. Differential diagnosis includes pneumonia, atelectasis, and/or reexpansion pulmonary edema. No pneumothorax. Ir Thoracentesis Ndl Punc Asp W Image Result Date: 11/6/2020 IMPRESSION: Successful ultrasound-guided thoracentesis of right large pleural effusion, with approximately 1.3 L serous pleural fluid removed. Pleural fluid sample was also sent for lab analysis. Assessment and Plan:  
 
Hospital Problems  Never Reviewed Codes Class Noted POA Hyponatremia ICD-10-CM: E87.1 ICD-9-CM: 276.1  11/3/2020 Unknown Malignant Pleural Effusion: 
- CT on admission showed near complete collapse of right lung with large right sided effusion. 
- S/p thoracentesis on 11/6 with removal of 1260 cc of fluid with cytology showing adenocarcinoma. - Clinically could be lung primary. The patient reports h/o lung cancer treated at [place] many yrs ago. - The patient has dementia and poor performance status and therefore, I agree with Dr. Micky French, that hospice care would be the best option. Will attempt to contact family again tomorrow to discuss the above. I do not think the patient is able to make her own decisions. Thank you for the consult. D/w Dr. Nellie Mcdaniel. Signed By: Danish Martinez MD   
 November 10, 2020

## 2020-11-11 NOTE — PROGRESS NOTES
Patient has been accepted to [facility]. Upon chart review, patient appears to have a new diagnosis of metastatic adenocarcinoma, and is currently pending an oncology consult as well as it does not appear the doctor has been able to contact family to inform them. CM has also been unable to contact family for discharge planning. CM will continue to follow hospital course.

## 2020-11-11 NOTE — PROGRESS NOTES
PHYSICAL THERAPY TREATMENT Patient: Tanya Jones (79 y.o. female) Date: 11/11/2020 Diagnosis: Hyponatremia [E87.1] <principal problem not specified> Precautions:   
Chart, physical therapy assessment, plan of care and goals were reviewed. ASSESSMENT Patient continues with skilled PT services and is progressing towards goals but minimally, and req constant motivation. Patient req encouragement and education to participate with therapy as pt frequently stated she \"doesn't feel like doing it\". Pt required min A for bed mobility but mod A x2 for sit to stand and side steps. Pt demos post lean and flexed posture with standing, req cues to stay upright. Pt req increased time to complete tasks and was educated on some LE therex sitting EOB. Will cont to progress towards goals, rec SNF at d/c. Current Level of Function Impacting Discharge (mobility/balance): poor mobility and req encouragement. PLAN : 
Patient continues to benefit from skilled intervention to address the above impairments. Continue treatment per established plan of care. to address goals. Recommendation for discharge: (in order for the patient to meet his/her long term goals) Therapy up to 5 days/week in SNF setting This discharge recommendation: 
Has been made in collaboration with the attending provider and/or case management IF patient discharges home will need the following DME: rolling walker SUBJECTIVE:  
Patient stated I don't feel like doing this today.  OBJECTIVE DATA SUMMARY:  
Critical Behavior: 
Neurologic State: Alert Orientation Level: Disoriented to person, Disoriented to place Cognition: Follows commands Functional Mobility Training: 
Bed Mobility: 
  
Supine to Sit: Minimum assistance;Assist x1 Sit to Supine: Minimum assistance;Assist x1 Scooting: Minimum assistance Transfers: 
Sit to Stand: Moderate assistance;Assist x2 Stand to Sit: Moderate assistance;Assist x2 
 
  
 Balance: 
Sitting: Intact; Without support Sitting - Static: Good (unsupported) Sitting - Dynamic: Fair (occasional) Standing: Impaired; With support Standing - Static: Constant support Standing - Dynamic : Constant support Ambulation/Gait Training: 
Distance (ft): 3 Feet (ft)(side steps) Assistive Device: Gait belt;Walker, rolling Ambulation - Level of Assistance: Moderate assistance;Assist x2 Step Length: Left shortened;Right shortened Therapeutic Exercises:  
Performed 10x LAQ and marches at EOB Pain Ratin/10 Activity Tolerance:  
Fair and requires frequent rest breaks Please refer to the flowsheet for vital signs taken during this treatment. After treatment patient left in no apparent distress:  
Supine in bed and Call bell within reach COMMUNICATION/COLLABORATION:  
The patients plan of care was discussed with: Occupational therapy assistant. Cotx with OT for OOB mobility and patient has poor motivation to participate twice. Alondra Osorio Time Calculation: 23 mins Problem: Mobility Impaired (Adult and Pediatric) Goal: *Acute Goals and Plan of Care (Insert Text) Description: Patient will move from supine to sit and sit to supine , scoot up and down, and roll side to side in bed with moderate assistance  within 7 day(s). Patient will transfer from bed to chair and chair to bed with moderate assistance  using the least restrictive device within 7 day(s). Patient will improve static sitting balance to minimal assistance within 1 week(s). Patient will ambulate 30 feet with minimal assistance with least restrictive device within 1 weeks.    
  
Outcome: Progressing Towards Goal

## 2020-11-11 NOTE — PROGRESS NOTES
General Daily Progress Note Patient Name: Odin Hills YOB: 1940 Age: 
79 y.o. Admit Date: 11/3/2020 Subjective:  
 
Pt is alert and awake this morning. She is laying in bed comfortably. No acute distress. Denies cough, shortness of breath, and chest pain. Denies weakness and dizziness. Post thoracentesis 1260 cc of serous fluid removed. Cytology results positive for metastatic adenocarcinoma. Negative cultures. Upon further discussion, patient reports previous history of lung cancer that she got treatment for in [place]. She says it was [place] while back. \" She also says that she \"smoked all the time. \" She states that even after the cancer she continued to smoke. Pt is pleasant and kind but confused with moderate dementia. Hgb 10.8 on 11/8, trending down from 15.2 on 11/3 Sodium is 135 on 11/8. 136 on 11/7 Potassium 3.8 Objective:  
 
Visit Vitals BP (!) 154/69 (BP 1 Location: Left arm, BP Patient Position: At rest) Pulse 73 Temp 98.6 °F (37 °C) Resp 21 Ht 5' 2.01\" (1.575 m) Wt 63.5 kg (140 lb) SpO2 96% BMI 25.60 kg/m² No results found for this or any previous visit (from the past 24 hour(s)). [unfilled] Review of Systems Constitutional: Negative for chills and fever. HENT: Negative. Eyes: Negative. Respiratory: Negative. Cardiovascular: Negative. Gastrointestinal: Negative for abdominal pain and nausea. Skin: Negative. Neurological: Negative. Physical Exam:   
 
Constitutional: pt is oriented to person, place, and time. HENT:  
Head: Normocephalic and atraumatic. Eyes: Pupils are equal, round, and reactive to light. EOM are normal.  
Cardiovascular: Normal rate, regular rhythm and normal heart sounds. Pulmonary/Chest: Breath sounds normal. No wheezes. No rales. Exhibits no tenderness. Abdominal: Soft.  Bowel sounds are normal. There is no abdominal tenderness. There is no rebound and no guarding. Musculoskeletal: Normal range of motion. Neurological: pt is alert and oriented to person, place, and time. XR CHEST PA LAT Final Result Impression: Large right-sided pleural effusion and associated  
compression/consolidation of the right lung, increased. Mild atelectasis at the  
left base. Possible small left pleural effusion. XR CHEST PORT Final Result IMPRESSION: Diffuse airspace disease, right hemithorax, with improved aeration  
from previous. Differential diagnosis includes pneumonia, atelectasis, and/or  
reexpansion pulmonary edema. No pneumothorax. [address] Final Result IMPRESSION:   
Successful ultrasound-guided thoracentesis of right large pleural effusion, with  
approximately 1.3 L serous pleural fluid removed. Pleural fluid sample was also  
sent for lab analysis. CT CHEST WO CONT Final Result IMPRESSION: Near complete collapse of the right lung secondary to large pleural  
effusion XR HIP LT W OR WO PELV 2-3 VWS Final Result IMPRESSION:  
1. No acute bony findings. XR CHEST SNGL V Final Result No results found for this or any previous visit (from the past 24 hour(s)). Results Procedure Component Value Units Date/Time CULTURE, BODY FLUID Arcadio Ludwig [[id number]] Collected:  11/06/20 1730 Order Status:  Completed Specimen: Body Fluid from Swab Updated:  11/11/20 3178 Special Requests: No Special Requests GRAM STAIN Few WBCs seen No organisms seen Culture result:    
  culture performed on fluid swab specimen No growth 4 days Salty Javed [[id number]] Collected:  11/04/20 0750 Order Status:  Completed Specimen:  Urine Updated:  11/06/20 7919 Special Requests: No Special Requests Culture result: No Growth (<1000 cfu/mL) CULTURE, RESPIRATORY/SPUTUM/BRONCH Melinda Romero [[id number]] Collected:  11/03/20 2130 Order Status:  Canceled Specimen:  Sputum CULTURE, BLOOD, LINE [[id number]] Collected:  11/03/20 2130 Order Status:  Canceled Specimen:  Blood Labs:  
 
Recent Labs 11/08/20 
1315 WBC 7.7 HGB 10.8* HCT 34.3*  
 Recent Labs 11/08/20 
1315 * K 3.8  CO2 33* BUN 14  
CREA 0.87 GLU 94  
CA 10.1 Recent Labs 11/08/20 
1315 ALT 46 AP 68 TBILI 0.5 TP 5.8* ALB 1.8*  
GLOB 4.0 No results for input(s): INR, PTP, APTT, INREXT, INREXT in the last 72 hours. No results for input(s): FE, TIBC, PSAT, FERR in the last 72 hours. No results found for: FOL, RBCF No results for input(s): PH, PCO2, PO2 in the last 72 hours. No results for input(s): CPK, CKNDX, TROIQ in the last 72 hours. No lab exists for component: CPKMB No results found for: CHOL, CHOLX, CHLST, CHOLV, HDL, HDLP, LDL, LDLC, DLDLP, TGLX, TRIGL, TRIGP, CHHD, CHHDX Lab Results Component Value Date/Time Glucose (POC) 220 (H) 11/08/2020 11:01 AM  
 Glucose (POC) 110 (H) 11/08/2020 08:26 AM  
 Glucose (POC) 86 11/06/2020 05:29 PM  
 Glucose (POC) 75 11/06/2020 03:12 PM  
 Glucose (POC) 84 11/06/2020 07:55 AM  
 
Lab Results Component Value Date/Time Color Yellow/Straw 11/03/2020 02:45 PM  
 Appearance Clear 11/03/2020 02:45 PM  
 Specific gravity 1.010 11/03/2020 02:45 PM  
 pH (UA) 6.5 11/03/2020 02:45 PM  
 Protein Negative 11/03/2020 02:45 PM  
 Glucose Normal (A) 11/03/2020 02:45 PM  
 Ketone Negative 11/03/2020 02:45 PM  
 Bilirubin Negative 11/03/2020 02:45 PM  
 Urobilinogen Normal 11/03/2020 02:45 PM  
 Nitrites Negative 11/03/2020 02:45 PM  
 Leukocyte Esterase Negative 11/03/2020 02:45 PM  
 Bacteria Negative 11/03/2020 02:45 PM  
 WBC 0-4 11/03/2020 02:45 PM  
 RBC 0-5 11/03/2020 02:45 PM  
 
   
Assessment: 1. Dementia 2. Hx of Congestive Heart Failure 3. Hyponatremia/likely from diuretic 4. Hypertension 5. Hyperlipidemia 6. Failure to Thrive  
7.  Chest x-ray shows opacities/pleural effusion - Chest CT shows Near complete collapse of the right lung secondary to large pleural 
Effusion status post thoracocentesis 8. Paroxysmal A. Fib 9. Covid screening negative 10. Hypokalemia 11. Metastatic Adenocarcinoma 12. History of lung cancer Plan:  
 
Static post thoracocentesis - cytology results positive for metastatic adenocarcinoma; culture negative Oncology consult - Attempted to contact the family but unsuccessful, will try again today. Recommends hospice care due to dementia and poor performance status. On metoprolol 25 mg twice a day Monitor potassium Repeat the labs/pending PT/OT recommends 5/days week in SNF Try to call the family regarding update of the biopsy report No one picked up the phone and mailbox is full Unable to left any message Current Facility-Administered Medications:  
  heparin porcine injection 5,000 Units, 5,000 Units, SubCUTAneous, Q12H, Jesus Maldonado MD 
  acetylcysteine (MUCOMYST) 200 mg/mL (20 %) solution 600 mg, 600 mg, Nebulization, Q6H RT, Chuck Marroquin MD, 600 mg at 11/11/20 0840 
  albuterol-ipratropium (DUO-NEB) 2.5 MG-0.5 MG/3 ML, 3 mL, Nebulization, Q4H PRN, Chuck Marroquin MD, 3 mL at 11/10/20 0125 
  metoprolol tartrate (LOPRESSOR) tablet 25 mg, 25 mg, Oral, Q12H, Deven Barry MD, 25 mg at 11/11/20 3572   albuterol-ipratropium (DUO-NEB) 2.5 MG-0.5 MG/3 ML, 3 mL, Nebulization, Q6HWA RT, Asim Red MD, 3 mL at 11/11/20 0840   piperacillin-tazobactam (ZOSYN) 3.375 g in 0.9% sodium chloride (MBP/ADV) 100 mL MBP, 3.375 g, IntraVENous, Q8H, Jesus Maldonado MD, Last Rate: 200 mL/hr at 11/11/20 0628, 3.375 g at 11/11/20 1366

## 2020-11-11 NOTE — PROGRESS NOTES
Comprehensive Nutrition Assessment Type and Reason for Visit: Reassess(Goal) Nutrition Recommendations/Plan:  
Continue current regular, soft solids diet 
            Adjust Ensure Enlive to BID Adding Ensure clear, apple daily 
            continue Magic Cup daily Allow snacks as desired Adjust insulin to promote euglycemia Please document % of all meal/snack consumed, wts, BMs 
 
Nutrition Assessment:  Admitted for FTT, needs SNF placement, noted pt in ED 10/26 for fall with fx. Pt now s/p thoracentesis with 1.26L removed (11/6), cytology +metastatic adenocarcinoma. MD hardy attempting to contact family for POC, rec's Hospice, however unable to reach yet. At initial interview pt lethargic, flat affect, unreliable historian, at f/u RD observed 0% eaten x2 meals with 50% intakes of Ensure Enlive, refused food despite tray set-up and encouragement. Discussed with RN today who endorses pt able to feed self but only took bites of B, drank 3 apple juices. RD to adjust Ensure to Clear in AM, keeping Enlive at L & D. Noted pt with poor appetite since admit likely exacerbated by dementia. Appetite stimulant unlikely to help as pt with dementia. Labs: Na 135, -220, AST 46. Meds: Heparin, Zosyn. Malnutrition Assessment: 
Malnutrition Status: Moderate malnutrition Context:  Acute illness Findings of the 6 clinical characteristics of malnutrition:  
Energy Intake:  7 - 50% or less of est energy requirements for 5 or more days(<25% x1 week inpatient) Weight Loss:  No significant weight loss Body Fat Loss:  No significant body fat loss, Muscle Mass Loss:  1 - Mild muscle mass loss, Calf Fluid Accumulation:  No significant fluid accumulation,   
 
Estimated Daily Nutrient Needs: 
Energy (kcal): 1869kcals (30kcals/kg); Weight Used for Energy Requirements: Current Protein (g): 81g (1.3g/kg); Weight Used for Protein Requirements: Current Fluid (ml/day): 1869ml; Method Used for Fluid Requirements: 1 ml/kcal 
 Needs for maintenance with new cancer Nutrition Related Findings:  NFPE finding mild muscle wasing. No edema. No N/V per RN, lat documented BM 11/7 per EMR, none today per RN. Pt previously endorsed no issues with c/s, no swallow evals in EMR. Wounds:   
None Current Nutrition Therapies: DIET NUTRITIONAL SUPPLEMENTS Dinner, Lunch, Breakfast; Ensure [employer] DIET REGULAR 
DIET NUTRITIONAL SUPPLEMENTS Dinner; [employer] Anthropometric Measures: 
· Height:  5' 2.01\" (157.5 cm) · Current Body Wt:  62.3 kg (137 lb 5.6 oz)(11/9) · Admission Body Wt:  139 lb 15.9 oz(11/3) · Usual Body Wt:  54.4 kg (120 lb 0.2 oz)(per pt, stable) · Ideal Body Wt:  110 lbs:  124.9 % · BMI Category:  Normal weight (BMI 22.0-24.9) age over 72 Wt hx: 59.9kg (11/5), 63.6kg (11/3) No prior wt hx to assess, UBW less than pt current BW per EMR, unable to assess recent wt loss, appears fairly stable so far inpatient. Nutrition Diagnosis:  
· Inadequate oral intake related to cognitive or neurological impairment(lethargy) as evidenced by intake 0-25% Nutrition Interventions:  
Food and/or Nutrient Delivery: Continue current diet, Modify oral nutrition supplement(Adjusting Enlive, adding Ensure Clear) Nutrition Education and Counseling: Education not indicated Coordination of Nutrition Care: Continue to monitor while inpatient, Feeding assistance/environmental change Goals: 
Intakes >/=50% of EENs (not met) 
wt maintenance within +/-0.5kg (Not met) Nutrition Monitoring and Evaluation:  
Behavioral-Environmental Outcomes: None identified Food/Nutrient Intake Outcomes: Food and nutrient intake, Supplement intake Physical Signs/Symptoms Outcomes: Chewing or swallowing, Weight, Nutrition focused physical findings Discharge Planning: Too soon to determine Electronically signed by Hao Guadarrama  [id number] [facility] [place] 11/11/2020 at 10:13 AM 
 
Contact: Ext [phone]

## 2020-11-11 NOTE — PROGRESS NOTES
Hematology/Oncology Progress Note Patient: Destini Burton MRN: [id number] YOB: 1940  Age: 79 y.o. Sex: female Admit Date: 11/3/2020 LOS: 8 days Chief Complaint: Admitted with generalized weakness Subjective:  
 
 Pleasantly confused. Constitutional Confused. Allergic/Immunologic No recent allergic reactions Eyes No significant visual difficulties. No diplopia. ENMT No problems with hearing, no sore throat, no sinus drainage. Endocrine No hot flashes or night sweats. No cold intolerance, polyuria, or polydipsia Hematologic/Lymphatic No easy bruising or bleeding. The patient denies any tender or palpable lymph nodes Breasts No abnormal masses of breast, nipple discharge or pain. Respiratory No dyspnea on exertion, orthopnea, chest pain, cough or hemoptysis. Cardiovascular No anginal chest pain, irregular heart beat, tachycardia, palpitations or orthopnea. Gastrointestinal No nausea, vomiting, diarrhea, constipation, cramping, dysphagia, reflux, heartburn, GI bleeding, or early satiety. No change in bowel habits. Genitourinary (M) No hematuria, dysuria, increased frequency, urgency, hesitancy or incontinence. Musculoskeletal No joint pain, swelling or redness. No decreased range of motion. Integumentary No chronic rashes, inflammation, ulcerations, pruritus, petechiae, purpura, ecchymoses, or skin changes. Neurologic No headache, blurred vision, and no areas of focal weakness or numbness. Normal gait. No sensory problems. Psychiatric No insomnia, depression, sherry or mood swings. No psychotropic drugs. Current Facility-Administered Medications:  
  heparin porcine injection 5,000 Units, 5,000 Units, SubCUTAneous, Q12H, Jesus Maldonado MD 
  acetylcysteine (MUCOMYST) 200 mg/mL (20 %) solution 600 mg, 600 mg, Nebulization, Q6H RT, Hunter Richardson MD, 600 mg at 11/11/20 2687   albuterol-ipratropium (DUO-NEB) 2.5 MG-0.5 MG/3 ML, 3 mL, Nebulization, Q4H PRN, Anayeli Burden MD, 3 mL at 11/10/20 0125 
  metoprolol tartrate (LOPRESSOR) tablet 25 mg, 25 mg, Oral, Q12H, Deven Barry MD, 25 mg at 11/11/20 3773   albuterol-ipratropium (DUO-NEB) 2.5 MG-0.5 MG/3 ML, 3 mL, Nebulization, Q6HWA RT, Asim Red MD, 3 mL at 11/11/20 1415   piperacillin-tazobactam (ZOSYN) 3.375 g in 0.9% sodium chloride (MBP/ADV) 100 mL MBP, 3.375 g, IntraVENous, Q8H, Jesus Maldonado MD, Last Rate: 200 mL/hr at 11/11/20 1406, 3.375 g at 11/11/20 1406 Objective:  
 
Vitals:  
 11/11/20 0840 11/11/20 0906 11/11/20 1415 11/11/20 1703 BP:  (!) 154/69  133/69 Pulse:  73  61 Resp:  21  20 Temp:  98.6 °F (37 °C)  98.2 °F (36.8 °C) SpO2: 97% 96% 98% 95% Weight:      
Height:      
  
 
 
Physical Exam:  
Constitutional Alert, cooperative, oriented. Mood and affect appropriate. Appears close to chronological age. Well nourished. Well developed. Head Normocephalic; no scars Eyes Conjunctivae and sclerae are clear and without icterus. Pupils are reactive and equal.  
ENMT Sinuses are nontender. No oral exudates, ulcers, masses, thrush or mucositis. Oropharynx clear. Tongue normal.  
Neck Supple without masses or thyromegaly. No jugular venous distension. Hematologic/Lymphatic No petechiae or purpura. No tender or palpable lymph nodes in the cervical, supraclavicular, axillary or inguinal area. Respiratory Lungs are clear to auscultation without rhonchi or wheezing. Cardiovascular Regular rate and rhythm of heart without murmurs, gallops or rubs. Chest / Line Site Chest is symmetric with no chest wall deformities. Abdomen Non-tender, non-distended, no masses, ascites or hepatosplenomegaly. Good bowel sounds. No guarding or rebound tenderness. No pulsatile masses. Musculoskeletal No tenderness or swelling, normal range of motion without obvious weakness. Extremities No visible deformities, no cyanosis, clubbing or edema. Skin No rashes, scars, or lesions suggestive of malignancy. No petechiae, purpura, or ecchymoses. No excoriations. Neurologic Confused, but conversant. Psychiatric Alert and oriented times three. Coherent speech. Verbalizes understanding of our discussions today. Lab/Data Review: No results for input(s): WBC, HGB, HCT, PLT, HGBEXT, HCTEXT, PLTEXT in the last 72 hours. No results for input(s): NA, K, CL, CO2, GLU, BUN, CREA, CA, MG, PHOS, ALB, TBIL, TBILI, ALT, INR, INREXT in the last 72 hours. No lab exists for component: SGOT No results for input(s): PH, PCO2, PO2, HCO3, FIO2 in the last 72 hours. No results found for this or any previous visit (from the past 24 hour(s)). Radiology: CT Results  (Last 48 hours) None Assessment and Plan: Active Problems: Hyponatremia (11/3/2020) Malignant Pleural Effusion: 
- CT on admission showed near complete collapse of right lung with large right sided effusion. 
- S/p thoracentesis on 11/6 with removal of 1260 cc of fluid with cytology showing adenocarcinoma. - Clinically could be lung primary. The patient reports h/o lung cancer treated at [place] many yrs ago. - The patient has dementia and poor performance status and therefore, I agree with Dr. Dudley Tucker, that hospice care would be the best option. 
  
We have been unable to contact family to discuss further. Discussed with . Signed By: Yoanna Guaman MD   
 November 11, 2020

## 2020-11-11 NOTE — PROGRESS NOTES
Pulmonary Progress Note Daily Progress Note: 11/11/2020 Subjective: The patient is seen for follow  up. Excerpts from admission 11/3/2020 or consult notes as follows:  
71-year-old lady came in because of generalized weakness lethargy past medical history of dementia congestive heart failure hypertension hyperlipidemia patient was discharged from home with a sling on her right arm as she fell. Now chest x-ray and CAT scan of the chest was done which shows right lower lobe collapse with pleural effusion unable to get much history out of the patient so pulmonary consult was called for further evaluation 11/8 denies cough or shortness of breath remains on room air 11/09 Today she denies cough and shortness of breath. She had a chest Xray this morning, awaiting read. She is on room air breathing well. 
11/10 She says she feels great and denies SOB and cough. CXR yesterday shows enlarging right pleural effusion and atelectasis and atelectasis of left base. 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 She is on room air. Problem List: 
Problem List as of 11/11/2020 Never Reviewed Codes Class Noted - Resolved Hyponatremia ICD-10-CM: E87.1 ICD-9-CM: 276.1  11/3/2020 - Present Medications reviewed Current Facility-Administered Medications Medication Dose Route Frequency  acetylcysteine (MUCOMYST) 200 mg/mL (20 %) solution 600 mg  600 mg Nebulization Q6H RT  
 albuterol-ipratropium (DUO-NEB) 2.5 MG-0.5 MG/3 ML  3 mL Nebulization Q4H PRN  
 metoprolol tartrate (LOPRESSOR) tablet 25 mg  25 mg Oral Q12H  
 albuterol-ipratropium (DUO-NEB) 2.5 MG-0.5 MG/3 ML  3 mL Nebulization Q6HWA RT  
 heparin porcine injection 5,000 Units  5,000 Units SubCUTAneous Q12H  piperacillin-tazobactam (ZOSYN) 3.375 g in 0.9% sodium chloride (MBP/ADV) 100 mL MBP  3.375 g IntraVENous Q8H Review of Systems: A comprehensive review of systems was negative except for that written in the HPI. Objective:  
Physical Exam:  
 
Visit Vitals /76 (BP 1 Location: Left arm, BP Patient Position: At rest) Pulse (!) 55 Temp 98.4 °F (36.9 °C) Resp 18 Ht 5' 2.01\" (1.575 m) Wt 140 lb (63.5 kg) SpO2 97% BMI 25.60 kg/m² O2 Device: Room air Temp (24hrs), Av.1 °F (36.7 °C), Min:97.8 °F (36.6 °C), Max:98.4 °F (36.9 °C) No intake/output data recorded.  1901 -  0700 In: -  
Out: 150 [Urine:150] Constitutional:  
 Awake alert seems oriented HENT:  
Head: Normocephalic and atraumatic. Eyes: Pupils are equal, round, and reactive to light. Extraocular movements intact Neck: Normal range of motion. Neck supple. No definite JVD Cardiovascular: Normal rate and regular rhythm. Pulmonary/Chest:  
 Clear anteriorly and left lateral with fairly good breath sounds right lateral and right posterior no definite wheezes or rales Abdominal: Soft. Bowel sounds are normal.  
Musculoskeletal: Normal range of motion. Skin: Skin is warm. Data Review:  
   
Recent Days: 
Recent Labs 20 
1315 WBC 7.7 HGB 10.8* HCT 34.3*  
 Recent Labs 20 
1315 * K 3.8  CO2 33* GLU 94 BUN 14  
CREA 0.87 CA 10.1 ALB 1.8* TBILI 0.5 ALT 46 Room air oxygen saturation 98% 24 Hour Results: No results found for this or any previous visit (from the past 24 hour(s)). Imaging: 
  
   
CXR Results  (Last 48 hours)  
  None  
   
  
    
Results from Hospital Encounter encounter on 20 XR HIP LT W OR WO PELV 2-3 VWS  
  Narrative Fall 1 week ago. 
  
No comparison. 
  
FINDINGS: AP pelvis and frog-leg view of the left hip. No acute fracture or 
dislocation. Deformity of the left femur trochanter and proximal diaphysis, 
appears chronic. No radiopaque foreign body. 
   
  Impression IMPRESSION: 
1. No acute bony findings.   
XR CHEST SNGL V  
   Narrative Chest single view. 
  
Comparison single view chest March 29, 2018. 
  
Right-sided Port-A-Cath stable position. New opacification of the lower two thirds right hemithorax. This is likely 
related to a combination of large volume pleural fluid and atelectatic lung. Left lung is aerated. Cardiac and mediastinal structures unchanged. No 
pneumothorax. Nonacute rib fractures noted. Results from [facility] Encounter encounter on 10/26/20 XR SHOULDER RT AP/LAT MIN 2 V  
  Narrative 3 views of the right shoulder reveal diffuse osteopenia. There is mild 
degenerative subluxation of the humerus with respect to the glenoid. No fracture 
or dislocation is identified. 
   
  Impression IMPRESSION: Degenerative changes.  
  
    
Results from Hospital Encounter encounter on 11/03/20 CT CHEST WO CONT  
  Narrative CT dose reduction was achieved through use of a standardized protocol tailored 
for this examination and automatic exposure control for dose modulation. 
  
Noncontrast study shows large right pleural effusion. Right middle and lower 
lobe are completely collapsed, with very few air bronchograms. The upper lobe is 
almost completely collapsed, sparingly anterior segment only. Mainstem bronchus 
is open. Proximal lobar bronchi are open. Left lung is clear. Multiple calcified 
mediastinal hilar lymph nodes. Small left pleural effusion. Small pericardial 
effusion. Normal caliber aorta. No significant upper abdominal or chest wall 
finding. Pronounced soft tissue edema in the visualized portion of the left arm 
   
  Impression IMPRESSION: Near complete collapse of the right lung secondary to large pleural 
effusion  
  
 
  
IMPRESSION:  
1. RLL collapse Atelectasis markedly improved on x-ray 11/7 and on exam  
2. Malignant pleural Effusion Right side status post thoracentesis 11/06 by IR 
3.  Chronic Obstructive Pulmonary Disease with Severe Acute Exacerbation requiring inpatient hospitalization and management; no wheezing today 4. A. fib with RVR 5. History of heart failure hyponatremia 5. Prognosis guarded   
   
RECOMMENDATIONS/PLAN:  
 
 
 Cytology of thoracentesis on 11/06 showed many groups of abnormal epithelial cells are noted, with large nuclei and cytoplasmic vacuoles, indicative of metastatic adenocarcinoma. 1. Status post thoracentesis via IR 1260 cc fluid removed. Pleural fluid culture negative. Chest Xray 11/09 showed increased large right pleural effusion and atelectasis, as well as atelectasis of the left base and possible small left pleural effusion. 2. Nebulizer treatment with Mucomyst 
3. On zosyn Attempts to contact family yesterday were unsuccessful. Try again today to discuss her condition and prognosis possible she needs to be in hospice  
  
 
 
 
 
[facility]

## 2020-11-11 NOTE — PROGRESS NOTES
Problem: Self Care Deficits Care Plan (Adult) Goal: *Acute Goals and Plan of Care (Insert Text) Description: Pt will be SBA sup<->sit in prep for EOB ADL's Pt will be SBA  LB dressing EOB level Pt will be min A  sit<-> prep for toilet transfer Pt will be min A toilet transfer with LRAD Pt will be min A  toileting/cloth mgmt LRAD Pt will be min A  grooming standing sink Pt will be mod A bathing sitting/standing sink LRAD Pt will be MI brandon UE HEP in prep for self care tasks Outcome: Progressing Towards Goal 
 OCCUPATIONAL THERAPY TREATMENT Patient: Yuli King (79 y.o. female) Date: 11/11/2020 Diagnosis: Hyponatremia [E87.1] <principal problem not specified> Precautions:   
Chart, occupational therapy assessment, plan of care, and goals were reviewed. ASSESSMENT Patient continues with skilled OT services and is progressing towards goals, minimally. Pt continuously requires motivation and encouragement to perform in therapy each session. pt frequently stating throughout session that she \"doesn't feel like doing it and that it is cold in her room\". Pt provided education from therapist on importance of participating in therapy session. Pt required increased assistance with functional transfers and mobility. min A for bed mobility, mod A x2 for sit to stand and side steps with RW. Pt requires increased time to complete tasks and education provided on UE therex sitting EOB. Current Level of Function Impacting Discharge (ADLs): decreased motivation PLAN : 
Patient continues to benefit from skilled intervention to address the above impairments. Continue treatment per established plan of care. to address goals. Recommendation for discharge: (in order for the patient to meet his/her long term goals) Therapy up to 5 days/week in SNF setting This discharge recommendation: 
Has been made in collaboration with the attending provider and/or case management IF patient discharges home will need the following DME: TBD SUBJECTIVE:  
Patient stated I dont feel like doing this today.  OBJECTIVE DATA SUMMARY:  
Cognitive/Behavioral Status: 
Neurologic State: Alert Orientation Level: Disoriented to person;Disoriented to place Cognition: Follows commands Functional Mobility and Transfers for ADLs: 
Bed Mobility: 
Supine to Sit: Minimum assistance;Assist x1 Sit to Supine: Minimum assistance;Assist x1 Scooting: Minimum assistance Transfers: 
Sit to Stand: Moderate assistance;Assist x2 Balance: 
Sitting: Intact; Without support Sitting - Static: Good (unsupported) Sitting - Dynamic: Fair (occasional) Standing: Impaired; With support Standing - Static: Constant support Standing - Dynamic : Constant support Therapeutic Exercises:  
Shoulder flex/ex Elbow flex/ex Wrist flex/ex Pain: 
0/0 pain reported Activity Tolerance:  
Fair Please refer to the flowsheet for vital signs taken during this treatment. After treatment patient left in no apparent distress:  
Supine in bed and Call bell within reach COMMUNICATION/COLLABORATION:  
The patients plan of care was discussed with: Physical therapy assistant. Co-tx with PTA for increased assistance with functional  transfers and mobility Bisi Helm Time Calculation: 23 mins

## 2020-11-11 NOTE — PROGRESS NOTES
General Daily Progress Note Patient Name: Trevor Davila YOB: 1940 Age: 
79 y.o. Admit Date: 11/3/2020 Subjective:  
 
Pt is alert and awake this morning. She is laying in bed comfortably. No acute distress. Denies cough, shortness of breath, and chest pain. Denies weakness and dizziness. Post thoracentesis 1260 cc of serous fluid removed. Cytology results positive for metastatic adenocarcinoma. Negative cultures. Upon further discussion, patient reports previous history of lung cancer that she got treatment for in [place]. She says it was [place] while back. \" She also says that she \"smoked all the time. \" She states that even after the cancer she continued to smoke. Pt is pleasant and kind but confused with moderate dementia. Hgb 10.8 on 11/8, trending down from 15.2 on 11/3 Sodium is 135 on 11/8. 136 on 11/7 Potassium 3.8 Objective:  
 
Visit Vitals BP (!) 154/69 (BP 1 Location: Left arm, BP Patient Position: At rest) Pulse 73 Temp 98.6 °F (37 °C) Resp 21 Ht 5' 2.01\" (1.575 m) Wt 63.5 kg (140 lb) SpO2 96% BMI 25.60 kg/m² No results found for this or any previous visit (from the past 24 hour(s)). [unfilled] Review of Systems Constitutional: Negative for chills and fever. HENT: Negative. Eyes: Negative. Respiratory: Negative. Cardiovascular: Negative. Gastrointestinal: Negative for abdominal pain and nausea. Skin: Negative. Neurological: Negative. Physical Exam:   
 
Constitutional: pt is oriented to person, place, and time. HENT:  
Head: Normocephalic and atraumatic. Eyes: Pupils are equal, round, and reactive to light. EOM are normal.  
Cardiovascular: Normal rate, regular rhythm and normal heart sounds. Pulmonary/Chest: Breath sounds normal. No wheezes. No rales. Exhibits no tenderness. Abdominal: Soft. Bowel sounds are normal. There is no abdominal tenderness.  There is no rebound and no guarding. Musculoskeletal: Normal range of motion. Neurological: pt is alert and oriented to person, place, and time. XR CHEST PA LAT Final Result Impression: Large right-sided pleural effusion and associated  
compression/consolidation of the right lung, increased. Mild atelectasis at the  
left base. Possible small left pleural effusion. XR CHEST PORT Final Result IMPRESSION: Diffuse airspace disease, right hemithorax, with improved aeration  
from previous. Differential diagnosis includes pneumonia, atelectasis, and/or  
reexpansion pulmonary edema. No pneumothorax. [address] Final Result IMPRESSION:   
Successful ultrasound-guided thoracentesis of right large pleural effusion, with  
approximately 1.3 L serous pleural fluid removed. Pleural fluid sample was also  
sent for lab analysis. CT CHEST WO CONT Final Result IMPRESSION: Near complete collapse of the right lung secondary to large pleural  
effusion XR HIP LT W OR WO PELV 2-3 VWS Final Result IMPRESSION:  
1. No acute bony findings. XR CHEST SNGL V Final Result No results found for this or any previous visit (from the past 24 hour(s)). Results Procedure Component Value Units Date/Time CULTURE, BODY FLUID Rita Smith [[id number]] Collected:  11/06/20 1730 Order Status:  Completed Specimen: Body Fluid from Swab Updated:  11/11/20 8288 Special Requests: No Special Requests GRAM STAIN Few WBCs seen No organisms seen Culture result:    
  culture performed on fluid swab specimen No growth 4 days Ryann Sheehan [[id number]] Collected:  11/04/20 0750 Order Status:  Completed Specimen:  Urine Updated:  11/06/20 2076 Special Requests: No Special Requests Culture result: No Growth (<1000 cfu/mL)  CULTURE, RESPIRATORY/SPUTUM/BRONCH Rita Smith [[id number]] Collected:  11/03/20 2130 Order Status:  Canceled Specimen:  Sputum CULTURE, BLOOD, LINE [[id number]] Collected:  11/03/20 2130 Order Status:  Canceled Specimen:  Blood Labs:  
 
Recent Labs 11/08/20 
1315 WBC 7.7 HGB 10.8* HCT 34.3*  
 Recent Labs 11/08/20 
1315 * K 3.8  CO2 33* BUN 14  
CREA 0.87 GLU 94  
CA 10.1 Recent Labs 11/08/20 
1315 ALT 46 AP 68 TBILI 0.5 TP 5.8* ALB 1.8*  
GLOB 4.0 No results for input(s): INR, PTP, APTT, INREXT, INREXT in the last 72 hours. No results for input(s): FE, TIBC, PSAT, FERR in the last 72 hours. No results found for: FOL, RBCF No results for input(s): PH, PCO2, PO2 in the last 72 hours. No results for input(s): CPK, CKNDX, TROIQ in the last 72 hours. No lab exists for component: CPKMB No results found for: CHOL, CHOLX, CHLST, CHOLV, HDL, HDLP, LDL, LDLC, DLDLP, TGLX, TRIGL, TRIGP, CHHD, CHHDX Lab Results Component Value Date/Time Glucose (POC) 220 (H) 11/08/2020 11:01 AM  
 Glucose (POC) 110 (H) 11/08/2020 08:26 AM  
 Glucose (POC) 86 11/06/2020 05:29 PM  
 Glucose (POC) 75 11/06/2020 03:12 PM  
 Glucose (POC) 84 11/06/2020 07:55 AM  
 
Lab Results Component Value Date/Time Color Yellow/Straw 11/03/2020 02:45 PM  
 Appearance Clear 11/03/2020 02:45 PM  
 Specific gravity 1.010 11/03/2020 02:45 PM  
 pH (UA) 6.5 11/03/2020 02:45 PM  
 Protein Negative 11/03/2020 02:45 PM  
 Glucose Normal (A) 11/03/2020 02:45 PM  
 Ketone Negative 11/03/2020 02:45 PM  
 Bilirubin Negative 11/03/2020 02:45 PM  
 Urobilinogen Normal 11/03/2020 02:45 PM  
 Nitrites Negative 11/03/2020 02:45 PM  
 Leukocyte Esterase Negative 11/03/2020 02:45 PM  
 Bacteria Negative 11/03/2020 02:45 PM  
 WBC 0-4 11/03/2020 02:45 PM  
 RBC 0-5 11/03/2020 02:45 PM  
 
   
Assessment: 1. Dementia 2. Hx of Congestive Heart Failure 3. Hyponatremia/likely from diuretic 4. Hypertension 5. Hyperlipidemia 6.  Failure to [employer] 7.  Chest x-ray shows opacities/pleural effusion - Chest CT shows Near complete collapse of the right lung secondary to large pleural 
Effusion status post thoracocentesis 8. Paroxysmal A. Fib 9. Covid screening negative 10. Hypokalemia 11. Metastatic Adenocarcinoma 12. History of lung cancer Plan:  
 
Static post thoracocentesis - cytology results positive for metastatic adenocarcinoma; culture negative Oncology consult - Attempted to contact the family but unsuccessful, will try again today. Recommends hospice care due to dementia and poor performance status. On metoprolol 25 mg twice a day Monitor potassium Repeat the labs/pending PT/OT recommends 5/days week in SNF Try to call the family regarding update of the biopsy report No one picked up the phone and mailbox is full Unable to left any message Current Facility-Administered Medications:  
  heparin porcine injection 5,000 Units, 5,000 Units, SubCUTAneous, Q12H, Jesus Maldonado MD 
  acetylcysteine (MUCOMYST) 200 mg/mL (20 %) solution 600 mg, 600 mg, Nebulization, Q6H RT, Sanjay Braun MD, 600 mg at 11/11/20 0840 
  albuterol-ipratropium (DUO-NEB) 2.5 MG-0.5 MG/3 ML, 3 mL, Nebulization, Q4H PRN, Sanjay Braun MD, 3 mL at 11/10/20 0125 
  metoprolol tartrate (LOPRESSOR) tablet 25 mg, 25 mg, Oral, Q12H, Deven Barry MD, 25 mg at 11/11/20 0273   albuterol-ipratropium (DUO-NEB) 2.5 MG-0.5 MG/3 ML, 3 mL, Nebulization, Q6HWA RT, Asim Red MD, 3 mL at 11/11/20 0840   piperacillin-tazobactam (ZOSYN) 3.375 g in 0.9% sodium chloride (MBP/ADV) 100 mL MBP, 3.375 g, IntraVENous, Q8H, Jesus Maldonado MD, Last Rate: 200 mL/hr at 11/11/20 0628, 3.375 g at 11/11/20 8818

## 2020-11-11 NOTE — PROGRESS NOTES
Pulmonary Progress Note Daily Progress Note: 11/11/2020 Subjective: The patient is seen for follow  up. Excerpts from admission 11/3/2020 or consult notes as follows:  
36-year-old lady came in because of generalized weakness lethargy past medical history of dementia congestive heart failure hypertension hyperlipidemia patient was discharged from home with a sling on her right arm as she fell. Now chest x-ray and CAT scan of the chest was done which shows right lower lobe collapse with pleural effusion unable to get much history out of the patient so pulmonary consult was called for further evaluation 11/8 denies cough or shortness of breath remains on room air 11/09 Today she denies cough and shortness of breath. She had a chest Xray this morning, awaiting read. She is on room air breathing well. 
11/10 She says she feels great and denies SOB and cough. CXR yesterday shows enlarging right pleural effusion and atelectasis and atelectasis of left base. 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 She is on room air. Problem List: 
Problem List as of 11/11/2020 Never Reviewed Codes Class Noted - Resolved Hyponatremia ICD-10-CM: E87.1 ICD-9-CM: 276.1  11/3/2020 - Present Medications reviewed Current Facility-Administered Medications Medication Dose Route Frequency  heparin porcine injection 5,000 Units  5,000 Units SubCUTAneous Q12H  
 acetylcysteine (MUCOMYST) 200 mg/mL (20 %) solution 600 mg  600 mg Nebulization Q6H RT  
 albuterol-ipratropium (DUO-NEB) 2.5 MG-0.5 MG/3 ML  3 mL Nebulization Q4H PRN  
 metoprolol tartrate (LOPRESSOR) tablet 25 mg  25 mg Oral Q12H  
 albuterol-ipratropium (DUO-NEB) 2.5 MG-0.5 MG/3 ML  3 mL Nebulization Q6HWA RT  
 piperacillin-tazobactam (ZOSYN) 3.375 g in 0.9% sodium chloride (MBP/ADV) 100 mL MBP  3.375 g IntraVENous Q8H Review of Systems: A comprehensive review of systems was negative except for that written in the HPI. Objective:  
Physical Exam:  
 
Visit Vitals BP (!) 154/69 (BP 1 Location: Left arm, BP Patient Position: At rest) Pulse 73 Temp 98.6 °F (37 °C) Resp 21 Ht 5' 2.01\" (1.575 m) Wt 63.5 kg (140 lb) SpO2 96% BMI 25.60 kg/m² O2 Device: Room air Temp (24hrs), Av.3 °F (36.8 °C), Min:97.8 °F (36.6 °C), Max:98.6 °F (37 °C) No intake/output data recorded.  1901 -  0700 In: -  
Out: 150 [Urine:150] Constitutional:  
 Awake alert seems oriented HENT:  
Head: Normocephalic and atraumatic. Eyes: Pupils are equal, round, and reactive to light. Extraocular movements intact Neck: Normal range of motion. Neck supple. No definite JVD Cardiovascular: Normal rate and regular rhythm. Pulmonary/Chest:  
 Clear anteriorly and left lateral with fairly good breath sounds right lateral and right posterior no definite wheezes or rales Abdominal: Soft. Bowel sounds are normal.  
Musculoskeletal: Normal range of motion. Skin: Skin is warm. Data Review:  
   
Recent Days: 
Recent Labs 20 
1315 WBC 7.7 HGB 10.8* HCT 34.3*  
 Recent Labs 20 
1315 * K 3.8  CO2 33* GLU 94 BUN 14  
CREA 0.87 CA 10.1 ALB 1.8* TBILI 0.5 ALT 46 Room air oxygen saturation 98% 24 Hour Results: No results found for this or any previous visit (from the past 24 hour(s)). Imaging: 
  
   
CXR Results  (Last 48 hours)  
  None  
   
  
    
Results from Hospital Encounter encounter on 20 XR HIP LT W OR WO PELV 2-3 VWS  
  Narrative Fall 1 week ago. 
  
No comparison. 
  
FINDINGS: AP pelvis and frog-leg view of the left hip. No acute fracture or 
dislocation. Deformity of the left femur trochanter and proximal diaphysis, 
appears chronic. No radiopaque foreign body. 
   
  Impression IMPRESSION: 
1. No acute bony findings.   
XR CHEST SNGL V  
   Narrative Chest single view. 
  
Comparison single view chest March 29, 2018. 
  
Right-sided Port-A-Cath stable position. New opacification of the lower two thirds right hemithorax. This is likely 
related to a combination of large volume pleural fluid and atelectatic lung. Left lung is aerated. Cardiac and mediastinal structures unchanged. No 
pneumothorax. Nonacute rib fractures noted. Results from [facility] Encounter encounter on 10/26/20 XR SHOULDER RT AP/LAT MIN 2 V  
  Narrative 3 views of the right shoulder reveal diffuse osteopenia. There is mild 
degenerative subluxation of the humerus with respect to the glenoid. No fracture 
or dislocation is identified. 
   
  Impression IMPRESSION: Degenerative changes.  
  
    
Results from Hospital Encounter encounter on 11/03/20 CT CHEST WO CONT  
  Narrative CT dose reduction was achieved through use of a standardized protocol tailored 
for this examination and automatic exposure control for dose modulation. 
  
Noncontrast study shows large right pleural effusion. Right middle and lower 
lobe are completely collapsed, with very few air bronchograms. The upper lobe is 
almost completely collapsed, sparingly anterior segment only. Mainstem bronchus 
is open. Proximal lobar bronchi are open. Left lung is clear. Multiple calcified 
mediastinal hilar lymph nodes. Small left pleural effusion. Small pericardial 
effusion. Normal caliber aorta. No significant upper abdominal or chest wall 
finding. Pronounced soft tissue edema in the visualized portion of the left arm 
   
  Impression IMPRESSION: Near complete collapse of the right lung secondary to large pleural 
effusion  
  
 
  
IMPRESSION:  
1. RLL collapse Atelectasis markedly improved on x-ray 11/7 and on exam  
2. Malignant pleural Effusion Right side status post thoracentesis 11/06 by IR 
3.  Chronic Obstructive Pulmonary Disease with Severe Acute Exacerbation requiring inpatient hospitalization and management; no wheezing today 4. A. fib with RVR 5. History of heart failure hyponatremia ? Hx of lung cancer 6. Prognosis guarded   
   
RECOMMENDATIONS/PLAN:  
 
Discussed with the patient detail about her condition she told me that she had a history of lung cancer diagnosed more than 3 years ago in [place] and apparently she got treated for it I do not have any records and also she is intermittently confused Pleural fluid Cytology of thoracentesis on 11/06 showed many groups of abnormal epithelial cells are noted, with large nuclei and cytoplasmic vacuoles, indicative of metastatic adenocarcinoma. 1. Status post thoracentesis via IR 1260 cc fluid removed. Pleural fluid culture negative. Chest Xray 11/09 showed increased large right pleural effusion and atelectasis, as well as atelectasis of the left base and possible small left pleural effusion. 2. Nebulizer treatment with Mucomyst 
3. On zosyn Attempts to contact family yesterday were unsuccessful. Try again today to discuss her condition and prognosis possible she needs to be in hospice  
  Abdirahman Ludwig MD

## 2020-11-12 NOTE — PROGRESS NOTES
Hematology/Oncology Progress Note Patient: Maya Salgado MRN: [id number] YOB: 1940  Age: 79 y.o. Sex: female Admit Date: 11/3/2020 LOS: 9 days Chief Complaint: Admitted with generalized weakness Subjective:  
 
 Pleasantly confused. Constitutional Confused. Allergic/Immunologic No recent allergic reactions Eyes No significant visual difficulties. No diplopia. ENMT No problems with hearing, no sore throat, no sinus drainage. Endocrine No hot flashes or night sweats. No cold intolerance, polyuria, or polydipsia Hematologic/Lymphatic No easy bruising or bleeding. The patient denies any tender or palpable lymph nodes Breasts No abnormal masses of breast, nipple discharge or pain. Respiratory No dyspnea on exertion, orthopnea, chest pain, cough or hemoptysis. Cardiovascular No anginal chest pain, irregular heart beat, tachycardia, palpitations or orthopnea. Gastrointestinal No nausea, vomiting, diarrhea, constipation, cramping, dysphagia, reflux, heartburn, GI bleeding, or early satiety. No change in bowel habits. Genitourinary (M) No hematuria, dysuria, increased frequency, urgency, hesitancy or incontinence. Musculoskeletal No joint pain, swelling or redness. No decreased range of motion. Integumentary No chronic rashes, inflammation, ulcerations, pruritus, petechiae, purpura, ecchymoses, or skin changes. Neurologic No headache, blurred vision, and no areas of focal weakness or numbness. Normal gait. No sensory problems. Psychiatric No insomnia, depression, sherry or mood swings. No psychotropic drugs.   
  
 
Current Facility-Administered Medications:  
  acetylcysteine (MUCOMYST) 200 mg/mL (20 %) solution 600 mg, 600 mg, Nebulization, Q6H PRN, Asim Red MD 
  albuterol-ipratropium (DUO-NEB) 2.5 MG-0.5 MG/3 ML, 3 mL, Nebulization, Q6H PRN, Ada Red MD 
   heparin porcine injection 5,000 Units, 5,000 Units, SubCUTAneous, Q12H, Jesus Maldonado MD, 5,000 Units at 11/12/20 7486   metoprolol tartrate (LOPRESSOR) tablet 25 mg, 25 mg, Oral, Q12H, Deven Barry MD, 25 mg at 11/12/20 0046   piperacillin-tazobactam (ZOSYN) 3.375 g in 0.9% sodium chloride (MBP/ADV) 100 mL MBP, 3.375 g, IntraVENous, Q8H, Jesus Maldonado MD, Last Rate: 200 mL/hr at 11/12/20 1624, 3.375 g at 11/12/20 1624 Objective:  
 
Vitals:  
 11/12/20 0729 11/12/20 0744 11/12/20 0809 11/12/20 1627 BP:   (!) 141/87 (!) 140/83 Pulse:   (!) 122 (!) 59 Resp:   18 18 Temp:   99 °F (37.2 °C) 98.2 °F (36.8 °C) SpO2: 96% 94% 97% 98% Weight:      
Height:      
  
 
 
Physical Exam:  
Constitutional Alert, cooperative, oriented. Mood and affect appropriate. Appears close to chronological age. Well nourished. Well developed. Head Normocephalic; no scars Eyes Conjunctivae and sclerae are clear and without icterus. Pupils are reactive and equal.  
ENMT Sinuses are nontender. No oral exudates, ulcers, masses, thrush or mucositis. Oropharynx clear. Tongue normal.  
Neck Supple without masses or thyromegaly. No jugular venous distension. Hematologic/Lymphatic No petechiae or purpura. No tender or palpable lymph nodes in the cervical, supraclavicular, axillary or inguinal area. Respiratory Lungs are clear to auscultation without rhonchi or wheezing. Cardiovascular Regular rate and rhythm of heart without murmurs, gallops or rubs. Chest / Line Site Chest is symmetric with no chest wall deformities. Abdomen Non-tender, non-distended, no masses, ascites or hepatosplenomegaly. Good bowel sounds. No guarding or rebound tenderness. No pulsatile masses. Musculoskeletal No tenderness or swelling, normal range of motion without obvious weakness. Extremities No visible deformities, no cyanosis, clubbing or edema. Skin No rashes, scars, or lesions suggestive of malignancy. No petechiae, purpura, or ecchymoses. No excoriations. Neurologic Confused, but conversant. Psychiatric Alert and oriented times three. Coherent speech. Verbalizes understanding of our discussions today. Lab/Data Review: No results for input(s): WBC, HGB, HCT, PLT, HGBEXT, HCTEXT, PLTEXT, HGBEXT, HCTEXT, PLTEXT in the last 72 hours. No results for input(s): NA, K, CL, CO2, GLU, BUN, CREA, CA, MG, PHOS, ALB, TBIL, TBILI, ALT, INR, INREXT, INREXT in the last 72 hours. No lab exists for component: SGOT No results for input(s): PH, PCO2, PO2, HCO3, FIO2 in the last 72 hours. No results found for this or any previous visit (from the past 24 hour(s)). Radiology: CT Results  (Last 48 hours) None Assessment and Plan: Active Problems: Hyponatremia (11/3/2020) Malignant Pleural Effusion: 
- CT on admission showed near complete collapse of right lung with large right sided effusion. 
- S/p thoracentesis on 11/6 with removal of 1260 cc of fluid with cytology showing adenocarcinoma. - Clinically could be lung primary. The patient reports h/o lung cancer treated at [place] many yrs ago. - The patient has dementia and poor performance status and therefore, I agree with Dr. Mago Delgado, that hospice care would be the best option. 
  
Tried to call Ramón Abrams @ [phone] as well as Fouzia Olmedo on the listed numbers. Called multiple times with no response. We have been unable to contact family to discuss further. Discussed with . Signed By: Brain Landers MD   
 November 12, 2020

## 2020-11-12 NOTE — PROGRESS NOTES
Pulmonary Progress Note Daily Progress Note: 11/12/2020 Subjective: The patient is seen for follow  up. Excerpts from admission 11/3/2020 or consult notes as follows:  
67-year-old lady came in because of generalized weakness lethargy past medical history of dementia congestive heart failure hypertension hyperlipidemia patient was discharged from home with a sling on her right arm as she fell. Now chest x-ray and CAT scan of the chest was done which shows right lower lobe collapse with pleural effusion unable to get much history out of the patient so pulmonary consult was called for further evaluation 11/8 denies cough or shortness of breath remains on room air 11/09 Today she denies cough and shortness of breath. She had a chest Xray this morning, awaiting read. She is on room air breathing well. 
11/10 She says she feels great and denies SOB and cough. CXR yesterday shows enlarging right pleural effusion and atelectasis and atelectasis of left base. 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 She is on room air. 11/12 She is on room air. Awaiting placement. Problem List: 
Problem List as of 11/12/2020 Never Reviewed Codes Class Noted - Resolved Hyponatremia ICD-10-CM: E87.1 ICD-9-CM: 276.1  11/3/2020 - Present Medications reviewed Current Facility-Administered Medications Medication Dose Route Frequency  acetylcysteine (MUCOMYST) 200 mg/mL (20 %) solution 600 mg  600 mg Nebulization Q6H PRN  
 albuterol-ipratropium (DUO-NEB) 2.5 MG-0.5 MG/3 ML  3 mL Nebulization Q6H PRN  
 heparin porcine injection 5,000 Units  5,000 Units SubCUTAneous Q12H  
 metoprolol tartrate (LOPRESSOR) tablet 25 mg  25 mg Oral Q12H  piperacillin-tazobactam (ZOSYN) 3.375 g in 0.9% sodium chloride (MBP/ADV) 100 mL MBP  3.375 g IntraVENous Q8H Review of Systems: A comprehensive review of systems was negative except for that written in the HPI. Objective:  
Physical Exam:  
 
Visit Vitals BP (!) 141/87 (BP 1 Location: Right arm, BP Patient Position: At rest) Pulse (!) 122 Temp 99 °F (37.2 °C) Resp 18 Ht 5' 2.01\" (1.575 m) Wt 140 lb (63.5 kg) SpO2 97% BMI 25.60 kg/m² O2 Device: Room air Temp (24hrs), Av.7 °F (37.1 °C), Min:98.2 °F (36.8 °C), Max:99 °F (37.2 °C) No intake/output data recorded. 11/10 1901 -  0700 In: 480 [P.O.:480] Out: 700 [Urine:700] Constitutional:  
 Awake alert seems oriented HENT:  
Head: Normocephalic and atraumatic. Eyes: Pupils are equal, round, and reactive to light. Extraocular movements intact Neck: Normal range of motion. Neck supple. No definite JVD Cardiovascular: Normal rate and regular rhythm. Pulmonary/Chest:  
 Clear anteriorly and left lateral with fairly good breath sounds right lateral and right posterior no definite wheezes or rales Abdominal: Soft. Bowel sounds are normal.  
Musculoskeletal: Normal range of motion. Skin: Skin is warm. Data Review:  
   
Recent Days: 
No results for input(s): WBC, HGB, HCT, PLT, HGBEXT, HCTEXT, PLTEXT, HGBEXT, HCTEXT, PLTEXT in the last 72 hours. No results for input(s): NA, K, CL, CO2, GLU, BUN, CREA, CA, MG, PHOS, ALB, TBIL, TBILI, ALT, INR, INREXT, INREXT in the last 72 hours. No lab exists for component: SGOT Room air oxygen saturation 98% 24 Hour Results: No results found for this or any previous visit (from the past 24 hour(s)). Imaging: 
  
   
CXR Results  (Last 48 hours)  
  None  
   
  
    
Results from Hospital Encounter encounter on 20 XR HIP LT W OR WO PELV 2-3 VWS  
  Narrative Fall 1 week ago. 
  
No comparison. 
  
FINDINGS: AP pelvis and frog-leg view of the left hip. No acute fracture or 
dislocation. Deformity of the left femur trochanter and proximal diaphysis, 
appears chronic. No radiopaque foreign body. 
   
  Impression IMPRESSION: 
1. No acute bony findings. XR CHEST SNGL V  
  Narrative Chest single view. 
  
Comparison single view chest March 29, 2018. 
  
Right-sided Port-A-Cath stable position. New opacification of the lower two thirds right hemithorax. This is likely 
related to a combination of large volume pleural fluid and atelectatic lung. Left lung is aerated. Cardiac and mediastinal structures unchanged. No 
pneumothorax. Nonacute rib fractures noted. Results from [facility] Encounter encounter on 10/26/20 XR SHOULDER RT AP/LAT MIN 2 V  
  Narrative 3 views of the right shoulder reveal diffuse osteopenia. There is mild 
degenerative subluxation of the humerus with respect to the glenoid. No fracture 
or dislocation is identified. 
   
  Impression IMPRESSION: Degenerative changes.  
  
    
Results from Hospital Encounter encounter on 11/03/20 CT CHEST WO CONT  
  Narrative CT dose reduction was achieved through use of a standardized protocol tailored 
for this examination and automatic exposure control for dose modulation. 
  
Noncontrast study shows large right pleural effusion. Right middle and lower 
lobe are completely collapsed, with very few air bronchograms. The upper lobe is 
almost completely collapsed, sparingly anterior segment only. Mainstem bronchus 
is open. Proximal lobar bronchi are open. Left lung is clear. Multiple calcified 
mediastinal hilar lymph nodes. Small left pleural effusion. Small pericardial 
effusion. Normal caliber aorta. No significant upper abdominal or chest wall 
finding. Pronounced soft tissue edema in the visualized portion of the left arm 
   
  Impression IMPRESSION: Near complete collapse of the right lung secondary to large pleural 
effusion  
  
 
  
IMPRESSION:  
1. RLL collapse Atelectasis markedly improved on x-ray 11/7 and on exam  
2. Malignant pleural Effusion Right side status post thoracentesis 11/06 by IR 
3.  Chronic Obstructive Pulmonary Disease with Severe Acute Exacerbation requiring inpatient hospitalization and management; no wheezing today 4. A. fib with RVR 5. History of heart failure hyponatremia ? Hx of lung cancer 6. Prognosis guarded   
   
RECOMMENDATIONS/PLAN:  
 
Discussed with the patient detail about her condition she told me that she had a history of lung cancer diagnosed more than 3 years ago in [place] and apparently she got treated for it I do not have any records and also she is intermittently confused Pleural fluid Cytology of thoracentesis on 11/06 showed many groups of abnormal epithelial cells are noted, with large nuclei and cytoplasmic vacuoles, indicative of metastatic adenocarcinoma. 1. Status post thoracentesis via IR 1260 cc fluid removed. Pleural fluid culture negative. Chest Xray 11/09 showed increased large right pleural effusion and atelectasis, as well as atelectasis of the left base and possible small left pleural effusion. 2. Nebulizer treatment with Mucomyst 
3. On zosyn She is agreeable to [facility] and rehab. 
  
  
 
 
 
 
Guerrero Forrest

## 2020-11-12 NOTE — PROGRESS NOTES
Patient was accepted to [facility] and Rehab. Discharge plans are pending the family speaking to the physician. CM will continue to follow the patient for discharge planning needs.

## 2020-11-12 NOTE — PROGRESS NOTES
PHYSICAL THERAPY TREATMENT Patient: Nery Padron (79 y.o. female) Date: 11/12/2020 Diagnosis: Hyponatremia [E87.1] <principal problem not specified> Precautions:   
Chart, physical therapy assessment, plan of care and goals were reviewed. ASSESSMENT Patient continues with skilled PT services and is progressing towards goals. Pt semi supine in bed upon PT/OT arrival and agreeable to session with max encouragement. Patient seen from 11:38-11:46am and 14:50-15:12pm. First session patient performed sit>sup with max A x 1 and TA for scooting to [facility]. During second visit patient performed sup>sit with min A x 1 and STS with mod A x 2. Pt performed STS 2x, on second STS patient able to take side steps to [facility] with mod A x 2. Pt assisted back to bed with max A x 1 and required TA x2 for scooting to [facility]. While seated EOB patient stated \"do you see all the ants on the floor\", and therapist told patient there was nothing on the floor but patient stated \"they are crawling everywhere\". Patient required max encouragement throughout both sessions for participation. Co treated with OT for patient and therapist safety Current Level of Function Impacting Discharge (mobility/balance): level of assistance required for all mobility Other factors to consider for discharge: AMS PLAN : 
Patient continues to benefit from skilled intervention to address the above impairments. Continue treatment per established plan of care. to address goals. Recommendation for discharge: (in order for the patient to meet his/her long term goals) Therapy up to 5 days/week in SNF setting This discharge recommendation: 
Has been made in collaboration with the attending provider and/or case management IF patient discharges home will need the following DME: rolling walker SUBJECTIVE:  
Patient stated do you see all the ants all over the floor OBJECTIVE DATA SUMMARY:  
Critical Behavior: Neurologic State: Alert Orientation Level: Oriented to person Cognition: Follows commands, Other (comment)(with encouragement) Functional Mobility Training: 
Bed Mobility: 
  
Supine to Sit: Minimum assistance Sit to Supine: Maximum assistance Scooting: Minimum assistance Transfers: 
Sit to Stand: Moderate assistance;Assist x2 Stand to Sit: Moderate assistance;Assist x2 Balance: 
Sitting: Intact; Without support Sitting - Static: Good (unsupported) Sitting - Dynamic: Fair (occasional) Standing: Impaired; With support Standing - Static: Constant support Standing - Dynamic : Constant support Ambulation/Gait Training: 
Distance (ft): 2 Feet (ft) Assistive Device: Gait belt;Walker, rolling Ambulation - Level of Assistance: Moderate assistance;Assist x2 Step Length: Left shortened;Right shortened Pain Rating: 
No pain reported Activity Tolerance:  
Poor Please refer to the flowsheet for vital signs taken during this treatment. After treatment patient left in no apparent distress:  
Supine in bed, Call bell within reach, and Bed / chair alarm activated COMMUNICATION/COLLABORATION:  
The patients plan of care was discussed with: Occupational therapist.  
 
OT/PT sessions occurred together for increased patient and clinician safety Problem: Mobility Impaired (Adult and Pediatric) Goal: *Acute Goals and Plan of Care (Insert Text) Description: Patient will move from supine to sit and sit to supine , scoot up and down, and roll side to side in bed with moderate assistance  within 7 day(s). Patient will transfer from bed to chair and chair to bed with moderate assistance  using the least restrictive device within 7 day(s). Patient will improve static sitting balance to minimal assistance within 1 week(s). Patient will ambulate 30 feet with minimal assistance with least restrictive device within 1 weeks.    
  
Outcome: Progressing Towards Goal 
  
 
Jose Mata PTA 
 Time Calculation: 30 minutes

## 2020-11-12 NOTE — PROGRESS NOTES
Pulmonary Progress Note Daily Progress Note: 11/12/2020 Subjective: The patient is seen for follow  up. Excerpts from admission 11/3/2020 or consult notes as follows:  
69-year-old lady came in because of generalized weakness lethargy past medical history of dementia congestive heart failure hypertension hyperlipidemia patient was discharged from home with a sling on her right arm as she fell. Now chest x-ray and CAT scan of the chest was done which shows right lower lobe collapse with pleural effusion unable to get much history out of the patient so pulmonary consult was called for further evaluation 11/8 denies cough or shortness of breath remains on room air 11/09 Today she denies cough and shortness of breath. She had a chest Xray this morning, awaiting read. She is on room air breathing well. 
11/10 She says she feels great and denies SOB and cough. CXR yesterday shows enlarging right pleural effusion and atelectasis and atelectasis of left base. 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 She is on room air. 11/12 She is on room air. Awaiting placement. Problem List: 
Problem List as of 11/12/2020 Never Reviewed Codes Class Noted - Resolved Hyponatremia ICD-10-CM: E87.1 ICD-9-CM: 276.1  11/3/2020 - Present Medications reviewed Current Facility-Administered Medications Medication Dose Route Frequency  acetylcysteine (MUCOMYST) 200 mg/mL (20 %) solution 600 mg  600 mg Nebulization Q6H PRN  
 albuterol-ipratropium (DUO-NEB) 2.5 MG-0.5 MG/3 ML  3 mL Nebulization Q6H PRN  
 heparin porcine injection 5,000 Units  5,000 Units SubCUTAneous Q12H  
 metoprolol tartrate (LOPRESSOR) tablet 25 mg  25 mg Oral Q12H  piperacillin-tazobactam (ZOSYN) 3.375 g in 0.9% sodium chloride (MBP/ADV) 100 mL MBP  3.375 g IntraVENous Q8H Review of Systems: A comprehensive review of systems was negative except for that written in the HPI. Objective:  
Physical Exam:  
 
Visit Vitals BP (!) 141/87 (BP 1 Location: Right arm, BP Patient Position: At rest) Pulse (!) 122 Temp 99 °F (37.2 °C) Resp 18 Ht 5' 2.01\" (1.575 m) Wt 63.5 kg (140 lb) SpO2 97% BMI 25.60 kg/m² O2 Device: Room air Temp (24hrs), Av.7 °F (37.1 °C), Min:98.2 °F (36.8 °C), Max:99 °F (37.2 °C) No intake/output data recorded. 11/10 1901 -  0700 In: 480 [P.O.:480] Out: 700 [Urine:700] Constitutional:  
 Awake alert seems oriented HENT:  
Head: Normocephalic and atraumatic. Eyes: Pupils are equal, round, and reactive to light. Extraocular movements intact Neck: Normal range of motion. Neck supple. No definite JVD Cardiovascular: Normal rate and regular rhythm. Pulmonary/Chest:  
 Clear anteriorly and left lateral with fairly good breath sounds right lateral and right posterior no definite wheezes or rales Abdominal: Soft. Bowel sounds are normal.  
Musculoskeletal: Normal range of motion. Skin: Skin is warm. Data Review:  
   
Recent Days: 
No results for input(s): WBC, HGB, HCT, PLT, HGBEXT, HCTEXT, PLTEXT, HGBEXT, HCTEXT, PLTEXT in the last 72 hours. No results for input(s): NA, K, CL, CO2, GLU, BUN, CREA, CA, MG, PHOS, ALB, TBIL, TBILI, ALT, INR, INREXT, INREXT in the last 72 hours. No lab exists for component: SGOT Room air oxygen saturation 98% 24 Hour Results: No results found for this or any previous visit (from the past 24 hour(s)). Imaging: 
  
   
CXR Results  (Last 48 hours)  
  None  
   
  
    
Results from Hospital Encounter encounter on 20 XR HIP LT W OR WO PELV 2-3 VWS  
  Narrative Fall 1 week ago. 
  
No comparison. 
  
FINDINGS: AP pelvis and frog-leg view of the left hip. No acute fracture or 
dislocation. Deformity of the left femur trochanter and proximal diaphysis, 
appears chronic. No radiopaque foreign body. 
   
  Impression IMPRESSION: 
1. No acute bony findings. XR CHEST SNGL V  
  Narrative Chest single view. 
  
Comparison single view chest March 29, 2018. 
  
Right-sided Port-A-Cath stable position. New opacification of the lower two thirds right hemithorax. This is likely 
related to a combination of large volume pleural fluid and atelectatic lung. Left lung is aerated. Cardiac and mediastinal structures unchanged. No 
pneumothorax. Nonacute rib fractures noted. Results from [facility] [place], [place] Encounter encounter on 10/26/20 XR SHOULDER RT AP/LAT MIN 2 V  
  Narrative 3 views of the right shoulder reveal diffuse osteopenia. There is mild 
degenerative subluxation of the humerus with respect to the glenoid. No fracture 
or dislocation is identified. 
   
  Impression IMPRESSION: Degenerative changes.  
  
    
Results from Hospital Encounter encounter on 11/03/20 CT CHEST WO CONT  
  Narrative CT dose reduction was achieved through use of a standardized protocol tailored 
for this examination and automatic exposure control for dose modulation. 
  
Noncontrast study shows large right pleural effusion. Right middle and lower 
lobe are completely collapsed, with very few air bronchograms. The upper lobe is 
almost completely collapsed, sparingly anterior segment only. Mainstem bronchus 
is open. Proximal lobar bronchi are open. Left lung is clear. Multiple calcified 
mediastinal hilar lymph nodes. Small left pleural effusion. Small pericardial 
effusion. Normal caliber aorta. No significant upper abdominal or chest wall 
finding. Pronounced soft tissue edema in the visualized portion of the left arm 
   
  Impression IMPRESSION: Near complete collapse of the right lung secondary to large pleural 
effusion  
  
 
  
IMPRESSION:  
1. RLL collapse Atelectasis markedly improved on x-ray 11/7 and on exam  
2. Malignant pleural Effusion Right side status post thoracentesis 11/06 by IR 
3.  Chronic Obstructive Pulmonary Disease with Severe Acute Exacerbation requiring inpatient hospitalization and management; no wheezing today 4. A. fib with RVR 5. History of heart failure hyponatremia ? Hx of lung cancer 6. Prognosis guarded   
   
RECOMMENDATIONS/PLAN:  
 
Discussed with the patient detail about her condition she told me that she had a history of lung cancer diagnosed more than 3 years ago in [place] and apparently she got treated for it I do not have any records and also she is intermittently confused Pleural fluid Cytology of thoracentesis on 11/06 showed many groups of abnormal epithelial cells are noted, with large nuclei and cytoplasmic vacuoles, indicative of metastatic adenocarcinoma. 1. Status post thoracentesis via IR 1260 cc fluid removed. Pleural fluid culture negative. Chest Xray 11/09 showed increased large right pleural effusion and atelectasis, as well as atelectasis of the left base and possible small left pleural effusion. 2. Nebulizer treatment with Mucomyst 
3. On zosyn She is agreeable to [facility] and rehab. 
  
Katie Kessler MD

## 2020-11-12 NOTE — PROGRESS NOTES
General Daily Progress Note Patient Name: Julian Sauceda YOB: 1940 Age: 
79 y.o. Admit Date: 11/3/2020 Subjective:  
 
Pt is alert and awake this morning. She is laying in bed comfortably. No acute distress. Denies cough, shortness of breath, and chest pain. Denies weakness and dizziness. Post thoracentesis 1260 cc of serous fluid removed. Cytology results positive for metastatic adenocarcinoma. Negative cultures. History of previous lung cancer and smoking. CM was able to get the correct contact information for patients POA this morning. Pt is pleasant and kind but confused with moderate dementia. Objective:  
 
Visit Vitals BP (!) 141/87 (BP 1 Location: Right arm, BP Patient Position: At rest) Pulse (!) 122 Temp 99 °F (37.2 °C) Resp 18 Ht 5' 2.01\" (1.575 m) Wt 63.5 kg (140 lb) SpO2 97% BMI 25.60 kg/m² No results found for this or any previous visit (from the past 24 hour(s)). [unfilled] Review of Systems Constitutional: Negative for chills and fever. HENT: Negative. Eyes: Negative. Respiratory: Negative. Cardiovascular: Negative. Gastrointestinal: Negative for abdominal pain and nausea. Skin: Negative. Neurological: Negative. Physical Exam:   
 
Constitutional: pt is oriented to person, place, and time. HENT:  
Head: Normocephalic and atraumatic. Eyes: Pupils are equal, round, and reactive to light. EOM are normal.  
Cardiovascular: Normal rate, regular rhythm and normal heart sounds. Pulmonary/Chest: Breath sounds normal. No wheezes. No rales. Exhibits no tenderness. Abdominal: Soft. Bowel sounds are normal. There is no abdominal tenderness. There is no rebound and no guarding. Musculoskeletal: Normal range of motion. Neurological: pt is alert and oriented to person, place, and time. XR CHEST PA LAT Final Result Impression: Large right-sided pleural effusion and associated  
compression/consolidation of the right lung, increased. Mild atelectasis at the  
left base. Possible small left pleural effusion. XR CHEST PORT Final Result IMPRESSION: Diffuse airspace disease, right hemithorax, with improved aeration  
from previous. Differential diagnosis includes pneumonia, atelectasis, and/or  
reexpansion pulmonary edema. No pneumothorax. [address] Final Result IMPRESSION:   
Successful ultrasound-guided thoracentesis of right large pleural effusion, with  
approximately 1.3 L serous pleural fluid removed. Pleural fluid sample was also  
sent for lab analysis. CT CHEST WO CONT Final Result IMPRESSION: Near complete collapse of the right lung secondary to large pleural  
effusion XR HIP LT W OR WO PELV 2-3 VWS Final Result IMPRESSION:  
1. No acute bony findings. XR CHEST SNGL V Final Result No results found for this or any previous visit (from the past 24 hour(s)). Results Procedure Component Value Units Date/Time CULTURE, BODY FLUID Mardene Hole [[id number]] Collected:  11/06/20 1730 Order Status:  Completed Specimen: Body Fluid from Swab Updated:  11/11/20 9994 Special Requests: No Special Requests GRAM STAIN Few WBCs seen No organisms seen Culture result:    
  culture performed on fluid swab specimen No growth 4 days José Luis Nerijohanasherri [[id number]] Collected:  11/04/20 0750 Order Status:  Completed Specimen:  Urine Updated:  11/06/20 9388 Special Requests: No Special Requests Culture result: No Growth (<1000 cfu/mL) CULTURE, RESPIRATORY/SPUTUM/BRONCH Mardene Hole [[id number]] Collected:  11/03/20 2130 Order Status:  Canceled Specimen:  Sputum CULTURE, BLOOD, LINE [[id number]] Collected:  11/03/20 2130 Order Status:  Canceled Specimen:  Blood Labs:  
 
No results for input(s): WBC, HGB, HCT, PLT, HGBEXT, HCTEXT, PLTEXT, HGBEXT, HCTEXT, PLTEXT in the last 72 hours. No results for input(s): NA, K, CL, CO2, BUN, CREA, GLU, CA, MG, PHOS, URICA in the last 72 hours. No results for input(s): ALT, AP, TBIL, TBILI, TP, ALB, GLOB, GGT, AML, LPSE in the last 72 hours. No lab exists for component: SGOT, GPT, AMYP, HLPSE No results for input(s): INR, PTP, APTT, INREXT, INREXT in the last 72 hours. No results for input(s): FE, TIBC, PSAT, FERR in the last 72 hours. No results found for: FOL, RBCF No results for input(s): PH, PCO2, PO2 in the last 72 hours. No results for input(s): CPK, CKNDX, TROIQ in the last 72 hours. No lab exists for component: CPKMB No results found for: CHOL, CHOLX, CHLST, CHOLV, HDL, HDLP, LDL, LDLC, DLDLP, TGLX, TRIGL, TRIGP, CHHD, CHHDX Lab Results Component Value Date/Time Glucose (POC) 220 (H) 11/08/2020 11:01 AM  
 Glucose (POC) 110 (H) 11/08/2020 08:26 AM  
 Glucose (POC) 86 11/06/2020 05:29 PM  
 Glucose (POC) 75 11/06/2020 03:12 PM  
 Glucose (POC) 84 11/06/2020 07:55 AM  
 
Lab Results Component Value Date/Time Color Yellow/Straw 11/03/2020 02:45 PM  
 Appearance Clear 11/03/2020 02:45 PM  
 Specific gravity 1.010 11/03/2020 02:45 PM  
 pH (UA) 6.5 11/03/2020 02:45 PM  
 Protein Negative 11/03/2020 02:45 PM  
 Glucose Normal (A) 11/03/2020 02:45 PM  
 Ketone Negative 11/03/2020 02:45 PM  
 Bilirubin Negative 11/03/2020 02:45 PM  
 Urobilinogen Normal 11/03/2020 02:45 PM  
 Nitrites Negative 11/03/2020 02:45 PM  
 Leukocyte Esterase Negative 11/03/2020 02:45 PM  
 Bacteria Negative 11/03/2020 02:45 PM  
 WBC 0-4 11/03/2020 02:45 PM  
 RBC 0-5 11/03/2020 02:45 PM  
 
   
Assessment: 1. Dementia 2. Hx of Congestive Heart Failure 3. Hyponatremia/likely from diuretic 4. Hypertension 5. Hyperlipidemia 6.  Failure to Thrive  
7.  Chest x-ray shows opacities/pleural effusion - Chest CT shows Near complete collapse of the right lung secondary to large pleural 
Effusion status post thoracocentesis 8. Paroxysmal A. Fib 9. Covid screening negative 10. Hypokalemia 11. Metastatic Adenocarcinoma 12. History of lung cancer Plan:  
 
Static post thoracocentesis - cytology results positive for metastatic adenocarcinoma; culture negative Oncology consult - Attempted to contact the family but unsuccessful, will try again today. Recommends hospice care due to dementia and poor performance status. Per CM's last note: Correct number for POA is her sister-in-law Corey Raya @ [phone] On metoprolol 25 mg twice a day Monitor potassium Repeat the labs PT/OT recommends 5/days week in SNF Current Facility-Administered Medications:  
  acetylcysteine (MUCOMYST) 200 mg/mL (20 %) solution 600 mg, 600 mg, Nebulization, Q6H PRN, Asim Red MD 
  albuterol-ipratropium (DUO-NEB) 2.5 MG-0.5 MG/3 ML, 3 mL, Nebulization, Q6H PRN, Asim Red MD 
  heparin porcine injection 5,000 Units, 5,000 Units, SubCUTAneous, Q12H, Jesus Maldonado MD, 5,000 Units at 11/12/20 2514   metoprolol tartrate (LOPRESSOR) tablet 25 mg, 25 mg, Oral, Q12H, Deven Barry MD, 25 mg at 11/12/20 5153   piperacillin-tazobactam (ZOSYN) 3.375 g in 0.9% sodium chloride (MBP/ADV) 100 mL MBP, 3.375 g, IntraVENous, Q8H, Jesus Maldonado MD, Last Rate: 200 mL/hr at 11/12/20 0513, 3.375 g at 11/12/20 0796

## 2020-11-12 NOTE — PROGRESS NOTES
General Daily Progress Note Patient Name: Libertad De Oliveira YOB: 1940 Age: 
79 y.o. Admit Date: 11/3/2020 Subjective:  
 
Pt is alert and awake this morning. She is laying in bed comfortably. No acute distress. Denies cough, shortness of breath, and chest pain. Denies weakness and dizziness. Post thoracentesis 1260 cc of serous fluid removed. Cytology results positive for metastatic adenocarcinoma. Negative cultures. History of previous lung cancer and smoking. CM was able to get the correct contact information for patients POA this morning. Pt is pleasant and kind but confused with moderate dementia. Objective:  
 
Visit Vitals BP (!) 141/87 (BP 1 Location: Right arm, BP Patient Position: At rest) Pulse (!) 122 Temp 99 °F (37.2 °C) Resp 18 Ht 5' 2.01\" (1.575 m) Wt 63.5 kg (140 lb) SpO2 97% BMI 25.60 kg/m² No results found for this or any previous visit (from the past 24 hour(s)). [unfilled] Review of Systems Constitutional: Negative for chills and fever. HENT: Negative. Eyes: Negative. Respiratory: Negative. Cardiovascular: Negative. Gastrointestinal: Negative for abdominal pain and nausea. Skin: Negative. Neurological: Negative. Physical Exam:   
 
Constitutional: pt is oriented to person, place, and time. HENT:  
Head: Normocephalic and atraumatic. Eyes: Pupils are equal, round, and reactive to light. EOM are normal.  
Cardiovascular: Normal rate, regular rhythm and normal heart sounds. Pulmonary/Chest: Breath sounds normal. No wheezes. No rales. Exhibits no tenderness. Abdominal: Soft. Bowel sounds are normal. There is no abdominal tenderness. There is no rebound and no guarding. Musculoskeletal: Normal range of motion. Neurological: pt is alert and oriented to person, place, and time. XR CHEST PA LAT Final Result Impression: Large right-sided pleural effusion and associated  
compression/consolidation of the right lung, increased. Mild atelectasis at the  
left base. Possible small left pleural effusion. XR CHEST PORT Final Result IMPRESSION: Diffuse airspace disease, right hemithorax, with improved aeration  
from previous. Differential diagnosis includes pneumonia, atelectasis, and/or  
reexpansion pulmonary edema. No pneumothorax. [address] Final Result IMPRESSION:   
Successful ultrasound-guided thoracentesis of right large pleural effusion, with  
approximately 1.3 L serous pleural fluid removed. Pleural fluid sample was also  
sent for lab analysis. CT CHEST WO CONT Final Result IMPRESSION: Near complete collapse of the right lung secondary to large pleural  
effusion XR HIP LT W OR WO PELV 2-3 VWS Final Result IMPRESSION:  
1. No acute bony findings. XR CHEST SNGL V Final Result No results found for this or any previous visit (from the past 24 hour(s)). Results Procedure Component Value Units Date/Time CULTURE, BODY FLUID Sheri Crowe [[id number]] Collected:  11/06/20 1730 Order Status:  Completed Specimen: Body Fluid from Swab Updated:  11/11/20 9968 Special Requests: No Special Requests GRAM STAIN Few WBCs seen No organisms seen Culture result:    
  culture performed on fluid swab specimen No growth 4 days Ruth Toledo [[id number]] Collected:  11/04/20 0750 Order Status:  Completed Specimen:  Urine Updated:  11/06/20 1116 Special Requests: No Special Requests Culture result: No Growth (<1000 cfu/mL) CULTURE, RESPIRATORY/SPUTUM/BRONCH Ether Sebastien [[id number]] Collected:  11/03/20 2130 Order Status:  Canceled Specimen:  Sputum CULTURE, BLOOD, LINE [[id number]] Collected:  11/03/20 2130 Order Status:  Canceled Specimen:  Blood Labs: No results for input(s): WBC, HGB, HCT, PLT, HGBEXT, HCTEXT, PLTEXT, HGBEXT, HCTEXT, PLTEXT in the last 72 hours. No results for input(s): NA, K, CL, CO2, BUN, CREA, GLU, CA, MG, PHOS, URICA in the last 72 hours. No results for input(s): ALT, AP, TBIL, TBILI, TP, ALB, GLOB, GGT, AML, LPSE in the last 72 hours. No lab exists for component: SGOT, GPT, AMYP, HLPSE No results for input(s): INR, PTP, APTT, INREXT, INREXT in the last 72 hours. No results for input(s): FE, TIBC, PSAT, FERR in the last 72 hours. No results found for: FOL, RBCF No results for input(s): PH, PCO2, PO2 in the last 72 hours. No results for input(s): CPK, CKNDX, TROIQ in the last 72 hours. No lab exists for component: CPKMB No results found for: CHOL, CHOLX, CHLST, CHOLV, HDL, HDLP, LDL, LDLC, DLDLP, TGLX, TRIGL, TRIGP, CHHD, CHHDX Lab Results Component Value Date/Time Glucose (POC) 220 (H) 11/08/2020 11:01 AM  
 Glucose (POC) 110 (H) 11/08/2020 08:26 AM  
 Glucose (POC) 86 11/06/2020 05:29 PM  
 Glucose (POC) 75 11/06/2020 03:12 PM  
 Glucose (POC) 84 11/06/2020 07:55 AM  
 
Lab Results Component Value Date/Time Color Yellow/Straw 11/03/2020 02:45 PM  
 Appearance Clear 11/03/2020 02:45 PM  
 Specific gravity 1.010 11/03/2020 02:45 PM  
 pH (UA) 6.5 11/03/2020 02:45 PM  
 Protein Negative 11/03/2020 02:45 PM  
 Glucose Normal (A) 11/03/2020 02:45 PM  
 Ketone Negative 11/03/2020 02:45 PM  
 Bilirubin Negative 11/03/2020 02:45 PM  
 Urobilinogen Normal 11/03/2020 02:45 PM  
 Nitrites Negative 11/03/2020 02:45 PM  
 Leukocyte Esterase Negative 11/03/2020 02:45 PM  
 Bacteria Negative 11/03/2020 02:45 PM  
 WBC 0-4 11/03/2020 02:45 PM  
 RBC 0-5 11/03/2020 02:45 PM  
 
   
Assessment: 1. Dementia 2. Hx of Congestive Heart Failure 3. Hyponatremia/likely from diuretic 4. Hypertension 5. Hyperlipidemia 6.  Failure to Thrive  
7.  Chest x-ray shows opacities/pleural effusion - Chest CT shows Near complete collapse of the right lung secondary to large pleural 
Effusion status post thoracocentesis 8. Paroxysmal A. Fib 9. Covid screening negative 10. Hypokalemia 11. Metastatic Adenocarcinoma 12. History of lung cancer Plan:  
 
Static post thoracocentesis - cytology results positive for metastatic adenocarcinoma; culture negative Oncology consult - Attempted to contact the family but unsuccessful, will try again today. Recommends hospice care due to dementia and poor performance status. Per CM's last note: Correct number for POA is her sister-in-law Liseth Guerrero @ [phone] Tried to call above number but is out of service 
 
[phone]. Left message on this number Call [phone] and try to left message On metoprolol 25 mg twice a day Monitor potassium Repeat the labs PT/OT recommends 5/days week in SNF Current Facility-Administered Medications:  
  acetylcysteine (MUCOMYST) 200 mg/mL (20 %) solution 600 mg, 600 mg, Nebulization, Q6H PRN, Asim Red MD 
  albuterol-ipratropium (DUO-NEB) 2.5 MG-0.5 MG/3 ML, 3 mL, Nebulization, Q6H PRN, Asim Red MD 
  heparin porcine injection 5,000 Units, 5,000 Units, SubCUTAneous, Q12H, Jesus Maldonado MD, 5,000 Units at 11/12/20 2780   metoprolol tartrate (LOPRESSOR) tablet 25 mg, 25 mg, Oral, Q12H, Deven Barry MD, 25 mg at 11/12/20 9158   piperacillin-tazobactam (ZOSYN) 3.375 g in 0.9% sodium chloride (MBP/ADV) 100 mL MBP, 3.375 g, IntraVENous, Q8H, Jesus Maldonado MD, Last Rate: 200 mL/hr at 11/12/20 0513, 3.375 g at 11/12/20 1931

## 2020-11-13 NOTE — PROGRESS NOTES
OT treatment attempt at 8:50 AM however, pt sleeping on arrival and refusing therapy session at this time stating \"she just to tired\". Education and encouragement provided, pt continue to refuse. Will continue to follow pt and attempt therapy session at a later time as time allows.

## 2020-11-13 NOTE — PROGRESS NOTES
General Daily Progress Note Patient Name: Efraín Pelletier YOB: 1940 Age: 
79 y.o. Admit Date: 11/3/2020 Subjective:  
 
Pt is alert and awake this morning. She is laying in bed comfortably. No acute distress. Denies cough, shortness of breath, and chest pain. Denies weakness and dizziness. Post thoracentesis 1260 cc of serous fluid removed. Cytology results positive for metastatic adenocarcinoma. Negative cultures. History of previous lung cancer and smoking. CM was able to get the correct contact information for patients POA this morning. Pt is pleasant and kind but confused with moderate dementia. Objective:  
 
Visit Vitals BP (!) 146/93 (BP 1 Location: Left arm, BP Patient Position: At rest) Pulse 79 Temp 98.2 °F (36.8 °C) Resp 18 Ht 5' 2.01\" (1.575 m) Wt 63.5 kg (140 lb) SpO2 100% BMI 25.60 kg/m² No results found for this or any previous visit (from the past 24 hour(s)). [unfilled] Review of Systems Constitutional: Negative for chills and fever. HENT: Negative. Eyes: Negative. Respiratory: Negative. Cardiovascular: Negative. Gastrointestinal: Negative for abdominal pain and nausea. Skin: Negative. Neurological: Negative. Physical Exam:   
 
Constitutional: pt is oriented to person, place, and time. HENT:  
Head: Normocephalic and atraumatic. Eyes: Pupils are equal, round, and reactive to light. EOM are normal.  
Cardiovascular: Normal rate, regular rhythm and normal heart sounds. Pulmonary/Chest: Breath sounds normal. No wheezes. No rales. Exhibits no tenderness. Abdominal: Soft. Bowel sounds are normal. There is no abdominal tenderness. There is no rebound and no guarding. Musculoskeletal: Normal range of motion. Neurological: pt is alert and oriented to person, place, and time. XR CHEST PA LAT Final Result Impression: Large right-sided pleural effusion and associated  
compression/consolidation of the right lung, increased. Mild atelectasis at the  
left base. Possible small left pleural effusion. XR CHEST PORT Final Result IMPRESSION: Diffuse airspace disease, right hemithorax, with improved aeration  
from previous. Differential diagnosis includes pneumonia, atelectasis, and/or  
reexpansion pulmonary edema. No pneumothorax. [address] Final Result IMPRESSION:   
Successful ultrasound-guided thoracentesis of right large pleural effusion, with  
approximately 1.3 L serous pleural fluid removed. Pleural fluid sample was also  
sent for lab analysis. CT CHEST WO CONT Final Result IMPRESSION: Near complete collapse of the right lung secondary to large pleural  
effusion XR HIP LT W OR WO PELV 2-3 VWS Final Result IMPRESSION:  
1. No acute bony findings. XR CHEST SNGL V Final Result No results found for this or any previous visit (from the past 24 hour(s)). Results Procedure Component Value Units Date/Time CULTURE, BODY FLUID Judith Martinez [[id number]] Collected:  11/06/20 1730 Order Status:  Completed Specimen: Body Fluid from Swab Updated:  11/11/20 1571 Special Requests: No Special Requests GRAM STAIN Few WBCs seen No organisms seen Culture result:    
  culture performed on fluid swab specimen No growth 4 days Karrie Linton [[id number]] Collected:  11/04/20 0750 Order Status:  Completed Specimen:  Urine Updated:  11/06/20 2866 Special Requests: No Special Requests Culture result: No Growth (<1000 cfu/mL) CULTURE, RESPIRATORY/SPUTUM/BRONCH Judith Martinez [[id number]] Collected:  11/03/20 2130 Order Status:  Canceled Specimen:  Sputum CULTURE, BLOOD, LINE [[id number]] Collected:  11/03/20 2130 Order Status:  Canceled Specimen:  Blood Labs: No results for input(s): WBC, HGB, HCT, PLT, HGBEXT, HCTEXT, PLTEXT, HGBEXT, HCTEXT, PLTEXT in the last 72 hours. No results for input(s): NA, K, CL, CO2, BUN, CREA, GLU, CA, MG, PHOS, URICA in the last 72 hours. No results for input(s): ALT, AP, TBIL, TBILI, TP, ALB, GLOB, GGT, AML, LPSE in the last 72 hours. No lab exists for component: SGOT, GPT, AMYP, HLPSE No results for input(s): INR, PTP, APTT, INREXT, INREXT in the last 72 hours. No results for input(s): FE, TIBC, PSAT, FERR in the last 72 hours. No results found for: FOL, RBCF No results for input(s): PH, PCO2, PO2 in the last 72 hours. No results for input(s): CPK, CKNDX, TROIQ in the last 72 hours. No lab exists for component: CPKMB No results found for: CHOL, CHOLX, CHLST, CHOLV, HDL, HDLP, LDL, LDLC, DLDLP, TGLX, TRIGL, TRIGP, CHHD, CHHDX Lab Results Component Value Date/Time Glucose (POC) 220 (H) 11/08/2020 11:01 AM  
 Glucose (POC) 110 (H) 11/08/2020 08:26 AM  
 Glucose (POC) 86 11/06/2020 05:29 PM  
 Glucose (POC) 75 11/06/2020 03:12 PM  
 Glucose (POC) 84 11/06/2020 07:55 AM  
 
Lab Results Component Value Date/Time Color Yellow/Straw 11/03/2020 02:45 PM  
 Appearance Clear 11/03/2020 02:45 PM  
 Specific gravity 1.010 11/03/2020 02:45 PM  
 pH (UA) 6.5 11/03/2020 02:45 PM  
 Protein Negative 11/03/2020 02:45 PM  
 Glucose Normal (A) 11/03/2020 02:45 PM  
 Ketone Negative 11/03/2020 02:45 PM  
 Bilirubin Negative 11/03/2020 02:45 PM  
 Urobilinogen Normal 11/03/2020 02:45 PM  
 Nitrites Negative 11/03/2020 02:45 PM  
 Leukocyte Esterase Negative 11/03/2020 02:45 PM  
 Bacteria Negative 11/03/2020 02:45 PM  
 WBC 0-4 11/03/2020 02:45 PM  
 RBC 0-5 11/03/2020 02:45 PM  
 
   
Assessment: 1. Dementia 2. Hx of Congestive Heart Failure 3. Hyponatremia/likely from diuretic 4. Hypertension 5. Hyperlipidemia 6.  Failure to Thrive  
7.  Chest x-ray shows opacities/pleural effusion - Chest CT shows Near complete collapse of the right lung secondary to large pleural 
Effusion status post thoracocentesis 8. Paroxysmal A. Fib 9. Covid screening negative 10. Hypokalemia 11. Metastatic Adenocarcinoma 12. History of lung cancer Plan:  
 
Static post thoracocentesis - cytology results positive for metastatic adenocarcinoma; culture negative Oncology consult - Attempted to contact the family but unsuccessful, will try again today. Recommends hospice care due to dementia and poor performance status. [phone]. Left message on this number Call [phone] and try to left message Discussed with the sister-in-law make patient DNR/DNI discussed about patient is not a candidate for any chemotherapy and radiation as per oncologist she understand and okay to discharge to skilled care rehab when bed available On metoprolol 25 mg twice a day Monitor potassium Repeat the labs PT/OT recommends 5/days week in SNF Current Facility-Administered Medications:  
  acetylcysteine (MUCOMYST) 200 mg/mL (20 %) solution 600 mg, 600 mg, Nebulization, Q6H PRN, Asim Red MD 
  albuterol-ipratropium (DUO-NEB) 2.5 MG-0.5 MG/3 ML, 3 mL, Nebulization, Q6H PRN, Asim Red MD 
  heparin porcine injection 5,000 Units, 5,000 Units, SubCUTAneous, Q12H, Jesus Maldonado MD, 5,000 Units at 11/13/20 0900 
  metoprolol tartrate (LOPRESSOR) tablet 25 mg, 25 mg, Oral, Q12H, Deven Barry MD, 25 mg at 11/13/20 0900   piperacillin-tazobactam (ZOSYN) 3.375 g in 0.9% sodium chloride (MBP/ADV) 100 mL MBP, 3.375 g, IntraVENous, Q8H, Jesus Maldonado MD, Last Rate: 200 mL/hr at 11/13/20 0547, 3.375 g at 11/13/20 0547

## 2020-11-13 NOTE — PROGRESS NOTES
Problem: Falls - Risk of 
Goal: *Absence of Falls Description: Document Gumaro Hurley Fall Risk and appropriate interventions in the flowsheet. Outcome: Progressing Towards Goal 
Note: Fall Risk Interventions: 
Mobility Interventions: Bed/chair exit alarm Mentation Interventions: Bed/chair exit alarm Medication Interventions: Bed/chair exit alarm Elimination Interventions: Bed/chair exit alarm History of Falls Interventions: Bed/chair exit alarm Problem: Patient Education: Go to Patient Education Activity Goal: Patient/Family Education Outcome: Progressing Towards Goal 
  
Problem: Pressure Injury - Risk of 
Goal: *Prevention of pressure injury Description: Document Cruz Scale and appropriate interventions in the flowsheet. Outcome: Progressing Towards Goal 
Note: Pressure Injury Interventions: 
Sensory Interventions: Minimize linen layers, Keep linens dry and wrinkle-free, Discuss PT/OT consult with provider Moisture Interventions: Internal/External urinary devices Activity Interventions: PT/OT evaluation Mobility Interventions: PT/OT evaluation Nutrition Interventions: Document food/fluid/supplement intake Friction and Shear Interventions: Apply protective barrier, creams and emollients, Minimize layers, Foam dressings/transparent film/skin sealants Problem: Patient Education: Go to Patient Education Activity Goal: Patient/Family Education Outcome: Progressing Towards Goal 
  
Problem: Patient Education: Go to Patient Education Activity Goal: Patient/Family Education Outcome: Progressing Towards Goal 
  
Problem: Patient Education: Go to Patient Education Activity Goal: Patient/Family Education Outcome: Progressing Towards Goal 
  
Problem: Nutrition Deficit Goal: *Optimize nutritional status Outcome: Progressing Towards Goal

## 2020-11-13 NOTE — NURSE NAVIGATOR
Spoke with patient's POA., Flex Morris, regarding patient's care, along with CM Mireille. Tristan Aguirre understands that patient's cancer has returned and that she has a poor prognosis. Discussed benefits of hospice care however patient does not currently have a secondary payor source. CM to have financial department screen patient for Medicaid in hopes patient will qualify for Medicaid and obtain secondary payor. POA's questions answered to the best of my ability and she stated she was satisfied. Will continue to monitor as needed.

## 2020-11-13 NOTE — DISCHARGE SUMMARY
Discharge Summary PATIENT ID: Elza Pike MRN: [id number] YOB: 1940 DATE OF ADMISSION: 11/3/2020  2:03 PM   
DATE OF DISCHARGE:  
PRIMARY CARE PROVIDER: Kristen Pollock MD  
 
ATTENDING PHYSICIAN: [address] [place] [address] DISCHARGING PROVIDER: [address] [place] [address] CONSULTATIONS: IP CONSULT TO HOSPITALIST 
IP CONSULT TO PULMONOLOGY 
IP CONSULT TO INTERVENTIONAL RADIOLOGY 
IP CONSULT TO ONCOLOGY 
IP CONSULT TO CARDIOLOGY PROCEDURES/SURGERIES: * No surgery found * ADMITTING DIAGNOSES:   
Patient Active Problem List  
 Diagnosis Date Noted  Hyponatremia 11/03/2020 DISCHARGE DIAGNOSES / PLAN:   
1. Dementia 2. Hx of Congestive Heart Failure 3. Hyponatremia/likely from diuretic 4. Hypertension 5. Hyperlipidemia 6. Failure to Thrive  
7.  Chest x-ray shows opacities/pleural effusion - Chest CT shows Near complete collapse of the right lung secondary to large pleural 
Effusion status post thoracocentesis 8. Paroxysmal A. Fib 9. Covid screening negative 10. Hypokalemia 11. Metastatic adenocarcinoma 12. History of lung cancer 1. ADDITIONAL CARE RECOMMENDATIONS:  
 
 
 
DISCHARGE MEDICATIONS: 
Current Discharge Medication List  
  
START taking these medications Details  
!! albuterol-ipratropium (DUO-NEB) 2.5 mg-0.5 mg/3 ml nebu 3 mL by Nebulization route every six (6) hours as needed for Wheezing. Qty: 30 Nebule, Refills: 0  
  
!! albuterol-ipratropium (DUO-NEB) 2.5 mg-0.5 mg/3 ml nebu 3 mL by Nebulization route every six (6) hours as needed for Wheezing. Qty: 30 Nebule, Refills: 1  
  
amoxicillin-clavulanate (Augmentin) 875-125 mg per tablet Take 1 Tab by mouth two (2) times a day. Qty: 20 Tab, Refills: 0  
  
 !! - Potential duplicate medications found. Please discuss with provider. CONTINUE these medications which have NOT CHANGED Details  
carvediloL (COREG) 6.25 mg tablet Take 6.25 mg by mouth two (2) times daily (with meals). ferrous sulfate (IRON) 325 mg (65 mg iron) EC tablet Take 325 mg by mouth two (2) times a day. spironolactone (Aldactone) 25 mg tablet Take 25 mg by mouth daily. Take 1/2 daily  
  
atorvastatin (Lipitor) 40 mg tablet Take 40 mg by mouth daily. STOP taking these medications  
  
 potassium chloride SA (MICRO-K) 10 mEq capsule Comments:  
Reason for Stopping:   
   
 hydrALAZINE (APRESOLINE) 100 mg tablet Comments:  
Reason for Stopping:   
   
 furosemide (Lasix) 40 mg tablet Comments:  
Reason for Stopping:   
   
  
 
 
 
NOTIFY YOUR PHYSICIAN FOR ANY OF THE FOLLOWING:  
Fever over 101 degrees for 24 hours. Chest pain, shortness of breath, fever, chills, nausea, vomiting, diarrhea, change in mentation, falling, weakness, bleeding. Severe pain or pain not relieved by medications. Or, any other signs or symptoms that you may have questions about. DISPOSITION: 
x  Home With: 
 OT  PT  HH  RN  
  
 Long term SNF/Inpatient Rehab Independent/assisted living Hospice Other:  
 
 
PATIENT CONDITION AT DISCHARGE: Stable PHYSICAL EXAMINATION AT DISCHARGE: 
General:          Alert, cooperative, no distress, appears stated age. HEENT:           Atraumatic, anicteric sclerae, pink conjunctivae No oral ulcers, mucosa moist, throat clear, dentition fair Neck:               Supple, symmetrical 
Lungs:             Clear to auscultation bilaterally. No Wheezing or Rhonchi. No rales. Chest wall:      No tenderness  No Accessory muscle use. Heart:              Regular  rhythm,  No  murmur   No edema Abdomen:        Soft, non-tender. Not distended. Bowel sounds normal 
Extremities:     No cyanosis. No clubbing,   
                        Skin turgor normal, Capillary refill normal 
Skin:                Not pale. Not Jaundiced  No rashes Psych:             Not anxious or agitated.  
Neurologic:      Alert, moves all extremities, answers questions appropriately and responds to commands XR CHEST PA LAT Final Result Impression: Large right-sided pleural effusion and associated  
compression/consolidation of the right lung, increased. Mild atelectasis at the  
left base. Possible small left pleural effusion. XR CHEST PORT Final Result IMPRESSION: Diffuse airspace disease, right hemithorax, with improved aeration  
from previous. Differential diagnosis includes pneumonia, atelectasis, and/or  
reexpansion pulmonary edema. No pneumothorax. [address] Final Result IMPRESSION:   
Successful ultrasound-guided thoracentesis of right large pleural effusion, with  
approximately 1.3 L serous pleural fluid removed. Pleural fluid sample was also  
sent for lab analysis. CT CHEST WO CONT Final Result IMPRESSION: Near complete collapse of the right lung secondary to large pleural  
effusion XR HIP LT W OR WO PELV 2-3 VWS Final Result IMPRESSION:  
1. No acute bony findings. XR CHEST SNGL V Final Result No results found for this or any previous visit (from the past 24 hour(s)).   
 
 
HOSPITAL COURSE: 
History of present illness precipitation physical at the time of admission as the patient was admitted because of altered mental status history of dementia history of CHF hyponatremia likely from diuretic chest x-ray shows large pleural effusion CT scan shows complete collapse of the right lung secondary to large pleural effusion seen by the pulmonologist and the cardiologist during this admission patient have thoracocentesis done and remove 1200 cc patient tolerated the procedure well patient have paroxysmal A. fib fib seen by the cardiology patient converted to normal sinus rhythm patient echo done shows ejection fraction about 25% stage I diastolic dysfunction, this morning patient was alert awake denies any chest pain shortness of breath nausea no vomiting only concern about his poor appetite but is improving Patient denies any chest pain or shortness of breath at all 
 
 
admitted because of large pleural effusion collapse of the lung seen by the pulmonologist had a thoracocentesis done results came back positive for adenocarcinoma seen by the oncology because of other medical condition including failure to thrive poor appetite history of lung cancer in the past never been treated dementia patient is not a good candidate for any chemotherapy discussed with the patient family they make her DNR/DNI patient is going to skilled care rehab Have a detailed discussion with the family regarding patient condition and prognosis Time spent is 35 minutes 50% time spent in counseling and coordination of care Signed: Wolfgang Encinas MD 
11/13/2020 
12:04 PM

## 2020-11-13 NOTE — PROGRESS NOTES
Pulmonary Progress Note Daily Progress Note: 11/13/2020 Subjective: The patient is seen for follow  up. Excerpts from admission 11/3/2020 or consult notes as follows:  
69-year-old lady came in because of generalized weakness lethargy past medical history of dementia congestive heart failure hypertension hyperlipidemia patient was discharged from home with a sling on her right arm as she fell. Now chest x-ray and CAT scan of the chest was done which shows right lower lobe collapse with pleural effusion unable to get much history out of the patient so pulmonary consult was called for further evaluation 11/8 denies cough or shortness of breath remains on room air 11/09 Today she denies cough and shortness of breath. She had a chest Xray this morning, awaiting read. She is on room air breathing well. 
11/10 She says she feels great and denies SOB and cough. CXR yesterday shows enlarging right pleural effusion and atelectasis and atelectasis of left base. 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 She is on room air. 11/12 She is on room air. Awaiting placement. Problem List: 
Problem List as of 11/13/2020 Never Reviewed Codes Class Noted - Resolved Hyponatremia ICD-10-CM: E87.1 ICD-9-CM: 276.1  11/3/2020 - Present Medications reviewed Current Facility-Administered Medications Medication Dose Route Frequency  influenza vaccine 2020-21 (4 yrs+)(PF) (FLUCELVAX QUAD) injection 0.5 mL  0.5 mL IntraMUSCular PRIOR TO DISCHARGE  acetylcysteine (MUCOMYST) 200 mg/mL (20 %) solution 600 mg  600 mg Nebulization Q6H PRN  
 albuterol-ipratropium (DUO-NEB) 2.5 MG-0.5 MG/3 ML  3 mL Nebulization Q6H PRN  
 heparin porcine injection 5,000 Units  5,000 Units SubCUTAneous Q12H  
 metoprolol tartrate (LOPRESSOR) tablet 25 mg  25 mg Oral Q12H  piperacillin-tazobactam (ZOSYN) 3.375 g in 0.9% sodium chloride (MBP/ADV) 100 mL MBP  3.375 g IntraVENous Q8H Review of Systems: A comprehensive review of systems was negative except for that written in the HPI. Objective:  
Physical Exam:  
 
Visit Vitals BP (!) 146/93 (BP 1 Location: Left arm, BP Patient Position: At rest) Pulse 79 Temp 98.2 °F (36.8 °C) Resp 18 Ht 5' 2.01\" (1.575 m) Wt 63.5 kg (140 lb) SpO2 100% BMI 25.60 kg/m² O2 Device: Room air Temp (24hrs), Av.3 °F (36.8 °C), Min:97.8 °F (36.6 °C), Max:99.1 °F (37.3 °C) No intake/output data recorded.  1901 -  0700 In: 80 [P.O.:780] Out: 1400 [[id number]] Constitutional:  
 Awake alert seems oriented HENT:  
Head: Normocephalic and atraumatic. Eyes: Pupils are equal, round, and reactive to light. Extraocular movements intact Neck: Normal range of motion. Neck supple. No definite JVD Cardiovascular: Normal rate and regular rhythm. Pulmonary/Chest:  
 Clear anteriorly and left lateral with fairly good breath sounds right lateral and right posterior no definite wheezes or rales Abdominal: Soft. Bowel sounds are normal.  
Musculoskeletal: Normal range of motion. Skin: Skin is warm. Data Review:  
   
Recent Days: 
No results for input(s): WBC, HGB, HCT, PLT, HGBEXT, HCTEXT, PLTEXT, HGBEXT, HCTEXT, PLTEXT in the last 72 hours. No results for input(s): NA, K, CL, CO2, GLU, BUN, CREA, CA, MG, PHOS, ALB, TBIL, TBILI, ALT, INR, INREXT, INREXT in the last 72 hours. No lab exists for component: SGOT Room air oxygen saturation 98% 24 Hour Results: No results found for this or any previous visit (from the past 24 hour(s)). Imaging: 
  
   
CXR Results  (Last 48 hours)  
  None  
   
  
    
Results from Hospital Encounter encounter on 20 XR HIP LT W OR WO PELV 2-3 VWS  
  Narrative Fall 1 week ago. 
  
No comparison. 
  
FINDINGS: AP pelvis and frog-leg view of the left hip. No acute fracture or dislocation. Deformity of the left femur trochanter and proximal diaphysis, 
appears chronic. No radiopaque foreign body. 
   
  Impression IMPRESSION: 
1. No acute bony findings. XR CHEST SNGL V  
  Narrative Chest single view. 
  
Comparison single view chest March 29, 2018. 
  
Right-sided Port-A-Cath stable position. New opacification of the lower two thirds right hemithorax. This is likely 
related to a combination of large volume pleural fluid and atelectatic lung. Left lung is aerated. Cardiac and mediastinal structures unchanged. No 
pneumothorax. Nonacute rib fractures noted. Results from [facility] Encounter encounter on 10/26/20 XR SHOULDER RT AP/LAT MIN 2 V  
  Narrative 3 views of the right shoulder reveal diffuse osteopenia. There is mild 
degenerative subluxation of the humerus with respect to the glenoid. No fracture 
or dislocation is identified. 
   
  Impression IMPRESSION: Degenerative changes.  
  
    
Results from Hospital Encounter encounter on 11/03/20 CT CHEST WO CONT  
  Narrative CT dose reduction was achieved through use of a standardized protocol tailored 
for this examination and automatic exposure control for dose modulation. 
  
Noncontrast study shows large right pleural effusion. Right middle and lower 
lobe are completely collapsed, with very few air bronchograms. The upper lobe is 
almost completely collapsed, sparingly anterior segment only. Mainstem bronchus 
is open. Proximal lobar bronchi are open. Left lung is clear. Multiple calcified 
mediastinal hilar lymph nodes. Small left pleural effusion. Small pericardial 
effusion. Normal caliber aorta. No significant upper abdominal or chest wall 
finding.  Pronounced soft tissue edema in the visualized portion of the left arm 
   
  Impression IMPRESSION: Near complete collapse of the right lung secondary to large pleural 
effusion  
  
 
  
IMPRESSION:  
 1. RLL collapse Atelectasis markedly improved on x-ray 11/7 and on exam  
2. Malignant pleural Effusion Right side status post thoracentesis 11/06 by IR 
3. Chronic Obstructive Pulmonary Disease with Severe Acute Exacerbation requiring inpatient hospitalization and management; no wheezing today 4. A. fib with RVR 5. History of heart failure hyponatremia ? Hx of lung cancer 6. Prognosis guarded   
   
RECOMMENDATIONS/PLAN:  
 
Discussed with the patient detail about her condition she told me that she had a history of lung cancer diagnosed more than 3 years ago in [place] and apparently she got treated for it I do not have any records and also she is intermittently confused Pleural fluid Cytology of thoracentesis on 11/06 showed many groups of abnormal epithelial cells are noted, with large nuclei and cytoplasmic vacuoles, indicative of metastatic adenocarcinoma. 1. Status post thoracentesis via IR 1260 cc fluid removed. Pleural fluid culture negative. Chest Xray 11/09 showed increased large right pleural effusion and atelectasis, as well as atelectasis of the left base and possible small left pleural effusion. 2. Nebulizer treatment with Mucomyst 
3. On zosyn She is agreeable to [facility] and rehab. 
  
Karolina Mosher MD

## 2020-11-13 NOTE — PROGRESS NOTES
CM spoke with patient's sister-in-law/PONEENA Berry for discharge plan along with Nurse Navigator, Birdie Feliz. Ragini Linares states she understands patient's prognosis. CM explained to Ragini Linares patient cannot return home without having someone there. CM explained patient does not have a payor for LTC or for hospice in a facility but she may go short term. Ragini Lniares is opting for patient to [facility] to [facility], in the meantime Ragini Linares would like patient to be screened for Medicaid benefits. CM has e-mailed [facility] to have the patient screened for Medicaid as well as messaged Stephanie via [facility] to see when patient may discharge to them. CM awaiting response. ADDENDUM Incoming call from [facility] stating they are still reviewing to see if patient can admit today. CM following. ADDENDUM Incoming call from [facility] stating the DON is gone for the day and so patient cannot admit today. Possibly discharge over the weekend if [facility] can admit patient.

## 2020-11-13 NOTE — PROGRESS NOTES
Hematology/Oncology Progress Note Patient: Maya Salgado MRN: [id number] YOB: 1940  Age: 79 y.o. Sex: female Admit Date: 11/3/2020 LOS: 10 days Chief Complaint: Admitted with generalized weakness Subjective:  
 
 Pleasantly confused. Constitutional Confused. Allergic/Immunologic No recent allergic reactions Eyes No significant visual difficulties. No diplopia. ENMT No problems with hearing, no sore throat, no sinus drainage. Endocrine No hot flashes or night sweats. No cold intolerance, polyuria, or polydipsia Hematologic/Lymphatic No easy bruising or bleeding. The patient denies any tender or palpable lymph nodes Breasts No abnormal masses of breast, nipple discharge or pain. Respiratory No dyspnea on exertion, orthopnea, chest pain, cough or hemoptysis. Cardiovascular No anginal chest pain, irregular heart beat, tachycardia, palpitations or orthopnea. Gastrointestinal No nausea, vomiting, diarrhea, constipation, cramping, dysphagia, reflux, heartburn, GI bleeding, or early satiety. No change in bowel habits. Genitourinary (M) No hematuria, dysuria, increased frequency, urgency, hesitancy or incontinence. Musculoskeletal No joint pain, swelling or redness. No decreased range of motion. Integumentary No chronic rashes, inflammation, ulcerations, pruritus, petechiae, purpura, ecchymoses, or skin changes. Neurologic No headache, blurred vision, and no areas of focal weakness or numbness. Normal gait. No sensory problems. Psychiatric No insomnia, depression, sherry or mood swings. No psychotropic drugs.   
  
 
Current Facility-Administered Medications:  
  influenza vaccine 2020-21 (4 yrs+)(PF) (FLUCELVAX QUAD) injection 0.5 mL, 0.5 mL, IntraMUSCular, PRIOR TO DISCHARGE, Jesus Maldonado MD 
  acetylcysteine (MUCOMYST) 200 mg/mL (20 %) solution 600 mg, 600 mg, Nebulization, Q6H PRN, Ada Red MD 
   albuterol-ipratropium (DUO-NEB) 2.5 MG-0.5 MG/3 ML, 3 mL, Nebulization, Q6H PRN, Asim Red MD 
  heparin porcine injection 5,000 Units, 5,000 Units, SubCUTAneous, Q12H, Jesus Maldonado MD, 5,000 Units at 11/13/20 0900 
  metoprolol tartrate (LOPRESSOR) tablet 25 mg, 25 mg, Oral, Q12H, Deven Barry MD, 25 mg at 11/13/20 0900   piperacillin-tazobactam (ZOSYN) 3.375 g in 0.9% sodium chloride (MBP/ADV) 100 mL MBP, 3.375 g, IntraVENous, Q8H, Jesus Maldonado MD, Last Rate: 200 mL/hr at 11/13/20 1409, 3.375 g at 11/13/20 1409 Objective:  
 
Vitals:  
 11/12/20 1954 11/13/20 0010 11/13/20 0811 11/13/20 1640 BP: 136/80 (!) 147/90 (!) 146/93 126/73 Pulse: 68 (!) 109 79 (!) 118 Resp: 18 18 18 18 Temp: 99.1 °F (37.3 °C) 97.8 °F (36.6 °C) 98.2 °F (36.8 °C) 98.1 °F (36.7 °C) SpO2: 99% 97% 100% 100% Weight:      
Height:      
  
 
 
Physical Exam:  
Constitutional Alert, cooperative, oriented. Mood and affect appropriate. Appears close to chronological age. Well nourished. Well developed. Head Normocephalic; no scars Eyes Conjunctivae and sclerae are clear and without icterus. Pupils are reactive and equal.  
ENMT Sinuses are nontender. No oral exudates, ulcers, masses, thrush or mucositis. Oropharynx clear. Tongue normal.  
Neck Supple without masses or thyromegaly. No jugular venous distension. Hematologic/Lymphatic No petechiae or purpura. No tender or palpable lymph nodes in the cervical, supraclavicular, axillary or inguinal area. Respiratory Lungs are clear to auscultation without rhonchi or wheezing. Cardiovascular Regular rate and rhythm of heart without murmurs, gallops or rubs. Chest / Line Site Chest is symmetric with no chest wall deformities. Abdomen Non-tender, non-distended, no masses, ascites or hepatosplenomegaly. Good bowel sounds. No guarding or rebound tenderness. No pulsatile masses. Musculoskeletal No tenderness or swelling, normal range of motion without obvious weakness. Extremities No visible deformities, no cyanosis, clubbing or edema. Skin No rashes, scars, or lesions suggestive of malignancy. No petechiae, purpura, or ecchymoses. No excoriations. Neurologic Confused, but conversant. Psychiatric Alert and oriented times three. Coherent speech. Verbalizes understanding of our discussions today. Lab/Data Review: No results for input(s): WBC, HGB, HCT, PLT, HGBEXT, HCTEXT, PLTEXT, HGBEXT, HCTEXT, PLTEXT in the last 72 hours. No results for input(s): NA, K, CL, CO2, GLU, BUN, CREA, CA, MG, PHOS, ALB, TBIL, TBILI, ALT, INR, INREXT, INREXT in the last 72 hours. No lab exists for component: SGOT No results for input(s): PH, PCO2, PO2, HCO3, FIO2 in the last 72 hours. No results found for this or any previous visit (from the past 24 hour(s)). Radiology: CT Results  (Last 48 hours) None Assessment and Plan: Active Problems: Hyponatremia (11/3/2020) Malignant Pleural Effusion: 
- CT on admission showed near complete collapse of right lung with large right sided effusion. 
- S/p thoracentesis on 11/6 with removal of 1260 cc of fluid with cytology showing adenocarcinoma. - Clinically could be lung primary. The patient reports h/o lung cancer treated at [place] many yrs ago. - The patient has dementia and poor performance status and therefore, I agree with Dr. Damon Owens, that hospice care would be the best option. 
  
Tried to call Jesus Talley @ [phone] as well as Mika Calvo on the listed numbers. Called multiple times with no response. Dr. Caren Munson was able to speak with family today. Discussed with . Will sign off. Signed By: Joseph Dai MD   
 November 13, 2020

## 2020-11-13 NOTE — PROGRESS NOTES
[facility], however after CM speaking with sister-in-law Bayron Samayoa yesterday, neither Dr. Celina Moran or Dr. Maceo Sandhoff have been able to contact the patient's family to update them so that we may proceed with discharge planning. Discharge plan is pending MD being able to speak with family.

## 2020-11-13 NOTE — PROGRESS NOTES
PT treatment attempted at [facility] declining participation with therapy due to being tired. Educated on importance but still declined. . Will continue to follow pt and will attempt treatment at a later time. Thank you

## 2020-11-14 NOTE — PROGRESS NOTES
Discharge Summary PATIENT ID: Jovanny Jha MRN: [id number] YOB: 1940 DATE OF ADMISSION: 11/3/2020  2:03 PM   
DATE OF DISCHARGE:  
PRIMARY CARE PROVIDER: Sulma Dumas MD  
 
ATTENDING PHYSICIAN: Raul Mtz DISCHARGING PROVIDER: Raul Mtz CONSULTATIONS: IP CONSULT TO HOSPITALIST 
IP CONSULT TO PULMONOLOGY 
IP CONSULT TO INTERVENTIONAL RADIOLOGY 
IP CONSULT TO ONCOLOGY 
IP CONSULT TO CARDIOLOGY PROCEDURES/SURGERIES: * No surgery found * ADMITTING DIAGNOSES:   
Patient Active Problem List  
 Diagnosis Date Noted  Hyponatremia 11/03/2020 DISCHARGE DIAGNOSES / PLAN:   
1. Dementia 2. Hx of Congestive Heart Failure 3. Hyponatremia/likely from diuretic 4. Hypertension 5. Hyperlipidemia 6. Failure to Thrive  
7.  Chest x-ray shows opacities/pleural effusion - Chest CT shows Near complete collapse of the right lung secondary to large pleural 
Effusion status post thoracocentesis 8. Paroxysmal A. Fib 9. Covid screening negative 10. Hypokalemia 11. Metastatic adenocarcinoma 12. History of lung cancer 1. ADDITIONAL CARE RECOMMENDATIONS:  
 
 
 
DISCHARGE MEDICATIONS: 
Current Discharge Medication List  
  
START taking these medications Details  
!! albuterol-ipratropium (DUO-NEB) 2.5 mg-0.5 mg/3 ml nebu 3 mL by Nebulization route every six (6) hours as needed for Wheezing. Qty: 30 Nebule, Refills: 0  
  
!! albuterol-ipratropium (DUO-NEB) 2.5 mg-0.5 mg/3 ml nebu 3 mL by Nebulization route every six (6) hours as needed for Wheezing. Qty: 30 Nebule, Refills: 1  
  
amoxicillin-clavulanate (Augmentin) 875-125 mg per tablet Take 1 Tab by mouth two (2) times a day. Qty: 20 Tab, Refills: 0  
  
 !! - Potential duplicate medications found. Please discuss with provider. CONTINUE these medications which have NOT CHANGED Details  
carvediloL (COREG) 6.25 mg tablet Take 6.25 mg by mouth two (2) times daily (with meals). ferrous sulfate (IRON) 325 mg (65 mg iron) EC tablet Take 325 mg by mouth two (2) times a day. spironolactone (Aldactone) 25 mg tablet Take 25 mg by mouth daily. Take 1/2 daily  
  
atorvastatin (Lipitor) 40 mg tablet Take 40 mg by mouth daily. STOP taking these medications  
  
 potassium chloride SA (MICRO-K) 10 mEq capsule Comments:  
Reason for Stopping:   
   
 hydrALAZINE (APRESOLINE) 100 mg tablet Comments:  
Reason for Stopping:   
   
 furosemide (Lasix) 40 mg tablet Comments:  
Reason for Stopping:   
   
  
 
 
 
NOTIFY YOUR PHYSICIAN FOR ANY OF THE FOLLOWING:  
Fever over 101 degrees for 24 hours. Chest pain, shortness of breath, fever, chills, nausea, vomiting, diarrhea, change in mentation, falling, weakness, bleeding. Severe pain or pain not relieved by medications. Or, any other signs or symptoms that you may have questions about. DISPOSITION: 
x  Home With: 
 OT  PT  HH  RN  
  
 Long term SNF/Inpatient Rehab Independent/assisted living Hospice Other:  
 
 
PATIENT CONDITION AT DISCHARGE: Stable PHYSICAL EXAMINATION AT DISCHARGE: 
General:          Alert, cooperative, no distress, appears stated age. HEENT:           Atraumatic, anicteric sclerae, pink conjunctivae No oral ulcers, mucosa moist, throat clear, dentition fair Neck:               Supple, symmetrical 
Lungs:             Clear to auscultation bilaterally. No Wheezing or Rhonchi. No rales. Chest wall:      No tenderness  No Accessory muscle use. Heart:              Regular  rhythm,  No  murmur   No edema Abdomen:        Soft, non-tender. Not distended. Bowel sounds normal 
Extremities:     No cyanosis. No clubbing,   
                        Skin turgor normal, Capillary refill normal 
Skin:                Not pale. Not Jaundiced  No rashes Psych:             Not anxious or agitated.  
Neurologic:      Alert, moves all extremities, answers questions appropriately and responds to commands XR CHEST PA LAT Final Result Impression: Large right-sided pleural effusion and associated  
compression/consolidation of the right lung, increased. Mild atelectasis at the  
left base. Possible small left pleural effusion. XR CHEST PORT Final Result IMPRESSION: Diffuse airspace disease, right hemithorax, with improved aeration  
from previous. Differential diagnosis includes pneumonia, atelectasis, and/or  
reexpansion pulmonary edema. No pneumothorax. [address] Final Result IMPRESSION:   
Successful ultrasound-guided thoracentesis of right large pleural effusion, with  
approximately 1.3 L serous pleural fluid removed. Pleural fluid sample was also  
sent for lab analysis. CT CHEST WO CONT Final Result IMPRESSION: Near complete collapse of the right lung secondary to large pleural  
effusion XR HIP LT W OR WO PELV 2-3 VWS Final Result IMPRESSION:  
1. No acute bony findings. XR CHEST SNGL V Final Result No results found for this or any previous visit (from the past 24 hour(s)).   
 
 
HOSPITAL COURSE: 
History of present illness precipitation physical at the time of admission as the patient was admitted because of altered mental status history of dementia history of CHF hyponatremia likely from diuretic chest x-ray shows large pleural effusion CT scan shows complete collapse of the right lung secondary to large pleural effusion seen by the pulmonologist and the cardiologist during this admission patient have thoracocentesis done and remove 1200 cc patient tolerated the procedure well patient have paroxysmal A. fib fib seen by the cardiology patient converted to normal sinus rhythm patient echo done shows ejection fraction about 70% stage I diastolic dysfunction, this morning patient was alert awake denies any chest pain shortness of breath nausea no vomiting only concern about his poor appetite but is improving Patient denies any chest pain or shortness of breath at all 
 
 
admitted because of large pleural effusion collapse of the lung seen by the pulmonologist had a thoracocentesis done results came back positive for adenocarcinoma seen by the oncology because of other medical condition including failure to thrive poor appetite history of lung cancer in the past never been treated dementia patient is not a good candidate for any chemotherapy discussed with the patient family they make her DNR/DNI patient is going to skilled care rehab Have a detailed discussion with the family regarding patient condition and prognosis Time spent is 35 minutes 50% time spent in counseling and coordination of care Signed:  
Agnieszka Coffey MD 
11/14/2020 
12:04 PM

## 2020-11-14 NOTE — PROGRESS NOTES
Pulmonary Progress Note Daily Progress Note: 11/14/2020 Subjective: The patient is seen for follow  up. Excerpts from admission 11/3/2020 or consult notes as follows:  
69-year-old lady came in because of generalized weakness lethargy past medical history of dementia congestive heart failure hypertension hyperlipidemia patient was discharged from home with a sling on her right arm as she fell. Now chest x-ray and CAT scan of the chest was done which shows right lower lobe collapse with pleural effusion unable to get much history out of the patient so pulmonary consult was called for further evaluation 11/8 denies cough or shortness of breath remains on room air 11/09 Today she denies cough and shortness of breath. She had a chest Xray this morning, awaiting read. She is on room air breathing well. 
11/10 She says she feels great and denies SOB and cough. CXR yesterday shows enlarging right pleural effusion and atelectasis and atelectasis of left base. 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 She is on room air. 11/12 She is on room air. Awaiting placement. Problem List: 
Problem List as of 11/14/2020 Never Reviewed Codes Class Noted - Resolved Hyponatremia ICD-10-CM: E87.1 ICD-9-CM: 276.1  11/3/2020 - Present Medications reviewed Current Facility-Administered Medications Medication Dose Route Frequency  metoprolol tartrate (LOPRESSOR) tablet 50 mg  50 mg Oral Q12H  
 influenza vaccine 2020-21 (4 yrs+)(PF) (FLUCELVAX QUAD) injection 0.5 mL  0.5 mL IntraMUSCular PRIOR TO DISCHARGE  acetylcysteine (MUCOMYST) 200 mg/mL (20 %) solution 600 mg  600 mg Nebulization Q6H PRN  
 albuterol-ipratropium (DUO-NEB) 2.5 MG-0.5 MG/3 ML  3 mL Nebulization Q6H PRN  
 heparin porcine injection 5,000 Units  5,000 Units SubCUTAneous Q12H  piperacillin-tazobactam (ZOSYN) 3.375 g in 0.9% sodium chloride (MBP/ADV) 100 mL MBP  3.375 g IntraVENous Q8H Review of Systems: A comprehensive review of systems was negative except for that written in the HPI. Objective:  
Physical Exam:  
 
Visit Vitals BP (!) 153/90 Pulse (!) 111 Temp 97.9 °F (36.6 °C) Resp 16 Ht 5' 2.01\" (1.575 m) Wt 63.5 kg (140 lb) SpO2 97% BMI 25.60 kg/m² O2 Device: Room air Temp (24hrs), Av.3 °F (36.8 °C), Min:97.9 °F (36.6 °C), Max:98.7 °F (37.1 °C) No intake/output data recorded.  1901 -  0700 In: -  
Out: 700 [Urine:700] Constitutional:  
 Awake alert seems oriented HENT:  
Head: Normocephalic and atraumatic. Eyes: Pupils are equal, round, and reactive to light. Extraocular movements intact Neck: Normal range of motion. Neck supple. No definite JVD Cardiovascular: Normal rate and regular rhythm. Pulmonary/Chest:  
 Clear anteriorly and left lateral with fairly good breath sounds right lateral and right posterior no definite wheezes or rales Abdominal: Soft. Bowel sounds are normal.  
Musculoskeletal: Normal range of motion. Skin: Skin is warm. Data Review:  
   
Recent Days: 
No results for input(s): WBC, HGB, HCT, PLT, HGBEXT, HCTEXT, PLTEXT, HGBEXT, HCTEXT, PLTEXT in the last 72 hours. No results for input(s): NA, K, CL, CO2, GLU, BUN, CREA, CA, MG, PHOS, ALB, TBIL, TBILI, ALT, INR, INREXT, INREXT in the last 72 hours. No lab exists for component: SGOT Room air oxygen saturation 98% 24 Hour Results: No results found for this or any previous visit (from the past 24 hour(s)). Imaging: 
  
   
CXR Results  (Last 48 hours)  
  None  
   
  
    
Results from Hospital Encounter encounter on 20 XR HIP LT W OR WO PELV 2-3 VWS  
  Narrative Fall 1 week ago. 
  
No comparison. 
  
FINDINGS: AP pelvis and frog-leg view of the left hip. No acute fracture or 
dislocation.  Deformity of the left femur trochanter and proximal diaphysis, 
 appears chronic. No radiopaque foreign body. 
   
  Impression IMPRESSION: 
1. No acute bony findings. XR CHEST SNGL V  
  Narrative Chest single view. 
  
Comparison single view chest March 29, 2018. 
  
Right-sided Port-A-Cath stable position. New opacification of the lower two thirds right hemithorax. This is likely 
related to a combination of large volume pleural fluid and atelectatic lung. Left lung is aerated. Cardiac and mediastinal structures unchanged. No 
pneumothorax. Nonacute rib fractures noted. Results from [facility] Encounter encounter on 10/26/20 XR SHOULDER RT AP/LAT MIN 2 V  
  Narrative 3 views of the right shoulder reveal diffuse osteopenia. There is mild 
degenerative subluxation of the humerus with respect to the glenoid. No fracture 
or dislocation is identified. 
   
  Impression IMPRESSION: Degenerative changes.  
  
    
Results from Hospital Encounter encounter on 11/03/20 CT CHEST WO CONT  
  Narrative CT dose reduction was achieved through use of a standardized protocol tailored 
for this examination and automatic exposure control for dose modulation. 
  
Noncontrast study shows large right pleural effusion. Right middle and lower 
lobe are completely collapsed, with very few air bronchograms. The upper lobe is 
almost completely collapsed, sparingly anterior segment only. Mainstem bronchus 
is open. Proximal lobar bronchi are open. Left lung is clear. Multiple calcified 
mediastinal hilar lymph nodes. Small left pleural effusion. Small pericardial 
effusion. Normal caliber aorta. No significant upper abdominal or chest wall 
finding. Pronounced soft tissue edema in the visualized portion of the left arm 
   
  Impression IMPRESSION: Near complete collapse of the right lung secondary to large pleural 
effusion  
  
 
  
IMPRESSION:  
1.  RLL collapse Atelectasis markedly improved on x-ray 11/7 and on exam  
 2. Malignant pleural Effusion Right side status post thoracentesis 11/06 by IR 
3. Chronic Obstructive Pulmonary Disease with Severe Acute Exacerbation requiring inpatient hospitalization and management; no wheezing today 4. A. fib with RVR 5. History of heart failure hyponatremia ? Hx of lung cancer 6. Prognosis guarded   
   
RECOMMENDATIONS/PLAN:  
 
Discussed with the patient detail about her condition she told me that she had a history of lung cancer diagnosed more than 3 years ago in [place] and apparently she got treated for it I do not have any records and also she is intermittently confused Pleural fluid Cytology of thoracentesis on 11/06 showed many groups of abnormal epithelial cells are noted, with large nuclei and cytoplasmic vacuoles, indicative of metastatic adenocarcinoma. 1. Status post thoracentesis via IR 1260 cc fluid removed. Pleural fluid culture negative. Chest Xray 11/09 showed increased large right pleural effusion and atelectasis, as well as atelectasis of the left base and possible small left pleural effusion. 2. Nebulizer treatment with Mucomyst 
3. On zosyn She is agreeable to [facility] and rehab. 
  
Ev Vega MD

## 2020-11-14 NOTE — PROGRESS NOTES
Spoke with pt's family and sister (POA) Lisset Villanueva about pt diagnosis. The family would like to make sure that provider has informed pt of prognosis and that pt is aware of out come. Family does not want to be the to inform the pt of this information. I will also consult pastoral care.

## 2020-11-15 NOTE — PROGRESS NOTES
Pulmonary Progress Note Daily Progress Note: 11/15/2020 Subjective: The patient is seen for follow  up. Excerpts from admission 11/3/2020 or consult notes as follows:  
52-year-old lady came in because of generalized weakness lethargy past medical history of dementia congestive heart failure hypertension hyperlipidemia patient was discharged from home with a sling on her right arm as she fell. Now chest x-ray and CAT scan of the chest was done which shows right lower lobe collapse with pleural effusion unable to get much history out of the patient so pulmonary consult was called for further evaluation 11/8 denies cough or shortness of breath remains on room air 11/09 Today she denies cough and shortness of breath. She had a chest Xray this morning, awaiting read. She is on room air breathing well. 
11/10 She says she feels great and denies SOB and cough. CXR yesterday shows enlarging right pleural effusion and atelectasis and atelectasis of left base. 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 She is on room air. 11/12 She is on room air. Awaiting placement. Problem List: 
Problem List as of 11/15/2020 Never Reviewed Codes Class Noted - Resolved Hyponatremia ICD-10-CM: E87.1 ICD-9-CM: 276.1  11/3/2020 - Present Medications reviewed Current Facility-Administered Medications Medication Dose Route Frequency  metoprolol tartrate (LOPRESSOR) tablet 50 mg  50 mg Oral Q12H  
 influenza vaccine 2020-21 (4 yrs+)(PF) (FLUCELVAX QUAD) injection 0.5 mL  0.5 mL IntraMUSCular PRIOR TO DISCHARGE  acetylcysteine (MUCOMYST) 200 mg/mL (20 %) solution 600 mg  600 mg Nebulization Q6H PRN  
 albuterol-ipratropium (DUO-NEB) 2.5 MG-0.5 MG/3 ML  3 mL Nebulization Q6H PRN  
 heparin porcine injection 5,000 Units  5,000 Units SubCUTAneous Q12H  piperacillin-tazobactam (ZOSYN) 3.375 g in 0.9% sodium chloride (MBP/ADV) 100 mL MBP  3.375 g IntraVENous Q8H Review of Systems: A comprehensive review of systems was negative except for that written in the HPI. Objective:  
Physical Exam:  
 
Visit Vitals BP (!) 147/87 (BP 1 Location: Left arm, BP Patient Position: At rest) Pulse (!) 104 Temp 98 °F (36.7 °C) Resp 18 Ht 5' 2.01\" (1.575 m) Wt 63.5 kg (140 lb) SpO2 96% BMI 25.60 kg/m² O2 Device: Room air Temp (24hrs), Av.4 °F (36.9 °C), Min:98 °F (36.7 °C), Max:98.6 °F (37 °C) No intake/output data recorded. No intake/output data recorded. Constitutional:  
 Awake alert seems oriented HENT:  
Head: Normocephalic and atraumatic. Eyes: Pupils are equal, round, and reactive to light. Extraocular movements intact Neck: Normal range of motion. Neck supple. No definite JVD Cardiovascular: Normal rate and regular rhythm. Pulmonary/Chest:  
 Clear anteriorly and left lateral with fairly good breath sounds right lateral and right posterior no definite wheezes or rales Abdominal: Soft. Bowel sounds are normal.  
Musculoskeletal: Normal range of motion. Skin: Skin is warm. Data Review:  
   
Recent Days: 
No results for input(s): WBC, HGB, HCT, PLT, HGBEXT, HCTEXT, PLTEXT, HGBEXT, HCTEXT, PLTEXT in the last 72 hours. No results for input(s): NA, K, CL, CO2, GLU, BUN, CREA, CA, MG, PHOS, ALB, TBIL, TBILI, ALT, INR, INREXT, INREXT in the last 72 hours. No lab exists for component: SGOT Room air oxygen saturation 98% 24 Hour Results: No results found for this or any previous visit (from the past 24 hour(s)). Imaging: 
  
   
CXR Results  (Last 48 hours)  
  None  
   
  
    
Results from Hospital Encounter encounter on 20 XR HIP LT W OR WO PELV 2-3 VWS  
  Narrative Fall 1 week ago. 
  
No comparison. 
  
FINDINGS: AP pelvis and frog-leg view of the left hip. No acute fracture or 
dislocation.  Deformity of the left femur trochanter and proximal diaphysis, 
 appears chronic. No radiopaque foreign body. 
   
  Impression IMPRESSION: 
1. No acute bony findings. XR CHEST SNGL V  
  Narrative Chest single view. 
  
Comparison single view chest March 29, 2018. 
  
Right-sided Port-A-Cath stable position. New opacification of the lower two thirds right hemithorax. This is likely 
related to a combination of large volume pleural fluid and atelectatic lung. Left lung is aerated. Cardiac and mediastinal structures unchanged. No 
pneumothorax. Nonacute rib fractures noted. Results from [facility] Encounter encounter on 10/26/20 XR SHOULDER RT AP/LAT MIN 2 V  
  Narrative 3 views of the right shoulder reveal diffuse osteopenia. There is mild 
degenerative subluxation of the humerus with respect to the glenoid. No fracture 
or dislocation is identified. 
   
  Impression IMPRESSION: Degenerative changes.  
  
    
Results from Hospital Encounter encounter on 11/03/20 CT CHEST WO CONT  
  Narrative CT dose reduction was achieved through use of a standardized protocol tailored 
for this examination and automatic exposure control for dose modulation. 
  
Noncontrast study shows large right pleural effusion. Right middle and lower 
lobe are completely collapsed, with very few air bronchograms. The upper lobe is 
almost completely collapsed, sparingly anterior segment only. Mainstem bronchus 
is open. Proximal lobar bronchi are open. Left lung is clear. Multiple calcified 
mediastinal hilar lymph nodes. Small left pleural effusion. Small pericardial 
effusion. Normal caliber aorta. No significant upper abdominal or chest wall 
finding. Pronounced soft tissue edema in the visualized portion of the left arm 
   
  Impression IMPRESSION: Near complete collapse of the right lung secondary to large pleural 
effusion  
  
 
  
IMPRESSION:  
1.  RLL collapse Atelectasis markedly improved on x-ray 11/7 and on exam  
 2. Malignant pleural Effusion Right side status post thoracentesis 11/06 by IR 
3. Chronic Obstructive Pulmonary Disease with Severe Acute Exacerbation requiring inpatient hospitalization and management; no wheezing today 4. A. fib with RVR 5. History of heart failure hyponatremia ? Hx of lung cancer 6. Prognosis guarded   
   
RECOMMENDATIONS/PLAN:  
 
Discussed with the patient detail about her condition she told me that she had a history of lung cancer diagnosed more than 3 years ago in [place] and apparently she got treated for it I do not have any records and also she is intermittently confused Pleural fluid Cytology of thoracentesis on 11/06 showed many groups of abnormal epithelial cells are noted, with large nuclei and cytoplasmic vacuoles, indicative of metastatic adenocarcinoma. 1. Status post thoracentesis via IR 1260 cc fluid removed. Pleural fluid culture negative. Chest Xray 11/09 showed increased large right pleural effusion and atelectasis, as well as atelectasis of the left base and possible small left pleural effusion. 2. Nebulizer treatment with Mucomyst 
3. On zosyn She is agreeable to [facility] and rehab. 
  
Rosalinda Jiménez MD

## 2020-11-15 NOTE — PROGRESS NOTES
Spiritual Care Assessment/Progress Note [facility] 
 
 
NAME: Maya Salgado      MRN: [id number] AGE: 79 y.o. SEX: female Yazidi Affiliation: Hoahaoism  
Language: English  
 
11/15/2020     Total Time (in minutes): 25 Spiritual Assessment begun in [facility] 5 [facility] through conversation with: 
  
    [x]Patient        [] Family    [] Friend(s) Reason for Consult: Request by physician Spiritual beliefs: (Please include comment if needed) [x] Identifies with a sloan tradition:    Hoahaoism 
   [] Supported by a sloan community:        
   [] Claims no spiritual orientation:       
   [] Seeking spiritual identity:            
   [] Adheres to an individual form of spirituality:       
   [] Not able to assess:                   
 
    
Identified resources for coping:  
   [] Prayer                           
   [] Music                  [] Guided Imagery [x] Family/friends                 [] Pet visits [] Devotional reading                         [] Unknown 
   [] Other:                                         
 
 
Interventions offered during this visit: (See comments for more details) Patient Interventions: Affirmation of emotions/emotional suffering, Affirmation of sloan, Catharsis/review of pertinent events in supportive environment, Coping skills reviewed/reinforced, Initial/Spiritual assessment, patient floor, Normalization of emotional/spiritual concerns, Life review/legacy Plan of Care: 
 
 [] Support spiritual and/or cultural needs  
 [] Support AMD and/or advance care planning process    
 [] Support grieving process 
 [] Coordinate Rites and/or Rituals  
 [] Coordination with community clergy [] No spiritual needs identified at this time 
 [] Detailed Plan of Care below (See Comments)  [] Make referral to Music Therapy 
[] Make referral to Pet Therapy    
[] Make referral to Addiction services 
[] Make referral to [facility] [] Make referral to Spiritual Care Partner 
[] No future visits requested       
[x] Follow up visits as needed Comments:  visited on 5E, 511 in response to RN consult referral.  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 Patient and  explored her life review, 1 older brother and 1 younger (24 yrs her corie) and her feelings of grief due to both of their deaths. Patient indicated that she spoiled her younger brother. Patient spoke of her reluctance to express emotion.  and patient also reflected on the changes and challenges of retiring and perhaps feelings of grief due to the loss of close friendships and a vocation that she loved. She also lamented that at times she wishes she would have retired sooner.  and patient explored patient's sense of uncertainty re her dx and px and is hopeful to talk with a Dr soon re her status. Patient indicated no needs at this time,  offered words of caring and support, assured patient of Spiritual Care support as needed.   
 
Visited by Schuyler Su, [address] [facility]

## 2020-11-15 NOTE — PROGRESS NOTES
Discharge Summary PATIENT ID: Elza Pike MRN: [id number] YOB: 1940 DATE OF ADMISSION: 11/3/2020  2:03 PM   
DATE OF DISCHARGE:  
PRIMARY CARE PROVIDER: Kristen Pollock MD  
 
ATTENDING PHYSICIAN: [address] [place] [address] DISCHARGING PROVIDER: [address] [place] [address] CONSULTATIONS: IP CONSULT TO HOSPITALIST 
IP CONSULT TO PULMONOLOGY 
IP CONSULT TO INTERVENTIONAL RADIOLOGY 
IP CONSULT TO ONCOLOGY 
IP CONSULT TO CARDIOLOGY PROCEDURES/SURGERIES: * No surgery found * ADMITTING DIAGNOSES:   
Patient Active Problem List  
 Diagnosis Date Noted  Hyponatremia 11/03/2020 DISCHARGE DIAGNOSES / PLAN:   
1. Dementia 2. Hx of Congestive Heart Failure 3. Hyponatremia/likely from diuretic 4. Hypertension 5. Hyperlipidemia 6. Failure to Thrive  
7.  Chest x-ray shows opacities/pleural effusion - Chest CT shows Near complete collapse of the right lung secondary to large pleural 
Effusion status post thoracocentesis 8. Paroxysmal A. Fib 9. Covid screening negative 10. Hypokalemia 11. Metastatic adenocarcinoma 12. History of lung cancer 1. ADDITIONAL CARE RECOMMENDATIONS:  
 
 
 
DISCHARGE MEDICATIONS: 
Current Discharge Medication List  
  
START taking these medications Details  
!! albuterol-ipratropium (DUO-NEB) 2.5 mg-0.5 mg/3 ml nebu 3 mL by Nebulization route every six (6) hours as needed for Wheezing. Qty: 30 Nebule, Refills: 0  
  
!! albuterol-ipratropium (DUO-NEB) 2.5 mg-0.5 mg/3 ml nebu 3 mL by Nebulization route every six (6) hours as needed for Wheezing. Qty: 30 Nebule, Refills: 1  
  
amoxicillin-clavulanate (Augmentin) 875-125 mg per tablet Take 1 Tab by mouth two (2) times a day. Qty: 20 Tab, Refills: 0  
  
 !! - Potential duplicate medications found. Please discuss with provider. CONTINUE these medications which have NOT CHANGED Details  
carvediloL (COREG) 6.25 mg tablet Take 6.25 mg by mouth two (2) times daily (with meals). ferrous sulfate (IRON) 325 mg (65 mg iron) EC tablet Take 325 mg by mouth two (2) times a day. spironolactone (Aldactone) 25 mg tablet Take 25 mg by mouth daily. Take 1/2 daily  
  
atorvastatin (Lipitor) 40 mg tablet Take 40 mg by mouth daily. STOP taking these medications  
  
 potassium chloride SA (MICRO-K) 10 mEq capsule Comments:  
Reason for Stopping:   
   
 hydrALAZINE (APRESOLINE) 100 mg tablet Comments:  
Reason for Stopping:   
   
 furosemide (Lasix) 40 mg tablet Comments:  
Reason for Stopping:   
   
  
 
 
 
NOTIFY YOUR PHYSICIAN FOR ANY OF THE FOLLOWING:  
Fever over 101 degrees for 24 hours. Chest pain, shortness of breath, fever, chills, nausea, vomiting, diarrhea, change in mentation, falling, weakness, bleeding. Severe pain or pain not relieved by medications. Or, any other signs or symptoms that you may have questions about. DISPOSITION: 
x  Home With: 
 OT  PT  HH  RN  
  
 Long term SNF/Inpatient Rehab Independent/assisted living Hospice Other:  
 
 
PATIENT CONDITION AT DISCHARGE: Stable PHYSICAL EXAMINATION AT DISCHARGE: 
General:          Alert, cooperative, no distress, appears stated age. HEENT:           Atraumatic, anicteric sclerae, pink conjunctivae No oral ulcers, mucosa moist, throat clear, dentition fair Neck:               Supple, symmetrical 
Lungs:             Clear to auscultation bilaterally. No Wheezing or Rhonchi. No rales. Chest wall:      No tenderness  No Accessory muscle use. Heart:              Regular  rhythm,  No  murmur   No edema Abdomen:        Soft, non-tender. Not distended. Bowel sounds normal 
Extremities:     No cyanosis. No clubbing,   
                        Skin turgor normal, Capillary refill normal 
Skin:                Not pale. Not Jaundiced  No rashes Psych:             Not anxious or agitated.  
Neurologic:      Alert, moves all extremities, answers questions appropriately and responds to commands XR CHEST PA LAT Final Result Impression: Large right-sided pleural effusion and associated  
compression/consolidation of the right lung, increased. Mild atelectasis at the  
left base. Possible small left pleural effusion. XR CHEST PORT Final Result IMPRESSION: Diffuse airspace disease, right hemithorax, with improved aeration  
from previous. Differential diagnosis includes pneumonia, atelectasis, and/or  
reexpansion pulmonary edema. No pneumothorax. [address] Final Result IMPRESSION:   
Successful ultrasound-guided thoracentesis of right large pleural effusion, with  
approximately 1.3 L serous pleural fluid removed. Pleural fluid sample was also  
sent for lab analysis. CT CHEST WO CONT Final Result IMPRESSION: Near complete collapse of the right lung secondary to large pleural  
effusion XR HIP LT W OR WO PELV 2-3 VWS Final Result IMPRESSION:  
1. No acute bony findings. XR CHEST SNGL V Final Result No results found for this or any previous visit (from the past 24 hour(s)).   
 
 
HOSPITAL COURSE: 
History of present illness precipitation physical at the time of admission as the patient was admitted because of altered mental status history of dementia history of CHF hyponatremia likely from diuretic chest x-ray shows large pleural effusion CT scan shows complete collapse of the right lung secondary to large pleural effusion seen by the pulmonologist and the cardiologist during this admission patient have thoracocentesis done and remove 1200 cc patient tolerated the procedure well patient have paroxysmal A. fib fib seen by the cardiology patient converted to normal sinus rhythm patient echo done shows ejection fraction about 95% stage I diastolic dysfunction, this morning patient was alert awake denies any chest pain shortness of breath nausea no vomiting only concern about his poor appetite but is improving Patient denies any chest pain or shortness of breath at all 
 
 
admitted because of large pleural effusion collapse of the lung seen by the pulmonologist had a thoracocentesis done results came back positive for adenocarcinoma seen by the oncology because of other medical condition including failure to thrive poor appetite history of lung cancer in the past never been treated dementia patient is not a good candidate for any chemotherapy discussed with the patient family they make her DNR/DNI patient is going to skilled care rehab Have a detailed discussion with the family regarding patient condition and prognosis Time spent is 35 minutes 50% time spent in counseling and coordination of care Signed:  
Tj Banda MD 
11/15/2020 
12:04 PM

## 2020-11-16 NOTE — PROGRESS NOTES
*ATTENTION:  This note has been created by a medical student for educational purposes only. Please do not refer to the content of this note for clinical decision-making, billing, or other purposes. Please see attending physicians note to obtain clinical information on this patient. * Progress Note Patient: dOin Hills MRN: [id number]  SSN: [id number] YOB: 1940  Age: 79 y.o. Sex: female Admit Date: 11/3/2020 LOS: 13 days Subjective: Ms Lisa Bowen is a 78 yo female w/PMH of dementia, CHF, hyperlipidemia and HTN. Pt presented to the ED 11/04 with lethargy probably d/t Hyponatremia. Currently pt is alert and in no acute distress. Pt denies SOB, weakness or dizziness. Pt has poor appetite ; did not eat breakfast, she did however drink a small ensure. Objective:  
 
Vitals:  
 11/15/20 0911 11/15/20 1437 11/15/20 2026 11/15/20 2343 BP:  (!) 146/105 (!) 155/92 (!) 145/91 Pulse:  (!) 101 (!) 105 (!) 108 Resp:  16 18 18 Temp:  97.6 °F (36.4 °C) 98 °F (36.7 °C) 98 °F (36.7 °C) SpO2: 96%  96% 97% Weight:      
Height:      
  
Review of Systems Constitutional: Negative. HENT: Negative. Eyes: Negative. Respiratory: Negative. Cardiovascular: Negative. Gastrointestinal: Negative. Genitourinary: Negative. Musculoskeletal: Negative. Skin: Negative. Neurological: Negative. Endo/Heme/Allergies: Negative. Psychiatric/Behavioral: Negative. Physical Exam 
Vitals signs and nursing note reviewed. Constitutional:   
   General: She is not in acute distress. Appearance: Normal appearance. She is not ill-appearing. Eyes:  
   Pupils: Pupils are equal, round, and reactive to light. Cardiovascular:  
   Rate and Rhythm: Normal rate. Abdominal:  
   General: There is distension. Palpations: Abdomen is soft. Tenderness: There is no abdominal tenderness. There is no guarding. Skin: 
   General: Skin is dry. Neurological:  
   General: No focal deficit present. Mental Status: She is alert. Psychiatric:     
   Mood and Affect: Mood normal.     
   Thought Content: Thought content normal.  
 
 
 
Lab/Data Review: 
Lab results reviewed. For significant abnormal values and values requiring intervention, see assessment and plan. Assessment: Hypokalemia Problem List Items Addressed This Visit Other Hyponatremia - Primary Relevant Medications  
 potassium chloride SA (MICRO-K) 10 mEq capsule  
 ferrous sulfate (IRON) 325 mg (65 mg iron) EC tablet  
 spironolactone (Aldactone) 25 mg tablet  
 furosemide (Lasix) 40 mg tablet Other Visit Diagnoses Pleural effusion Plan:  
 
Current Facility-Administered Medications Medication Dose Route Frequency  potassium chloride 10 mEq in 100 ml IVPB  10 mEq IntraVENous Q1H  
 megestroL (MEGACE) 400 mg/10 mL (10 mL) oral suspension 400 mg  400 mg Oral BID  metoprolol tartrate (LOPRESSOR) tablet 50 mg  50 mg Oral Q12H  
 influenza vaccine 2020-21 (4 yrs+)(PF) (FLUCELVAX QUAD) injection 0.5 mL  0.5 mL IntraMUSCular PRIOR TO DISCHARGE  acetylcysteine (MUCOMYST) 200 mg/mL (20 %) solution 600 mg  600 mg Nebulization Q6H PRN  
 albuterol-ipratropium (DUO-NEB) 2.5 MG-0.5 MG/3 ML  3 mL Nebulization Q6H PRN  
 heparin porcine injection 5,000 Units  5,000 Units SubCUTAneous Q12H  piperacillin-tazobactam (ZOSYN) 3.375 g in 0.9% sodium chloride (MBP/ADV) 100 mL MBP  3.375 g IntraVENous Q8H Signed By: Ever Bowen November 16, 2020

## 2020-11-16 NOTE — PROGRESS NOTES
PHYSICAL THERAPY TREATMENT Patient: Chino Cruz (79 y.o. female) Date: 11/16/2020 Diagnosis: Hyponatremia [E87.1] <principal problem not specified> Precautions:   
Chart, physical therapy assessment, plan of care and goals were reviewed. Patient will move from supine to sit and sit to supine , scoot up and down, and roll side to side in bed with moderate assistance within 7 day(s). Patient will transfer from bed to chair and chair to bed with moderate assistance using the least restrictive device within 7 day(s). Patient will improve static sitting balance to minimal assistance within 1 week(s). Patient will ambulate 30 feet with minimal assistance with least restrictive device within 1 weeks. ASSESSMENT Patient continues with skilled PT services and is progressing towards goals. Pt. Semi supine in bed upon arrival with family members  in room. Performed supine LE TE. Removed pt. Pure wick and was covered in feces. Notified SN to get patient cleaned up. Pt. Still very lethargic. Able to Roll with Min A. Required Mod A for sup to sit and sit to sup. Performed 1 sit to stand with Min A and noticed increased RR. Patient reported feeling light headed with symptoms resolving in supine. Recommend DC to SNF. Left patient semi supine in bed with callbell and all needs met. .  
 
Current Level of Function Impacting Discharge (mobility/balance): ACTIVITY TOLERANCE Other factors to consider for discharge: tbd PLAN : 
Patient continues to benefit from skilled intervention to address the above impairments. Continue treatment per established plan of care. to address goals. Recommendation for discharge: (in order for the patient to meet his/her long term goals) Therapy up to 5 days/week in SNF setting This discharge recommendation: 
Has been made in collaboration with the attending provider and/or case management IF patient discharges home will need the following DME: to be determined (TBD) SUBJECTIVE:  
Patient stated  im okay can I sleep.  OBJECTIVE DATA SUMMARY:  
Critical Behavior: 
Neurologic State: Alert, Confused Orientation Level: Oriented to person Cognition: Decreased attention/concentration Functional Mobility Training: 
Bed Mobility: 
Rolling: Minimum assistance Supine to Sit: Moderate assistance Sit to Supine: Moderate assistance Scooting: Moderate assistance Transfers: 
Sit to Stand: Moderate assistance Stand to Sit: Contact guard assistance Balance: 
Sitting: Intact Sitting - Static: Good (unsupported) Sitting - Dynamic: Good (unsupported) Standing: Impaired;Pull to stand; With support Standing - Static: Poor Ambulation/Gait Training: 
  
  
  
  
  
  
  
  
  
  
  
  
  
  
  
  
  
  
Stairs: Therapeutic Exercises:  
Therapeutic Exercises:  
 
 
EXERCISE Sets Reps Active Active Assist  
Passive Self ROM Comments Ankle Pumps  20 [x] [] [] [] Quad Sets/Glut Sets   [] [] [] [] Hamstring Sets   [] [] [] [] Short Arc Quads   [] [] [] [] Heel Slides  20 [x] [] [] [] Straight Leg Raises   [] [] [] [] Hip abd/add  10 [x] [] [] [] Long Arc Quads  10 [x] [] [] [] Marching  10  [] [] []   
   [] [] [] []   
 
 
Pain Ratin Activity Tolerance:  
Poor Please refer to the flowsheet for vital signs taken during this treatment. After treatment patient left in no apparent distress:  
Supine in bed COMMUNICATION/COLLABORATION:  
The patients plan of care was discussed with: Physical therapy assistant. Ct Bill Time Calculation: 30 mins

## 2020-11-16 NOTE — PROGRESS NOTES
Comprehensive Nutrition Assessment Type and Reason for Visit: Reassess(Interim) Nutrition Recommendations/Plan:  
Continue current regular, soft solids diet 
            Continue Ensure Enlive to BID ContinueEnsure clear, apple daily             Continue Magic Cup daily Allow snacks as desired Consider adding MVI with pt poor PO intakes If pt decided to no go Hospice, TF would be best option to meet nutritional needs Please document % of all meal/snack consumed, wts, BMs 
 
Nutrition Assessment:  Admitted for FTT, needs SNF placement, noted pt in ED 10/26 for fall with fx. Pt now s/p thoracentesis with 1.26L removed (11/6), cytology +metastatic adenocarcinoma. MD's rec hospice however no confirmed decision yet. Per CM, current plans to d/c to [facility], working on [employer]. Per EMR pt with poor appetite and intakes ~10% of meals.  [facility] notifying RD that pt has not been touching trays for last week, pt only drinks ~50% of 1 Ensure Enlive/day (this was also observed by RD at initial assessment 11/11, pt refusing tray despite set-up and encouragement). Multiple ONS on for pt however does not appear to be helping with intakes, noted pt may benefit from appetite stimulant (marinol or megace) however limited efficacy with pt underlying dementia. Also ? Appropriateness with pt considering hospice. RD to keep all supplements, will adjust when goals of care decided. If pt/POA decide on aggressive measures, TF would likely be best option. RD to provide recs as appropriate. No recent labs to assess. Meds: heparin, zosyn. Malnutrition Assessment: 
Malnutrition Status: Moderate malnutrition Context:  Acute illness Estimated Daily Nutrient Needs: 
Energy (kcal): 1869kcals (30kcals/kg); Weight Used for Energy Requirements: Current Protein (g): 81g (1.3g/kg); Weight Used for Protein Requirements: Current Fluid (ml/day): 1869ml; Method Used for Fluid Requirements: 1 ml/kcal 
 Needs for maintenance with new cancer Nutrition Related Findings:  NFPE finding mild muscle wasing. No edema. No N/V per RN, lat documented BM 11/14, soft per EMR. Pt previously endorsed no issues with c/s, no swallow evals in EMR. Wounds:   
None Current Nutrition Therapies: DIET REGULAR 
DIET NUTRITIONAL SUPPLEMENTS Dinner; [employer] DIET NUTRITIONAL SUPPLEMENTS Dinner, Lunch; Ensure [employer] DIET NUTRITIONAL SUPPLEMENTS Breakfast; Ensure Clear (apple) Anthropometric Measures: 
· Height:  5' 2.01\" (157.5 cm) · Current Body Wt:  62.3 kg (137 lb 5.6 oz)(11/9) · Admission Body Wt:  139 lb 15.9 oz(11/3) · Usual Body Wt:  54.4 kg (120 lb 0.2 oz)(per pt, stable) · Ideal Body Wt:  110 lbs:  124.9 % · BMI Category:  Normal weight (BMI 22.0-24.9) age over 72 Wt hx: 59.9kg (11/5), 63.6kg (11/3) No prior wt hx to assess, UBW less than pt current BW per EMR, unable to assess recent wt loss. No new wts to assess. Nutrition Diagnosis:  
· Inadequate oral intake related to cognitive or neurological impairment(lethargy) as evidenced by intake 0-25% Nutrition Interventions:  
Food and/or Nutrient Delivery: Continue current diet, Continue oral nutrition supplement Nutrition Education and Counseling: Education not indicated Coordination of Nutrition Care: Continue to monitor while inpatient, Feeding assistance/environmental change Goals: 
Intakes >/=50% of EENs (not progressing) 
wt maintenance within +/-0.5kg (Not progressing)        
 
Nutrition Monitoring and Evaluation:  
Behavioral-Environmental Outcomes: None identified Food/Nutrient Intake Outcomes: Food and nutrient intake, Supplement intake Physical Signs/Symptoms Outcomes: Weight, Nutrition focused physical findings Discharge Planning: Too soon to determine Electronically signed by Bartholome Jeans RD on 11/16/2020 at 6:46 AM 
 
 Contact: Ext [phone]

## 2020-11-16 NOTE — PROGRESS NOTES
ESS has not been able to contact the patient's POA to obtain a Medicaid screen yet. ALVARADO spoke with [facility] with [facility] who states the patient will have to be screen for Medicaid prior to coming and if she qualifies they will have to have a successfully processed [facility]AI before the patient can admit to them. CM will e-mail [facility] to try to contact the POA again and continue to follow.

## 2020-11-16 NOTE — PROGRESS NOTES
General Daily Progress Note Patient Name: Glenna Barbour YOB: 1940 Age: 
79 y.o. Admit Date: 11/3/2020 Subjective:  
 
Pt is alert and awake this morning. She is laying in bed comfortably. No acute distress. Denies cough, shortness of breath, and chest pain. Denies weakness and dizziness. Post thoracentesis 1260 cc of serous fluid removed. Cytology results positive for metastatic adenocarcinoma. Negative cultures. History of previous lung cancer and smoking. CM was able to get the correct contact information for patients POA this morning. Pt is pleasant and kind but confused with moderate dementia. Objective:  
 
Visit Vitals /80 (BP 1 Location: Right arm, BP Patient Position: At rest) Pulse (!) 105 Temp 98.2 °F (36.8 °C) Resp 18 Ht 5' 2.01\" (1.575 m) Wt 63.5 kg (140 lb) SpO2 98% BMI 25.60 kg/m² No results found for this or any previous visit (from the past 24 hour(s)). [unfilled] Review of Systems Constitutional: Negative for chills and fever. HENT: Negative. Eyes: Negative. Respiratory: Negative. Cardiovascular: Negative. Gastrointestinal: Negative for abdominal pain and nausea. Skin: Negative. Neurological: Negative. Physical Exam:   
 
Constitutional: pt is oriented to person, place, and time. HENT:  
Head: Normocephalic and atraumatic. Eyes: Pupils are equal, round, and reactive to light. EOM are normal.  
Cardiovascular: Normal rate, regular rhythm and normal heart sounds. Pulmonary/Chest: Breath sounds normal. No wheezes. No rales. Exhibits no tenderness. Abdominal: Soft. Bowel sounds are normal. There is no abdominal tenderness. There is no rebound and no guarding. Musculoskeletal: Normal range of motion. Neurological: pt is alert and oriented to person, place, and time. XR CHEST PA LAT Final Result Impression: Large right-sided pleural effusion and associated  
compression/consolidation of the right lung, increased. Mild atelectasis at the  
left base. Possible small left pleural effusion. XR CHEST PORT Final Result IMPRESSION: Diffuse airspace disease, right hemithorax, with improved aeration  
from previous. Differential diagnosis includes pneumonia, atelectasis, and/or  
reexpansion pulmonary edema. No pneumothorax. [address] Final Result IMPRESSION:   
Successful ultrasound-guided thoracentesis of right large pleural effusion, with  
approximately 1.3 L serous pleural fluid removed. Pleural fluid sample was also  
sent for lab analysis. CT CHEST WO CONT Final Result IMPRESSION: Near complete collapse of the right lung secondary to large pleural  
effusion XR HIP LT W OR WO PELV 2-3 VWS Final Result IMPRESSION:  
1. No acute bony findings. XR CHEST SNGL V Final Result No results found for this or any previous visit (from the past 24 hour(s)). Results Procedure Component Value Units Date/Time CULTURE, BODY FLUID [facility] [[id number]] Collected:  11/06/20 1730 Order Status:  Completed Specimen: Body Fluid from Swab Updated:  11/11/20 0369 Special Requests: No Special Requests GRAM STAIN Few WBCs seen No organisms seen Culture result:    
  culture performed on fluid swab specimen No growth 4 days [facility] [[id number]] Collected:  11/04/20 0750 Order Status:  Completed Specimen:  Urine Updated:  11/06/20 6915 Special Requests: No Special Requests Culture result: No Growth (<1000 cfu/mL) CULTURE, RESPIRATORY/SPUTUM/BRONCH [facility] [[id number]] Collected:  11/03/20 2130 Order Status:  Canceled Specimen:  Sputum CULTURE, BLOOD, LINE [[id number]] Collected:  11/03/20 2130 Order Status:  Canceled Specimen:  Blood Labs: No results for input(s): WBC, HGB, HCT, PLT, HGBEXT, HCTEXT, PLTEXT, HGBEXT, HCTEXT, PLTEXT in the last 72 hours. No results for input(s): NA, K, CL, CO2, BUN, CREA, GLU, CA, MG, PHOS, URICA in the last 72 hours. No results for input(s): ALT, AP, TBIL, TBILI, TP, ALB, GLOB, GGT, AML, LPSE in the last 72 hours. No lab exists for component: SGOT, GPT, AMYP, HLPSE No results for input(s): INR, PTP, APTT, INREXT, INREXT in the last 72 hours. No results for input(s): FE, TIBC, PSAT, FERR in the last 72 hours. No results found for: FOL, RBCF No results for input(s): PH, PCO2, PO2 in the last 72 hours. No results for input(s): CPK, CKNDX, TROIQ in the last 72 hours. No lab exists for component: CPKMB No results found for: CHOL, CHOLX, CHLST, CHOLV, HDL, HDLP, LDL, LDLC, DLDLP, TGLX, TRIGL, TRIGP, CHHD, CHHDX Lab Results Component Value Date/Time Glucose (POC) 220 (H) 11/08/2020 11:01 AM  
 Glucose (POC) 110 (H) 11/08/2020 08:26 AM  
 Glucose (POC) 86 11/06/2020 05:29 PM  
 Glucose (POC) 75 11/06/2020 03:12 PM  
 Glucose (POC) 84 11/06/2020 07:55 AM  
 
Lab Results Component Value Date/Time Color Yellow/Straw 11/03/2020 02:45 PM  
 Appearance Clear 11/03/2020 02:45 PM  
 Specific gravity 1.010 11/03/2020 02:45 PM  
 pH (UA) 6.5 11/03/2020 02:45 PM  
 Protein Negative 11/03/2020 02:45 PM  
 Glucose Normal (A) 11/03/2020 02:45 PM  
 Ketone Negative 11/03/2020 02:45 PM  
 Bilirubin Negative 11/03/2020 02:45 PM  
 Urobilinogen Normal 11/03/2020 02:45 PM  
 Nitrites Negative 11/03/2020 02:45 PM  
 Leukocyte Esterase Negative 11/03/2020 02:45 PM  
 Bacteria Negative 11/03/2020 02:45 PM  
 WBC 0-4 11/03/2020 02:45 PM  
 RBC 0-5 11/03/2020 02:45 PM  
 
   
Assessment: 1. Dementia 2. Hx of Congestive Heart Failure 3. Hyponatremia/likely from diuretic 4. Hypertension 5. Hyperlipidemia 6.  Failure to Thrive  
7.  Chest x-ray shows opacities/pleural effusion - Chest CT shows Near complete collapse of the right lung secondary to large pleural 
Effusion status post thoracocentesis 8. Paroxysmal A. Fib 9. Covid screening negative 10. Hypokalemia 11. Metastatic Adenocarcinoma 12. History of lung cancer Plan:  
 
Static post thoracocentesis - cytology results positive for metastatic adenocarcinoma; culture negative Oncology consult - Attempted to contact the family but unsuccessful, will try again today. Recommends hospice care due to dementia and poor performance status. [phone]. Left message on this number Call [phone] and try to left message Discussed with the sister-in-law make patient DNR/DNI discussed about patient is not a candidate for any chemotherapy and radiation as per oncologist she understand and okay to discharge to skilled care rehab when bed available On metoprolol 25 mg twice a day Monitor potassium Repeat the labs PT/OT recommends 5/days week in SNF Have a detailed discussion with the patient family including patient's sister and other 2 family members 2 nieces Discussed about the options patient is not a candidate for any chemotherapy as per oncologist patient is severely malnutrition not eating drinking well and about hospice Discussed with the patient family about all these options and they are thinking about possible PEG tube Discussed with the  also regarding patient's discussion and plan Current Facility-Administered Medications:  
  megestroL (MEGACE) 400 mg/10 mL (10 mL) oral suspension 400 mg, 400 mg, Oral, BID, Dev Maldonado MD 
  metoprolol tartrate (LOPRESSOR) tablet 50 mg, 50 mg, Oral, Q12H, Edwin Smith MD, 50 mg at 11/16/20 1149 
  influenza vaccine 2020-21 (4 yrs+)(PF) (FLUCELVAX QUAD) injection 0.5 mL, 0.5 mL, IntraMUSCular, PRIOR TO DISCHARGE, Dev Maldonado MD 
  acetylcysteine (MUCOMYST) 200 mg/mL (20 %) solution 600 mg, 600 mg, Nebulization, Q6H PRN, Asim Red MD 
  albuterol-ipratropium (DUO-NEB) 2.5 MG-0.5 MG/3 ML, 3 mL, Nebulization, Q6H PRN, Asim Red MD 
  heparin porcine injection 5,000 Units, 5,000 Units, SubCUTAneous, Q12H, Jesus Maldonado MD, 5,000 Units at 11/16/20 1149   piperacillin-tazobactam (ZOSYN) 3.375 g in 0.9% sodium chloride (MBP/ADV) 100 mL MBP, 3.375 g, IntraVENous, Q8H, Jesus Maldonado MD, Last Rate: 200 mL/hr at 11/16/20 0501, 3.375 g at 11/16/20 0501

## 2020-11-16 NOTE — PROGRESS NOTES
Pulmonary Progress Note Daily Progress Note: 11/16/2020 Subjective: The patient is seen for follow  up. Excerpts from admission 11/3/2020 or consult notes as follows:  
13-year-old lady came in because of generalized weakness lethargy past medical history of dementia congestive heart failure hypertension hyperlipidemia patient was discharged from home with a sling on her right arm as she fell. Now chest x-ray and CAT scan of the chest was done which shows right lower lobe collapse with pleural effusion unable to get much history out of the patient so pulmonary consult was called for further evaluation 11/8 denies cough or shortness of breath remains on room air 11/09 Today she denies cough and shortness of breath. She had a chest Xray this morning, awaiting read. She is on room air breathing well. 
11/10 She says she feels great and denies SOB and cough. CXR yesterday shows enlarging right pleural effusion and atelectasis and atelectasis of left base. 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 She is on room air. 11/12 She is on room air. Awaiting placement. Problem List: 
Problem List as of 11/16/2020 Never Reviewed Codes Class Noted - Resolved Hyponatremia ICD-10-CM: E87.1 ICD-9-CM: 276.1  11/3/2020 - Present Medications reviewed Current Facility-Administered Medications Medication Dose Route Frequency  metoprolol tartrate (LOPRESSOR) tablet 50 mg  50 mg Oral Q12H  
 influenza vaccine 2020-21 (4 yrs+)(PF) (FLUCELVAX QUAD) injection 0.5 mL  0.5 mL IntraMUSCular PRIOR TO DISCHARGE  acetylcysteine (MUCOMYST) 200 mg/mL (20 %) solution 600 mg  600 mg Nebulization Q6H PRN  
 albuterol-ipratropium (DUO-NEB) 2.5 MG-0.5 MG/3 ML  3 mL Nebulization Q6H PRN  
 heparin porcine injection 5,000 Units  5,000 Units SubCUTAneous Q12H  piperacillin-tazobactam (ZOSYN) 3.375 g in 0.9% sodium chloride (MBP/ADV) 100 mL MBP  3.375 g IntraVENous Q8H Review of Systems: A comprehensive review of systems was negative except for that written in the HPI. Objective:  
Physical Exam:  
 
Visit Vitals BP (!) 145/91 (BP 1 Location: Right arm, BP Patient Position: At rest) Pulse (!) 108 Temp 98 °F (36.7 °C) Resp 18 Ht 5' 2.01\" (1.575 m) Wt 63.5 kg (140 lb) SpO2 97% BMI 25.60 kg/m² O2 Device: Room air Temp (24hrs), Av.9 °F (36.6 °C), Min:97.6 °F (36.4 °C), Max:98 °F (36.7 °C) No intake/output data recorded. No intake/output data recorded. Constitutional:  
 Awake alert seems oriented HENT:  
Head: Normocephalic and atraumatic. Eyes: Pupils are equal, round, and reactive to light. Extraocular movements intact Neck: Normal range of motion. Neck supple. No definite JVD Cardiovascular: Normal rate and regular rhythm. Pulmonary/Chest:  
 Clear anteriorly and left lateral with fairly good breath sounds right lateral and right posterior no definite wheezes or rales Abdominal: Soft. Bowel sounds are normal.  
Musculoskeletal: Normal range of motion. Skin: Skin is warm. Data Review:  
   
Recent Days: 
No results for input(s): WBC, HGB, HCT, PLT, HGBEXT, HCTEXT, PLTEXT, HGBEXT, HCTEXT, PLTEXT in the last 72 hours. No results for input(s): NA, K, CL, CO2, GLU, BUN, CREA, CA, MG, PHOS, ALB, TBIL, TBILI, ALT, INR, INREXT, INREXT in the last 72 hours. No lab exists for component: SGOT Room air oxygen saturation 98% 24 Hour Results: No results found for this or any previous visit (from the past 24 hour(s)). Imaging: 
  
   
CXR Results  (Last 48 hours)  
  None  
   
  
    
Results from Hospital Encounter encounter on 20 XR HIP LT W OR WO PELV 2-3 VWS  
  Narrative Fall 1 week ago. 
  
No comparison. 
  
FINDINGS: AP pelvis and frog-leg view of the left hip. No acute fracture or 
dislocation.  Deformity of the left femur trochanter and proximal diaphysis, 
 appears chronic. No radiopaque foreign body. 
   
  Impression IMPRESSION: 
1. No acute bony findings. XR CHEST SNGL V  
  Narrative Chest single view. 
  
Comparison single view chest March 29, 2018. 
  
Right-sided Port-A-Cath stable position. New opacification of the lower two thirds right hemithorax. This is likely 
related to a combination of large volume pleural fluid and atelectatic lung. Left lung is aerated. Cardiac and mediastinal structures unchanged. No 
pneumothorax. Nonacute rib fractures noted. Results from [facility] Encounter encounter on 10/26/20 XR SHOULDER RT AP/LAT MIN 2 V  
  Narrative 3 views of the right shoulder reveal diffuse osteopenia. There is mild 
degenerative subluxation of the humerus with respect to the glenoid. No fracture 
or dislocation is identified. 
   
  Impression IMPRESSION: Degenerative changes.  
  
    
Results from Hospital Encounter encounter on 11/03/20 CT CHEST WO CONT  
  Narrative CT dose reduction was achieved through use of a standardized protocol tailored 
for this examination and automatic exposure control for dose modulation. 
  
Noncontrast study shows large right pleural effusion. Right middle and lower 
lobe are completely collapsed, with very few air bronchograms. The upper lobe is 
almost completely collapsed, sparingly anterior segment only. Mainstem bronchus 
is open. Proximal lobar bronchi are open. Left lung is clear. Multiple calcified 
mediastinal hilar lymph nodes. Small left pleural effusion. Small pericardial 
effusion. Normal caliber aorta. No significant upper abdominal or chest wall 
finding. Pronounced soft tissue edema in the visualized portion of the left arm 
   
  Impression IMPRESSION: Near complete collapse of the right lung secondary to large pleural 
effusion  
  
 
  
IMPRESSION:  
1.  RLL collapse Atelectasis markedly improved on x-ray 11/7 and on exam  
 2. Malignant pleural Effusion Right side status post thoracentesis 11/06 by IR 
3. Chronic Obstructive Pulmonary Disease with Severe Acute Exacerbation requiring inpatient hospitalization and management; no wheezing today 4. A. fib with RVR 5. History of heart failure hyponatremia ? Hx of lung cancer 6. Prognosis guarded   
   
RECOMMENDATIONS/PLAN:  
 
Discussed with the patient detail about her condition she told me that she had a history of lung cancer diagnosed more than 3 years ago in [place] and apparently she got treated for it I do not have any records and also she is intermittently confused Pleural fluid Cytology of thoracentesis on 11/06 showed many groups of abnormal epithelial cells are noted, with large nuclei and cytoplasmic vacuoles, indicative of metastatic adenocarcinoma. 1. Status post thoracentesis via IR 1260 cc fluid removed. Pleural fluid culture negative. Chest Xray 11/09 showed increased large right pleural effusion and atelectasis, as well as atelectasis of the left base and possible small left pleural effusion. 2. Nebulizer treatment with Mucomyst 
3. On zosyn She is agreeable to [facility] and rehab. 
  
Stefania Hoffmann MD

## 2020-11-17 NOTE — PROGRESS NOTES
Comprehensive Nutrition Assessment Type and Reason for Visit: Reassess(Goal) Nutrition Recommendations/Plan:  
Continue current regular, soft solids diet D/c Ensure Enlive to BID Increased Ensure clear, Mixed berry to TID 
 D/c Magic Cup daily Allow snacks as desired 
  
Continue with appetite stimulant Consider adding MVI with pt poor PO intakes 
  
If pt decided to no go Hospice, TF would be best option to meet nutritional needs Please document % of all meal/snack consumed, wts, BMs 
 
Nutrition Assessment:  Admitted for FTT, needs SNF placement, noted pt in ED 10/26 for fall with fx. Pt now s/p thoracentesis with 1.26L removed (11/6), cytology +metastatic adenocarcinoma. MD's rec hospice however no decision yet. Per CM, current plans to d/c to [facility], working on [employer]. Per EMR pt with poor appetite and intakes ~10% of meals, no recent intakes documented. At last assessment  [facility] notified RD that pt has not been touching trays for last week, pt only drinks ~50% of 1 Ensure Enlive/day (this was also observed by RD at initial assessment 11/11, pt refusing tray despite set-up and encouragement). Discussed with RN today, endorses pt not interested in food at all despite encouragement. Stated she does not drink Ensure Enlive or eat Magic Cup at all (RD to d/c these), does like Ensure Clear Mixed Berry, will increase to TID. Noted Megace started yesterday as appetite stimulant, noted limited efficacy with pt underlying dementia. ? Appropriateness with pt considering hospice. Noted if POA does not choose hospice next step is PEG for nutrition considering poor PO x2 weeks inpatient. Labs; K+ 2.4, BUN 5, Cr 0.53, Alb 1.5. Meds: heparin, megace, zosyn, KCl. Malnutrition Assessment: 
Malnutrition Status: Moderate malnutrition Context:  Acute illness Findings of the 6 clinical characteristics of malnutrition: Energy Intake:  7 - 50% or less of est energy requirements for 5 or more days Weight Loss:  No significant weight loss(no new wts to assess) Body Fat Loss:  No significant body fat loss, Muscle Mass Loss:  1 - Mild muscle mass loss, Calf Fluid Accumulation:  No significant fluid accumulation,   
 
Estimated Daily Nutrient Needs: 
Energy (kcal): 1869kcals (30kcals/kg); Weight Used for Energy Requirements: Current Protein (g): 81g (1.3g/kg); Weight Used for Protein Requirements: Current Fluid (ml/day): 1869ml; Method Used for Fluid Requirements: 1 ml/kcal 
 Needs for maintenance with new cancer Nutrition Related Findings:  NFPE finding mild muscle wasing. No edema. No N/V/D/C per RN, last BM 11/16, soft per EMR. Pt previously endorsed no issues with c/s, no swallow evals in EMR. Wounds:   
None Current Nutrition Therapies: DIET REGULAR 
DIET NUTRITIONAL SUPPLEMENTS Breakfast, Lunch, Dinner; Ensure Clear (mixed berry) Anthropometric Measures: 
· Height:  5' 2.01\" (157.5 cm) · Current Body Wt:  62.3 kg (137 lb 5.6 oz)(11/9) · Admission Body Wt:  139 lb 15.9 oz(11/3) · Usual Body Wt:  54.4 kg (120 lb 0.2 oz)(per pt, stable) · Ideal Body Wt:  110 lbs:  124.9 % · BMI Category:  Normal weight (BMI 22.0-24.9) age over 72 Wt hx: 59.9kg (11/5), 63.6kg (11/3) No prior wt hx to assess, UBW less than pt current BW per EMR, unable to assess recent wt loss. No new wts to assess. Nutrition Diagnosis:  
· Inadequate oral intake related to cognitive or neurological impairment(lethargy) as evidenced by intake 0-25% Nutrition Interventions:  
Food and/or Nutrient Delivery: Continue current diet, Modify oral nutrition supplement(d/c Enlive and Magic Cup, increase Ensure Clear to TID) Nutrition Education and Counseling: No recommendations at this time Coordination of Nutrition Care: Continue to monitor while inpatient, Feeding assistance/environmental change Goals: Intakes >/=50% of EENs (not met) Wt maintenance within +/-0.5kg    (not met) Nutrition Monitoring and Evaluation:  
Behavioral-Environmental Outcomes: None identified Food/Nutrient Intake Outcomes: Food and nutrient intake, Supplement intake Physical Signs/Symptoms Outcomes: Chewing or swallowing, Biochemical data, Weight, Nutrition focused physical findings, Meal time behavior Discharge Planning: Too soon to determine Electronically signed by Esme Evans RD on 11/17/2020 at 11:50 AM 
 
Contact: Ext [phone]

## 2020-11-17 NOTE — PROGRESS NOTES
Pulmonary Progress Note Daily Progress Note: 11/17/2020 Subjective: The patient is seen for follow  up. Excerpts from admission 11/3/2020 or consult notes as follows:  
21-year-old lady came in because of generalized weakness lethargy past medical history of dementia congestive heart failure hypertension hyperlipidemia patient was discharged from home with a sling on her right arm as she fell. Now chest x-ray and CAT scan of the chest was done which shows right lower lobe collapse with pleural effusion unable to get much history out of the patient so pulmonary consult was called for further evaluation 11/8 denies cough or shortness of breath remains on room air 11/09 Today she denies cough and shortness of breath. She had a chest Xray this morning, awaiting read. She is on room air breathing well. 
11/10 She says she feels great and denies SOB and cough. CXR yesterday shows enlarging right pleural effusion and atelectasis and atelectasis of left base. 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 She is on room air. 11/12 She is on room air. Awaiting placement. Problem List: 
Problem List as of 11/17/2020 Never Reviewed Codes Class Noted - Resolved Hyponatremia ICD-10-CM: E87.1 ICD-9-CM: 276.1  11/3/2020 - Present Medications reviewed Current Facility-Administered Medications Medication Dose Route Frequency  megestroL (MEGACE) 400 mg/10 mL (10 mL) oral suspension 400 mg  400 mg Oral BID  metoprolol tartrate (LOPRESSOR) tablet 50 mg  50 mg Oral Q12H  
 influenza vaccine 2020-21 (4 yrs+)(PF) (FLUCELVAX QUAD) injection 0.5 mL  0.5 mL IntraMUSCular PRIOR TO DISCHARGE  acetylcysteine (MUCOMYST) 200 mg/mL (20 %) solution 600 mg  600 mg Nebulization Q6H PRN  
 albuterol-ipratropium (DUO-NEB) 2.5 MG-0.5 MG/3 ML  3 mL Nebulization Q6H PRN  
 heparin porcine injection 5,000 Units  5,000 Units SubCUTAneous Q12H  piperacillin-tazobactam (ZOSYN) 3.375 g in 0.9% sodium chloride (MBP/ADV) 100 mL MBP  3.375 g IntraVENous Q8H Review of Systems: A comprehensive review of systems was negative except for that written in the HPI. Objective:  
Physical Exam:  
 
Visit Vitals BP (!) 149/97 Pulse 100 Temp 97.8 °F (36.6 °C) Resp 16 Ht 5' 2.01\" (1.575 m) Wt 63.5 kg (140 lb) SpO2 96% BMI 25.60 kg/m² O2 Device: Room air Temp (24hrs), Av.9 °F (36.6 °C), Min:97.7 °F (36.5 °C), Max:98.2 °F (36.8 °C) No intake/output data recorded. 11/15 1901 -  0700 In: -  
Out: 400 [Urine:400] Constitutional:  
 Awake alert seems oriented HENT:  
Head: Normocephalic and atraumatic. Eyes: Pupils are equal, round, and reactive to light. Extraocular movements intact Neck: Normal range of motion. Neck supple. No definite JVD Cardiovascular: Normal rate and regular rhythm. Pulmonary/Chest:  
 Clear anteriorly and left lateral with fairly good breath sounds right lateral and right posterior no definite wheezes or rales Abdominal: Soft. Bowel sounds are normal.  
Musculoskeletal: Normal range of motion. Skin: Skin is warm. Data Review:  
   
Recent Days: 
No results for input(s): WBC, HGB, HCT, PLT, HGBEXT, HCTEXT, PLTEXT, HGBEXT, HCTEXT, PLTEXT in the last 72 hours. No results for input(s): NA, K, CL, CO2, GLU, BUN, CREA, CA, MG, PHOS, ALB, TBIL, TBILI, ALT, INR, INREXT, INREXT in the last 72 hours. No lab exists for component: SGOT Room air oxygen saturation 98% 24 Hour Results: No results found for this or any previous visit (from the past 24 hour(s)). Imaging: 
  
   
CXR Results  (Last 48 hours)  
  None  
   
  
    
Results from Hospital Encounter encounter on 20 XR HIP LT W OR WO PELV 2-3 VWS  
  Narrative Fall 1 week ago. 
  
No comparison. 
  
FINDINGS: AP pelvis and frog-leg view of the left hip. No acute fracture or dislocation. Deformity of the left femur trochanter and proximal diaphysis, 
appears chronic. No radiopaque foreign body. 
   
  Impression IMPRESSION: 
1. No acute bony findings. XR CHEST SNGL V  
  Narrative Chest single view. 
  
Comparison single view chest March 29, 2018. 
  
Right-sided Port-A-Cath stable position. New opacification of the lower two thirds right hemithorax. This is likely 
related to a combination of large volume pleural fluid and atelectatic lung. Left lung is aerated. Cardiac and mediastinal structures unchanged. No 
pneumothorax. Nonacute rib fractures noted. Results from [facility] Encounter encounter on 10/26/20 XR SHOULDER RT AP/LAT MIN 2 V  
  Narrative 3 views of the right shoulder reveal diffuse osteopenia. There is mild 
degenerative subluxation of the humerus with respect to the glenoid. No fracture 
or dislocation is identified. 
   
  Impression IMPRESSION: Degenerative changes.  
  
    
Results from Hospital Encounter encounter on 11/03/20 CT CHEST WO CONT  
  Narrative CT dose reduction was achieved through use of a standardized protocol tailored 
for this examination and automatic exposure control for dose modulation. 
  
Noncontrast study shows large right pleural effusion. Right middle and lower 
lobe are completely collapsed, with very few air bronchograms. The upper lobe is 
almost completely collapsed, sparingly anterior segment only. Mainstem bronchus 
is open. Proximal lobar bronchi are open. Left lung is clear. Multiple calcified 
mediastinal hilar lymph nodes. Small left pleural effusion. Small pericardial 
effusion. Normal caliber aorta. No significant upper abdominal or chest wall 
finding.  Pronounced soft tissue edema in the visualized portion of the left arm 
   
  Impression IMPRESSION: Near complete collapse of the right lung secondary to large pleural 
effusion  
  
 
  
IMPRESSION:  
 
 1. Malignant pleural Effusion Right side status post thoracentesis 11/06 by IR 
2. Chronic Obstructive Pulmonary Disease with Severe Acute Exacerbation requiring inpatient hospitalization and management; no wheezing today 3. A. fib with RVR 5. History of heart failure hyponatremia ? Hx of lung cancer 6. Prognosis guarded   
   
RECOMMENDATIONS/PLAN:  
 
Discussed with the patient detail about her condition she told me that she had a history of lung cancer diagnosed more than 3 years ago in [place] and apparently she got treated for it I do not have any records and also she is intermittently confused Pleural fluid Cytology of thoracentesis on 11/06 showed many groups of abnormal epithelial cells are noted, with large nuclei and cytoplasmic vacuoles, indicative of metastatic adenocarcinoma. 1. Status post thoracentesis via IR 1260 cc fluid removed. Pleural fluid culture negative. Chest Xray 11/09 showed increased large right pleural effusion and atelectasis, as well as atelectasis of the left base and possible small left pleural effusion. 2. Nebulizer treatment with Mucomyst 
3. On zosyn She is agreeable to [facility] and rehab. 
  
Jai Sweet MD

## 2020-11-17 NOTE — PROGRESS NOTES
Problem: Falls - Risk of 
Goal: *Absence of Falls Description: Document Sourav Coyne Fall Risk and appropriate interventions in the flowsheet. Outcome: Progressing Towards Goal 
Note: Fall Risk Interventions: 
Mobility Interventions: Bed/chair exit alarm Mentation Interventions: Bed/chair exit alarm Medication Interventions: Bed/chair exit alarm Elimination Interventions: Call light in reach History of Falls Interventions: Bed/chair exit alarm Problem: Patient Education: Go to Patient Education Activity Goal: Patient/Family Education Outcome: Progressing Towards Goal 
  
Problem: Pressure Injury - Risk of 
Goal: *Prevention of pressure injury Description: Document Cruz Scale and appropriate interventions in the flowsheet. Outcome: Progressing Towards Goal 
Note: Pressure Injury Interventions: 
Sensory Interventions: Assess changes in LOC Moisture Interventions: Absorbent underpads Activity Interventions: PT/OT evaluation Mobility Interventions: Assess need for specialty bed, Float heels Nutrition Interventions: Discuss nutritional consult with provider Friction and Shear Interventions: Lift team/patient mobility team

## 2020-11-17 NOTE — PROGRESS NOTES
CM spoke with patient's POA/sister-in-law Beth Dior [phone]. CM informed her ESS has been trying to contact her to do a medicaid screening and provided her Blake Alas # with ESS to call and complete the screening. Patient's discharge is pending ESS screen for medicaid eligibility. If patient qualifies [facility] is requiring a processed [facility] prior to the patient admitting to them.

## 2020-11-17 NOTE — PROGRESS NOTES
General Daily Progress Note Patient Name: Julian Sauceda YOB: 1940 Age: 
79 y.o. Admit Date: 11/3/2020 Subjective:  
 
Pt is alert and awake this morning. She is laying in bed comfortably. No acute distress. Denies cough, shortness of breath, and chest pain. Denies weakness and dizziness. Post thoracentesis 1260 cc of serous fluid removed. Cytology results positive for metastatic adenocarcinoma. Negative cultures. History of previous lung cancer and smoking. CM was able to get the correct contact information for patients POA this morning. Pt is pleasant and kind but confused with moderate dementia. Objective:  
 
Visit Vitals BP (!) 155/101 Pulse (!) 102 Temp 97.9 °F (36.6 °C) Resp 18 Ht 5' 2.01\" (1.575 m) Wt 63.5 kg (140 lb) SpO2 97% BMI 25.60 kg/m² Recent Results (from the past 24 hour(s)) CBC WITH AUTOMATED DIFF Collection Time: 11/17/20  8:20 AM  
Result Value Ref Range WBC 4.0 3.6 - 11.0 K/uL  
 RBC 4.53 3.80 - 5.20 M/uL  
 HGB 11.3 (L) 11.5 - 16.0 g/dL HCT 36.0 35.0 - 47.0 % MCV 79.5 (L) 80.0 - 99.0 FL  
 MCH 24.9 (L) 26.0 - 34.0 PG  
 MCHC 31.4 30.0 - 36.5 g/dL  
 RDW 18.2 (H) 11.5 - 14.5 % PLATELET 858 228 - 672 K/uL MPV 10.8 8.9 - 12.9 FL  
 NEUTROPHILS 60 32 - 75 % LYMPHOCYTES 17 12 - 49 % MONOCYTES 12 5 - 13 % EOSINOPHILS 10 (H) 0 - 7 % BASOPHILS 1 0 - 1 % IMMATURE GRANULOCYTES 0 0.0 - 0.5 % ABS. NEUTROPHILS 2.4 1.8 - 8.0 K/UL  
 ABS. LYMPHOCYTES 0.7 (L) 0.8 - 3.5 K/UL  
 ABS. MONOCYTES 0.5 0.0 - 1.0 K/UL  
 ABS. EOSINOPHILS 0.4 0.0 - 0.4 K/UL  
 ABS. BASOPHILS 0.1 0.0 - 0.1 K/UL  
 ABS. IMM. GRANS. 0.0 0.00 - 0.04 K/UL  
 DF AUTOMATED METABOLIC PANEL, COMPREHENSIVE Collection Time: 11/17/20  8:20 AM  
Result Value Ref Range Sodium 141 136 - 145 mmol/L Potassium 2.4 (LL) 3.5 - 5.1 mmol/L  Chloride 106 97 - 108 mmol/L  
 CO2 31 21 - 32 mmol/L  
 Anion gap 4 (L) 5 - 15 mmol/L Glucose 75 65 - 100 mg/dL BUN 5 (L) 6 - 20 mg/dL Creatinine 0.53 (L) 0.55 - 1.02 mg/dL BUN/Creatinine ratio 9 (L) 12 - 20 GFR est AA >60 >60 ml/min/1.73m2 GFR est non-AA >60 >60 ml/min/1.73m2 Calcium 9.8 8.5 - 10.1 mg/dL Bilirubin, total 0.4 0.2 - 1.0 mg/dL AST (SGOT) 23 15 - 37 U/L  
 ALT (SGPT) 27 12 - 78 U/L Alk. phosphatase 71 45 - 117 U/L Protein, total 5.5 (L) 6.4 - 8.2 g/dL Albumin 1.5 (L) 3.5 - 5.0 g/dL Globulin 4.0 2.0 - 4.0 g/dL A-G Ratio 0.4 (L) 1.1 - 2.2    
 
[unfilled] Review of Systems Constitutional: Negative for chills and fever. HENT: Negative. Eyes: Negative. Respiratory: Negative. Cardiovascular: Negative. Gastrointestinal: Negative for abdominal pain and nausea. Skin: Negative. Neurological: Negative. Physical Exam:   
 
Constitutional: pt is oriented to person, place, and time. HENT:  
Head: Normocephalic and atraumatic. Eyes: Pupils are equal, round, and reactive to light. EOM are normal.  
Cardiovascular: Normal rate, regular rhythm and normal heart sounds. Pulmonary/Chest: Breath sounds normal. No wheezes. No rales. Exhibits no tenderness. Abdominal: Soft. Bowel sounds are normal. There is no abdominal tenderness. There is no rebound and no guarding. Musculoskeletal: Normal range of motion. Neurological: pt is alert and oriented to person, place, and time. XR CHEST PA LAT Final Result Impression: Large right-sided pleural effusion and associated  
compression/consolidation of the right lung, increased. Mild atelectasis at the  
left base. Possible small left pleural effusion. XR CHEST PORT Final Result IMPRESSION: Diffuse airspace disease, right hemithorax, with improved aeration  
from previous. Differential diagnosis includes pneumonia, atelectasis, and/or  
reexpansion pulmonary edema. No pneumothorax. [facility] [address] Final Result IMPRESSION:   
Successful ultrasound-guided thoracentesis of right large pleural effusion, with  
approximately 1.3 L serous pleural fluid removed. Pleural fluid sample was also  
sent for lab analysis. CT CHEST WO CONT Final Result IMPRESSION: Near complete collapse of the right lung secondary to large pleural  
effusion XR HIP LT W OR WO PELV 2-3 VWS Final Result IMPRESSION:  
1. No acute bony findings. XR CHEST SNGL V Final Result Recent Results (from the past 24 hour(s)) CBC WITH AUTOMATED DIFF Collection Time: 11/17/20  8:20 AM  
Result Value Ref Range WBC 4.0 3.6 - 11.0 K/uL  
 RBC 4.53 3.80 - 5.20 M/uL  
 HGB 11.3 (L) 11.5 - 16.0 g/dL HCT 36.0 35.0 - 47.0 % MCV 79.5 (L) 80.0 - 99.0 FL  
 MCH 24.9 (L) 26.0 - 34.0 PG  
 MCHC 31.4 30.0 - 36.5 g/dL  
 RDW 18.2 (H) 11.5 - 14.5 % PLATELET 225 453 - 906 K/uL MPV 10.8 8.9 - 12.9 FL  
 NEUTROPHILS 60 32 - 75 % LYMPHOCYTES 17 12 - 49 % MONOCYTES 12 5 - 13 % EOSINOPHILS 10 (H) 0 - 7 % BASOPHILS 1 0 - 1 % IMMATURE GRANULOCYTES 0 0.0 - 0.5 % ABS. NEUTROPHILS 2.4 1.8 - 8.0 K/UL  
 ABS. LYMPHOCYTES 0.7 (L) 0.8 - 3.5 K/UL  
 ABS. MONOCYTES 0.5 0.0 - 1.0 K/UL  
 ABS. EOSINOPHILS 0.4 0.0 - 0.4 K/UL  
 ABS. BASOPHILS 0.1 0.0 - 0.1 K/UL  
 ABS. IMM. GRANS. 0.0 0.00 - 0.04 K/UL  
 DF AUTOMATED METABOLIC PANEL, COMPREHENSIVE Collection Time: 11/17/20  8:20 AM  
Result Value Ref Range Sodium 141 136 - 145 mmol/L Potassium 2.4 (LL) 3.5 - 5.1 mmol/L Chloride 106 97 - 108 mmol/L  
 CO2 31 21 - 32 mmol/L Anion gap 4 (L) 5 - 15 mmol/L Glucose 75 65 - 100 mg/dL BUN 5 (L) 6 - 20 mg/dL Creatinine 0.53 (L) 0.55 - 1.02 mg/dL BUN/Creatinine ratio 9 (L) 12 - 20 GFR est AA >60 >60 ml/min/1.73m2 GFR est non-AA >60 >60 ml/min/1.73m2 Calcium 9.8 8.5 - 10.1 mg/dL Bilirubin, total 0.4 0.2 - 1.0 mg/dL  AST (SGOT) 23 15 - 37 U/L  
 ALT (SGPT) 27 12 - 78 U/L  
 Alk. phosphatase 71 45 - 117 U/L Protein, total 5.5 (L) 6.4 - 8.2 g/dL Albumin 1.5 (L) 3.5 - 5.0 g/dL Globulin 4.0 2.0 - 4.0 g/dL A-G Ratio 0.4 (L) 1.1 - 2.2 Results Procedure Component Value Units Date/Time CULTURE, BODY FLUID Dre Brim [[id number]] Collected:  11/06/20 1730 Order Status:  Completed Specimen: Body Fluid from Swab Updated:  11/11/20 9435 Special Requests: No Special Requests GRAM STAIN Few WBCs seen No organisms seen Culture result:    
  culture performed on fluid swab specimen No growth 4 days Darleensvetlana Romerot [[id number]] Collected:  11/04/20 0750 Order Status:  Completed Specimen:  Urine Updated:  11/06/20 6787 Special Requests: No Special Requests Culture result: No Growth (<1000 cfu/mL) CULTURE, RESPIRATORY/SPUTUM/BRONCH Dre Brim [[id number]] Collected:  11/03/20 2130 Order Status:  Canceled Specimen:  Sputum CULTURE, BLOOD, LINE [[id number]] Collected:  11/03/20 2130 Order Status:  Canceled Specimen:  Blood Labs:  
 
Recent Labs 11/17/20 
0820 WBC 4.0 HGB 11.3* HCT 36.0  Recent Labs 11/17/20 
0820   
K 2.4*  
 CO2 31 BUN 5*  
CREA 0.53* GLU 75  
CA 9.8 Recent Labs 11/17/20 
0820 ALT 27 AP 71 TBILI 0.4 TP 5.5* ALB 1.5*  
GLOB 4.0 No results for input(s): INR, PTP, APTT, INREXT, INREXT in the last 72 hours. No results for input(s): FE, TIBC, PSAT, FERR in the last 72 hours. No results found for: FOL, RBCF No results for input(s): PH, PCO2, PO2 in the last 72 hours. No results for input(s): CPK, CKNDX, TROIQ in the last 72 hours. No lab exists for component: CPKMB No results found for: CHOL, CHOLX, CHLST, CHOLV, HDL, HDLP, LDL, LDLC, DLDLP, TGLX, TRIGL, TRIGP, CHHD, CHHDX Lab Results Component Value Date/Time  Glucose (POC) 220 (H) 11/08/2020 11:01 AM  
 Glucose (POC) 110 (H) 11/08/2020 08:26 AM  
 Glucose (POC) 86 11/06/2020 05:29 PM  
 Glucose (POC) 75 11/06/2020 03:12 PM  
 Glucose (POC) 84 11/06/2020 07:55 AM  
 
Lab Results Component Value Date/Time Color Yellow/Straw 11/03/2020 02:45 PM  
 Appearance Clear 11/03/2020 02:45 PM  
 Specific gravity 1.010 11/03/2020 02:45 PM  
 pH (UA) 6.5 11/03/2020 02:45 PM  
 Protein Negative 11/03/2020 02:45 PM  
 Glucose Normal (A) 11/03/2020 02:45 PM  
 Ketone Negative 11/03/2020 02:45 PM  
 Bilirubin Negative 11/03/2020 02:45 PM  
 Urobilinogen Normal 11/03/2020 02:45 PM  
 Nitrites Negative 11/03/2020 02:45 PM  
 Leukocyte Esterase Negative 11/03/2020 02:45 PM  
 Bacteria Negative 11/03/2020 02:45 PM  
 WBC 0-4 11/03/2020 02:45 PM  
 RBC 0-5 11/03/2020 02:45 PM  
 
   
Assessment: 1. Dementia 2. Hx of Congestive Heart Failure 3. Hyponatremia/likely from diuretic 4. Hypertension 5. Hyperlipidemia 6. Failure to Thrive  
7.  Chest x-ray shows opacities/pleural effusion - Chest CT shows Near complete collapse of the right lung secondary to large pleural 
Effusion status post thoracocentesis 8. Paroxysmal A. Fib 9. Covid screening negative 10. Hypokalemia 11. Metastatic Adenocarcinoma 12. History of lung cancer 13. Hypokalemia Plan:  
 
Static post thoracocentesis - cytology results positive for metastatic adenocarcinoma; culture negative Oncology consult - Attempted to contact the family but unsuccessful, will try again today. Recommends hospice care due to dementia and poor performance status. [phone]. Left message on this number Call [phone] and try to left message Discussed with the sister-in-law make patient DNR/DNI discussed about patient is not a candidate for any chemotherapy and radiation as per oncologist she understand and okay to discharge to skilled care rehab when bed available On metoprolol 25 mg twice a day Monitor potassium Repeat the labs PT/OT recommends 5/days week in SNF Replace potassium Discussed with the  regarding discharge planning Waiting Medicaid application before discharge to nursing home Current Facility-Administered Medications:  
  potassium chloride 10 mEq in 100 ml IVPB, 10 mEq, IntraVENous, Q1H, Jesus Maldonado MD, Last Rate: 100 mL/hr at 11/17/20 1256, 10 mEq at 11/17/20 1256   megestroL (MEGACE) 400 mg/10 mL (10 mL) oral suspension 400 mg, 400 mg, Oral, BID, Jesus Maldonado MD, 400 mg at 11/17/20 3166 
  metoprolol tartrate (LOPRESSOR) tablet 50 mg, 50 mg, Oral, Q12H, Edwin Smith MD, 50 mg at 11/17/20 0837 
  influenza vaccine 2020-21 (4 yrs+)(PF) (FLUCELVAX QUAD) injection 0.5 mL, 0.5 mL, IntraMUSCular, PRIOR TO DISCHARGE, Dev Maldonado MD 
  acetylcysteine (MUCOMYST) 200 mg/mL (20 %) solution 600 mg, 600 mg, Nebulization, Q6H PRN, Asim Red MD 
  albuterol-ipratropium (DUO-NEB) 2.5 MG-0.5 MG/3 ML, 3 mL, Nebulization, Q6H PRN, Asim Red MD 
  heparin porcine injection 5,000 Units, 5,000 Units, SubCUTAneous, Q12H, Jesus Maldonado MD, 5,000 Units at 11/17/20 5294   piperacillin-tazobactam (ZOSYN) 3.375 g in 0.9% sodium chloride (MBP/ADV) 100 mL MBP, 3.375 g, IntraVENous, Q8H, Jesus Maldonado MD, Last Rate: 200 mL/hr at 11/17/20 0607, 3.375 g at 11/17/20 2542

## 2020-11-18 NOTE — PROGRESS NOTES
General Daily Progress Note Patient Name: Clarence Bolaños YOB: 1940 Age: 
79 y.o. Admit Date: 11/3/2020 Subjective:  
 
Pt is alert and awake this morning. She is laying in bed comfortably. No acute distress. Denies cough, shortness of breath, and chest pain. Denies weakness and dizziness. Post thoracentesis 1260 cc of serous fluid removed. Cytology results positive for metastatic adenocarcinoma. Negative cultures. History of previous lung cancer and smoking. Objective:  
 
Visit Vitals BP (!) 146/89 Pulse (!) 114 Temp 99.1 °F (37.3 °C) Resp 17 Ht 5' 2.01\" (1.575 m) Wt 63.5 kg (140 lb) SpO2 96% BMI 25.60 kg/m² No results found for this or any previous visit (from the past 24 hour(s)). [unfilled] Review of Systems Constitutional: Negative for chills and fever. HENT: Negative. Eyes: Negative. Respiratory: Negative. Cardiovascular: Negative. Gastrointestinal: Negative for abdominal pain and nausea. Skin: Negative. Neurological: Negative. Physical Exam:   
 
Constitutional: pt is oriented to person, place, and time. HENT:  
Head: Normocephalic and atraumatic. Eyes: Pupils are equal, round, and reactive to light. EOM are normal.  
Cardiovascular: Normal rate, regular rhythm and normal heart sounds. Pulmonary/Chest: Breath sounds normal. No wheezes. No rales. Exhibits no tenderness. Abdominal: Soft. Bowel sounds are normal. There is no abdominal tenderness. There is no rebound and no guarding. Musculoskeletal: Normal range of motion. Neurological: pt is alert and oriented to person, place, and time. XR CHEST PA LAT Final Result Impression: Large right-sided pleural effusion and associated  
compression/consolidation of the right lung, increased. Mild atelectasis at the  
left base. Possible small left pleural effusion. XR CHEST PORT Final Result IMPRESSION: Diffuse airspace disease, right hemithorax, with improved aeration  
from previous. Differential diagnosis includes pneumonia, atelectasis, and/or  
reexpansion pulmonary edema. No pneumothorax. [address] Final Result IMPRESSION:   
Successful ultrasound-guided thoracentesis of right large pleural effusion, with  
approximately 1.3 L serous pleural fluid removed. Pleural fluid sample was also  
sent for lab analysis. CT CHEST WO CONT Final Result IMPRESSION: Near complete collapse of the right lung secondary to large pleural  
effusion XR HIP LT W OR WO PELV 2-3 VWS Final Result IMPRESSION:  
1. No acute bony findings. XR CHEST SNGL V Final Result No results found for this or any previous visit (from the past 24 hour(s)). Results Procedure Component Value Units Date/Time CULTURE, BODY FLUID Geeta Flores [[id number]] Collected:  11/06/20 1730 Order Status:  Completed Specimen: Body Fluid from Swab Updated:  11/11/20 4245 Special Requests: No Special Requests GRAM STAIN Few WBCs seen No organisms seen Culture result:    
  culture performed on fluid swab specimen No growth 4 days Labs:  
 
Recent Labs 11/17/20 
0820 WBC 4.0 HGB 11.3* HCT 36.0  Recent Labs 11/17/20 
0820   
K 2.4*  
 CO2 31 BUN 5*  
CREA 0.53* GLU 75  
CA 9.8 Recent Labs 11/17/20 
0820 ALT 27 AP 71 TBILI 0.4 TP 5.5* ALB 1.5*  
GLOB 4.0 No results for input(s): INR, PTP, APTT, INREXT, INREXT in the last 72 hours. No results for input(s): FE, TIBC, PSAT, FERR in the last 72 hours. No results found for: FOL, RBCF No results for input(s): PH, PCO2, PO2 in the last 72 hours. No results for input(s): CPK, CKNDX, TROIQ in the last 72 hours. No lab exists for component: CPKMB No results found for: CHOL, CHOLX, CHLST, CHOLV, HDL, HDLP, LDL, LDLC, DLDLP, [place], [address], [address] [facility], [address] [facility] Lab Results Component Value Date/Time Glucose (POC) 220 (H) 11/08/2020 11:01 AM  
 Glucose (POC) 110 (H) 11/08/2020 08:26 AM  
 Glucose (POC) 86 11/06/2020 05:29 PM  
 Glucose (POC) 75 11/06/2020 03:12 PM  
 Glucose (POC) 84 11/06/2020 07:55 AM  
 
Lab Results Component Value Date/Time Color Yellow/Straw 11/03/2020 02:45 PM  
 Appearance Clear 11/03/2020 02:45 PM  
 Specific gravity 1.010 11/03/2020 02:45 PM  
 pH (UA) 6.5 11/03/2020 02:45 PM  
 Protein Negative 11/03/2020 02:45 PM  
 Glucose Normal (A) 11/03/2020 02:45 PM  
 Ketone Negative 11/03/2020 02:45 PM  
 Bilirubin Negative 11/03/2020 02:45 PM  
 Urobilinogen Normal 11/03/2020 02:45 PM  
 Nitrites Negative 11/03/2020 02:45 PM  
 Leukocyte Esterase Negative 11/03/2020 02:45 PM  
 Bacteria Negative 11/03/2020 02:45 PM  
 WBC 0-4 11/03/2020 02:45 PM  
 RBC 0-5 11/03/2020 02:45 PM  
 
   
Assessment & Plan 1. Hypokalemia 11/17 Potassium 2.4 2. Metastatic Adenocarcinoma 11/13 Cytology- Right Pleural fluid positive for adenocarcinoma Oncology consult - Attempted to contact the family but unsuccessful, will try again today. Recommends hospice care due to dementia and poor performance status. [phone]. Left message on this number Call [phone] and try to left message Discussed with the sister-in-law make patient DNR/DNI discussed about patient is not a candidate for any chemotherapy and radiation as per oncologist she understand and okay to discharge to skilled care rehab when bed available 3. Anemia of Chronic disease Hgb 11.3 (11/17) Hgb 10.8 11/08 4. Failure to Thrive 11/17 Hypoalbuminemia ( 1.5) w/low total protein (A-G ratio 0.4) BMI: 25.60  
 
5. Hyponatremia/likely from diuretic Resolved- 11/17 Sodium 141 
 
6. Hypertension 11/18 - 146/89 7. Pleural effusion 11/06 Chest x-ray shows opacities/pleural effusion - Chest CT shows Near complete collapse of the right lung secondary to large pleural 
Effusion status post thoracocentesis Static post thoracocentesis - cytology results positive for metastatic adenocarcinoma; culture negative 8. Paroxysmal A. Fib- 11/7 ECG 
 
9. Hx of Congestive Heart Failure 11/08 proBNP 1,127  
 
10. Hx of Dementia 11. Hx of Hyperlipidemia 12. Hx of  lung cancer 13. Covid screening negative On metoprolol 25 mg twice a day Monitor potassium Repeat the labs PT/OT recommends 5/days week in SNF Replace potassium Discussed with the  regarding discharge planning Waiting Medicaid application before discharge to nursing home Current Facility-Administered Medications:  
  megestroL (MEGACE) 400 mg/10 mL (10 mL) oral suspension 400 mg, 400 mg, Oral, BID, Jesus Maldonado MD, 400 mg at 11/18/20 9985 
  metoprolol tartrate (LOPRESSOR) tablet 50 mg, 50 mg, Oral, Q12H, Edwin Smith MD, 50 mg at 11/18/20 5167 
  influenza vaccine 2020-21 (4 yrs+)(PF) (FLUCELVAX QUAD) injection 0.5 mL, 0.5 mL, IntraMUSCular, PRIOR TO DISCHARGE, Bonnie Maldonado MD 
  acetylcysteine (MUCOMYST) 200 mg/mL (20 %) solution 600 mg, 600 mg, Nebulization, Q6H PRN, Asim Red MD 
  albuterol-ipratropium (DUO-NEB) 2.5 MG-0.5 MG/3 ML, 3 mL, Nebulization, Q6H PRN, Asim Red MD 
  heparin porcine injection 5,000 Units, 5,000 Units, SubCUTAneous, Q12H, Jesus Maldonado MD, 5,000 Units at 11/18/20 2625   piperacillin-tazobactam (ZOSYN) 3.375 g in 0.9% sodium chloride (MBP/ADV) 100 mL MBP, 3.375 g, IntraVENous, Q8H, Jesus Maldonado MD, Last Rate: 200 mL/hr at 11/18/20 0553, 3.375 g at 11/18/20 5207

## 2020-11-18 NOTE — PROGRESS NOTES
Patient's discharge is pending ESS screen for medicaid eligibility. If patient qualifies Margareth Townsend is requiring a processed [facility] prior to the patient admitting to them.

## 2020-11-18 NOTE — PROGRESS NOTES
General Daily Progress Note Patient Name: Efraín Pelletier YOB: 1940 Age: 
79 y.o. Admit Date: 11/3/2020 Subjective:  
 
Pt is alert and awake this morning. She is laying in bed comfortably. No acute distress. Denies cough, shortness of breath, and chest pain. Denies weakness and dizziness. Post thoracentesis 1260 cc of serous fluid removed. Cytology results positive for metastatic adenocarcinoma. Negative cultures. History of previous lung cancer and smoking. Objective:  
 
Visit Vitals BP (!) 146/89 Pulse (!) 114 Temp 99.1 °F (37.3 °C) Resp 17 Ht 5' 2.01\" (1.575 m) Wt 63.5 kg (140 lb) SpO2 96% BMI 25.60 kg/m² No results found for this or any previous visit (from the past 24 hour(s)). [unfilled] Review of Systems Constitutional: Negative for chills and fever. HENT: Negative. Eyes: Negative. Respiratory: Negative. Cardiovascular: Negative. Gastrointestinal: Negative for abdominal pain and nausea. Skin: Negative. Neurological: Negative. Physical Exam:   
 
Constitutional: pt is oriented to person, place, and time. HENT:  
Head: Normocephalic and atraumatic. Eyes: Pupils are equal, round, and reactive to light. EOM are normal.  
Cardiovascular: Normal rate, regular rhythm and normal heart sounds. Pulmonary/Chest: Breath sounds normal. No wheezes. No rales. Exhibits no tenderness. Abdominal: Soft. Bowel sounds are normal. There is no abdominal tenderness. There is no rebound and no guarding. Musculoskeletal: Normal range of motion. Neurological: pt is alert and oriented to person, place, and time. XR CHEST PA LAT Final Result Impression: Large right-sided pleural effusion and associated  
compression/consolidation of the right lung, increased. Mild atelectasis at the  
left base. Possible small left pleural effusion. XR CHEST PORT Final Result IMPRESSION: Diffuse airspace disease, right hemithorax, with improved aeration  
from previous. Differential diagnosis includes pneumonia, atelectasis, and/or  
reexpansion pulmonary edema. No pneumothorax. [address] Final Result IMPRESSION:   
Successful ultrasound-guided thoracentesis of right large pleural effusion, with  
approximately 1.3 L serous pleural fluid removed. Pleural fluid sample was also  
sent for lab analysis. CT CHEST WO CONT Final Result IMPRESSION: Near complete collapse of the right lung secondary to large pleural  
effusion XR HIP LT W OR WO PELV 2-3 VWS Final Result IMPRESSION:  
1. No acute bony findings. XR CHEST SNGL V Final Result No results found for this or any previous visit (from the past 24 hour(s)). Results Procedure Component Value Units Date/Time CULTURE, BODY FLUID Kayce Zamora [[id number]] Collected:  11/06/20 1730 Order Status:  Completed Specimen: Body Fluid from Swab Updated:  11/11/20 8564 Special Requests: No Special Requests GRAM STAIN Few WBCs seen No organisms seen Culture result:    
  culture performed on fluid swab specimen No growth 4 days Labs:  
 
Recent Labs 11/17/20 
0820 WBC 4.0 HGB 11.3* HCT 36.0  Recent Labs 11/17/20 
0820   
K 2.4*  
 CO2 31 BUN 5*  
CREA 0.53* GLU 75  
CA 9.8 Recent Labs 11/17/20 
0820 ALT 27 AP 71 TBILI 0.4 TP 5.5* ALB 1.5*  
GLOB 4.0 No results for input(s): INR, PTP, APTT, INREXT, INREXT in the last 72 hours. No results for input(s): FE, TIBC, PSAT, FERR in the last 72 hours. No results found for: FOL, RBCF No results for input(s): PH, PCO2, PO2 in the last 72 hours. No results for input(s): CPK, CKNDX, TROIQ in the last 72 hours. No lab exists for component: CPKMB No results found for: CHOL, CHOLX, CHLST, CHOLV, HDL, HDLP, LDL, LDLC, DLDLP, TGLX, TRIGL, TRIGP, CHHD, CHHDX Lab Results Component Value Date/Time Glucose (POC) 220 (H) 11/08/2020 11:01 AM  
 Glucose (POC) 110 (H) 11/08/2020 08:26 AM  
 Glucose (POC) 86 11/06/2020 05:29 PM  
 Glucose (POC) 75 11/06/2020 03:12 PM  
 Glucose (POC) 84 11/06/2020 07:55 AM  
 
Lab Results Component Value Date/Time Color Yellow/Straw 11/03/2020 02:45 PM  
 Appearance Clear 11/03/2020 02:45 PM  
 Specific gravity 1.010 11/03/2020 02:45 PM  
 pH (UA) 6.5 11/03/2020 02:45 PM  
 Protein Negative 11/03/2020 02:45 PM  
 Glucose Normal (A) 11/03/2020 02:45 PM  
 Ketone Negative 11/03/2020 02:45 PM  
 Bilirubin Negative 11/03/2020 02:45 PM  
 Urobilinogen Normal 11/03/2020 02:45 PM  
 Nitrites Negative 11/03/2020 02:45 PM  
 Leukocyte Esterase Negative 11/03/2020 02:45 PM  
 Bacteria Negative 11/03/2020 02:45 PM  
 WBC 0-4 11/03/2020 02:45 PM  
 RBC 0-5 11/03/2020 02:45 PM  
 
   
Assessment & Plan 1. Hypokalemia 11/17 Potassium 2.4 2. Metastatic Adenocarcinoma 11/13 Cytology- Right Pleural fluid positive for adenocarcinoma Oncology consult - Attempted to contact the family but unsuccessful, will try again today. Recommends hospice care due to dementia and poor performance status. [phone]. Left message on this number Call [phone] and try to left message Discussed with the sister-in-law make patient DNR/DNI discussed about patient is not a candidate for any chemotherapy and radiation as per oncologist she understand and okay to discharge to skilled care rehab when bed available 3. Anemia of Chronic disease Hgb 11.3 (11/17) Hgb 10.8 11/08 4. Failure to Thrive 11/17 Hypoalbuminemia ( 1.5) w/low total protein (A-G ratio 0.4) BMI: 25.60  
 
5. Hyponatremia/likely from diuretic Resolved- 11/17 Sodium 141 
 
6. Hypertension 11/18 - 146/89 7. Pleural effusion 11/06 Chest x-ray shows opacities/pleural effusion - Chest CT shows Near complete collapse of the right lung secondary to large pleural 
Effusion status post thoracocentesis Static post thoracocentesis - cytology results positive for metastatic adenocarcinoma; culture negative 8. Paroxysmal A. Fib- 11/7 ECG 
 
9. Hx of Congestive Heart Failure 11/08 proBNP 1,127  
 
10. Hx of Dementia 11. Hx of Hyperlipidemia 12. Hx of  lung cancer 13. Covid screening negative On metoprolol 25 mg twice a day Monitor potassium Repeat the labs PT/OT recommends 5/days week in SNF Replace potassium Discussed with the  regarding discharge planning Waiting Medicaid application before discharge to nursing home Current Facility-Administered Medications:  
  megestroL (MEGACE) 400 mg/10 mL (10 mL) oral suspension 400 mg, 400 mg, Oral, BID, Jesus Maldonado MD, 400 mg at 11/18/20 7686 
  metoprolol tartrate (LOPRESSOR) tablet 50 mg, 50 mg, Oral, Q12H, Edwin Smith MD, 50 mg at 11/18/20 2815 
  influenza vaccine 2020-21 (4 yrs+)(PF) (FLUCELVAX QUAD) injection 0.5 mL, 0.5 mL, IntraMUSCular, PRIOR TO DISCHARGE, Tanya Maldonado MD 
  acetylcysteine (MUCOMYST) 200 mg/mL (20 %) solution 600 mg, 600 mg, Nebulization, Q6H PRN, Asim Red MD 
  albuterol-ipratropium (DUO-NEB) 2.5 MG-0.5 MG/3 ML, 3 mL, Nebulization, Q6H PRN, Asim Red MD 
  heparin porcine injection 5,000 Units, 5,000 Units, SubCUTAneous, Q12H, Jesus Maldonado MD, 5,000 Units at 11/18/20 4466   piperacillin-tazobactam (ZOSYN) 3.375 g in 0.9% sodium chloride (MBP/ADV) 100 mL MBP, 3.375 g, IntraVENous, Q8H, Jesus Maldonado MD, Last Rate: 200 mL/hr at 11/18/20 1152, 3.375 g at 11/18/20 1152

## 2020-11-18 NOTE — PROGRESS NOTES
Pulmonary Progress Note Daily Progress Note: 11/18/2020 Subjective: The patient is seen for follow  up. Excerpts from admission 11/3/2020 or consult notes as follows:  
41-year-old lady came in because of generalized weakness lethargy past medical history of dementia congestive heart failure hypertension hyperlipidemia patient was discharged from home with a sling on her right arm as she fell. Now chest x-ray and CAT scan of the chest was done which shows right lower lobe collapse with pleural effusion unable to get much history out of the patient so pulmonary consult was called for further evaluation 11/8 denies cough or shortness of breath remains on room air 11/09 Today she denies cough and shortness of breath. She had a chest Xray this morning, awaiting read. She is on room air breathing well. 
11/10 She says she feels great and denies SOB and cough. CXR yesterday shows enlarging right pleural effusion and atelectasis and atelectasis of left base. 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 She is on room air. 11/12 She is on room air. Awaiting placement. Problem List: 
Problem List as of 11/18/2020 Never Reviewed Codes Class Noted - Resolved Hyponatremia ICD-10-CM: E87.1 ICD-9-CM: 276.1  11/3/2020 - Present Medications reviewed Current Facility-Administered Medications Medication Dose Route Frequency  megestroL (MEGACE) 400 mg/10 mL (10 mL) oral suspension 400 mg  400 mg Oral BID  metoprolol tartrate (LOPRESSOR) tablet 50 mg  50 mg Oral Q12H  
 influenza vaccine 2020-21 (4 yrs+)(PF) (FLUCELVAX QUAD) injection 0.5 mL  0.5 mL IntraMUSCular PRIOR TO DISCHARGE  acetylcysteine (MUCOMYST) 200 mg/mL (20 %) solution 600 mg  600 mg Nebulization Q6H PRN  
 albuterol-ipratropium (DUO-NEB) 2.5 MG-0.5 MG/3 ML  3 mL Nebulization Q6H PRN  
 heparin porcine injection 5,000 Units  5,000 Units SubCUTAneous Q12H  piperacillin-tazobactam (ZOSYN) 3.375 g in 0.9% sodium chloride (MBP/ADV) 100 mL MBP  3.375 g IntraVENous Q8H Review of Systems: A comprehensive review of systems was negative except for that written in the HPI. Objective:  
Physical Exam:  
 
Visit Vitals BP (!) 146/89 Pulse (!) 114 Temp 99.1 °F (37.3 °C) Resp 17 Ht 5' 2.01\" (1.575 m) Wt 63.5 kg (140 lb) SpO2 96% BMI 25.60 kg/m² O2 Device: Room air Temp (24hrs), Av.4 °F (36.9 °C), Min:97.9 °F (36.6 °C), Max:99.1 °F (37.3 °C) No intake/output data recorded.  1901 -  0700 In: -  
Out: 6072 [[id number]] Constitutional:  
 Awake alert seems oriented HENT:  
Head: Normocephalic and atraumatic. Eyes: Pupils are equal, round, and reactive to light. Extraocular movements intact Neck: Normal range of motion. Neck supple. No definite JVD Cardiovascular: Normal rate and regular rhythm. Pulmonary/Chest:  
 Clear anteriorly and left lateral with fairly good breath sounds right lateral and right posterior no definite wheezes or rales Abdominal: Soft. Bowel sounds are normal.  
Musculoskeletal: Normal range of motion. Skin: Skin is warm. Data Review:  
   
Recent Days: 
Recent Labs 20 
0820 WBC 4.0 HGB 11.3* HCT 36.0  Recent Labs 20 
0820   
K 2.4*  
 CO2 31  
GLU 75 BUN 5*  
CREA 0.53* CA 9.8 ALB 1.5* TBILI 0.4 ALT 27 Room air oxygen saturation 98% 24 Hour Results: No results found for this or any previous visit (from the past 24 hour(s)). Imaging: 
  
   
CXR Results  (Last 48 hours)  
  None  
   
  
    
Results from Hospital Encounter encounter on 20 XR HIP LT W OR WO PELV 2-3 VWS  
  Narrative Fall 1 week ago. 
  
No comparison. 
  
FINDINGS: AP pelvis and frog-leg view of the left hip. No acute fracture or 
dislocation.  Deformity of the left femur trochanter and proximal diaphysis, 
 appears chronic. No radiopaque foreign body. 
   
  Impression IMPRESSION: 
1. No acute bony findings. XR CHEST SNGL V  
  Narrative Chest single view. 
  
Comparison single view chest March 29, 2018. 
  
Right-sided Port-A-Cath stable position. New opacification of the lower two thirds right hemithorax. This is likely 
related to a combination of large volume pleural fluid and atelectatic lung. Left lung is aerated. Cardiac and mediastinal structures unchanged. No 
pneumothorax. Nonacute rib fractures noted. Results from [facility] Encounter encounter on 10/26/20 XR SHOULDER RT AP/LAT MIN 2 V  
  Narrative 3 views of the right shoulder reveal diffuse osteopenia. There is mild 
degenerative subluxation of the humerus with respect to the glenoid. No fracture 
or dislocation is identified. 
   
  Impression IMPRESSION: Degenerative changes.  
  
    
Results from Hospital Encounter encounter on 11/03/20 CT CHEST WO CONT  
  Narrative CT dose reduction was achieved through use of a standardized protocol tailored 
for this examination and automatic exposure control for dose modulation. 
  
Noncontrast study shows large right pleural effusion. Right middle and lower 
lobe are completely collapsed, with very few air bronchograms. The upper lobe is 
almost completely collapsed, sparingly anterior segment only. Mainstem bronchus 
is open. Proximal lobar bronchi are open. Left lung is clear. Multiple calcified 
mediastinal hilar lymph nodes. Small left pleural effusion. Small pericardial 
effusion. Normal caliber aorta. No significant upper abdominal or chest wall 
finding. Pronounced soft tissue edema in the visualized portion of the left arm 
   
  Impression IMPRESSION: Near complete collapse of the right lung secondary to large pleural 
effusion  
  
 
  
IMPRESSION:  
 
1.  Malignant pleural Effusion Right side status post thoracentesis 11/06 by IR 
 2. Chronic Obstructive Pulmonary Disease with Severe Acute Exacerbation requiring inpatient hospitalization and management; no wheezing today 3. A. fib with RVR 5. History of heart failure hyponatremia ? Hx of lung cancer 6. Prognosis guarded   
   
RECOMMENDATIONS/PLAN:  
 
Discussed with the patient detail about her condition she told me that she had a history of lung cancer diagnosed more than 3 years ago in [place] and apparently she got treated for it I do not have any records and also she is intermittently confused Pleural fluid Cytology of thoracentesis on 11/06 showed many groups of abnormal epithelial cells are noted, with large nuclei and cytoplasmic vacuoles, indicative of metastatic adenocarcinoma. 1. Status post thoracentesis via IR 1260 cc fluid removed. Pleural fluid culture negative. Chest Xray 11/09 showed increased large right pleural effusion and atelectasis, as well as atelectasis of the left base and possible small left pleural effusion. 2. Nebulizer treatment with Mucomyst 
3. On zosyn She is agreeable to [facility] and rehab. 
  
Jai Sweet MD

## 2020-11-19 NOTE — PROGRESS NOTES
Attempted to reach patient's Abi Floor, at [phone], with no answer. Soon after received call back from Peyman Quin, [address] daughter, at [phone] indicating that Rosa Isela Winkler was dealing with her own health conditions and having a procedure today. She was trying to find out what she could do to help. Notified her the first step of needing the POA to provide ESS with financial information to determine if she is eligible for Medicaid. Provided her with René's number in ESS and she stated she would call. Lisandra Stearnsce also indicated that Rosa Isela Winkler has been trying to arrange things, however, the patient has a brokerage account, and she is unable to access with her POA papers without a note indicating the patient is unable to make her own decisions. She requested Dr. Alis Ibarra call her. Notified Dr. Grace Lopez and he ordered a psych consult for capacity. CM will continue to follow.

## 2020-11-19 NOTE — PROGRESS NOTES
General Daily Progress Note Patient Name: Trina Cruz YOB: 1940 Age: 
79 y.o. Admit Date: 11/3/2020 Subjective:  
Patient is a 79 y.o female with a past medical history of dementia, CHF, hyperlipidemia, and hypertension Post thoracentesis 1260 cc of serous fluid removed. Cytology results positive for metastatic adenocarcinoma. Negative cultures. History of previous lung cancer and smoking. Pt is alert and awake watching T.V, laying in bed comfortably. No acute distress. Denies cough, shortness of breath, and chest pain. Denies weakness and dizziness. Patient didn't complete her breakfast, however she completed 8oz of ensure. Objective:  
 
Visit Vitals BP (!) 153/93 (BP 1 Location: Left arm) Pulse (!) 109 Temp 98.9 °F (37.2 °C) Resp 16 Ht 5' 2.01\" (1.575 m) Wt 63.5 kg (140 lb) SpO2 96% BMI 25.60 kg/m² Recent Results (from the past 24 hour(s)) METABOLIC PANEL, BASIC Collection Time: 11/18/20 10:33 PM  
Result Value Ref Range Sodium 138 136 - 145 mmol/L Potassium 3.0 (L) 3.5 - 5.1 mmol/L Chloride 103 97 - 108 mmol/L  
 CO2 31 21 - 32 mmol/L Anion gap 4 (L) 5 - 15 mmol/L Glucose 92 65 - 100 mg/dL BUN 6 6 - 20 mg/dL Creatinine 0.58 0.55 - 1.02 mg/dL BUN/Creatinine ratio 10 (L) 12 - 20 GFR est AA >60 >60 ml/min/1.73m2 GFR est non-AA >60 >60 ml/min/1.73m2 Calcium 9.8 8.5 - 10.1 mg/dL  
 
[unfilled] Review of Systems Constitutional: Negative for chills and fever. HENT: Negative. Eyes: Negative. Respiratory: Negative. Cardiovascular: Negative. Gastrointestinal: Negative for abdominal pain and nausea. Skin: Negative. Neurological: Negative. Physical Exam:   
 
Constitutional: pt is oriented to person, place, and time. HENT:  
Head: Normocephalic and atraumatic. Eyes: Pupils are equal, round, and reactive to light.  EOM are normal.  
Cardiovascular: Normal rate, regular rhythm and normal heart sounds. Pulmonary/Chest: Breath sounds normal. No wheezes. No rales. Exhibits no tenderness. Abdominal: Soft. Bowel sounds are normal. There is no abdominal tenderness. There is no rebound and no guarding. Musculoskeletal: Normal range of motion. Neurological: pt is alert and oriented to person, place, and time. XR CHEST PA LAT Final Result Impression: Large right-sided pleural effusion and associated  
compression/consolidation of the right lung, increased. Mild atelectasis at the  
left base. Possible small left pleural effusion. XR CHEST PORT Final Result IMPRESSION: Diffuse airspace disease, right hemithorax, with improved aeration  
from previous. Differential diagnosis includes pneumonia, atelectasis, and/or  
reexpansion pulmonary edema. No pneumothorax. [address] Final Result IMPRESSION:   
Successful ultrasound-guided thoracentesis of right large pleural effusion, with  
approximately 1.3 L serous pleural fluid removed. Pleural fluid sample was also  
sent for lab analysis. CT CHEST WO CONT Final Result IMPRESSION: Near complete collapse of the right lung secondary to large pleural  
effusion XR HIP LT W OR WO PELV 2-3 VWS Final Result IMPRESSION:  
1. No acute bony findings. XR CHEST SNGL V Final Result Recent Results (from the past 24 hour(s)) METABOLIC PANEL, BASIC Collection Time: 11/18/20 10:33 PM  
Result Value Ref Range Sodium 138 136 - 145 mmol/L Potassium 3.0 (L) 3.5 - 5.1 mmol/L Chloride 103 97 - 108 mmol/L  
 CO2 31 21 - 32 mmol/L Anion gap 4 (L) 5 - 15 mmol/L Glucose 92 65 - 100 mg/dL BUN 6 6 - 20 mg/dL Creatinine 0.58 0.55 - 1.02 mg/dL BUN/Creatinine ratio 10 (L) 12 - 20 GFR est AA >60 >60 ml/min/1.73m2 GFR est non-AA >60 >60 ml/min/1.73m2 Calcium 9.8 8.5 - 10.1 mg/dL Results  Procedure Component Value Units Date/Time CULTURE, BODY FLUID Natalia Guerrero [[id number]] Collected:  11/06/20 1730 Order Status:  Completed Specimen: Body Fluid from Swab Updated:  11/11/20 9732 Special Requests: No Special Requests GRAM STAIN Few WBCs seen No organisms seen Culture result:    
  culture performed on fluid swab specimen No growth 4 days Labs:  
 
Recent Labs 11/17/20 
0820 WBC 4.0 HGB 11.3* HCT 36.0  Recent Labs 11/18/20 
2233 11/17/20 
0820  141  
K 3.0* 2.4*  
 106 CO2 31 31 BUN 6 5* CREA 0.58 0.53* GLU 92 75  
CA 9.8 9.8 Recent Labs 11/17/20 
0820 ALT 27 AP 71 TBILI 0.4 TP 5.5* ALB 1.5*  
GLOB 4.0 No results for input(s): INR, PTP, APTT, INREXT, INREXT in the last 72 hours. No results for input(s): FE, TIBC, PSAT, FERR in the last 72 hours. No results found for: FOL, RBCF No results for input(s): PH, PCO2, PO2 in the last 72 hours. No results for input(s): CPK, CKNDX, TROIQ in the last 72 hours. No lab exists for component: CPKMB No results found for: CHOL, CHOLX, CHLST, CHOLV, HDL, HDLP, LDL, LDLC, DLDLP, TGLX, TRIGL, TRIGP, CHHD, CHHDX Lab Results Component Value Date/Time Glucose (POC) 220 (H) 11/08/2020 11:01 AM  
 Glucose (POC) 110 (H) 11/08/2020 08:26 AM  
 Glucose (POC) 86 11/06/2020 05:29 PM  
 Glucose (POC) 75 11/06/2020 03:12 PM  
 Glucose (POC) 84 11/06/2020 07:55 AM  
 
Lab Results Component Value Date/Time  Color Yellow/Straw 11/03/2020 02:45 PM  
 Appearance Clear 11/03/2020 02:45 PM  
 Specific gravity 1.010 11/03/2020 02:45 PM  
 pH (UA) 6.5 11/03/2020 02:45 PM  
 Protein Negative 11/03/2020 02:45 PM  
 Glucose Normal (A) 11/03/2020 02:45 PM  
 Ketone Negative 11/03/2020 02:45 PM  
 Bilirubin Negative 11/03/2020 02:45 PM  
 Urobilinogen Normal 11/03/2020 02:45 PM  
 Nitrites Negative 11/03/2020 02:45 PM  
 Leukocyte Esterase Negative 11/03/2020 02:45 PM  
 Bacteria Negative 11/03/2020 02:45 PM  
 WBC 0-4 11/03/2020 02:45 PM  
 RBC 0-5 11/03/2020 02:45 PM  
 
   
Assessment & Plan 1. Hypokalemia 11/17 Potassium 2.4 Continue to monitor 2. Metastatic Adenocarcinoma 11/13 Cytology- Right Pleural fluid positive for adenocarcinoma Oncology consult - Attempted to contact the family but unsuccessful, will try again today. Recommends hospice care due to dementia and poor performance status. [phone]. Left message on this number Call [phone] and try to left message Discussed with the sister-in-law make patient DNR/DNI discussed about patient is not a candidate for any chemotherapy and radiation as per oncologist she understand and okay to discharge to skilled care rehab when bed available 3. Anemia of Chronic disease Hgb 11.3 (11/17) Hgb 10.8 11/08 4. Failure to Thrive 11/17 Hypoalbuminemia ( 1.5) w/low total protein (A-G ratio 0.4) BMI: 25.60  
megestroL (MEGACE) 400 mg/10 mL (10 mL) oral suspension 400 mg  
 
5. Hyponatremia/likely from diuretic Resolved- 11/17 Sodium 141 
 
6. Hypertension- BP controlled 11/19 137/99 
metoprolol tartrate (LOPRESSOR) tablet 50 mg  
 
 
7. Pleural effusion 11/06 Chest x-ray shows opacities/pleural effusion - Chest CT shows Near complete collapse of the right lung secondary to large pleural 
Effusion status post thoracocentesis Static post thoracocentesis - cytology results positive for metastatic adenocarcinoma; culture negative 8. Paroxysmal A. Fib- 11/7 ECG 
metoprolol tartrate (LOPRESSOR) tablet 50 mg  
heparin porcine injection 5,000 Units 9. Hx of Congestive Heart Failure 11/08 proBNP 1,127  
 
10. Hx of Dementia 11. Hx of Hyperlipidemia 12. Hx of  lung cancer 13. Covid screening negative Repeat the labs PT/OT recommends 5/days week in SNF Discussed with the  regarding discharge planning Waiting Medicaid application before discharge to nursing home Current Facility-Administered Medications:  
  megestroL (MEGACE) 400 mg/10 mL (10 mL) oral suspension 400 mg, 400 mg, Oral, BID, Jesus Maldonado MD, 400 mg at 11/19/20 0846 
  metoprolol tartrate (LOPRESSOR) tablet 50 mg, 50 mg, Oral, Q12H, Edwin Smith MD, 50 mg at 11/19/20 0847 
  influenza vaccine 2020-21 (4 yrs+)(PF) (FLUCELVAX QUAD) injection 0.5 mL, 0.5 mL, IntraMUSCular, PRIOR TO DISCHARGE, Gladys Maldonado MD 
  acetylcysteine (MUCOMYST) 200 mg/mL (20 %) solution 600 mg, 600 mg, Nebulization, Q6H PRN, Asim Red MD 
  albuterol-ipratropium (DUO-NEB) 2.5 MG-0.5 MG/3 ML, 3 mL, Nebulization, Q6H PRN, Asim Red MD 
  heparin porcine injection 5,000 Units, 5,000 Units, SubCUTAneous, Q12H, Jesus Maldonado MD, 5,000 Units at 11/19/20 9724   piperacillin-tazobactam (ZOSYN) 3.375 g in 0.9% sodium chloride (MBP/ADV) 100 mL MBP, 3.375 g, IntraVENous, Q8H, Jesus Maldonado MD, Last Rate: 200 mL/hr at 11/18/20 2036, 3.375 g at 11/18/20 2036

## 2020-11-19 NOTE — PROGRESS NOTES
General Daily Progress Note Patient Name: Chino Cruz YOB: 1940 Age: 
79 y.o. Admit Date: 11/3/2020 Subjective:  
Patient is a 79 y.o female with a past medical history of dementia, CHF, hyperlipidemia, and hypertension Post thoracentesis 1260 cc of serous fluid removed. Cytology results positive for metastatic adenocarcinoma. Negative cultures. History of previous lung cancer and smoking. Pt is alert and awake watching T.V, laying in bed comfortably. No acute distress. Denies cough, shortness of breath, and chest pain. Denies weakness and dizziness. Patient not   eating t, however she completed 8oz of ensure. Objective:  
 
Visit Vitals BP (!) 153/93 (BP 1 Location: Left arm) Pulse (!) 109 Temp 98.9 °F (37.2 °C) Resp 16 Ht 5' 2.01\" (1.575 m) Wt 63.5 kg (140 lb) SpO2 96% BMI 25.60 kg/m² Recent Results (from the past 24 hour(s)) METABOLIC PANEL, BASIC Collection Time: 11/18/20 10:33 PM  
Result Value Ref Range Sodium 138 136 - 145 mmol/L Potassium 3.0 (L) 3.5 - 5.1 mmol/L Chloride 103 97 - 108 mmol/L  
 CO2 31 21 - 32 mmol/L Anion gap 4 (L) 5 - 15 mmol/L Glucose 92 65 - 100 mg/dL BUN 6 6 - 20 mg/dL Creatinine 0.58 0.55 - 1.02 mg/dL BUN/Creatinine ratio 10 (L) 12 - 20 GFR est AA >60 >60 ml/min/1.73m2 GFR est non-AA >60 >60 ml/min/1.73m2 Calcium 9.8 8.5 - 10.1 mg/dL  
 
[unfilled] Review of Systems Constitutional: Negative for chills and fever. HENT: Negative. Eyes: Negative. Respiratory: Negative. Cardiovascular: Negative. Gastrointestinal: Negative for abdominal pain and nausea. Skin: Negative. Neurological: Negative. Physical Exam:   
 
Constitutional: pt is oriented to person, place, and time. HENT:  
Head: Normocephalic and atraumatic. Eyes: Pupils are equal, round, and reactive to light.  EOM are normal.  
 Cardiovascular: Normal rate, regular rhythm and normal heart sounds. Pulmonary/Chest: Breath sounds normal. No wheezes. No rales. Exhibits no tenderness. Abdominal: Soft. Bowel sounds are normal. There is no abdominal tenderness. There is no rebound and no guarding. Musculoskeletal: Normal range of motion. Neurological: pt is alert and oriented to person, place, and time. XR CHEST PA LAT Final Result Impression: Large right-sided pleural effusion and associated  
compression/consolidation of the right lung, increased. Mild atelectasis at the  
left base. Possible small left pleural effusion. XR CHEST PORT Final Result IMPRESSION: Diffuse airspace disease, right hemithorax, with improved aeration  
from previous. Differential diagnosis includes pneumonia, atelectasis, and/or  
reexpansion pulmonary edema. No pneumothorax. [address] Final Result IMPRESSION:   
Successful ultrasound-guided thoracentesis of right large pleural effusion, with  
approximately 1.3 L serous pleural fluid removed. Pleural fluid sample was also  
sent for lab analysis. CT CHEST WO CONT Final Result IMPRESSION: Near complete collapse of the right lung secondary to large pleural  
effusion XR HIP LT W OR WO PELV 2-3 VWS Final Result IMPRESSION:  
1. No acute bony findings. XR CHEST SNGL V Final Result Recent Results (from the past 24 hour(s)) METABOLIC PANEL, BASIC Collection Time: 11/18/20 10:33 PM  
Result Value Ref Range Sodium 138 136 - 145 mmol/L Potassium 3.0 (L) 3.5 - 5.1 mmol/L Chloride 103 97 - 108 mmol/L  
 CO2 31 21 - 32 mmol/L Anion gap 4 (L) 5 - 15 mmol/L Glucose 92 65 - 100 mg/dL BUN 6 6 - 20 mg/dL Creatinine 0.58 0.55 - 1.02 mg/dL BUN/Creatinine ratio 10 (L) 12 - 20 GFR est AA >60 >60 ml/min/1.73m2 GFR est non-AA >60 >60 ml/min/1.73m2 Calcium 9.8 8.5 - 10.1 mg/dL Results Procedure Component Value Units Date/Time CULTURE, BODY FLUID Dereck Oropeza [[id number]] Collected:  11/06/20 1730 Order Status:  Completed Specimen: Body Fluid from Swab Updated:  11/11/20 8794 Special Requests: No Special Requests GRAM STAIN Few WBCs seen No organisms seen Culture result:    
  culture performed on fluid swab specimen No growth 4 days Labs:  
 
Recent Labs 11/17/20 
0820 WBC 4.0 HGB 11.3* HCT 36.0  Recent Labs 11/18/20 
2233 11/17/20 
0820  141  
K 3.0* 2.4*  
 106 CO2 31 31 BUN 6 5* CREA 0.58 0.53* GLU 92 75  
CA 9.8 9.8 Recent Labs 11/17/20 0820 ALT 27 AP 71 TBILI 0.4 TP 5.5* ALB 1.5*  
GLOB 4.0 No results for input(s): INR, PTP, APTT, INREXT, INREXT in the last 72 hours. No results for input(s): FE, TIBC, PSAT, FERR in the last 72 hours. No results found for: FOL, RBCF No results for input(s): PH, PCO2, PO2 in the last 72 hours. No results for input(s): CPK, CKNDX, TROIQ in the last 72 hours. No lab exists for component: CPKMB No results found for: CHOL, CHOLX, CHLST, CHOLV, HDL, HDLP, LDL, LDLC, DLDLP, TGLX, TRIGL, TRIGP, CHHD, CHHDX Lab Results Component Value Date/Time Glucose (POC) 220 (H) 11/08/2020 11:01 AM  
 Glucose (POC) 110 (H) 11/08/2020 08:26 AM  
 Glucose (POC) 86 11/06/2020 05:29 PM  
 Glucose (POC) 75 11/06/2020 03:12 PM  
 Glucose (POC) 84 11/06/2020 07:55 AM  
 
Lab Results Component Value Date/Time  Color Yellow/Straw 11/03/2020 02:45 PM  
 Appearance Clear 11/03/2020 02:45 PM  
 Specific gravity 1.010 11/03/2020 02:45 PM  
 pH (UA) 6.5 11/03/2020 02:45 PM  
 Protein Negative 11/03/2020 02:45 PM  
 Glucose Normal (A) 11/03/2020 02:45 PM  
 Ketone Negative 11/03/2020 02:45 PM  
 Bilirubin Negative 11/03/2020 02:45 PM  
 Urobilinogen Normal 11/03/2020 02:45 PM  
 Nitrites Negative 11/03/2020 02:45 PM  
 Leukocyte Esterase Negative 11/03/2020 02:45 PM  
 Bacteria Negative 11/03/2020 02:45 PM  
 WBC 0-4 11/03/2020 02:45 PM  
 RBC 0-5 11/03/2020 02:45 PM  
 
   
Assessment & Plan 1. Hypokalemia 11/17 Potassium 2.4 Replace potassium Continue to monitor 2. Metastatic Adenocarcinoma 11/13 Cytology- Right Pleural fluid positive for adenocarcinoma Oncology consult - Recommends hospice care due to dementia and poor performance status. 3. Anemia of Chronic disease Hgb 11.3 (11/17) Hgb 10.8 11/08 4. Failure to Thrive /severe malnutrition 11/17 Hypoalbuminemia ( 1.5) w/low total protein (A-G ratio 0.4) BMI: 25.60  
megestroL (MEGACE) 400 mg/10 mL (10 mL) oral suspension 400 mg  
 
5. Hyponatremia/likely from diuretic Resolved- 11/17 Sodium 141/stable 6. Hypertension- BP controlled 11/19 137/99 
metoprolol tartrate (LOPRESSOR) tablet 50 mg  
 
 
7. Pleural effusion 11/06 Chest x-ray shows opacities/pleural effusion - Chest CT shows Near complete collapse of the right lung secondary to large pleural 
Effusion status post thoracocentesis Static post thoracocentesis - cytology results positive for metastatic adenocarcinoma; culture negative 8. Paroxysmal A. Fib- 11/7 ECG 
metoprolol tartrate (LOPRESSOR) tablet 50 mg  
heparin porcine injection 5,000 Units 9. Hx of Congestive Heart Failure 11/08 proBNP 1,127  
 
10. Hx of Dementia 11. Hx of Hyperlipidemia 12. Hx of  lung cancer 13. Covid screening negative Repeat the labs PT/OT recommends 5/days week in SNF Discussed with the  regarding discharge planning Waiting Medicaid application before discharge to nursing home Discussed with the patient family today and will do psychiatric consult for competency Current Facility-Administered Medications:  
  megestroL (MEGACE) 400 mg/10 mL (10 mL) oral suspension 400 mg, 400 mg, Oral, BID, Jesus Maldonado MD, 400 mg at 11/19/20 6382   metoprolol tartrate (LOPRESSOR) tablet 50 mg, 50 mg, Oral, Q12H, Edwin Smith MD, 50 mg at 11/19/20 0847 
  influenza vaccine 2020-21 (4 yrs+)(PF) (FLUCELVAX QUAD) injection 0.5 mL, 0.5 mL, IntraMUSCular, PRIOR TO DISCHARGE, Shasta Maldonado MD 
  acetylcysteine (MUCOMYST) 200 mg/mL (20 %) solution 600 mg, 600 mg, Nebulization, Q6H PRN, Asim Red MD 
  albuterol-ipratropium (DUO-NEB) 2.5 MG-0.5 MG/3 ML, 3 mL, Nebulization, Q6H PRN, Asim Red MD 
  heparin porcine injection 5,000 Units, 5,000 Units, SubCUTAneous, Q12H, Jesus Maldonado MD, 5,000 Units at 11/19/20 3660   piperacillin-tazobactam (ZOSYN) 3.375 g in 0.9% sodium chloride (MBP/ADV) 100 mL MBP, 3.375 g, IntraVENous, Q8H, Jesus Maldonado MD, Last Rate: 200 mL/hr at 11/18/20 2036, 3.375 g at 11/18/20 2036

## 2020-11-19 NOTE — PROGRESS NOTES
Pulmonary Progress Note Daily Progress Note: 11/19/2020 Subjective: The patient is seen for follow  up. Excerpts from admission 11/3/2020 or consult notes as follows:  
28-year-old lady came in because of generalized weakness lethargy past medical history of dementia congestive heart failure hypertension hyperlipidemia patient was discharged from home with a sling on her right arm as she fell. Now chest x-ray and CAT scan of the chest was done which shows right lower lobe collapse with pleural effusion unable to get much history out of the patient so pulmonary consult was called for further evaluation 11/8 denies cough or shortness of breath remains on room air 11/09 Today she denies cough and shortness of breath. She had a chest Xray this morning, awaiting read. She is on room air breathing well. 
11/10 She says she feels great and denies SOB and cough. CXR yesterday shows enlarging right pleural effusion and atelectasis and atelectasis of left base. 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 She is on room air. 11/12 She is on room air. Awaiting placement. Problem List: 
Problem List as of 11/19/2020 Never Reviewed Codes Class Noted - Resolved Hyponatremia ICD-10-CM: E87.1 ICD-9-CM: 276.1  11/3/2020 - Present Medications reviewed Current Facility-Administered Medications Medication Dose Route Frequency  megestroL (MEGACE) 400 mg/10 mL (10 mL) oral suspension 400 mg  400 mg Oral BID  metoprolol tartrate (LOPRESSOR) tablet 50 mg  50 mg Oral Q12H  
 influenza vaccine 2020-21 (4 yrs+)(PF) (FLUCELVAX QUAD) injection 0.5 mL  0.5 mL IntraMUSCular PRIOR TO DISCHARGE  acetylcysteine (MUCOMYST) 200 mg/mL (20 %) solution 600 mg  600 mg Nebulization Q6H PRN  
 albuterol-ipratropium (DUO-NEB) 2.5 MG-0.5 MG/3 ML  3 mL Nebulization Q6H PRN  
 heparin porcine injection 5,000 Units  5,000 Units SubCUTAneous Q12H  piperacillin-tazobactam (ZOSYN) 3.375 g in 0.9% sodium chloride (MBP/ADV) 100 mL MBP  3.375 g IntraVENous Q8H Review of Systems: A comprehensive review of systems was negative except for that written in the HPI. Objective:  
Physical Exam:  
 
Visit Vitals BP (!) 153/93 (BP 1 Location: Left arm) Pulse (!) 109 Temp 98.9 °F (37.2 °C) Resp 16 Ht 5' 2.01\" (1.575 m) Wt 63.5 kg (140 lb) SpO2 96% BMI 25.60 kg/m² O2 Device: Room air Temp (24hrs), Av.7 °F (37.1 °C), Min:98.6 °F (37 °C), Max:98.9 °F (37.2 °C) No intake/output data recorded.  1901 -  0700 In: -  
Out: 1000 [Urine:1000] Constitutional:  
 Awake alert seems oriented HENT:  
Head: Normocephalic and atraumatic. Eyes: Pupils are equal, round, and reactive to light. Extraocular movements intact Neck: Normal range of motion. Neck supple. No definite JVD Cardiovascular: Normal rate and regular rhythm. Pulmonary/Chest:  
 Clear anteriorly and left lateral with fairly good breath sounds right lateral and right posterior no definite wheezes or rales Abdominal: Soft. Bowel sounds are normal.  
Musculoskeletal: Normal range of motion. Skin: Skin is warm. Data Review:  
   
Recent Days: 
Recent Labs 20 
0820 WBC 4.0 HGB 11.3* HCT 36.0  Recent Labs 20 
2233 20 
0820  141  
K 3.0* 2.4*  
 106 CO2 31 31 GLU 92 75 BUN 6 5* CREA 0.58 0.53* CA 9.8 9.8 ALB  --  1.5* TBILI  --  0.4 ALT  --  27  
 
Room air oxygen saturation 98% 24 Hour Results: 
Recent Results (from the past 24 hour(s)) METABOLIC PANEL, BASIC Collection Time: 20 10:33 PM  
Result Value Ref Range Sodium 138 136 - 145 mmol/L Potassium 3.0 (L) 3.5 - 5.1 mmol/L Chloride 103 97 - 108 mmol/L  
 CO2 31 21 - 32 mmol/L Anion gap 4 (L) 5 - 15 mmol/L Glucose 92 65 - 100 mg/dL BUN 6 6 - 20 mg/dL Creatinine 0.58 0.55 - 1.02 mg/dL BUN/Creatinine ratio 10 (L) 12 - 20 GFR est AA >60 >60 ml/min/1.73m2 GFR est non-AA >60 >60 ml/min/1.73m2 Calcium 9.8 8.5 - 10.1 mg/dL Imaging: 
  
   
CXR Results  (Last 48 hours)  
  None  
   
  
    
Results from Hospital Encounter encounter on 11/03/20 XR HIP LT W OR WO PELV 2-3 VWS  
  Narrative Fall 1 week ago. 
  
No comparison. 
  
FINDINGS: AP pelvis and frog-leg view of the left hip. No acute fracture or 
dislocation. Deformity of the left femur trochanter and proximal diaphysis, 
appears chronic. No radiopaque foreign body. 
   
  Impression IMPRESSION: 
1. No acute bony findings. XR CHEST SNGL V  
  Narrative Chest single view. 
  
Comparison single view chest March 29, 2018. 
  
Right-sided Port-A-Cath stable position. New opacification of the lower two thirds right hemithorax. This is likely 
related to a combination of large volume pleural fluid and atelectatic lung. Left lung is aerated. Cardiac and mediastinal structures unchanged. No 
pneumothorax. Nonacute rib fractures noted. Results from [facility] [place] En[place] encounter on 10/26/20 XR SHOULDER RT AP/LAT MIN 2 V  
  Narrative 3 views of the right shoulder reveal diffuse osteopenia. There is mild 
degenerative subluxation of the humerus with respect to the glenoid. No fracture 
or dislocation is identified. 
   
  Impression IMPRESSION: Degenerative changes.  
  
    
Results from Hospital Encounter encounter on 11/03/20 CT CHEST WO CONT  
  Narrative CT dose reduction was achieved through use of a standardized protocol tailored 
for this examination and automatic exposure control for dose modulation. 
  
Noncontrast study shows large right pleural effusion. Right middle and lower 
lobe are completely collapsed, with very few air bronchograms. The upper lobe is 
almost completely collapsed, sparingly anterior segment only. Mainstem bronchus 
is open. Proximal lobar bronchi are open. Left lung is clear.  Multiple calcified 
mediastinal hilar lymph nodes. Small left pleural effusion. Small pericardial 
effusion. Normal caliber aorta. No significant upper abdominal or chest wall 
finding. Pronounced soft tissue edema in the visualized portion of the left arm 
   
  Impression IMPRESSION: Near complete collapse of the right lung secondary to large pleural 
effusion  
  
 
  
IMPRESSION:  
 
1. Malignant pleural Effusion Right side status post thoracentesis 11/06 by IR 
2. Chronic Obstructive Pulmonary Disease with Severe Acute Exacerbation requiring inpatient hospitalization and management; no wheezing today 3. A. fib 5. History of heart failure hyponatremia ? Hx of lung cancer 6. Prognosis guarded   
   
RECOMMENDATIONS/PLAN:  
 
Discussed with the patient detail about her condition she told me that she had a history of lung cancer diagnosed more than 3 years ago in [place] and apparently she got treated for it I do not have any records and also she is intermittently confused Pleural fluid Cytology of thoracentesis on 11/06 showed many groups of abnormal epithelial cells are noted, with large nuclei and cytoplasmic vacuoles, indicative of metastatic adenocarcinoma. 1. Status post thoracentesis via IR 1260 cc fluid removed. Pleural fluid culture negative. Chest Xray 11/09 showed increased large right pleural effusion and atelectasis, as well as atelectasis of the left base and possible small left pleural effusion. 2. Nebulizer treatment with Mucomyst 
3. On zosyn She is agreeable to [facility] and rehab. 
  
Iman Kimbrough MD

## 2020-11-19 NOTE — PROGRESS NOTES
Problem: Mobility Impaired (Adult and Pediatric) Goal: *Acute Goals and Plan of Care (Insert Text) Description: Patient will move from supine to sit and sit to supine , scoot up and down, and roll side to side in bed with moderate assistance  within 7 day(s). Patient will transfer from bed to chair and chair to bed with moderate assistance  using the least restrictive device within 7 day(s). Patient will improve static sitting balance to minimal assistance within 1 week(s). Patient will ambulate 30 feet with minimal assistance with least restrictive device within 1 weeks. Outcome: Progressing Towards Goal 
 PHYSICAL THERAPY TREATMENT Patient: Destini Burton (79 y.o. female) Date: 11/19/2020 Diagnosis: Hyponatremia [E87.1] <principal problem not specified> Precautions:fall Chart, physical therapy assessment, plan of care and goals were reviewed. ASSESSMENT Patient continues with skilled PT services and is progressing towards goals. Pt. Required encouragement to work with therapy. Able to Roll with Min A. Required Mod A for sup to sit and sit to sup. Performed 1 sit to stand with Min A and noticed increased RR. Patient reported feeling light headed with symptoms resolving in supine. Recommend DC to SNF. Left patient semi supine in bed with callbell and all needs met. . .  
 
Current Level of Function Impacting Discharge (mobility/balance): decreased indep mobility. Requires assist for all mobility. Other factors to consider for discharge: SNF    
 
PLAN : 
Patient continues to benefit from skilled intervention to address the above impairments. Continue treatment per established plan of care. to address goals. Recommendation for discharge: (in order for the patient to meet his/her long term goals) Therapy up to 5 days/week in SNF setting This discharge recommendation: 
Has been made in collaboration with the attending provider and/or case management IF patient discharges home will need the following DME: hospital bed and wheelchair SUBJECTIVE:  
Patient stated i am ok. I don't think I want to do therapy. I am tired.  OBJECTIVE DATA SUMMARY:  
Critical Behavior: 
Neurologic State: Alert Orientation Level: Oriented to person, Oriented to place Cognition: Appropriate safety awareness Functional Mobility Training: 
Bed Mobility: 
  
  
  
  
  
  
Transfers: 
  
  
     
  
     
  
  
  
  
Balance: 
  
Ambulation/Gait Training: 
  
  
  
  
  
  
  
  
  
  
  
  
  
  
  
  
  
   
 
Therapeutic Exercises:  
AArom in bed brandon les. Pain Ratin/10 Activity Tolerance:  
Fair Please refer to the flowsheet for vital signs taken during this treatment. After treatment patient left in no apparent distress:  
Supine in bed and Call bell within reach COMMUNICATION/COLLABORATION:  
The patients plan of care was discussed with: Registered nurse. Ulises Wray PT Time Calculation: 23 mins

## 2020-11-20 NOTE — PROGRESS NOTES
General Daily Progress Note Patient Name: Odin Hills YOB: 1940 Age: 
79 y.o. Admit Date: 11/3/2020 Subjective:  
Patient is a 79 y.o female with a past medical history of dementia, CHF, hyperlipidemia, and hypertension Pt is alert and awake watching T.V, laying in bed comfortably. No acute distress. Denies cough, shortness of breath, and chest pain. Denies weakness and dizziness. Patient not eating says she dosen't have an appetite Objective:  
 
Visit Vitals /88 Pulse (!) 54 Temp 98.5 °F (36.9 °C) Resp 16 Ht 5' 2.01\" (1.575 m) Wt 63.5 kg (140 lb) SpO2 98% BMI 25.60 kg/m² Recent Results (from the past 24 hour(s)) GLUCOSE, POC Collection Time: 11/20/20  2:59 AM  
Result Value Ref Range Glucose (POC) 80 65 - 100 mg/dL Performed by JMIMY THOMASON   
CBC WITH AUTOMATED DIFF Collection Time: 11/20/20  8:04 AM  
Result Value Ref Range WBC 4.2 3.6 - 11.0 K/uL  
 RBC 4.56 3.80 - 5.20 M/uL  
 HGB 11.4 (L) 11.5 - 16.0 g/dL HCT 35.9 35.0 - 47.0 % MCV 78.7 (L) 80.0 - 99.0 FL  
 MCH 25.0 (L) 26.0 - 34.0 PG  
 MCHC 31.8 30.0 - 36.5 g/dL  
 RDW 18.3 (H) 11.5 - 14.5 % PLATELET 652 590 - 802 K/uL MPV 11.4 8.9 - 12.9 FL  
 NEUTROPHILS 65 32 - 75 % LYMPHOCYTES 14 12 - 49 % MONOCYTES 16 (H) 5 - 13 % EOSINOPHILS 4 0 - 7 % BASOPHILS 1 0 - 1 % IMMATURE GRANULOCYTES 0 0.0 - 0.5 % ABS. NEUTROPHILS 2.8 1.8 - 8.0 K/UL  
 ABS. LYMPHOCYTES 0.6 (L) 0.8 - 3.5 K/UL  
 ABS. MONOCYTES 0.7 0.0 - 1.0 K/UL  
 ABS. EOSINOPHILS 0.2 0.0 - 0.4 K/UL  
 ABS. BASOPHILS 0.0 0.0 - 0.1 K/UL  
 ABS. IMM. GRANS. 0.0 0.00 - 0.04 K/UL  
 DF AUTOMATED METABOLIC PANEL, COMPREHENSIVE Collection Time: 11/20/20  8:04 AM  
Result Value Ref Range Sodium 139 136 - 145 mmol/L Potassium 2.9 (L) 3.5 - 5.1 mmol/L Chloride 105 97 - 108 mmol/L  
 CO2 29 21 - 32 mmol/L Anion gap 5 5 - 15 mmol/L  Glucose 69 65 - 100 mg/dL BUN 6 6 - 20 mg/dL Creatinine 0.51 (L) 0.55 - 1.02 mg/dL BUN/Creatinine ratio 12 12 - 20 GFR est AA >60 >60 ml/min/1.73m2 GFR est non-AA >60 >60 ml/min/1.73m2 Calcium 9.9 8.5 - 10.1 mg/dL Bilirubin, total 0.4 0.2 - 1.0 mg/dL AST (SGOT) 28 15 - 37 U/L  
 ALT (SGPT) 27 12 - 78 U/L Alk. phosphatase 78 45 - 117 U/L Protein, total 6.2 (L) 6.4 - 8.2 g/dL Albumin 1.7 (L) 3.5 - 5.0 g/dL Globulin 4.5 (H) 2.0 - 4.0 g/dL A-G Ratio 0.4 (L) 1.1 - 2.2    
 
[unfilled] Review of Systems Constitutional: Negative for chills and fever. HENT: Negative. Eyes: Negative. Respiratory: Negative. Cardiovascular: Negative. Gastrointestinal: Negative for abdominal pain and nausea. Skin: Negative. Neurological: Negative. Physical Exam:   
 
Constitutional: pt is oriented to person, place, and time. HENT:  
Head: Normocephalic and atraumatic. Eyes: Pupils are equal, round, and reactive to light. EOM are normal.  
Cardiovascular: Normal rate, regular rhythm and normal heart sounds. Pulmonary/Chest: Breath sounds normal. No wheezes. No rales. Exhibits no tenderness. Abdominal: Soft. Bowel sounds are normal. There is no abdominal tenderness. There is no rebound and no guarding. Musculoskeletal: Normal range of motion. Neurological: pt is alert and oriented to person, place, and time. XR CHEST PA LAT Final Result Impression: Large right-sided pleural effusion and associated  
compression/consolidation of the right lung, increased. Mild atelectasis at the  
left base. Possible small left pleural effusion. XR CHEST PORT Final Result IMPRESSION: Diffuse airspace disease, right hemithorax, with improved aeration  
from previous. Differential diagnosis includes pneumonia, atelectasis, and/or  
reexpansion pulmonary edema. No pneumothorax[facility] [address] Final Result IMPRESSION:   
Successful ultrasound-guided thoracentesis of right large pleural effusion, with  
approximately 1.3 L serous pleural fluid removed. Pleural fluid sample was also  
sent for lab analysis. CT CHEST WO CONT Final Result IMPRESSION: Near complete collapse of the right lung secondary to large pleural  
effusion XR HIP LT W OR WO PELV 2-3 VWS Final Result IMPRESSION:  
1. No acute bony findings. XR CHEST SNGL V Final Result Recent Results (from the past 24 hour(s)) GLUCOSE, POC Collection Time: 11/20/20  2:59 AM  
Result Value Ref Range Glucose (POC) 80 65 - 100 mg/dL Performed by JIMMY THOMASON   
CBC WITH AUTOMATED DIFF Collection Time: 11/20/20  8:04 AM  
Result Value Ref Range WBC 4.2 3.6 - 11.0 K/uL  
 RBC 4.56 3.80 - 5.20 M/uL  
 HGB 11.4 (L) 11.5 - 16.0 g/dL HCT 35.9 35.0 - 47.0 % MCV 78.7 (L) 80.0 - 99.0 FL  
 MCH 25.0 (L) 26.0 - 34.0 PG  
 MCHC 31.8 30.0 - 36.5 g/dL  
 RDW 18.3 (H) 11.5 - 14.5 % PLATELET 619 711 - 097 K/uL MPV 11.4 8.9 - 12.9 FL  
 NEUTROPHILS 65 32 - 75 % LYMPHOCYTES 14 12 - 49 % MONOCYTES 16 (H) 5 - 13 % EOSINOPHILS 4 0 - 7 % BASOPHILS 1 0 - 1 % IMMATURE GRANULOCYTES 0 0.0 - 0.5 % ABS. NEUTROPHILS 2.8 1.8 - 8.0 K/UL  
 ABS. LYMPHOCYTES 0.6 (L) 0.8 - 3.5 K/UL  
 ABS. MONOCYTES 0.7 0.0 - 1.0 K/UL  
 ABS. EOSINOPHILS 0.2 0.0 - 0.4 K/UL  
 ABS. BASOPHILS 0.0 0.0 - 0.1 K/UL  
 ABS. IMM. GRANS. 0.0 0.00 - 0.04 K/UL  
 DF AUTOMATED METABOLIC PANEL, COMPREHENSIVE Collection Time: 11/20/20  8:04 AM  
Result Value Ref Range Sodium 139 136 - 145 mmol/L Potassium 2.9 (L) 3.5 - 5.1 mmol/L Chloride 105 97 - 108 mmol/L  
 CO2 29 21 - 32 mmol/L Anion gap 5 5 - 15 mmol/L Glucose 69 65 - 100 mg/dL BUN 6 6 - 20 mg/dL Creatinine 0.51 (L) 0.55 - 1.02 mg/dL BUN/Creatinine ratio 12 12 - 20 GFR est AA >60 >60 ml/min/1.73m2 GFR est non-AA >60 >60 ml/min/1.73m2 Calcium 9.9 8.5 - 10.1 mg/dL  Bilirubin, total 0.4 0.2 - 1.0 mg/dL AST (SGOT) 28 15 - 37 U/L  
 ALT (SGPT) 27 12 - 78 U/L Alk. phosphatase 78 45 - 117 U/L Protein, total 6.2 (L) 6.4 - 8.2 g/dL Albumin 1.7 (L) 3.5 - 5.0 g/dL Globulin 4.5 (H) 2.0 - 4.0 g/dL A-G Ratio 0.4 (L) 1.1 - 2.2 Results Procedure Component Value Units Date/Time CULTURE, BODY FLUID Abdullahi Martinez [[id number]] Collected:  11/06/20 1730 Order Status:  Completed Specimen: Body Fluid from Swab Updated:  11/11/20 4994 Special Requests: No Special Requests GRAM STAIN Few WBCs seen No organisms seen Culture result:    
  culture performed on fluid swab specimen No growth 4 days Labs:  
 
Recent Labs  
  11/20/20 
5120 WBC 4.2 HGB 11.4* HCT 35.9  Recent Labs  
  11/20/20 
0804 11/18/20 
2233  138  
K 2.9* 3.0*  
 103 CO2 29 31 BUN 6 6 CREA 0.51* 0.58 GLU 69 92 CA 9.9 9.8 Recent Labs  
  11/20/20 
6880 ALT 27 AP 78 TBILI 0.4 TP 6.2* ALB 1.7*  
GLOB 4.5* No results for input(s): INR, PTP, APTT, INREXT, INREXT in the last 72 hours. No results for input(s): FE, TIBC, PSAT, FERR in the last 72 hours. No results found for: FOL, RBCF No results for input(s): PH, PCO2, PO2 in the last 72 hours. No results for input(s): CPK, CKNDX, TROIQ in the last 72 hours. No lab exists for component: CPKMB No results found for: CHOL, CHOLX, CHLST, CHOLV, HDL, HDLP, LDL, LDLC, DLDLP, TGLX, TRIGL, TRIGP, CHHD, CHHDX Lab Results Component Value Date/Time Glucose (POC) 80 11/20/2020 02:59 AM  
 Glucose (POC) 220 (H) 11/08/2020 11:01 AM  
 Glucose (POC) 110 (H) 11/08/2020 08:26 AM  
 Glucose (POC) 86 11/06/2020 05:29 PM  
 Glucose (POC) 75 11/06/2020 03:12 PM  
 
Lab Results Component Value Date/Time  Color Yellow/Straw 11/03/2020 02:45 PM  
 Appearance Clear 11/03/2020 02:45 PM  
 Specific gravity 1.010 11/03/2020 02:45 PM  
 pH (UA) 6.5 11/03/2020 02:45 PM  
 Protein Negative 11/03/2020 02:45 PM  
 Glucose Normal (A) 11/03/2020 02:45 PM  
 Ketone Negative 11/03/2020 02:45 PM  
 Bilirubin Negative 11/03/2020 02:45 PM  
 Urobilinogen Normal 11/03/2020 02:45 PM  
 Nitrites Negative 11/03/2020 02:45 PM  
 Leukocyte Esterase Negative 11/03/2020 02:45 PM  
 Bacteria Negative 11/03/2020 02:45 PM  
 WBC 0-4 11/03/2020 02:45 PM  
 RBC 0-5 11/03/2020 02:45 PM  
 
   
Assessment & Plan 1. Hypokalemia 11/20 Potassium 2.9 Replace potassium Continue to monitor 2. Metastatic Adenocarcinoma 11/13 Cytology- Right Pleural fluid positive for adenocarcinoma Oncology consult - Recommends hospice care due to dementia and poor performance status. 3. Anemia of Chronic disease- stable 11/20-Hgb 11.4 Will continue to monitor H&H 4. Failure to Thrive /severe malnutrition 11/20 Hypoalbuminemia ( 1.7) w/low total protein(6.2) BMI: 25.60  
megestroL (MEGACE) 400 mg/10 mL (10 mL) oral suspension 400 mg  
 
5. Hyponatremia/likely from diuretic 
11/20- Sodium (139) -Stable 6. Hypertension- BP controlled 
metoprolol tartrate (LOPRESSOR) tablet 50 mg  
 
 
7. Pleural effusion 11/06 Chest x-ray shows opacities/pleural effusion - Chest CT shows Near complete collapse of the right lung secondary to large pleural 
Effusion status post thoracocentesis Static post thoracocentesis - cytology results positive for metastatic adenocarcinoma; culture negative 8. Paroxysmal A. Fib- 11/7 ECG 
metoprolol tartrate (LOPRESSOR) tablet 50 mg  
heparin porcine injection 5,000 Units 9. Hx of Congestive Heart Failure 11/08 proBNP 1,127  
 
10. Hx of Dementia 11. Hx of Hyperlipidemia 12. Hx of  lung cancer 13. Covid screening negative Repeat the labs PT/OT recommends 5/days week in SNF Discussed with the  regarding discharge planning Waiting Medicaid application before discharge to nursing home Discussed with the patient family today and will do psychiatric consult for competency Current Facility-Administered Medications:  
  escitalopram oxalate (LEXAPRO) tablet 5 mg, 5 mg, Oral, DAILY, Abundio An MD, 5 mg at 11/20/20 4266   piperacillin-tazobactam (ZOSYN) 3.375 g in 0.9% sodium chloride (MBP/ADV) 100 mL MBP, 3.375 g, IntraVENous, Q8H, Jesus Maldonado MD, Last Rate: 25 mL/hr at 11/20/20 0916, 3.375 g at 11/20/20 0916 
  megestroL (MEGACE) 400 mg/10 mL (10 mL) oral suspension 400 mg, 400 mg, Oral, BID, Jesus Maldonado MD, 400 mg at 11/20/20 0916 
  metoprolol tartrate (LOPRESSOR) tablet 50 mg, 50 mg, Oral, Q12H, Royce MACIAS MD, 50 mg at 11/20/20 6793   influenza vaccine 2020-21 (4 yrs+)(PF) (FLUCELVAX QUAD) injection 0.5 mL, 0.5 mL, IntraMUSCular, PRIOR TO DISCHARGE, Bonnie Maldonado MD 
  acetylcysteine (MUCOMYST) 200 mg/mL (20 %) solution 600 mg, 600 mg, Nebulization, Q6H PRN, Asim Red MD 
  albuterol-ipratropium (DUO-NEB) 2.5 MG-0.5 MG/3 ML, 3 mL, Nebulization, Q6H PRN, Asim Red MD 
  heparin porcine injection 5,000 Units, 5,000 Units, SubCUTAneous, Q12H, Jesus Maldonado MD, 5,000 Units at 11/20/20 1648

## 2020-11-20 NOTE — PROGRESS NOTES
Pulmonary Progress Note Daily Progress Note: 11/20/2020 Subjective: The patient is seen for follow  up. Excerpts from admission 11/3/2020 or consult notes as follows:  
58-year-old lady came in because of generalized weakness lethargy past medical history of dementia congestive heart failure hypertension hyperlipidemia patient was discharged from home with a sling on her right arm as she fell. Now chest x-ray and CAT scan of the chest was done which shows right lower lobe collapse with pleural effusion unable to get much history out of the patient so pulmonary consult was called for further evaluation 11/8 denies cough or shortness of breath remains on room air 11/09 Today she denies cough and shortness of breath. She had a chest Xray this morning, awaiting read. She is on room air breathing well. 
11/10 She says she feels great and denies SOB and cough. CXR yesterday shows enlarging right pleural effusion and atelectasis and atelectasis of left base. 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 She is on room air. 11/12 She is on room air. Awaiting placement. Problem List: 
Problem List as of 11/20/2020 Never Reviewed Codes Class Noted - Resolved Hyponatremia ICD-10-CM: E87.1 ICD-9-CM: 276.1  11/3/2020 - Present Medications reviewed Current Facility-Administered Medications Medication Dose Route Frequency  escitalopram oxalate (LEXAPRO) tablet 5 mg  5 mg Oral DAILY  piperacillin-tazobactam (ZOSYN) 3.375 g in 0.9% sodium chloride (MBP/ADV) 100 mL MBP  3.375 g IntraVENous Q8H  
 megestroL (MEGACE) 400 mg/10 mL (10 mL) oral suspension 400 mg  400 mg Oral BID  metoprolol tartrate (LOPRESSOR) tablet 50 mg  50 mg Oral Q12H  
 influenza vaccine 2020-21 (4 yrs+)(PF) (FLUCELVAX QUAD) injection 0.5 mL  0.5 mL IntraMUSCular PRIOR TO DISCHARGE  acetylcysteine (MUCOMYST) 200 mg/mL (20 %) solution 600 mg  600 mg Nebulization Q6H PRN  
 albuterol-ipratropium (DUO-NEB) 2.5 MG-0.5 MG/3 ML  3 mL Nebulization Q6H PRN  
 heparin porcine injection 5,000 Units  5,000 Units SubCUTAneous Q12H Review of Systems: A comprehensive review of systems was negative except for that written in the HPI. Objective:  
Physical Exam:  
 
Visit Vitals /88 Pulse (!) 54 Temp 98.5 °F (36.9 °C) Resp 16 Ht 5' 2.01\" (1.575 m) Wt 63.5 kg (140 lb) SpO2 98% BMI 25.60 kg/m² O2 Device: Room air Temp (24hrs), Av.7 °F (37.1 °C), Min:98.5 °F (36.9 °C), Max:98.9 °F (37.2 °C) No intake/output data recorded.  1901 -  0700 In: 480 [P.O.:480] Out: 700 [Urine:700] Constitutional:  
 Awake alert seems oriented HENT:  
Head: Normocephalic and atraumatic. Eyes: Pupils are equal, round, and reactive to light. Extraocular movements intact Neck: Normal range of motion. Neck supple. No definite JVD Cardiovascular: Normal rate and regular rhythm. Pulmonary/Chest:  
 Clear anteriorly and left lateral with fairly good breath sounds right lateral and right posterior no definite wheezes or rales Abdominal: Soft. Bowel sounds are normal.  
Musculoskeletal: Normal range of motion. Skin: Skin is warm. Data Review:  
   
Recent Days: 
Recent Labs  
  20 
0804 WBC 4.2 HGB 11.4* HCT 35.9  Recent Labs  
  20 
0804 20 
2233  138  
K 2.9* 3.0*  
 103 CO2 29 31 GLU 69 92 BUN 6 6 CREA 0.51* 0.58  
CA 9.9 9.8 ALB 1.7*  --   
TBILI 0.4  --   
ALT 27  --   
 
Room air oxygen saturation 98% 24 Hour Results: 
Recent Results (from the past 24 hour(s)) GLUCOSE, POC Collection Time: 20  2:59 AM  
Result Value Ref Range Glucose (POC) 80 65 - 100 mg/dL Performed by JIMMY THOMASON   
CBC WITH AUTOMATED DIFF Collection Time: 20  8:04 AM  
Result Value Ref Range WBC 4.2 3.6 - 11.0 K/uL  
 RBC 4.56 3.80 - 5.20 M/uL HGB 11.4 (L) 11.5 - 16.0 g/dL HCT 35.9 35.0 - 47.0 % MCV 78.7 (L) 80.0 - 99.0 FL  
 MCH 25.0 (L) 26.0 - 34.0 PG  
 MCHC 31.8 30.0 - 36.5 g/dL  
 RDW 18.3 (H) 11.5 - 14.5 % PLATELET 857 580 - 475 K/uL MPV 11.4 8.9 - 12.9 FL  
 NEUTROPHILS 65 32 - 75 % LYMPHOCYTES 14 12 - 49 % MONOCYTES 16 (H) 5 - 13 % EOSINOPHILS 4 0 - 7 % BASOPHILS 1 0 - 1 % IMMATURE GRANULOCYTES 0 0.0 - 0.5 % ABS. NEUTROPHILS 2.8 1.8 - 8.0 K/UL  
 ABS. LYMPHOCYTES 0.6 (L) 0.8 - 3.5 K/UL  
 ABS. MONOCYTES 0.7 0.0 - 1.0 K/UL  
 ABS. EOSINOPHILS 0.2 0.0 - 0.4 K/UL  
 ABS. BASOPHILS 0.0 0.0 - 0.1 K/UL  
 ABS. IMM. GRANS. 0.0 0.00 - 0.04 K/UL  
 DF AUTOMATED METABOLIC PANEL, COMPREHENSIVE Collection Time: 11/20/20  8:04 AM  
Result Value Ref Range Sodium 139 136 - 145 mmol/L Potassium 2.9 (L) 3.5 - 5.1 mmol/L Chloride 105 97 - 108 mmol/L  
 CO2 29 21 - 32 mmol/L Anion gap 5 5 - 15 mmol/L Glucose 69 65 - 100 mg/dL BUN 6 6 - 20 mg/dL Creatinine 0.51 (L) 0.55 - 1.02 mg/dL BUN/Creatinine ratio 12 12 - 20 GFR est AA >60 >60 ml/min/1.73m2 GFR est non-AA >60 >60 ml/min/1.73m2 Calcium 9.9 8.5 - 10.1 mg/dL Bilirubin, total 0.4 0.2 - 1.0 mg/dL AST (SGOT) 28 15 - 37 U/L  
 ALT (SGPT) 27 12 - 78 U/L Alk. phosphatase 78 45 - 117 U/L Protein, total 6.2 (L) 6.4 - 8.2 g/dL Albumin 1.7 (L) 3.5 - 5.0 g/dL Globulin 4.5 (H) 2.0 - 4.0 g/dL A-G Ratio 0.4 (L) 1.1 - 2.2 Imaging: 
  
   
CXR Results  (Last 48 hours)  
  None  
   
  
    
Results from Hospital Encounter encounter on 11/03/20 XR HIP LT W OR WO PELV 2-3 VWS  
  Narrative Fall 1 week ago. 
  
No comparison. 
  
FINDINGS: AP pelvis and frog-leg view of the left hip. No acute fracture or 
dislocation. Deformity of the left femur trochanter and proximal diaphysis, 
appears chronic. No radiopaque foreign body. 
   
  Impression IMPRESSION: 
1. No acute bony findings.   
XR CHEST SNGL V  
   Narrative Chest single view. 
  
Comparison single view chest March 29, 2018. 
  
Right-sided Port-A-Cath stable position. New opacification of the lower two thirds right hemithorax. This is likely 
related to a combination of large volume pleural fluid and atelectatic lung. Left lung is aerated. Cardiac and mediastinal structures unchanged. No 
pneumothorax. Nonacute rib fractures noted. [place] [facility] [place] XR SHOULDER RT AP/LAT MIN 2 V  
  Narrative 3 views of the right shoulder reveal diffuse osteopenia. There is mild 
degenerative subluxation of the humerus with respect to the glenoid. No fracture 
or dislocation is identified. 
   
  Impression IMPRESSION: Degenerative changes.  
  
    
Results from Hospital Encounter encounter on 11/03/20 CT CHEST WO CONT  
  Narrative CT dose reduction was achieved through use of a standardized protocol tailored 
for this examination and automatic exposure control for dose modulation. 
  
Noncontrast study shows large right pleural effusion. Right middle and lower 
lobe are completely collapsed, with very few air bronchograms. The upper lobe is 
almost completely collapsed, sparingly anterior segment only. Mainstem bronchus 
is open. Proximal lobar bronchi are open. Left lung is clear. Multiple calcified 
mediastinal hilar lymph nodes. Small left pleural effusion. Small pericardial 
effusion. Normal caliber aorta. No significant upper abdominal or chest wall 
finding. Pronounced soft tissue edema in the visualized portion of the left arm 
   
  Impression IMPRESSION: Near complete collapse of the right lung secondary to large pleural 
effusion  
  
 
  
IMPRESSION:  
 
1. Malignant pleural Effusion Right side status post thoracentesis 11/06 by IR 
2. Chronic Obstructive Pulmonary Disease with Severe Acute Exacerbation requiring inpatient hospitalization and management; no wheezing today 3. A. fib 5.   History of heart failure hyponatremia ? Hx of lung cancer 6. Prognosis guarded   
   
RECOMMENDATIONS/PLAN:  
 
Discussed with the patient detail about her condition she told me that she had a history of lung cancer diagnosed more than 3 years ago in [place] and apparently she got treated for it I do not have any records and also she is intermittently confused Pleural fluid Cytology of thoracentesis on 11/06 showed many groups of abnormal epithelial cells are noted, with large nuclei and cytoplasmic vacuoles, indicative of metastatic adenocarcinoma. 1. Status post thoracentesis via IR 1260 cc fluid removed. Pleural fluid culture negative. Chest Xray 11/09 showed increased large right pleural effusion and atelectasis, as well as atelectasis of the left base and possible small left pleural effusion. 2. Nebulizer treatment with Mucomyst 
3. Discontinue Zosyn She is agreeable to [facility] and rehab. 
  
Paul Carr MD

## 2020-11-20 NOTE — PROGRESS NOTES
Problem: Mobility Impaired (Adult and Pediatric) Goal: *Acute Goals and Plan of Care (Insert Text) Description: Patient will move from supine to sit and sit to supine , scoot up and down, and roll side to side in bed with min assistance  within 7 day(s). Patient will transfer from bed to chair and chair to bed with min assistance  using the least restrictive device within 7 day(s). Patient will improve static sitting balance to minimal assistance within 1 week(s)--MET Patient will ambulate 30 feet with minimal assistance with least restrictive device within 1 weeks. Outcome: Progressing Towards Goal 
PHYSICAL THERAPY REEVALUATION Patient: Waldemar Lay (79 y.o. female) Date: 11/20/2020 Primary Diagnosis: Hyponatremia [E87.1] Precautions: fall, decreased endurance ASSESSMENT Pt seen for reassessment today and again saying she does not feel well and does not want to participate in PT. Pt requiring max encouragement throughout treatment to participate and had to explain multiple times the importance of participation for her recovery. Pt agreed to sitting EOB and required mod A to sit EOB. Pt's sitting balance improved and able to sit Eob with CG-SBA. Pt performed exercises EOB but required frequent cues for proper technique and to continue as she was saying she felt dizzy and needed to lay back down. Explained to pt that the dizziness is probably due to her laying in the bed for so long and that she needs to practice being upright more often. After 3 exercises, pt c/o that her head was hurting and she was tired and she was ready to lay back down. Educated the pt on the importance of participating with PT each time we come, even if she does not feel up to it so we can help to build her strength and endurance. Pt agreed. Continue PT for LE strengthening, and functional mobility training.  
 
Other factors to consider for discharge: poor motivation, decreased endurance, amount of family support Patient will benefit from skilled therapy intervention to address the above noted impairments. PLAN : 
Recommendations and Planned Interventions: bed mobility training, transfer training, gait training, therapeutic exercises, patient and family training/education, and therapeutic activities Frequency/Duration: Patient will be followed by physical therapy:  5 times a week to address goals. Recommendation for discharge: (in order for the patient to meet his/her long term goals) Therapy up to 5 days/week in SNF setting This discharge recommendation: 
Has been made in collaboration with the attending provider and/or case management Equipment recommendations for successful discharge (if) home: none SUBJECTIVE:  
Patient stated I don't want to do anything today.  OBJECTIVE DATA SUMMARY:  
HISTORY:   
Past Medical History:  
Diagnosis Date Anemia Dementia ([address] Heart failure ([facility] [address] High cholesterol Hypertension Low blood potassium Past Surgical History:  
Procedure Laterality Date IR THORACENTESIS NDL PUNC ASP W IMAGE  11/6/2020 Hospital course since last seen and reason for reevaluation: 7 days since last reassessment Personal factors and/or comorbidities impacting plan of care: poor motivation, decreased endurance with activity Home Situation Home Environment: Private residence Wheelchair Ramp: Yes One/Two Story Residence: One story Living Alone: Yes Support Systems: Family member(s) Patient Expects to be Discharged to[de-identified] Skilled nursing facility Current DME Used/Available at Home: (cane) EXAMINATION/PRESENTATION/DECISION MAKING:  
Critical Behavior: 
Neurologic State: Alert Orientation Level: Oriented to person, Oriented to place Cognition: Impaired decision making Hearing: Auditory Auditory Impairment: None Range Of Motion: WFL Strength:   
 Grossly 3-/5 throughout LE's Functional Mobility: 
Bed Mobility: 
Rolling: Minimum assistance Supine to Sit: Minimum assistance Sit to Supine: Minimum assistance Scooting: Moderate assistance Transfers: 
  
 Pt declined all transfers/ambulation today. Balance:  
Sitting: Intact; With support Pt sat EOB x 10 min performing exercises Ambulation/Gait Training: 
  
  
  
  
  
  
  
  
  
  
  
  
  
  
  
  
  
    
 
Therapeutic Exercises:  
   Seated marches x 10, LAQ x 15 each, ankle pumps x 20 each Pain Rating: 
3/10 Activity Tolerance:  
Poor and requires rest breaks Please refer to the flowsheet for vital signs taken during this treatment. After treatment patient left in no apparent distress:  
Supine in bed, Call bell within reach, and Side rails x 3 
 
COMMUNICATION/EDUCATION:  
The patients plan of care was discussed with: Registered nurse. Patient understands intent and goals of therapy, but is neutral about his/her participation. Thank you for this referral. 
Anayeli Kerr Time Calculation: 17 mins

## 2020-11-20 NOTE — PROGRESS NOTES
General Daily Progress Note Patient Name: Ayde Brady YOB: 1940 Age: 
79 y.o. Admit Date: 11/3/2020 Subjective:  
Patient is a 79 y.o female with a past medical history of dementia, CHF, hyperlipidemia, and hypertension Resting in the bed not in distress Patient not eating says she dosen't have an appetite Objective:  
 
Visit Vitals /88 Pulse (!) 54 Temp 98.5 °F (36.9 °C) Resp 16 Ht 5' 2.01\" (1.575 m) Wt 63.5 kg (140 lb) SpO2 98% BMI 25.60 kg/m² Recent Results (from the past 24 hour(s)) GLUCOSE, POC Collection Time: 11/20/20  2:59 AM  
Result Value Ref Range Glucose (POC) 80 65 - 100 mg/dL Performed by JIMMY THOMASON   
CBC WITH AUTOMATED DIFF Collection Time: 11/20/20  8:04 AM  
Result Value Ref Range WBC 4.2 3.6 - 11.0 K/uL  
 RBC 4.56 3.80 - 5.20 M/uL  
 HGB 11.4 (L) 11.5 - 16.0 g/dL HCT 35.9 35.0 - 47.0 % MCV 78.7 (L) 80.0 - 99.0 FL  
 MCH 25.0 (L) 26.0 - 34.0 PG  
 MCHC 31.8 30.0 - 36.5 g/dL  
 RDW 18.3 (H) 11.5 - 14.5 % PLATELET 198 089 - 410 K/uL MPV 11.4 8.9 - 12.9 FL  
 NEUTROPHILS 65 32 - 75 % LYMPHOCYTES 14 12 - 49 % MONOCYTES 16 (H) 5 - 13 % EOSINOPHILS 4 0 - 7 % BASOPHILS 1 0 - 1 % IMMATURE GRANULOCYTES 0 0.0 - 0.5 % ABS. NEUTROPHILS 2.8 1.8 - 8.0 K/UL  
 ABS. LYMPHOCYTES 0.6 (L) 0.8 - 3.5 K/UL  
 ABS. MONOCYTES 0.7 0.0 - 1.0 K/UL  
 ABS. EOSINOPHILS 0.2 0.0 - 0.4 K/UL  
 ABS. BASOPHILS 0.0 0.0 - 0.1 K/UL  
 ABS. IMM. GRANS. 0.0 0.00 - 0.04 K/UL  
 DF AUTOMATED METABOLIC PANEL, COMPREHENSIVE Collection Time: 11/20/20  8:04 AM  
Result Value Ref Range Sodium 139 136 - 145 mmol/L Potassium 2.9 (L) 3.5 - 5.1 mmol/L Chloride 105 97 - 108 mmol/L  
 CO2 29 21 - 32 mmol/L Anion gap 5 5 - 15 mmol/L Glucose 69 65 - 100 mg/dL BUN 6 6 - 20 mg/dL Creatinine 0.51 (L) 0.55 - 1.02 mg/dL  BUN/Creatinine ratio 12 12 - 20    
 GFR est AA >60 >60 ml/min/1.73m2 GFR est non-AA >60 >60 ml/min/1.73m2 Calcium 9.9 8.5 - 10.1 mg/dL Bilirubin, total 0.4 0.2 - 1.0 mg/dL AST (SGOT) 28 15 - 37 U/L  
 ALT (SGPT) 27 12 - 78 U/L Alk. phosphatase 78 45 - 117 U/L Protein, total 6.2 (L) 6.4 - 8.2 g/dL Albumin 1.7 (L) 3.5 - 5.0 g/dL Globulin 4.5 (H) 2.0 - 4.0 g/dL A-G Ratio 0.4 (L) 1.1 - 2.2    
 
[unfilled] Review of Systems Constitutional: Negative for chills and fever. HENT: Negative. Eyes: Negative. Respiratory: Negative. Cardiovascular: Negative. Gastrointestinal: Negative for abdominal pain and nausea. Skin: Negative. Neurological: Negative. Physical Exam:   
 
Constitutional: pt is oriented to person, place, and time. HENT:  
Head: Normocephalic and atraumatic. Eyes: Pupils are equal, round, and reactive to light. EOM are normal.  
Cardiovascular: Normal rate, regular rhythm and normal heart sounds. Pulmonary/Chest: Breath sounds normal. No wheezes. No rales. Exhibits no tenderness. Abdominal: Soft. Bowel sounds are normal. There is no abdominal tenderness. There is no rebound and no guarding. Musculoskeletal: Normal range of motion. Neurological: pt is alert and oriented to person, place, and time. XR CHEST PA LAT Final Result Impression: Large right-sided pleural effusion and associated  
compression/consolidation of the right lung, increased. Mild atelectasis at the  
left base. Possible small left pleural effusion. XR CHEST PORT Final Result IMPRESSION: Diffuse airspace disease, right hemithorax, with improved aeration  
from previous. Differential diagnosis includes pneumonia, atelectasis, and/or  
reexpansion pulmonary edema. No pneumothorax. [facility] [address] Final Result IMPRESSION:   
Successful ultrasound-guided thoracentesis of right large pleural effusion, with approximately 1.3 L serous pleural fluid removed. Pleural fluid sample was also  
sent for lab analysis. CT CHEST WO CONT Final Result IMPRESSION: Near complete collapse of the right lung secondary to large pleural  
effusion XR HIP LT W OR WO PELV 2-3 VWS Final Result IMPRESSION:  
1. No acute bony findings. XR CHEST SNGL V Final Result Recent Results (from the past 24 hour(s)) GLUCOSE, POC Collection Time: 11/20/20  2:59 AM  
Result Value Ref Range Glucose (POC) 80 65 - 100 mg/dL Performed by JIMMY THOMASON   
CBC WITH AUTOMATED DIFF Collection Time: 11/20/20  8:04 AM  
Result Value Ref Range WBC 4.2 3.6 - 11.0 K/uL  
 RBC 4.56 3.80 - 5.20 M/uL  
 HGB 11.4 (L) 11.5 - 16.0 g/dL HCT 35.9 35.0 - 47.0 % MCV 78.7 (L) 80.0 - 99.0 FL  
 MCH 25.0 (L) 26.0 - 34.0 PG  
 MCHC 31.8 30.0 - 36.5 g/dL  
 RDW 18.3 (H) 11.5 - 14.5 % PLATELET 738 792 - 400 K/uL MPV 11.4 8.9 - 12.9 FL  
 NEUTROPHILS 65 32 - 75 % LYMPHOCYTES 14 12 - 49 % MONOCYTES 16 (H) 5 - 13 % EOSINOPHILS 4 0 - 7 % BASOPHILS 1 0 - 1 % IMMATURE GRANULOCYTES 0 0.0 - 0.5 % ABS. NEUTROPHILS 2.8 1.8 - 8.0 K/UL  
 ABS. LYMPHOCYTES 0.6 (L) 0.8 - 3.5 K/UL  
 ABS. MONOCYTES 0.7 0.0 - 1.0 K/UL  
 ABS. EOSINOPHILS 0.2 0.0 - 0.4 K/UL  
 ABS. BASOPHILS 0.0 0.0 - 0.1 K/UL  
 ABS. IMM. GRANS. 0.0 0.00 - 0.04 K/UL  
 DF AUTOMATED METABOLIC PANEL, COMPREHENSIVE Collection Time: 11/20/20  8:04 AM  
Result Value Ref Range Sodium 139 136 - 145 mmol/L Potassium 2.9 (L) 3.5 - 5.1 mmol/L Chloride 105 97 - 108 mmol/L  
 CO2 29 21 - 32 mmol/L Anion gap 5 5 - 15 mmol/L Glucose 69 65 - 100 mg/dL BUN 6 6 - 20 mg/dL Creatinine 0.51 (L) 0.55 - 1.02 mg/dL BUN/Creatinine ratio 12 12 - 20 GFR est AA >60 >60 ml/min/1.73m2 GFR est non-AA >60 >60 ml/min/1.73m2 Calcium 9.9 8.5 - 10.1 mg/dL Bilirubin, total 0.4 0.2 - 1.0 mg/dL  AST (SGOT) 28 15 - 37 U/L  
 ALT (SGPT) 27 12 - 78 U/L Alk. phosphatase 78 45 - 117 U/L Protein, total 6.2 (L) 6.4 - 8.2 g/dL Albumin 1.7 (L) 3.5 - 5.0 g/dL Globulin 4.5 (H) 2.0 - 4.0 g/dL A-G Ratio 0.4 (L) 1.1 - 2.2 Results Procedure Component Value Units Date/Time CULTURE, BODY FLUID Galindo Boudreaux [[id number]] Collected:  11/06/20 1730 Order Status:  Completed Specimen: Body Fluid from Swab Updated:  11/11/20 6743 Special Requests: No Special Requests GRAM STAIN Few WBCs seen No organisms seen Culture result:    
  culture performed on fluid swab specimen No growth 4 days Labs:  
 
Recent Labs  
  11/20/20 
8124 WBC 4.2 HGB 11.4* HCT 35.9  Recent Labs  
  11/20/20 
0804 11/18/20 
2233  138  
K 2.9* 3.0*  
 103 CO2 29 31 BUN 6 6 CREA 0.51* 0.58 GLU 69 92 CA 9.9 9.8 Recent Labs  
  11/20/20 
7153 ALT 27 AP 78 TBILI 0.4 TP 6.2* ALB 1.7*  
GLOB 4.5* No results for input(s): INR, PTP, APTT, INREXT, INREXT in the last 72 hours. No results for input(s): FE, TIBC, PSAT, FERR in the last 72 hours. No results found for: FOL, RBCF No results for input(s): PH, PCO2, PO2 in the last 72 hours. No results for input(s): CPK, CKNDX, TROIQ in the last 72 hours. No lab exists for component: CPKMB No results found for: CHOL, CHOLX, CHLST, CHOLV, HDL, HDLP, LDL, LDLC, DLDLP, TGLX, TRIGL, TRIGP, CHHD, CHHDX Lab Results Component Value Date/Time Glucose (POC) 80 11/20/2020 02:59 AM  
 Glucose (POC) 220 (H) 11/08/2020 11:01 AM  
 Glucose (POC) 110 (H) 11/08/2020 08:26 AM  
 Glucose (POC) 86 11/06/2020 05:29 PM  
 Glucose (POC) 75 11/06/2020 03:12 PM  
 
Lab Results Component Value Date/Time  Color Yellow/Straw 11/03/2020 02:45 PM  
 Appearance Clear 11/03/2020 02:45 PM  
 Specific gravity 1.010 11/03/2020 02:45 PM  
 pH (UA) 6.5 11/03/2020 02:45 PM  
 Protein Negative 11/03/2020 02:45 PM  
 Glucose Normal (A) 11/03/2020 02:45 PM  
 Ketone Negative 11/03/2020 02:45 PM  
 Bilirubin Negative 11/03/2020 02:45 PM  
 Urobilinogen Normal 11/03/2020 02:45 PM  
 Nitrites Negative 11/03/2020 02:45 PM  
 Leukocyte Esterase Negative 11/03/2020 02:45 PM  
 Bacteria Negative 11/03/2020 02:45 PM  
 WBC 0-4 11/03/2020 02:45 PM  
 RBC 0-5 11/03/2020 02:45 PM  
 
   
Assessment & Plan 1. Hypokalemia 11/20 Potassium 2.9 Replace potassium Continue to monitor 2. Metastatic Adenocarcinoma 11/13 Cytology- Right Pleural fluid positive for adenocarcinoma Oncology consult - Recommends hospice care due to dementia and poor performance status. 3. Anemia of Chronic disease- stable 11/20-Hgb 11.4 Will continue to monitor H&H 4. Failure to Thrive /severe malnutrition 11/20 Hypoalbuminemia ( 1.7) w/low total protein(6.2) BMI: 25.60  
megestroL (MEGACE) 400 mg/10 mL (10 mL) oral suspension 400 mg  
 
5. Hyponatremia/likely from diuretic 
11/20- Sodium (139) -Stable 6. Hypertension- BP controlled 
metoprolol tartrate (LOPRESSOR) tablet 50 mg  
 
 
7. Pleural effusion 11/06 Chest x-ray shows opacities/pleural effusion - Chest CT shows Near complete collapse of the right lung secondary to large pleural 
Effusion status post thoracocentesis Static post thoracocentesis - cytology results positive for metastatic adenocarcinoma; culture negative 8. Paroxysmal A. Fib- 11/7 ECG 
metoprolol tartrate (LOPRESSOR) tablet 50 mg  
heparin porcine injection 5,000 Units 9. Hx of Congestive Heart Failure 11/08 proBNP 1,127  
 
10. Hx of Dementia 11. Hx of Hyperlipidemia 12. Hx of  lung cancer 13. Covid screening negative 14. Hypokalemia Repeat the labs PT/OT recommends 5/days week in SNF Discussed with the  regarding discharge planning Waiting Medicaid application before discharge to nursing home Discussed with the patient family today and will do psychiatric consult for competency Current Facility-Administered Medications:  
  escitalopram oxalate (LEXAPRO) tablet 5 mg, 5 mg, Oral, DAILY, Abundio An MD, 5 mg at 11/20/20 0916 
  megestroL (MEGACE) 400 mg/10 mL (10 mL) oral suspension 400 mg, 400 mg, Oral, BID, Jesus Maldonado MD, 400 mg at 11/20/20 0916 
  metoprolol tartrate (LOPRESSOR) tablet 50 mg, 50 mg, Oral, Q12H, Tono MACIAS MD, 50 mg at 11/20/20 0202   influenza vaccine 2020-21 (4 yrs+)(PF) (FLUCELVAX QUAD) injection 0.5 mL, 0.5 mL, IntraMUSCular, PRIOR TO DISCHARGE, Jesus Maldonado MD 
  albuterol-ipratropium (DUO-NEB) 2.5 MG-0.5 MG/3 ML, 3 mL, Nebulization, Q6H PRN, Asim Red MD 
  heparin porcine injection 5,000 Units, 5,000 Units, SubCUTAneous, Q12H, Jesus Maldonado MD, 5,000 Units at 11/20/20 4443

## 2020-11-21 NOTE — PROGRESS NOTES
General Daily Progress Note Patient Name: Maya Salgado YOB: 1940 Age: 
79 y.o. Admit Date: 11/3/2020 Subjective:  
Patient is a 79 y.o female with a past medical history of dementia, CHF, hyperlipidemia, and hypertension Resting in the bed not in distress Patient not eating says she dosen't have an appetite Objective:  
 
Visit Vitals BP (!) 154/115 (BP Patient Position: At rest) Pulse (!) 115 Temp 99.1 °F (37.3 °C) Resp 18 Ht 5' 2.01\" (1.575 m) Wt 63.5 kg (140 lb) SpO2 98% BMI 25.60 kg/m² No results found for this or any previous visit (from the past 24 hour(s)). [unfilled] Review of Systems Constitutional: Negative for chills and fever. HENT: Negative. Eyes: Negative. Respiratory: Negative. Cardiovascular: Negative. Gastrointestinal: Negative for abdominal pain and nausea. Skin: Negative. Neurological: Negative. Physical Exam:   
 
Constitutional: pt is oriented to person, place, and time. HENT:  
Head: Normocephalic and atraumatic. Eyes: Pupils are equal, round, and reactive to light. EOM are normal.  
Cardiovascular: Normal rate, regular rhythm and normal heart sounds. Pulmonary/Chest: Breath sounds normal. No wheezes. No rales. Exhibits no tenderness. Abdominal: Soft. Bowel sounds are normal. There is no abdominal tenderness. There is no rebound and no guarding. Musculoskeletal: Normal range of motion. Neurological: pt is alert and oriented to person, place, and time. XR CHEST PA LAT Final Result Impression: Large right-sided pleural effusion and associated  
compression/consolidation of the right lung, increased. Mild atelectasis at the  
left base. Possible small left pleural effusion. XR CHEST PORT Final Result IMPRESSION: Diffuse airspace disease, right hemithorax, with improved aeration from previous. Differential diagnosis includes pneumonia, atelectasis, and/or  
reexpansion pulmonary edema. No pneumothorax. [address] Final Result IMPRESSION:   
Successful ultrasound-guided thoracentesis of right large pleural effusion, with  
approximately 1.3 L serous pleural fluid removed. Pleural fluid sample was also  
sent for lab analysis. CT CHEST WO CONT Final Result IMPRESSION: Near complete collapse of the right lung secondary to large pleural  
effusion XR HIP LT W OR WO PELV 2-3 VWS Final Result IMPRESSION:  
1. No acute bony findings. XR CHEST SNGL V Final Result No results found for this or any previous visit (from the past 24 hour(s)). Results ** No results found for the last 336 hours. **  
  
  
 
Labs:  
 
Recent Labs  
  11/20/20 
6827 WBC 4.2 HGB 11.4* HCT 35.9  Recent Labs  
  11/20/20 
0804 11/18/20 
2233  138  
K 2.9* 3.0*  
 103 CO2 29 31 BUN 6 6 CREA 0.51* 0.58 GLU 69 92 CA 9.9 9.8 Recent Labs  
  11/20/20 
3151 ALT 27 AP 78 TBILI 0.4 TP 6.2* ALB 1.7*  
GLOB 4.5* No results for input(s): INR, PTP, APTT, INREXT, INREXT in the last 72 hours. No results for input(s): FE, TIBC, PSAT, FERR in the last 72 hours. No results found for: FOL, RBCF No results for input(s): PH, PCO2, PO2 in the last 72 hours. No results for input(s): CPK, CKNDX, TROIQ in the last 72 hours. No lab exists for component: CPKMB No results found for: CHOL, CHOLX, CHLST, CHOLV, HDL, HDLP, LDL, LDLC, DLDLP, TGLX, TRIGL, TRIGP, CHHD, CHHDX Lab Results Component Value Date/Time Glucose (POC) 80 11/20/2020 02:59 AM  
 Glucose (POC) 220 (H) 11/08/2020 11:01 AM  
 Glucose (POC) 110 (H) 11/08/2020 08:26 AM  
 Glucose (POC) 86 11/06/2020 05:29 PM  
 Glucose (POC) 75 11/06/2020 03:12 PM  
 
Lab Results Component Value Date/Time  Color Yellow/Straw 11/03/2020 02:45 PM  
 Appearance Clear 11/03/2020 02:45 PM  
 Specific gravity 1.010 11/03/2020 02:45 PM  
 pH (UA) 6.5 11/03/2020 02:45 PM  
 Protein Negative 11/03/2020 02:45 PM  
 Glucose Normal (A) 11/03/2020 02:45 PM  
 Ketone Negative 11/03/2020 02:45 PM  
 Bilirubin Negative 11/03/2020 02:45 PM  
 Urobilinogen Normal 11/03/2020 02:45 PM  
 Nitrites Negative 11/03/2020 02:45 PM  
 Leukocyte Esterase Negative 11/03/2020 02:45 PM  
 Bacteria Negative 11/03/2020 02:45 PM  
 WBC 0-4 11/03/2020 02:45 PM  
 RBC 0-5 11/03/2020 02:45 PM  
 
   
Assessment & Plan 1. Hypokalemia 11/20 Potassium 2.9 Replace potassium Continue to monitor 2. Metastatic Adenocarcinoma 11/13 Cytology- Right Pleural fluid positive for adenocarcinoma Oncology consult - Recommends hospice care due to dementia and poor performance status. 3. Anemia of Chronic disease- stable 11/20-Hgb 11.4 Will continue to monitor H&H 4. Failure to Thrive /severe malnutrition 11/20 Hypoalbuminemia ( 1.7) w/low total protein(6.2) BMI: 25.60  
megestroL (MEGACE) 400 mg/10 mL (10 mL) oral suspension 400 mg  
 
5. Hyponatremia/likely from diuretic 
11/20- Sodium (139) -Stable 6. Hypertension- BP controlled 
metoprolol tartrate (LOPRESSOR) tablet 50 mg  
 
 
7. Pleural effusion 11/06 Chest x-ray shows opacities/pleural effusion - Chest CT shows Near complete collapse of the right lung secondary to large pleural 
Effusion status post thoracocentesis Static post thoracocentesis - cytology results positive for metastatic adenocarcinoma; culture negative 8. Paroxysmal A. Fib- 11/7 ECG 
metoprolol tartrate (LOPRESSOR) tablet 50 mg  
heparin porcine injection 5,000 Units 9. Hx of Congestive Heart Failure 11/08 proBNP 1,127  
 
10. Hx of Dementia 11. Hx of Hyperlipidemia 12. Hx of  lung cancer 13. Covid screening negative 14. Hypokalemia Repeat the labs PT/OT recommends 5/days week in SNF 
 
 
 Discussed with the  regarding discharge planning Waiting Medicaid application before discharge to nursing home Seen by the psychiatrist and he is competent to make decision about healthcare he is physically not capable of taking care of herself willing to have help and use third-party Current Facility-Administered Medications:  
  escitalopram oxalate (LEXAPRO) tablet 5 mg, 5 mg, Oral, DAILY, Abundio An MD, 5 mg at 11/21/20 0911 
  megestroL (MEGACE) 400 mg/10 mL (10 mL) oral suspension 400 mg, 400 mg, Oral, BID, Jesus Maldonado MD, 400 mg at 11/21/20 0910 
  metoprolol tartrate (LOPRESSOR) tablet 50 mg, 50 mg, Oral, Q12H, Edwin Smith MD, 50 mg at 11/21/20 0911 
  influenza vaccine 2020-21 (4 yrs+)(PF) (FLUCELVAX QUAD) injection 0.5 mL, 0.5 mL, IntraMUSCular, PRIOR TO DISCHARGE, Jesus Maldonado MD 
  albuterol-ipratropium (DUO-NEB) 2.5 MG-0.5 MG/3 ML, 3 mL, Nebulization, Q6H PRN, Asim Red MD 
  heparin porcine injection 5,000 Units, 5,000 Units, SubCUTAneous, Q12H, Jesus Maldonado MD, 5,000 Units at 11/21/20 8821

## 2020-11-21 NOTE — CONSULTS
[address] [facility] Name:  Abimbola Kc 
MR#:  [id number] :  1940 ACCOUNT #:  [de-identified] DATE OF SERVICE:  2020 PSYCHIATRIC CONSULTATION 
 
DATE OF CONSULT:  2020 DATE SEEN:  2020, 2020 REASON FOR CONSULT:  Capacity. Saw the patient, chart reviewed. HISTORY OF PRESENT ILLNESS:  This is a 78-year-old female patient admitted to Dr. Gavino Hernández service on 2020, lethargy, failure to thrive, weakness, falls, inability to care for self. Saw the patient, chart reviewed. The patient states she is from [place], lives alone. She has a sister-in-law, some nieces and nephews, some family in that area. 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 She has multiple issues, dementia, anemia, hypokalemia, congestive heart failure, adenocarcinoma of the lung. The patient is slow, but alert, verbal, knew all her medical conditions. Actually, she is the one that told me about her lung cancer. She denies any palpitations. She understands her medical condition. She states she has a family support. We felt that she is capable of making her own decisions. If necessary, she will ask her sister-in-law. She is little bit sluggish, perplexed, alert, slow, but knew the day of the week, date, year, her age, date of birth. Her medical condition, she said she is capable of making her own decision. There is no significant distortion in her thinking about her medical conditions, prognosis and treatment. She is capable of understanding her medical condition if explained and treatment options. She says she will make her own decisions after talking to the specialist.  As far as her capacity to physically take care, she does recognize that and she states she has resources, she is willing to have those to be used. DIAGNOSES:  Dementia, adjustment disorder with a depressed mood and anemia, hyperlipidemia, hypertension, heart failure, adenocarcinoma, pleural effusion, and the patient does have the capacity to understand and make decision about healthcare, and she is physically not capable of taking care of herself, but willing to have help and use third party. Galina Garcia MD 
 
 
[id number] 
D:  11/20/2020 23:46 
T:  11/21/2020 2:54 JOB #:  [id number]

## 2020-11-21 NOTE — PROGRESS NOTES
Pulmonary Progress Note Daily Progress Note: 11/21/2020 Subjective: The patient is seen for follow  up. Excerpts from admission 11/3/2020 or consult notes as follows:  
78-year-old lady came in because of generalized weakness lethargy past medical history of dementia congestive heart failure hypertension hyperlipidemia patient was discharged from home with a sling on her right arm as she fell. Now chest x-ray and CAT scan of the chest was done which shows right lower lobe collapse with pleural effusion unable to get much history out of the patient so pulmonary consult was called for further evaluation 11/8 denies cough or shortness of breath remains on room air 11/09 Today she denies cough and shortness of breath. She had a chest Xray this morning, awaiting read. She is on room air breathing well. 
11/10 She says she feels great and denies SOB and cough. CXR yesterday shows enlarging right pleural effusion and atelectasis and atelectasis of left base. 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 She is on room air. 11/12 She is on room air. Awaiting placement. Problem List: 
Problem List as of 11/21/2020 Never Reviewed Codes Class Noted - Resolved Hyponatremia ICD-10-CM: E87.1 ICD-9-CM: 276.1  11/3/2020 - Present Medications reviewed Current Facility-Administered Medications Medication Dose Route Frequency  escitalopram oxalate (LEXAPRO) tablet 5 mg  5 mg Oral DAILY  megestroL (MEGACE) 400 mg/10 mL (10 mL) oral suspension 400 mg  400 mg Oral BID  metoprolol tartrate (LOPRESSOR) tablet 50 mg  50 mg Oral Q12H  
 influenza vaccine 2020-21 (4 yrs+)(PF) (FLUCELVAX QUAD) injection 0.5 mL  0.5 mL IntraMUSCular PRIOR TO DISCHARGE  albuterol-ipratropium (DUO-NEB) 2.5 MG-0.5 MG/3 ML  3 mL Nebulization Q6H PRN  
 heparin porcine injection 5,000 Units  5,000 Units SubCUTAneous Q12H Review of Systems: A comprehensive review of systems was negative except for that written in the HPI. Objective:  
Physical Exam:  
 
Visit Vitals BP (!) 154/115 (BP Patient Position: At rest) Pulse (!) 115 Temp 99.1 °F (37.3 °C) Resp 18 Ht 5' 2.01\" (1.575 m) Wt 63.5 kg (140 lb) SpO2 98% BMI 25.60 kg/m² O2 Device: Room air Temp (24hrs), Av.7 °F (37.1 °C), Min:98.2 °F (36.8 °C), Max:99.1 °F (37.3 °C) No intake/output data recorded. No intake/output data recorded. Constitutional:  
 Awake alert seems oriented HENT:  
Head: Normocephalic and atraumatic. Eyes: Pupils are equal, round, and reactive to light. Extraocular movements intact Neck: Normal range of motion. Neck supple. No definite JVD Cardiovascular: Normal rate and regular rhythm. Pulmonary/Chest:  
 Clear anteriorly and left lateral with fairly good breath sounds right lateral and right posterior no definite wheezes or rales Abdominal: Soft. Bowel sounds are normal.  
Musculoskeletal: Normal range of motion. Skin: Skin is warm. Data Review:  
   
Recent Days: 
Recent Labs  
  20 
0804 WBC 4.2 HGB 11.4* HCT 35.9  Recent Labs  
  20 
0804 20 
2233  138  
K 2.9* 3.0*  
 103 CO2 29 31 GLU 69 92 BUN 6 6 CREA 0.51* 0.58  
CA 9.9 9.8 ALB 1.7*  --   
TBILI 0.4  --   
ALT 27  --   
 
Room air oxygen saturation 98% 24 Hour Results: No results found for this or any previous visit (from the past 24 hour(s)). Imaging: 
  
   
CXR Results  (Last 48 hours)  
  None  
   
  
    
Results from Hospital Encounter encounter on 20 XR HIP LT W OR WO PELV 2-3 VWS  
  Narrative Fall 1 week ago. 
  
No comparison. 
  
FINDINGS: AP pelvis and frog-leg view of the left hip. No acute fracture or 
dislocation. Deformity of the left femur trochanter and proximal diaphysis, 
appears chronic. No radiopaque foreign body. 
   
  Impression IMPRESSION: 
1. No acute bony findings. XR CHEST SNGL V  
  Narrative Chest single view. 
  
Comparison single view chest March 29, 2018. 
  
Right-sided Port-A-Cath stable position. New opacification of the lower two thirds right hemithorax. This is likely 
related to a combination of large volume pleural fluid and atelectatic lung. Left lung is aerated. Cardiac and mediastinal structures unchanged. No 
pneumothorax. Nonacute rib fractures noted. Results from [facility] Encounter encounter on 10/26/20 XR SHOULDER RT AP/LAT MIN 2 V  
  Narrative 3 views of the right shoulder reveal diffuse osteopenia. There is mild 
degenerative subluxation of the humerus with respect to the glenoid. No fracture 
or dislocation is identified. 
   
  Impression IMPRESSION: Degenerative changes.  
  
    
Results from Hospital Encounter encounter on 11/03/20 CT CHEST WO CONT  
  Narrative CT dose reduction was achieved through use of a standardized protocol tailored 
for this examination and automatic exposure control for dose modulation. 
  
Noncontrast study shows large right pleural effusion. Right middle and lower 
lobe are completely collapsed, with very few air bronchograms. The upper lobe is 
almost completely collapsed, sparingly anterior segment only. Mainstem bronchus 
is open. Proximal lobar bronchi are open. Left lung is clear. Multiple calcified 
mediastinal hilar lymph nodes. Small left pleural effusion. Small pericardial 
effusion. Normal caliber aorta. No significant upper abdominal or chest wall 
finding. Pronounced soft tissue edema in the visualized portion of the left arm 
   
  Impression IMPRESSION: Near complete collapse of the right lung secondary to large pleural 
effusion  
  
 
  
IMPRESSION:  
 
1. Malignant pleural Effusion Right side status post thoracentesis 11/06 by IR 
2. Chronic Obstructive Pulmonary Disease with Severe Acute Exacerbation requiring inpatient hospitalization and management; no wheezing today 3. A. fib 5.   History of heart failure hyponatremia ? Hx of lung cancer 6. Prognosis guarded   
   
RECOMMENDATIONS/PLAN:  
 
Discussed with the patient detail about her condition she told me that she had a history of lung cancer diagnosed more than 3 years ago in [place] and apparently she got treated for it I do not have any records and also she is intermittently confused Pleural fluid Cytology of thoracentesis on 11/06 showed many groups of abnormal epithelial cells are noted, with large nuclei and cytoplasmic vacuoles, indicative of metastatic adenocarcinoma. 1. Status post thoracentesis via IR 1260 cc fluid removed. Pleural fluid culture negative. Chest Xray 11/09 showed increased large right pleural effusion and atelectasis, as well as atelectasis of the left base and possible small left pleural effusion. 2. Nebulizer treatment with Mucomyst 
3. Discontinue Zosyn She is agreeable to [facility] and rehab. 
  
Adriana Oshea MD

## 2020-11-21 NOTE — PROGRESS NOTES
Nutrition Note Spoke to [employer] re: pt nutritional status. Noted pt reporting fear with intake d/t reporting loose stools however no loose BMs observed per RN. RN stated plans to monitor BMs and request bowel regimen as appropriate. Pt agreeable to continue Ensure clears, willing to try Gelatein supplement (added). Continues with appetite stimulant. RD to continue to monitor POC and f/u per policy. Electronically signed by Henna Kapoor on 11/21/2020 at 2:02 PM 
 
Contact: [phone] [phone]

## 2020-11-22 NOTE — PROGRESS NOTES
General Daily Progress Note Patient Name: Elza Pike YOB: 1940 Age: 
79 y.o. Admit Date: 11/3/2020 Subjective:  
Patient is a 79 y.o female with a past medical history of dementia, CHF, hyperlipidemia, and hypertension Resting in the bed not in distress, patient not eating says she dosen't have an appetite Objective:  
 
Visit Vitals BP (!) 148/85 Pulse (!) 113 Temp 98.2 °F (36.8 °C) Resp 18 Ht 5' 2.01\" (1.575 m) Wt 63.5 kg (140 lb) SpO2 96% BMI 25.60 kg/m² Recent Results (from the past 24 hour(s)) METABOLIC PANEL, COMPREHENSIVE Collection Time: 11/21/20  6:30 PM  
Result Value Ref Range Sodium 142 136 - 145 mmol/L Potassium 3.3 (L) 3.5 - 5.1 mmol/L Chloride 107 97 - 108 mmol/L  
 CO2 31 21 - 32 mmol/L Anion gap 4 (L) 5 - 15 mmol/L Glucose 128 (H) 65 - 100 mg/dL BUN 7 6 - 20 mg/dL Creatinine 0.72 0.55 - 1.02 mg/dL BUN/Creatinine ratio 10 (L) 12 - 20 GFR est AA >60 >60 ml/min/1.73m2 GFR est non-AA >60 >60 ml/min/1.73m2 Calcium 10.1 8.5 - 10.1 mg/dL Bilirubin, total 0.4 0.2 - 1.0 mg/dL AST (SGOT) 31 15 - 37 U/L  
 ALT (SGPT) 30 12 - 78 U/L Alk. phosphatase 85 45 - 117 U/L Protein, total 6.2 (L) 6.4 - 8.2 g/dL Albumin 1.8 (L) 3.5 - 5.0 g/dL Globulin 4.4 (H) 2.0 - 4.0 g/dL A-G Ratio 0.4 (L) 1.1 - 2.2    
 
[unfilled] Review of Systems Constitutional: Negative for chills and fever. HENT: Negative. Eyes: Negative. Respiratory: Negative. Cardiovascular: Negative. Gastrointestinal: Negative for abdominal pain and nausea. Skin: Negative. Neurological: Negative. Physical Exam:   
 
Constitutional: pt is oriented to person, place, and time. HENT:  
Head: Normocephalic and atraumatic. Eyes: Pupils are equal, round, and reactive to light. EOM are normal.  
Cardiovascular: Normal rate, regular rhythm and normal heart sounds. Pulmonary/Chest: Breath sounds normal. No wheezes. No rales. Exhibits no tenderness. Abdominal: Soft. Bowel sounds are normal. There is no abdominal tenderness. There is no rebound and no guarding. Musculoskeletal: Normal range of motion. Neurological: pt is alert and oriented to person, place, and time. XR CHEST PA LAT Final Result Impression: Large right-sided pleural effusion and associated  
compression/consolidation of the right lung, increased. Mild atelectasis at the  
left base. Possible small left pleural effusion. XR CHEST PORT Final Result IMPRESSION: Diffuse airspace disease, right hemithorax, with improved aeration  
from previous. Differential diagnosis includes pneumonia, atelectasis, and/or  
reexpansion pulmonary edema. No pneumothorax. [address] Final Result IMPRESSION:   
Successful ultrasound-guided thoracentesis of right large pleural effusion, with  
approximately 1.3 L serous pleural fluid removed. Pleural fluid sample was also  
sent for lab analysis. CT CHEST WO CONT Final Result IMPRESSION: Near complete collapse of the right lung secondary to large pleural  
effusion XR HIP LT W OR WO PELV 2-3 VWS Final Result IMPRESSION:  
1. No acute bony findings. XR CHEST SNGL V Final Result Recent Results (from the past 24 hour(s)) METABOLIC PANEL, COMPREHENSIVE Collection Time: 11/21/20  6:30 PM  
Result Value Ref Range Sodium 142 136 - 145 mmol/L Potassium 3.3 (L) 3.5 - 5.1 mmol/L Chloride 107 97 - 108 mmol/L  
 CO2 31 21 - 32 mmol/L Anion gap 4 (L) 5 - 15 mmol/L Glucose 128 (H) 65 - 100 mg/dL BUN 7 6 - 20 mg/dL Creatinine 0.72 0.55 - 1.02 mg/dL BUN/Creatinine ratio 10 (L) 12 - 20 GFR est AA >60 >60 ml/min/1.73m2 GFR est non-AA >60 >60 ml/min/1.73m2 Calcium 10.1 8.5 - 10.1 mg/dL Bilirubin, total 0.4 0.2 - 1.0 mg/dL  AST (SGOT) 31 15 - 37 U/L  
 ALT (SGPT) 30 12 - 78 U/L Alk. phosphatase 85 45 - 117 U/L Protein, total 6.2 (L) 6.4 - 8.2 g/dL Albumin 1.8 (L) 3.5 - 5.0 g/dL Globulin 4.4 (H) 2.0 - 4.0 g/dL A-G Ratio 0.4 (L) 1.1 - 2.2 Results ** No results found for the last 336 hours. **  
  
  
 
Labs:  
 
Recent Labs  
  11/20/20 
0041 WBC 4.2 HGB 11.4* HCT 35.9  Recent Labs  
  11/21/20 
1830 11/20/20 
0804  139  
K 3.3* 2.9*  
 105 CO2 31 29 BUN 7 6 CREA 0.72 0.51* * 69  
CA 10.1 9.9 Recent Labs  
  11/21/20 1830 11/20/20 
0804 ALT 30 27 AP 85 78 TBILI 0.4 0.4 TP 6.2* 6.2* ALB 1.8* 1.7*  
GLOB 4.4* 4.5* No results for input(s): INR, PTP, APTT, INREXT, INREXT in the last 72 hours. No results for input(s): FE, TIBC, PSAT, FERR in the last 72 hours. No results found for: FOL, RBCF No results for input(s): PH, PCO2, PO2 in the last 72 hours. No results for input(s): CPK, CKNDX, TROIQ in the last 72 hours. No lab exists for component: CPKMB No results found for: CHOL, CHOLX, CHLST, CHOLV, HDL, HDLP, LDL, LDLC, DLDLP, TGLX, TRIGL, TRIGP, CHHD, CHHDX Lab Results Component Value Date/Time Glucose (POC) 80 11/20/2020 02:59 AM  
 Glucose (POC) 220 (H) 11/08/2020 11:01 AM  
 Glucose (POC) 110 (H) 11/08/2020 08:26 AM  
 Glucose (POC) 86 11/06/2020 05:29 PM  
 Glucose (POC) 75 11/06/2020 03:12 PM  
 
Lab Results Component Value Date/Time  Color Yellow/Straw 11/03/2020 02:45 PM  
 Appearance Clear 11/03/2020 02:45 PM  
 Specific gravity 1.010 11/03/2020 02:45 PM  
 pH (UA) 6.5 11/03/2020 02:45 PM  
 Protein Negative 11/03/2020 02:45 PM  
 Glucose Normal (A) 11/03/2020 02:45 PM  
 Ketone Negative 11/03/2020 02:45 PM  
 Bilirubin Negative 11/03/2020 02:45 PM  
 Urobilinogen Normal 11/03/2020 02:45 PM  
 Nitrites Negative 11/03/2020 02:45 PM  
 Leukocyte Esterase Negative 11/03/2020 02:45 PM  
 Bacteria Negative 11/03/2020 02:45 PM  
 WBC 0-4 11/03/2020 02:45 PM  
 RBC 0-5 11/03/2020 02:45 PM  
 
   
Assessment & Plan 1. Hypokalemia Replace  potassium 2. Metastatic Adenocarcinoma 11/13 Cytology- Right Pleural fluid positive for adenocarcinoma Oncology consult - Recommends hospice care due to dementia and poor performance status. 3. Anemia of Chronic disease- stable 11/20-Hgb 11.4 Will continue to monitor H&H 4. Failure to Thrive /severe malnutrition 11/20 Hypoalbuminemia ( 1.7) w/low total protein(6.2) BMI: 25.60  
megestroL (MEGACE) 400 mg/10 mL (10 mL) oral suspension 400 mg  
 
5. Hyponatremia/likely from diuretic 
11/20- Sodium (139) -Stable 6. Hypertension- BP controlled 
metoprolol tartrate (LOPRESSOR) tablet 50 mg  
 
 
7. Pleural effusion 11/06 Chest x-ray shows opacities/pleural effusion - Chest CT shows Near complete collapse of the right lung secondary to large pleural 
Effusion status post thoracocentesis Static post thoracocentesis - cytology results positive for metastatic adenocarcinoma; culture negative 8. Paroxysmal A. Fib- 11/7 ECG 
metoprolol tartrate (LOPRESSOR) tablet 50 mg  
heparin porcine injection 5,000 Units 9. Hx of Congestive Heart Failure 11/08 proBNP 1,127  
 
10. Hx of Dementia 
adjustment disorder with a depressed mood 11. Hx of Hyperlipidemia 12. Hx of  lung cancer 13. Covid screening negative 14. Hypokalemia Repeat the labs PT/OT recommends 5/days week in SNF Discussed with the  regarding discharge planning Waiting Medicaid application before discharge to nursing home Seen by the psychiatrist and he is competent to make decision about healthcare he is physically not capable of taking care of herself willing to have help and use third-party Current Facility-Administered Medications:  
  escitalopram oxalate (LEXAPRO) tablet 5 mg, 5 mg, Oral, DAILY, Abundio nA MD, 5 mg at 11/22/20 9248   megestroL (MEGACE) 400 mg/10 mL (10 mL) oral suspension 400 mg, 400 mg, Oral, BID, Jesus Maldonado MD, 400 mg at 11/22/20 0827 
  metoprolol tartrate (LOPRESSOR) tablet 50 mg, 50 mg, Oral, Q12H, Edwin Smith MD, 50 mg at 11/22/20 0827 
  influenza vaccine 2020-21 (4 yrs+)(PF) (FLUCELVAX QUAD) injection 0.5 mL, 0.5 mL, IntraMUSCular, PRIOR TO DISCHARGE, Jesus Maldonado MD 
  albuterol-ipratropium (DUO-NEB) 2.5 MG-0.5 MG/3 ML, 3 mL, Nebulization, Q6H PRN, Asim Red MD 
  heparin porcine injection 5,000 Units, 5,000 Units, SubCUTAneous, Q12H, Jesus Maldonado MD, 5,000 Units at 11/21/20 6288

## 2020-11-22 NOTE — PROGRESS NOTES
General Daily Progress Note Patient Name: Lety Wilder YOB: 1940 Age: 
79 y.o. Admit Date: 11/3/2020 Subjective:  
Patient is a 79 y.o female with a past medical history of dementia, CHF, hyperlipidemia, and hypertension Resting in the bed not in distress, patient not eating says she dosen't have an appetite Objective:  
 
Visit Vitals BP (!) 148/85 Pulse (!) 113 Temp 98.2 °F (36.8 °C) Resp 18 Ht 5' 2.01\" (1.575 m) Wt 63.5 kg (140 lb) SpO2 96% BMI 25.60 kg/m² Recent Results (from the past 24 hour(s)) METABOLIC PANEL, COMPREHENSIVE Collection Time: 11/21/20  6:30 PM  
Result Value Ref Range Sodium 142 136 - 145 mmol/L Potassium 3.3 (L) 3.5 - 5.1 mmol/L Chloride 107 97 - 108 mmol/L  
 CO2 31 21 - 32 mmol/L Anion gap 4 (L) 5 - 15 mmol/L Glucose 128 (H) 65 - 100 mg/dL BUN 7 6 - 20 mg/dL Creatinine 0.72 0.55 - 1.02 mg/dL BUN/Creatinine ratio 10 (L) 12 - 20 GFR est AA >60 >60 ml/min/1.73m2 GFR est non-AA >60 >60 ml/min/1.73m2 Calcium 10.1 8.5 - 10.1 mg/dL Bilirubin, total 0.4 0.2 - 1.0 mg/dL AST (SGOT) 31 15 - 37 U/L  
 ALT (SGPT) 30 12 - 78 U/L Alk. phosphatase 85 45 - 117 U/L Protein, total 6.2 (L) 6.4 - 8.2 g/dL Albumin 1.8 (L) 3.5 - 5.0 g/dL Globulin 4.4 (H) 2.0 - 4.0 g/dL A-G Ratio 0.4 (L) 1.1 - 2.2    
 
[unfilled] Review of Systems Constitutional: Negative for chills and fever. HENT: Negative. Eyes: Negative. Respiratory: Negative. Cardiovascular: Negative. Gastrointestinal: Negative for abdominal pain and nausea. Skin: Negative. Neurological: Negative. Physical Exam:   
 
Constitutional: pt is oriented to person, place, and time. HENT:  
Head: Normocephalic and atraumatic. Eyes: Pupils are equal, round, and reactive to light. EOM are normal.  
Cardiovascular: Normal rate, regular rhythm and normal heart sounds.   
Pulmonary/Chest: Breath sounds normal. No wheezes. No rales. Exhibits no tenderness. Abdominal: Soft. Bowel sounds are normal. There is no abdominal tenderness. There is no rebound and no guarding. Musculoskeletal: Normal range of motion. Neurological: pt is alert and oriented to person, place, and time. XR CHEST PA LAT Final Result Impression: Large right-sided pleural effusion and associated  
compression/consolidation of the right lung, increased. Mild atelectasis at the  
left base. Possible small left pleural effusion. XR CHEST PORT Final Result IMPRESSION: Diffuse airspace disease, right hemithorax, with improved aeration  
from previous. Differential diagnosis includes pneumonia, atelectasis, and/or  
reexpansion pulmonary edema. No pneumothorax. [address] Final Result IMPRESSION:   
Successful ultrasound-guided thoracentesis of right large pleural effusion, with  
approximately 1.3 L serous pleural fluid removed. Pleural fluid sample was also  
sent for lab analysis. CT CHEST WO CONT Final Result IMPRESSION: Near complete collapse of the right lung secondary to large pleural  
effusion XR HIP LT W OR WO PELV 2-3 VWS Final Result IMPRESSION:  
1. No acute bony findings. XR CHEST SNGL V Final Result Recent Results (from the past 24 hour(s)) METABOLIC PANEL, COMPREHENSIVE Collection Time: 11/21/20  6:30 PM  
Result Value Ref Range Sodium 142 136 - 145 mmol/L Potassium 3.3 (L) 3.5 - 5.1 mmol/L Chloride 107 97 - 108 mmol/L  
 CO2 31 21 - 32 mmol/L Anion gap 4 (L) 5 - 15 mmol/L Glucose 128 (H) 65 - 100 mg/dL BUN 7 6 - 20 mg/dL Creatinine 0.72 0.55 - 1.02 mg/dL BUN/Creatinine ratio 10 (L) 12 - 20 GFR est AA >60 >60 ml/min/1.73m2 GFR est non-AA >60 >60 ml/min/1.73m2 Calcium 10.1 8.5 - 10.1 mg/dL Bilirubin, total 0.4 0.2 - 1.0 mg/dL  AST (SGOT) 31 15 - 37 U/L  
 ALT (SGPT) 30 12 - 78 U/L Alk. phosphatase 85 45 - 117 U/L Protein, total 6.2 (L) 6.4 - 8.2 g/dL Albumin 1.8 (L) 3.5 - 5.0 g/dL Globulin 4.4 (H) 2.0 - 4.0 g/dL A-G Ratio 0.4 (L) 1.1 - 2.2 Results ** No results found for the last 336 hours. **  
  
  
 
Labs:  
 
Recent Labs  
  11/20/20 
0191 WBC 4.2 HGB 11.4* HCT 35.9  Recent Labs  
  11/21/20 
1830 11/20/20 
0804  139  
K 3.3* 2.9*  
 105 CO2 31 29 BUN 7 6 CREA 0.72 0.51* * 69  
CA 10.1 9.9 Recent Labs  
  11/21/20 1830 11/20/20 
0804 ALT 30 27 AP 85 78 TBILI 0.4 0.4 TP 6.2* 6.2* ALB 1.8* 1.7*  
GLOB 4.4* 4.5* No results for input(s): INR, PTP, APTT, INREXT, INREXT in the last 72 hours. No results for input(s): FE, TIBC, PSAT, FERR in the last 72 hours. No results found for: FOL, RBCF No results for input(s): PH, PCO2, PO2 in the last 72 hours. No results for input(s): CPK, CKNDX, TROIQ in the last 72 hours. No lab exists for component: CPKMB No results found for: CHOL, CHOLX, CHLST, CHOLV, HDL, HDLP, LDL, LDLC, DLDLP, TGLX, TRIGL, TRIGP, CHHD, CHHDX Lab Results Component Value Date/Time Glucose (POC) 80 11/20/2020 02:59 AM  
 Glucose (POC) 220 (H) 11/08/2020 11:01 AM  
 Glucose (POC) 110 (H) 11/08/2020 08:26 AM  
 Glucose (POC) 86 11/06/2020 05:29 PM  
 Glucose (POC) 75 11/06/2020 03:12 PM  
 
Lab Results Component Value Date/Time  Color Yellow/Straw 11/03/2020 02:45 PM  
 Appearance Clear 11/03/2020 02:45 PM  
 Specific gravity 1.010 11/03/2020 02:45 PM  
 pH (UA) 6.5 11/03/2020 02:45 PM  
 Protein Negative 11/03/2020 02:45 PM  
 Glucose Normal (A) 11/03/2020 02:45 PM  
 Ketone Negative 11/03/2020 02:45 PM  
 Bilirubin Negative 11/03/2020 02:45 PM  
 Urobilinogen Normal 11/03/2020 02:45 PM  
 Nitrites Negative 11/03/2020 02:45 PM  
 Leukocyte Esterase Negative 11/03/2020 02:45 PM  
 Bacteria Negative 11/03/2020 02:45 PM  
 WBC 0-4 11/03/2020 02:45 PM  
 RBC 0-5 11/03/2020 02:45 PM  
 
   
Assessment & Plan 1. Hypokalemia 11/20 Potassium 2.9 Replace potassium Continue to monitor 2. Metastatic Adenocarcinoma 11/13 Cytology- Right Pleural fluid positive for adenocarcinoma Oncology consult - Recommends hospice care due to dementia and poor performance status. 3. Anemia of Chronic disease- stable 11/20-Hgb 11.4 Will continue to monitor H&H 4. Failure to Thrive /severe malnutrition 11/20 Hypoalbuminemia ( 1.7) w/low total protein(6.2) BMI: 25.60  
megestroL (MEGACE) 400 mg/10 mL (10 mL) oral suspension 400 mg  
 
5. Hyponatremia/likely from diuretic 
11/20- Sodium (139) -Stable 6. Hypertension- BP controlled 
metoprolol tartrate (LOPRESSOR) tablet 50 mg  
 
 
7. Pleural effusion 11/06 Chest x-ray shows opacities/pleural effusion - Chest CT shows Near complete collapse of the right lung secondary to large pleural 
Effusion status post thoracocentesis Static post thoracocentesis - cytology results positive for metastatic adenocarcinoma; culture negative 8. Paroxysmal A. Fib- 11/7 ECG 
metoprolol tartrate (LOPRESSOR) tablet 50 mg  
heparin porcine injection 5,000 Units 9. Hx of Congestive Heart Failure 11/08 proBNP 1,127  
 
10. Hx of Dementia 
adjustment disorder with a depressed mood 11. Hx of Hyperlipidemia 12. Hx of  lung cancer 13. Covid screening negative 14. Hypokalemia Repeat the labs PT/OT recommends 5/days week in SNF Discussed with the  regarding discharge planning Waiting Medicaid application before discharge to nursing home Seen by the psychiatrist and he is competent to make decision about healthcare he is physically not capable of taking care of herself willing to have help and use third-party Current Facility-Administered Medications:  
  escitalopram oxalate (LEXAPRO) tablet 5 mg, 5 mg, Oral, DAILY, Abundio An MD, 5 mg at 11/22/20 0827 
  megestroL (MEGACE) 400 mg/10 mL (10 mL) oral suspension 400 mg, 400 mg, Oral, BID, Jesus Maldonado MD, 400 mg at 11/22/20 0827 
  metoprolol tartrate (LOPRESSOR) tablet 50 mg, 50 mg, Oral, Q12H, Edwin Smith MD, 50 mg at 11/22/20 0827 
  influenza vaccine 2020-21 (4 yrs+)(PF) (FLUCELVAX QUAD) injection 0.5 mL, 0.5 mL, IntraMUSCular, PRIOR TO DISCHARGE, Adenike Maldonado MD 
  albuterol-ipratropium (DUO-NEB) 2.5 MG-0.5 MG/3 ML, 3 mL, Nebulization, Q6H PRN, Asim Red MD 
  heparin porcine injection 5,000 Units, 5,000 Units, SubCUTAneous, Q12H, Jesus Maldonado MD, 5,000 Units at 11/21/20 1830

## 2020-11-23 NOTE — PROGRESS NOTES
Chart reviewed. Per Psychiatry patient has capacity for making decisions. Attempted to reach patient's sister, Azael [place] at [phone] and her niece, Sunni Nieves, at [phone], with no answer. Unable to leave a voice message. We need to speak with family to move forward on a plan. CM will continue to reach out.

## 2020-11-23 NOTE — PROGRESS NOTES
General Daily Progress Note Patient Name: Fahad Spence YOB: 1940 Age: 
79 y.o. Admit Date: 11/3/2020 Subjective:  
Patient is a 79 y.o female with a past medical history of dementia, CHF, hyperlipidemia, and hypertension Resting in the bed not in distress, patient not eating says she dosen't have an appetite Objective:  
 
Visit Vitals BP (!) 159/120 (BP 1 Location: Left arm, BP Patient Position: At rest) Pulse (!) 127 Temp 98 °F (36.7 °C) Resp 30 Ht 5' 2.01\" (1.575 m) Wt 63.5 kg (140 lb) SpO2 (!) 24% BMI 25.60 kg/m² Recent Results (from the past 24 hour(s)) GLUCOSE, POC Collection Time: 11/23/20  8:59 AM  
Result Value Ref Range Glucose (POC) 98 65 - 100 mg/dL Performed by Mariusz Saab   
 
[unfilled] Review of Systems Constitutional: Negative for chills and fever. HENT: Negative. Eyes: Negative. Respiratory: Negative. Cardiovascular: Negative. Gastrointestinal: Negative for abdominal pain and nausea. Skin: Negative. Neurological: Negative. Physical Exam:   
 
Constitutional: pt is oriented to person, place, and time. HENT:  
Head: Normocephalic and atraumatic. Eyes: Pupils are equal, round, and reactive to light. EOM are normal.  
Cardiovascular: Normal rate, regular rhythm and normal heart sounds. Pulmonary/Chest: Breath sounds normal. No wheezes. No rales. Exhibits no tenderness. Abdominal: Soft. Bowel sounds are normal. There is no abdominal tenderness. There is no rebound and no guarding. Musculoskeletal: Normal range of motion. Neurological: pt is alert and oriented to person, place, and time. XR CHEST PA LAT Final Result Impression: Large right-sided pleural effusion and associated  
compression/consolidation of the right lung, increased. Mild atelectasis at the  
left base. Possible small left pleural effusion. XR CHEST PORT Final Result IMPRESSION: Diffuse airspace disease, right hemithorax, with improved aeration  
from previous. Differential diagnosis includes pneumonia, atelectasis, and/or  
reexpansion pulmonary edema. No pneumothorax. [address] Final Result IMPRESSION:   
Successful ultrasound-guided thoracentesis of right large pleural effusion, with  
approximately 1.3 L serous pleural fluid removed. Pleural fluid sample was also  
sent for lab analysis. CT CHEST WO CONT Final Result IMPRESSION: Near complete collapse of the right lung secondary to large pleural  
effusion XR HIP LT W OR WO PELV 2-3 VWS Final Result IMPRESSION:  
1. No acute bony findings. XR CHEST SNGL V Final Result Recent Results (from the past 24 hour(s)) GLUCOSE, POC Collection Time: 11/23/20  8:59 AM  
Result Value Ref Range Glucose (POC) 98 65 - 100 mg/dL Performed by Saman Friedman Results ** No results found for the last 336 hours. **  
  
  
 
Labs:  
 
No results for input(s): WBC, HGB, HCT, PLT, HGBEXT, HCTEXT, PLTEXT, HGBEXT, HCTEXT, PLTEXT in the last 72 hours. Recent Labs  
  11/21/20 
1830   
K 3.3*  
 CO2 31 BUN 7  
CREA 0.72 * CA 10.1 Recent Labs  
  11/21/20 
1830 ALT 30 AP 85 TBILI 0.4 TP 6.2* ALB 1.8*  
GLOB 4.4* No results for input(s): INR, PTP, APTT, INREXT, INREXT in the last 72 hours. No results for input(s): FE, TIBC, PSAT, FERR in the last 72 hours. No results found for: FOL, RBCF No results for input(s): PH, PCO2, PO2 in the last 72 hours. No results for input(s): CPK, CKNDX, TROIQ in the last 72 hours. No lab exists for component: CPKMB No results found for: CHOL, CHOLX, CHLST, CHOLV, HDL, HDLP, LDL, LDLC, DLDLP, TGLX, TRIGL, TRIGP, CHHD, CHHDX Lab Results Component Value Date/Time  Glucose (POC) 98 11/23/2020 08:59 AM  
 Glucose (POC) 80 11/20/2020 02:59 AM  
 Glucose (POC) 220 (H) 11/08/2020 11:01 AM  
 Glucose (POC) 110 (H) 11/08/2020 08:26 AM  
 Glucose (POC) 86 11/06/2020 05:29 PM  
 
Lab Results Component Value Date/Time Color Yellow/Straw 11/03/2020 02:45 PM  
 Appearance Clear 11/03/2020 02:45 PM  
 Specific gravity 1.010 11/03/2020 02:45 PM  
 pH (UA) 6.5 11/03/2020 02:45 PM  
 Protein Negative 11/03/2020 02:45 PM  
 Glucose Normal (A) 11/03/2020 02:45 PM  
 Ketone Negative 11/03/2020 02:45 PM  
 Bilirubin Negative 11/03/2020 02:45 PM  
 Urobilinogen Normal 11/03/2020 02:45 PM  
 Nitrites Negative 11/03/2020 02:45 PM  
 Leukocyte Esterase Negative 11/03/2020 02:45 PM  
 Bacteria Negative 11/03/2020 02:45 PM  
 WBC 0-4 11/03/2020 02:45 PM  
 RBC 0-5 11/03/2020 02:45 PM  
 
   
Assessment & Plan 1. Hypokalemia Replace  potassium 2. Metastatic Adenocarcinoma 11/13 Cytology- Right Pleural fluid positive for adenocarcinoma Oncology consult - Recommends hospice care due to dementia and poor performance status. 3. Anemia of Chronic disease- stable 11/20-Hgb 11.4 Will continue to monitor H&H 4. Failure to Thrive /severe malnutrition 11/20 Hypoalbuminemia ( 1.7) w/low total protein(6.2) BMI: 25.60  
megestroL (MEGACE) 400 mg/10 mL (10 mL) oral suspension 400 mg  
 
5. Hyponatremia/likely from diuretic 
11/20- Sodium (139) -Stable 6. Hypertension- BP controlled 
metoprolol tartrate (LOPRESSOR) tablet 50 mg  
 
 
7. Pleural effusion 11/06 Chest x-ray shows opacities/pleural effusion - Chest CT shows Near complete collapse of the right lung secondary to large pleural 
Effusion status post thoracocentesis Static post thoracocentesis - cytology results positive for metastatic adenocarcinoma; culture negative 8. Paroxysmal A. Fib- 11/7 ECG 
metoprolol tartrate (LOPRESSOR) tablet 50 mg  
heparin porcine injection 5,000 Units 9. Hx of Congestive Heart Failure 11/08 proBNP 1,127  
 
10. Hx of Dementia adjustment disorder with a depressed mood 11. Hx of Hyperlipidemia 12. Hx of  lung cancer 13. Covid screening negative 14. Hypokalemia Repeat the labs PT/OT recommends 5/days week in SNF Discussed with the  regarding discharge planning Waiting Medicaid application before discharge to nursing home Seen by the psychiatrist and he is competent to make decision about healthcare he is physically not capable of taking care of herself willing to have help and use third-party Current Facility-Administered Medications:  
  escitalopram oxalate (LEXAPRO) tablet 5 mg, 5 mg, Oral, DAILY, Abundio An MD, 5 mg at 11/23/20 9890   megestroL (MEGACE) 400 mg/10 mL (10 mL) oral suspension 400 mg, 400 mg, Oral, BID, Jesus Maldonado MD, 400 mg at 11/23/20 7377   metoprolol tartrate (LOPRESSOR) tablet 50 mg, 50 mg, Oral, Q12H, Edwin Smith MD, 50 mg at 11/23/20 0912 
  influenza vaccine 2020-21 (4 yrs+)(PF) (FLUCELVAX QUAD) injection 0.5 mL, 0.5 mL, IntraMUSCular, PRIOR TO DISCHARGE, Jesus Maldonado MD 
  albuterol-ipratropium (DUO-NEB) 2.5 MG-0.5 MG/3 ML, 3 mL, Nebulization, Q6H PRN, Asim Red MD 
  heparin porcine injection 5,000 Units, 5,000 Units, SubCUTAneous, Q12H, Jesus Maldonado MD, 5,000 Units at 11/23/20 7056

## 2020-11-23 NOTE — PROGRESS NOTES
General Daily Progress Note Patient Name: Yesenia Bhatt YOB: 1940 Age: 
79 y.o. Admit Date: 11/3/2020 Subjective:  
Patient is a 79 y.o female with a past medical history of dementia, CHF, hyperlipidemia, and hypertension Patient tachycardic Resting in the bed not in distress, patient not eating says she dosen't have an appetite Objective:  
 
Visit Vitals /81 (BP 1 Location: Left arm, BP Patient Position: At rest) Pulse (!) 122 Temp 98.3 °F (36.8 °C) Resp 30 Ht 5' 2.01\" (1.575 m) Wt 63.5 kg (140 lb) SpO2 95% BMI 25.60 kg/m² Recent Results (from the past 24 hour(s)) GLUCOSE, POC Collection Time: 11/23/20  8:59 AM  
Result Value Ref Range Glucose (POC) 98 65 - 100 mg/dL Performed by Asia Figueroa   
 
[unfilled] Review of Systems Constitutional: Negative for chills and fever. HENT: Negative. Eyes: Negative. Respiratory: Negative. Cardiovascular: Negative. Gastrointestinal: Negative for abdominal pain and nausea. Skin: Negative. Neurological: Negative. Physical Exam:   
 
Constitutional: pt is oriented to person, place, and time. HENT:  
Head: Normocephalic and atraumatic. Eyes: Pupils are equal, round, and reactive to light. EOM are normal.  
Cardiovascular: Normal rate, regular rhythm and normal heart sounds. Pulmonary/Chest: Breath sounds normal. No wheezes. No rales. Exhibits no tenderness. Abdominal: Soft. Bowel sounds are normal. There is no abdominal tenderness. There is no rebound and no guarding. Musculoskeletal: Normal range of motion. Neurological: pt is alert and oriented to person, place, and time. XR CHEST PA LAT Final Result Impression: Large right-sided pleural effusion and associated  
compression/consolidation of the right lung, increased. Mild atelectasis at the  
left base. Possible small left pleural effusion.   
  
XR CHEST PORT  
 Final Result IMPRESSION: Diffuse airspace disease, right hemithorax, with improved aeration  
from previous. Differential diagnosis includes pneumonia, atelectasis, and/or  
reexpansion pulmonary edema. No pneumothorax. [address] Final Result IMPRESSION:   
Successful ultrasound-guided thoracentesis of right large pleural effusion, with  
approximately 1.3 L serous pleural fluid removed. Pleural fluid sample was also  
sent for lab analysis. CT CHEST WO CONT Final Result IMPRESSION: Near complete collapse of the right lung secondary to large pleural  
effusion XR HIP LT W OR WO PELV 2-3 VWS Final Result IMPRESSION:  
1. No acute bony findings. XR CHEST SNGL V Final Result Recent Results (from the past 24 hour(s)) GLUCOSE, POC Collection Time: 11/23/20  8:59 AM  
Result Value Ref Range Glucose (POC) 98 65 - 100 mg/dL Performed by [employer] Results ** No results found for the last 336 hours. **  
  
  
 
Labs:  
 
No results for input(s): WBC, HGB, HCT, PLT, HGBEXT, HCTEXT, PLTEXT, HGBEXT, HCTEXT, PLTEXT in the last 72 hours. Recent Labs  
  11/21/20 
1830   
K 3.3*  
 CO2 31 BUN 7  
CREA 0.72 * CA 10.1 Recent Labs  
  11/21/20 
1830 ALT 30 AP 85 TBILI 0.4 TP 6.2* ALB 1.8*  
GLOB 4.4* No results for input(s): INR, PTP, APTT, INREXT, INREXT in the last 72 hours. No results for input(s): FE, TIBC, PSAT, FERR in the last 72 hours. No results found for: FOL, RBCF No results for input(s): PH, PCO2, PO2 in the last 72 hours. No results for input(s): CPK, CKNDX, TROIQ in the last 72 hours. No lab exists for component: CPKMB No results found for: CHOL, CHOLX, CHLST, CHOLV, HDL, HDLP, LDL, LDLC, DLDLP, TGLX, TRIGL, TRIGP, CHHD, CHHDX Lab Results Component Value Date/Time  Glucose (POC) 98 11/23/2020 08:59 AM  
 Glucose (POC) 80 11/20/2020 02:59 AM  
 Glucose (POC) 220 (H) 11/08/2020 11:01 AM  
 Glucose (POC) 110 (H) 11/08/2020 08:26 AM  
 Glucose (POC) 86 11/06/2020 05:29 PM  
 
Lab Results Component Value Date/Time Color Yellow/Straw 11/03/2020 02:45 PM  
 Appearance Clear 11/03/2020 02:45 PM  
 Specific gravity 1.010 11/03/2020 02:45 PM  
 pH (UA) 6.5 11/03/2020 02:45 PM  
 Protein Negative 11/03/2020 02:45 PM  
 Glucose Normal (A) 11/03/2020 02:45 PM  
 Ketone Negative 11/03/2020 02:45 PM  
 Bilirubin Negative 11/03/2020 02:45 PM  
 Urobilinogen Normal 11/03/2020 02:45 PM  
 Nitrites Negative 11/03/2020 02:45 PM  
 Leukocyte Esterase Negative 11/03/2020 02:45 PM  
 Bacteria Negative 11/03/2020 02:45 PM  
 WBC 0-4 11/03/2020 02:45 PM  
 RBC 0-5 11/03/2020 02:45 PM  
 
   
Assessment & Plan 1. Hypokalemia Replace  potassium 2. Metastatic Adenocarcinoma 11/13 Cytology- Right Pleural fluid positive for adenocarcinoma Oncology consult - Recommends hospice care due to dementia and poor performance status. 3. Anemia of Chronic disease- stable 11/20-Hgb 11.4 Will continue to monitor H&H 4. Failure to Thrive /severe malnutrition 11/20 Hypoalbuminemia ( 1.7) w/low total protein(6.2) BMI: 25.60  
megestroL (MEGACE) 400 mg/10 mL (10 mL) oral suspension 400 mg  
 
5. Hyponatremia/likely from diuretic 
11/20- Sodium (139) -Stable 6. Hypertension- BP controlled 
metoprolol tartrate (LOPRESSOR) tablet 50 mg  
 
 
7. Pleural effusion 11/06 Chest x-ray shows opacities/pleural effusion - Chest CT shows Near complete collapse of the right lung secondary to large pleural 
Effusion status post thoracocentesis Static post thoracocentesis - cytology results positive for metastatic adenocarcinoma; culture negative 8. Paroxysmal A. Fib- 11/7 ECG 
metoprolol tartrate (LOPRESSOR) tablet 50 mg  
heparin porcine injection 5,000 Units 9. Hx of Congestive Heart Failure 11/08 proBNP 1,127  
 
10. Hx of Dementia adjustment disorder with a depressed mood 11. Hx of Hyperlipidemia 12. Hx of  lung cancer 13. Covid screening negative 14. Hypokalemia 15. Tachycardia Patient on metoprolol 50 mg twice a day Order EKG stat now Repeat the labs PT/OT recommends 5/days week in SNF Discussed with the  regarding discharge planning Waiting Medicaid application before discharge to nursing home Seen by the psychiatrist and he is competent to make decision about healthcare he is physically not capable of taking care of herself willing to have help and use third-party Current Facility-Administered Medications:  
  escitalopram oxalate (LEXAPRO) tablet 5 mg, 5 mg, Oral, DAILY, Abundio An MD, 5 mg at 11/23/20 8007   megestroL (MEGACE) 400 mg/10 mL (10 mL) oral suspension 400 mg, 400 mg, Oral, BID, Jesus Maldonado MD, 400 mg at 11/23/20 3102   metoprolol tartrate (LOPRESSOR) tablet 50 mg, 50 mg, Oral, Q12H, Edwin Smith MD, 50 mg at 11/23/20 0912 
  influenza vaccine 2020-21 (4 yrs+)(PF) (FLUCELVAX QUAD) injection 0.5 mL, 0.5 mL, IntraMUSCular, PRIOR TO DISCHARGE, Jesus Maldonado MD 
  albuterol-ipratropium (DUO-NEB) 2.5 MG-0.5 MG/3 ML, 3 mL, Nebulization, Q6H PRN, Asim Red MD 
  heparin porcine injection 5,000 Units, 5,000 Units, SubCUTAneous, Q12H, Jesus Maldonado MD, 5,000 Units at 11/23/20 9855

## 2020-11-23 NOTE — PROGRESS NOTES
Pulmonary Progress Note Daily Progress Note: 11/23/2020 Subjective: The patient is seen for follow  up. Excerpts from admission 11/3/2020 or consult notes as follows:  
71-year-old lady came in because of generalized weakness lethargy past medical history of dementia congestive heart failure hypertension hyperlipidemia patient was discharged from home with a sling on her right arm as she fell. Now chest x-ray and CAT scan of the chest was done which shows right lower lobe collapse with pleural effusion unable to get much history out of the patient so pulmonary consult was called for further evaluation 11/8 denies cough or shortness of breath remains on room air 11/09 Today she denies cough and shortness of breath. She had a chest Xray this morning, awaiting read. She is on room air breathing well. 
11/10 She says she feels great and denies SOB and cough. CXR yesterday shows enlarging right pleural effusion and atelectasis and atelectasis of left base. 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 She is on room air. 11/12 She is on room air. Awaiting placement. Problem List: 
Problem List as of 11/23/2020 Never Reviewed Codes Class Noted - Resolved Hyponatremia ICD-10-CM: E87.1 ICD-9-CM: 276.1  11/3/2020 - Present Medications reviewed Current Facility-Administered Medications Medication Dose Route Frequency  escitalopram oxalate (LEXAPRO) tablet 5 mg  5 mg Oral DAILY  megestroL (MEGACE) 400 mg/10 mL (10 mL) oral suspension 400 mg  400 mg Oral BID  metoprolol tartrate (LOPRESSOR) tablet 50 mg  50 mg Oral Q12H  
 influenza vaccine 2020-21 (4 yrs+)(PF) (FLUCELVAX QUAD) injection 0.5 mL  0.5 mL IntraMUSCular PRIOR TO DISCHARGE  albuterol-ipratropium (DUO-NEB) 2.5 MG-0.5 MG/3 ML  3 mL Nebulization Q6H PRN  
 heparin porcine injection 5,000 Units  5,000 Units SubCUTAneous Q12H Review of Systems: A comprehensive review of systems was negative except for that written in the HPI. Objective:  
Physical Exam:  
 
Visit Vitals BP (!) 155/107 Pulse (!) 139 Temp 98 °F (36.7 °C) Resp 30 Ht 5' 2.01\" (1.575 m) Wt 63.5 kg (140 lb) SpO2 95% BMI 25.60 kg/m² O2 Device: Room air Temp (24hrs), Av.5 °F (36.9 °C), Min:98 °F (36.7 °C), Max:99.5 °F (37.5 °C) No intake/output data recorded. No intake/output data recorded. Constitutional:  
 Awake alert seems oriented HENT:  
Head: Normocephalic and atraumatic. Eyes: Pupils are equal, round, and reactive to light. Extraocular movements intact Neck: Normal range of motion. Neck supple. No definite JVD Cardiovascular: Normal rate and regular rhythm. Pulmonary/Chest:  
 Clear anteriorly and left lateral with fairly good breath sounds right lateral and right posterior no definite wheezes or rales Abdominal: Soft. Bowel sounds are normal.  
Musculoskeletal: Normal range of motion. Skin: Skin is warm. Data Review:  
   
Recent Days: 
No results for input(s): WBC, HGB, HCT, PLT, HGBEXT, HCTEXT, PLTEXT, HGBEXT, HCTEXT, PLTEXT in the last 72 hours. Recent Labs  
  20 
1830   
K 3.3*  
 CO2 31 * BUN 7  
CREA 0.72  
CA 10.1 ALB 1.8* TBILI 0.4 ALT 30 Room air oxygen saturation 98% 24 Hour Results: 
Recent Results (from the past 24 hour(s)) GLUCOSE, POC Collection Time: 20  8:59 AM  
Result Value Ref Range Glucose (POC) 98 65 - 100 mg/dL Performed by [facility]: 
  
   
CXR Results  (Last 48 hours)  
  None  
   
  
    
Results from Hospital Encounter encounter on 20 XR HIP LT W OR WO PELV 2-3 VWS  
  Narrative Fall 1 week ago. 
  
No comparison. 
  
FINDINGS: AP pelvis and frog-leg view of the left hip. No acute fracture or 
dislocation. Deformity of the left femur trochanter and proximal diaphysis, 
appears chronic. No radiopaque foreign body.    
  Impression IMPRESSION: 
1. No acute bony findings. XR CHEST SNGL V  
  Narrative Chest single view. 
  
Comparison single view chest March 29, 2018. 
  
Right-sided Port-A-Cath stable position. New opacification of the lower two thirds right hemithorax. This is likely 
related to a combination of large volume pleural fluid and atelectatic lung. Left lung is aerated. Cardiac and mediastinal structures unchanged. No 
pneumothorax. Nonacute rib fractures noted. Results from [facility] Encounter encounter on 10/26/20 XR SHOULDER RT AP/LAT MIN 2 V  
  Narrative 3 views of the right shoulder reveal diffuse osteopenia. There is mild 
degenerative subluxation of the humerus with respect to the glenoid. No fracture 
or dislocation is identified. 
   
  Impression IMPRESSION: Degenerative changes.  
  
    
Results from Hospital Encounter encounter on 11/03/20 CT CHEST WO CONT  
  Narrative CT dose reduction was achieved through use of a standardized protocol tailored 
for this examination and automatic exposure control for dose modulation. 
  
Noncontrast study shows large right pleural effusion. Right middle and lower 
lobe are completely collapsed, with very few air bronchograms. The upper lobe is 
almost completely collapsed, sparingly anterior segment only. Mainstem bronchus 
is open. Proximal lobar bronchi are open. Left lung is clear. Multiple calcified 
mediastinal hilar lymph nodes. Small left pleural effusion. Small pericardial 
effusion. Normal caliber aorta. No significant upper abdominal or chest wall 
finding. Pronounced soft tissue edema in the visualized portion of the left arm 
   
  Impression IMPRESSION: Near complete collapse of the right lung secondary to large pleural 
effusion  
  
 
  
IMPRESSION:  
 
1. Malignant pleural Effusion Right side status post thoracentesis 11/06 by IR 
2.  Chronic Obstructive Pulmonary Disease with Severe Acute Exacerbation requiring inpatient hospitalization and management; no wheezing today 3. A. fib 5. History of heart failure hyponatremia ? Hx of lung cancer 6. Prognosis guarded   
   
RECOMMENDATIONS/PLAN:  
 
Discussed with the patient detail about her condition she told me that she had a history of lung cancer diagnosed more than 3 years ago in [place] and apparently she got treated for it I do not have any records and also she is intermittently confused Pleural fluid Cytology of thoracentesis on 11/06 showed many groups of abnormal epithelial cells are noted, with large nuclei and cytoplasmic vacuoles, indicative of metastatic adenocarcinoma. 1. Status post thoracentesis via IR 1260 cc fluid removed. Pleural fluid culture negative. Chest Xray 11/09 showed increased large right pleural effusion and atelectasis, as well as atelectasis of the left base and possible small left pleural effusion. 2. Nebulizer treatment with Mucomyst 
3. Discontinue Zosyn She is agreeable to [facility] and rehab. 
  
Iman Kimbrough MD

## 2020-11-23 NOTE — PROGRESS NOTES
PHYSICAL THERAPY TREATMENT Patient: Chino Cruz (79 y.o. female) Date: 11/23/2020 Diagnosis: Hyponatremia [E87.1] <principal problem not specified> Precautions:   
Chart, physical therapy assessment, plan of care and goals were reviewed. ASSESSMENT Patient continues with skilled PT services and is progressing towards goals. Pt. Semi supine in bed upon arrival and agreeable to therapy session. Pt. Very lethargic today. PCT notified pt. Having high BP today. Pt. Seems lethargic and absent in focus. General status seems to be declining. Able to perform semi supine LE TE, and Tolerate PROM. Recommend DC to SNF. Current Level of Function Impacting Discharge (mobility/balance): general health, R LUNG LOWER LOBE COLLAPSE Other factors to consider for discharge: TBD PLAN : 
Patient continues to benefit from skilled intervention to address the above impairments. Continue treatment per established plan of care. to address goals. Recommendation for discharge: (in order for the patient to meet his/her long term goals) Therapy up to 5 days/week in SNF setting This discharge recommendation: 
Has been made in collaboration with the attending provider and/or case management IF patient discharges home will need the following DME: to be determined (TBD) SUBJECTIVE:  
Patient stated IM TIRED.  OBJECTIVE DATA SUMMARY:  
Critical Behavior: 
Neurologic State: Alert, Lethargic Orientation Level: Oriented to person Cognition: Follows commands Functional Mobility Training: 
Bed Mobility: 
  
  
  
  
  
  
Transfers: 
  
  
     
  
     
  
  
  
  
Balance: 
  
Ambulation/Gait Training: 
  
  
  
  
  
  
  
  
  
  
  
  
  
  
  
  
  
  
Stairs: Therapeutic Exercises:  
Therapeutic Exercises:  
 
 
EXERCISE Sets Reps Active Active Assist  
Passive Self ROM Comments Ankle Pumps  15 [x] [] [x] [] Quad Sets/Glut Sets   [] [] [] [] Hamstring Sets   [] [] [] [] Short Arc Quads   [] [] [] [] Heel Slides  15 [x] [] [x] [] Straight Leg Raises   [] [] [] [] Hip abd/add  15 [x] [] [x] [] Long Arc Quads   [] [] [] [] Marching   [] [] [] []   
   [] [] [] []   
 
 
Pain Ratin Activity Tolerance:  
Poor Please refer to the flowsheet for vital signs taken during this treatment. After treatment patient left in no apparent distress:  
Supine in bed COMMUNICATION/COLLABORATION:  
The patients plan of care was discussed with: Physical therapy assistant. Omero Lantigua Time Calculation: 10 mins

## 2020-11-23 NOTE — INTERDISCIPLINARY ROUNDS
Called Dr. Kayleigh Nichole about HR and BP. I had given metoprolol. He said no new meds and to recheck in an hour.

## 2020-11-23 NOTE — PROGRESS NOTES
Comprehensive Nutrition Assessment Type and Reason for Visit: Reassess(interim) Nutrition Recommendations/Plan:  
Continue current regular, soft solids diet Ensure clear, Mixed berry to TID Prosource Gelatein BID Allow snacks as desired 
  
Continue with appetite stimulant Consider adding MVI with pt poor PO intakes 
  
If pt decided to no go Hospice, TF would be best option to meet nutritional needs  
Please document % of all meal/snack consumed, wts, BMs 
 
Nutrition Assessment:  Admitted for FTT, needs SNF placement, noted pt in ED 10/26 for fall with fx. Pt now s/p thoracentesis with 1.26L removed (11/6), cytology +metastatic adenocarcinoma. MD's rec hospice however no decision yet. Per CM, current plans to d/c to [facility], working on [employer]. Per EMR pt with poor appetite and intakes ~10% of meals, no recent intakes documented. RN and  ambassadors alerted RD pt not eating anything x2 weeks at least, disinterested in food despite encouragement. Per RN, pt not drinking Ensure Enlive or eating Magic Cup, loves Ensure Clear and drinks >75% of these TID. Noted Megace started 11/16 as appetite stimulant, limited efficacy appreciated. Today pt ate minimal B, at L ate 1 bite ice cream, drank x3 french, RD opened Ensure Clear for her. RD added Prosource Gelatein over weekend, pt endorsed liking these however unable to confirm visually or with RN today, will increase to BID and f/u. Labs: K+ 3.3, Alb 1.8. Meds: Heparin, megace, zosyn, KCl. Malnutrition Assessment: 
Malnutrition Status: Moderate malnutrition Context:  Acute illness Estimated Daily Nutrient Needs: 
Energy (kcal): 1869kcals (30kcals/kg); Weight Used for Energy Requirements: Current Protein (g): 81g (1.3g/kg); Weight Used for Protein Requirements: Current Fluid (ml/day): 1869ml; Method Used for Fluid Requirements: 1 ml/kcal 
 Needs for maintenance with new cancer Nutrition Related Findings:  NFPE finding mild muscle wasing. No edema. No N/V/D/C per RN, last BM 11/21, loose. Pt previously endorsed no issues with c/s, no swallow evals in EMR. Wounds:   
None Current Nutrition Therapies: DIET REGULAR 
DIET NUTRITIONAL SUPPLEMENTS Breakfast, Lunch, Dinner; Ensure Clear (mixed berry) DIET NUTRITIONAL SUPPLEMENTS Dinner, Lunch; Prosource (Gelatein Jello) Anthropometric Measures: 
· Height:  5' 2.01\" (157.5 cm) · Current Body Wt:  60.9 kg (134 lb 4.2 oz)(11/23) · Admission Body Wt:  139 lb 15.9 oz(11/3) · Usual Body Wt:  54.4 kg (120 lb 0.2 oz)(per pt, stable) · Ideal Body Wt:  110 lbs:  122.1 % · BMI Category:  Normal weight (BMI 22.0-24.9) age over 72 Wt hx: 60.9kg (11/23), 62.3kg (11/9), 59.9kg (11/5), 63.6kg (11/3) No prior wt hx to assess, UBW less than pt current BW per EMR. Pt slowly losing wt throughout admit. Nutrition Diagnosis:  
· Inadequate oral intake related to cognitive or neurological impairment(lethargy) as evidenced by intake 0-25% Nutrition Interventions:  
Food and/or Nutrient Delivery: Continue current diet, Modify oral nutrition supplement(increase prosource gelatein to BID) Nutrition Education and Counseling: No recommendations at this time Coordination of Nutrition Care: Continue to monitor while inpatient, Feeding assistance/environmental change Goals: 
Intakes >/=50% of EENs (not progressing) Wt maintenance within +/-0.5kg    (not progressing) Nutrition Monitoring and Evaluation:  
Behavioral-Environmental Outcomes: None identified Food/Nutrient Intake Outcomes: Food and nutrient intake, Supplement intake Physical Signs/Symptoms Outcomes: Weight, Chewing or swallowing, Biochemical data, Nutrition focused physical findings, Meal time behavior Discharge Planning: Too soon to determine Electronically signed by Candice Massey RD on 11/23/2020 at 3:38 PM 
 
Contact: Ext [phone]

## 2020-11-24 NOTE — PROGRESS NOTES
OT tx attempted at 1025 however will hold OT tx at this time s pt with high BP of 139/97. Will continue to follow patient and attempt OT at a later time. Thank you.

## 2020-11-24 NOTE — PROGRESS NOTES
General Daily Progress Note Patient Name: John Olivares YOB: 1940 Age: 
79 y.o. Admit Date: 11/3/2020 Subjective:  
Patient is a 79 y.o female with a past medical history of dementia, CHF, hyperlipidemia, and hypertension Patient tachycardic Resting in the bed not in distress, patient not eating says she dosen't have an appetite Objective:  
 
Visit Vitals BP (!) 139/97 Pulse (!) 122 Temp 98.8 °F (37.1 °C) Resp 16 Ht 5' 2.01\" (1.575 m) Wt 63.5 kg (140 lb) SpO2 92% BMI 25.60 kg/m² Recent Results (from the past 24 hour(s)) EKG, 12 LEAD, INITIAL Collection Time: 11/23/20  3:52 PM  
Result Value Ref Range Ventricular Rate 121 BPM  
 Atrial Rate 121 BPM  
 P-R Interval 124 ms QRS Duration 68 ms Q-T Interval 344 ms QTC Calculation (Bezet) 488 ms Calculated R Axis 77 degrees Calculated T Axis 108 degrees Diagnosis Sinus tachycardia Septal infarct , age undetermined T wave abnormality, consider anterior ischemia Abnormal ECG When compared with ECG of 07-NOV-2020 06:34, Sinus rhythm has replaced Atrial flutter Septal infarct is now Present T wave inversion now evident in Anterior leads Confirmed by Sis Arrieta [phone]) on 11/23/2020 5:04:12 PM 
  
TROPONIN I Collection Time: 11/23/20  8:16 PM  
Result Value Ref Range Troponin-I, Qt. 0.10 (H) <0.05 ng/mL TROPONIN I Collection Time: 11/24/20  6:10 AM  
Result Value Ref Range Troponin-I, Qt. 0.06 (H) <0.05 ng/mL URINALYSIS W/MICROSCOPIC Collection Time: 11/24/20  8:30 AM  
Result Value Ref Range Color Dark Yellow Appearance Clear Clear Specific gravity 1.018 1.003 - 1.030    
 pH (UA) 5.0 5.0 - 8.0 Protein Negative Negative mg/dL Glucose Negative Negative mg/dL Ketone Negative Negative mg/dL Bilirubin Negative Negative Blood Negative Negative Urobilinogen 4.0 (H) 0.1 - 1.0 EU/dL Nitrites Negative Negative Leukocyte Esterase Negative Negative WBC 0-4 0 - 4 /hpf  
 RBC 0-5 0 - 5 /hpf Bacteria Negative Negative /hpf Hyaline cast 0-2 0 - 5 /lpf  
 
[unfilled] Review of Systems Constitutional: Negative for chills and fever. HENT: Negative. Eyes: Negative. Respiratory: Negative. Cardiovascular: Negative. Gastrointestinal: Negative for abdominal pain and nausea. Skin: Negative. Neurological: Negative. Physical Exam:   
 
Constitutional: pt is oriented to person, place, and time. HENT:  
Head: Normocephalic and atraumatic. Eyes: Pupils are equal, round, and reactive to light. EOM are normal.  
Cardiovascular: Normal rate, regular rhythm and normal heart sounds. Pulmonary/Chest: Breath sounds normal. No wheezes. No rales. Exhibits no tenderness. Abdominal: Soft. Bowel sounds are normal. There is no abdominal tenderness. There is no rebound and no guarding. Musculoskeletal: Normal range of motion. Neurological: pt is alert and oriented to person, place, and time. XR CHEST PA LAT Final Result Impression: Large right-sided pleural effusion and associated  
compression/consolidation of the right lung, increased. Mild atelectasis at the  
left base. Possible small left pleural effusion. XR CHEST PORT Final Result IMPRESSION: Diffuse airspace disease, right hemithorax, with improved aeration  
from previous. Differential diagnosis includes pneumonia, atelectasis, and/or  
reexpansion pulmonary edema. No pneumothorax. [address] Final Result IMPRESSION:   
Successful ultrasound-guided thoracentesis of right large pleural effusion, with  
approximately 1.3 L serous pleural fluid removed. Pleural fluid sample was also  
sent for lab analysis. CT CHEST WO CONT Final Result IMPRESSION: Near complete collapse of the right lung secondary to large pleural  
effusion XR HIP LT W OR WO PELV 2-3 VWS Final Result IMPRESSION:  
1. No acute bony findings. XR CHEST SNGL V Final Result Recent Results (from the past 24 hour(s)) EKG, 12 LEAD, INITIAL Collection Time: 11/23/20  3:52 PM  
Result Value Ref Range Ventricular Rate 121 BPM  
 Atrial Rate 121 BPM  
 P-R Interval 124 ms QRS Duration 68 ms Q-T Interval 344 ms QTC Calculation (Bezet) 488 ms Calculated R Axis 77 degrees Calculated T Axis 108 degrees Diagnosis Sinus tachycardia Septal infarct , age undetermined T wave abnormality, consider anterior ischemia Abnormal ECG When compared with ECG of 07-NOV-2020 06:34, Sinus rhythm has replaced Atrial flutter Septal infarct is now Present T wave inversion now evident in Anterior leads Confirmed by Hector Puckett [phone]) on 11/23/2020 5:04:12 PM 
  
TROPONIN I Collection Time: 11/23/20  8:16 PM  
Result Value Ref Range Troponin-I, Qt. 0.10 (H) <0.05 ng/mL TROPONIN I Collection Time: 11/24/20  6:10 AM  
Result Value Ref Range Troponin-I, Qt. 0.06 (H) <0.05 ng/mL URINALYSIS W/MICROSCOPIC Collection Time: 11/24/20  8:30 AM  
Result Value Ref Range Color Dark Yellow Appearance Clear Clear Specific gravity 1.018 1.003 - 1.030    
 pH (UA) 5.0 5.0 - 8.0 Protein Negative Negative mg/dL Glucose Negative Negative mg/dL Ketone Negative Negative mg/dL Bilirubin Negative Negative Blood Negative Negative Urobilinogen 4.0 (H) 0.1 - 1.0 EU/dL Nitrites Negative Negative Leukocyte Esterase Negative Negative WBC 0-4 0 - 4 /hpf  
 RBC 0-5 0 - 5 /hpf Bacteria Negative Negative /hpf Hyaline cast 0-2 0 - 5 /lpf Results Procedure Component Value Units Date/Time CULTURE, URINE [[id number]] Collected:  11/24/20 0830 Order Status:  Completed Specimen:  Urine Updated:  11/24/20 4146 Labs: No results for input(s): WBC, HGB, HCT, PLT, HGBEXT, HCTEXT, PLTEXT, HGBEXT, HCTEXT, PLTEXT in the last 72 hours. Recent Labs  
  11/21/20 1830   
K 3.3*  
 CO2 31 BUN 7  
CREA 0.72 * CA 10.1 Recent Labs  
  11/21/20 
1830 ALT 30 AP 85 TBILI 0.4 TP 6.2* ALB 1.8*  
GLOB 4.4* No results for input(s): INR, PTP, APTT, INREXT, INREXT in the last 72 hours. No results for input(s): FE, TIBC, PSAT, FERR in the last 72 hours. No results found for: FOL, RBCF No results for input(s): PH, PCO2, PO2 in the last 72 hours. Recent Labs  
  11/24/20 
0610 11/23/20 2016 TROIQ 0.06* 0.10* No results found for: CHOL, CHOLX, CHLST, CHOLV, HDL, HDLP, LDL, LDLC, DLDLP, TGLX, TRIGL, TRIGP, CHHD, CHHDX Lab Results Component Value Date/Time Glucose (POC) 98 11/23/2020 08:59 AM  
 Glucose (POC) 80 11/20/2020 02:59 AM  
 Glucose (POC) 220 (H) 11/08/2020 11:01 AM  
 Glucose (POC) 110 (H) 11/08/2020 08:26 AM  
 Glucose (POC) 86 11/06/2020 05:29 PM  
 
Lab Results Component Value Date/Time Color Dark Yellow 11/24/2020 08:30 AM  
 Appearance Clear 11/24/2020 08:30 AM  
 Specific gravity 1.018 11/24/2020 08:30 AM  
 pH (UA) 5.0 11/24/2020 08:30 AM  
 Protein Negative 11/24/2020 08:30 AM  
 Glucose Negative 11/24/2020 08:30 AM  
 Ketone Negative 11/24/2020 08:30 AM  
 Bilirubin Negative 11/24/2020 08:30 AM  
 Urobilinogen 4.0 (H) 11/24/2020 08:30 AM  
 Nitrites Negative 11/24/2020 08:30 AM  
 Leukocyte Esterase Negative 11/24/2020 08:30 AM  
 Bacteria Negative 11/24/2020 08:30 AM  
 WBC 0-4 11/24/2020 08:30 AM  
 RBC 0-5 11/24/2020 08:30 AM  
 
   
Assessment & Plan Metastatic Adenocarcinoma 11/13 Cytology- Right Pleural fluid positive for adenocarcinoma Oncology consult - Recommends hospice care due to dementia and poor performance status. Anemia of Chronic disease- stable 11/20-Hgb 11.4 Will continue to monitor H&H Tachycardia- Pt denies chest pain Cardio consulted Paroxysmal A. Fib- 11/7 ECG 
metoprolol tartrate (LOPRESSOR) tablet 50 mg  
heparin porcine injection 5,000 Units Hx of Congestive Heart Failure 11/08 proBNP 1,127 Hypokalemia Replace  potassium Pleural effusion 11/06 Chest x-ray shows opacities/pleural effusion - Chest CT shows Near complete collapse of the right lung secondary to large pleural 
Effusion status post thoracocentesis Static post thoracocentesis - cytology results positive for metastatic adenocarcinoma; culture negative Hyponatremia/likely from diuretic 
11/20- Sodium (139) -Stable Hypertension- BP controlled 
metoprolol tartrate (LOPRESSOR) tablet 50 mg Failure to Thrive /severe malnutrition 11/20 Hypoalbuminemia ( 1.7) w/low total protein(6.2) BMI: 25.60  
megestroL (MEGACE) 400 mg/10 mL (10 mL) oral suspension 400 mg Hx of Dementia 
adjustment disorder with a depressed mood Hx of Hyperlipidemia Hx of  lung cancer Covid screening negative Patient on metoprolol 50 mg twice a day Order EKG stat now Repeat the labs PT/OT recommends 5/days week in SNF Discussed with the  regarding discharge planning Waiting Medicaid application before discharge to nursing home Seen by the psychiatrist and he is competent to make decision about healthcare he is physically not capable of taking care of herself willing to have help and use third-party Current Facility-Administered Medications:  
  escitalopram oxalate (LEXAPRO) tablet 5 mg, 5 mg, Oral, DAILY, Abundio An MD, 5 mg at 11/24/20 0083   megestroL (MEGACE) 400 mg/10 mL (10 mL) oral suspension 400 mg, 400 mg, Oral, BID, Jesus Maldonado MD, 400 mg at 11/23/20 2149 
  metoprolol tartrate (LOPRESSOR) tablet 50 mg, 50 mg, Oral, Q12H, Royce MACIAS MD, 50 mg at 11/24/20 6416   influenza vaccine 2020-21 (4 yrs+)(PF) (FLUCELVAX QUAD) injection 0.5 mL, 0.5 mL, IntraMUSCular, PRIOR TO DISCHARGE, Jesus Maldonado MD 
  albuterol-ipratropium (DUO-NEB) 2.5 MG-0.5 MG/3 ML, 3 mL, Nebulization, Q6H PRN, Asim Red MD 
  heparin porcine injection 5,000 Units, 5,000 Units, SubCUTAneous, Q12H, Jesus Maldonado MD, 5,000 Units at 11/24/20 1404

## 2020-11-24 NOTE — PROGRESS NOTES
Comprehensive Nutrition Assessment Type and Reason for Visit: Reassess(Goal) Nutrition Recommendations/Plan:  
Continue current regular, soft solids diet Ensure clear, Mixed berry to TID Prosource Gelatein BID Allow snacks as desired 
  
Continue with appetite stimulant Consider adding MVI with pt poor PO intakes 
  
If pt decides to not go Hospice, TF would be best option to meet nutritional needs  
Please document % of all meal/snack consumed, wts, BMs 
 
Nutrition Assessment:  Admitted for FTT, needs SNF placement, noted pt in ED 10/26 for fall with fx. Pt now s/p thoracentesis with 1.26L removed (11/6), cytology +metastatic adenocarcinoma. MD's rec hospice however no decision yet. Per CM, current plans to d/c to [facility], working on [employer]. Per EMR pt with poor appetite and intakes ~10% of meals, no recent intakes documented. RN and  ambassadors alerted RD pt not eating anything x2 weeks at least, disinterested in food despite encouragement. Per RN, pt not drinking Ensure Enlive or eating Magic Cup, loves Ensure Clear and drinks >75% of these TID. Noted Megace started 11/16 as appetite stimulant, limited efficacy appreciated. Yesterday noted pt ate minimal B, at L ate 1 bite ice cream, drank x3 french, RD opened Ensure Clear for her. Today, PCA feeding pt, unknown intakes yet. Still able to comment on Prosource Gelatein intakes, did not see product on dinner tray leftover this AM. Discussed with PCA to document if pt receives and how much she is able to eat. Regardless, intakes of just Ensure Clear and Prosource Gelatein NOT meeting pt nutritional needs, pt will need TF for long-term nutrition if within goals of care. Labs: K+ 3.3, Alb 1.8. Meds: Heparin, megace, zosyn, KCl. Malnutrition Assessment: 
Malnutrition Status: Moderate malnutrition Context:  Acute illness Findings of the 6 clinical characteristics of malnutrition: Energy Intake:  7 - 50% or less of est energy requirements for 5 or more days Weight Loss:  No significant weight loss(wt loss of 2.7kg x 1 month (4%, not significant)) Body Fat Loss:  No significant body fat loss, Muscle Mass Loss:  1 - Mild muscle mass loss, Calf Fluid Accumulation:  Unable to assess,   
 
Estimated Daily Nutrient Needs: 
Energy (kcal): 1869kcals (30kcals/kg); Weight Used for Energy Requirements: Current Protein (g): 81g (1.3g/kg); Weight Used for Protein Requirements: Current Fluid (ml/day): 1869ml; Method Used for Fluid Requirements: 1 ml/kcal 
 Needs for maintenance with new cancer Nutrition Related Findings:  NFPE finding mild muscle wasing. No edema. No N/V/D/C per RN, last BM 11/21, loose, no new documented. Pt previously endorsed no issues with c/s, SLP not following. Wounds:   
None Current Nutrition Therapies: DIET REGULAR 
DIET NUTRITIONAL SUPPLEMENTS Breakfast, Lunch, Dinner; Ensure Clear (mixed berry) DIET NUTRITIONAL SUPPLEMENTS Dinner, Lunch; Prosource (Gelatein Jello) Anthropometric Measures: 
· Height:  5' 2.01\" (157.5 cm) · Current Body Wt:  60.9 kg (134 lb 4.2 oz)(11/23) · Admission Body Wt:  139 lb 15.9 oz(11/3) · Usual Body Wt:  54.4 kg (120 lb 0.2 oz)(per pt, stable) · Ideal Body Wt:  110 lbs:  122.1 % · BMI Category:  Normal weight (BMI 22.0-24.9) age over 72 Wt hx: 60.9kg (11/23), 62.3kg (11/9), 59.9kg (11/5), 63.6kg (11/3) No prior wt hx to assess, UBW less than pt current BW per EMR. Noted wt loss of 2.7kg x 1 month (4%, not significant). Noted poor documentation of wts. Nutrition Diagnosis:  
· Inadequate oral intake related to cognitive or neurological impairment(lethargy) as evidenced by intake 0-25% Nutrition Interventions:  
Food and/or Nutrient Delivery: Continue current diet, Continue oral nutrition supplement Nutrition Education and Counseling: No recommendations at this time Coordination of Nutrition Care: Continue to monitor while inpatient, Feeding assistance/environmental change Goals: 
Intakes >/=50% of EENs (not met) Wt maintenance within +/-0.5kg (not met) Nutrition Monitoring and Evaluation:  
Behavioral-Environmental Outcomes: None identified Food/Nutrient Intake Outcomes: Food and nutrient intake, Supplement intake Physical Signs/Symptoms Outcomes: Weight, Nutrition focused physical findings, Constipation, Biochemical data Discharge Planning: Too soon to determine Electronically signed by Vic Carreno RD on 11/24/2020 at 9:47 AM 
 
Contact: Ext [phone]

## 2020-11-24 NOTE — PROGRESS NOTES
Attempted to see pt. 2 times today but pt. BP has been high. This afternoon BP is still 141/105 per RN. Will see pt. At a later time.

## 2020-11-24 NOTE — PROGRESS NOTES
Notified Dr. Yasmany Lynn of troponin 0.10. Provider aware, no orders received, will continue to monitor.

## 2020-11-24 NOTE — PROGRESS NOTES
Progress Note Patient: John Olivares MRN: [id number]  SSN: [id number] YOB: 1940  Age: 79 y.o. Sex: female Admit Date: 11/3/2020 LOS: 21 days Subjective: No acute events overnight Objective:  
 
Vitals:  
 11/23/20 1543 11/23/20 1938 11/23/20 2333 11/24/20 1268 BP:  123/77 109/77 (!) 140/102 Pulse:  (!) 121 (!) 115 (!) 122 Resp:  20 20 16 Temp:  98.8 °F (37.1 °C) 98.8 °F (37.1 °C) 98.8 °F (37.1 °C) SpO2:  95% 94% 92% Weight:      
Height: 5' 2.01\" (1.575 m) Intake and Output: 
Current Shift: No intake/output data recorded. Last three shifts: 11/22 1901 - 11/24 0700 In: -  
Out: Tiana Grier 136 Physical Exam:  
General:  Alert, cooperative, no distress, appears stated age. Eyes:  Conjunctivae/corneas clear. PERRL, EOMs intact. Fundi benign Ears:  Normal TMs and external ear canals both ears. Nose: Nares normal. Septum midline. Mucosa normal. No drainage or sinus tenderness. Mouth/Throat: Lips, mucosa, and tongue normal. Teeth and gums normal.  
Neck: Supple, symmetrical, trachea midline, no adenopathy, thyroid: no enlargment/tenderness/nodules, no carotid bruit and no JVD. Back:   Symmetric, no curvature. ROM normal. No CVA tenderness. Lungs:   Clear to auscultation bilaterally. Heart:  Regular rate and rhythm, S1, S2 normal, no murmur, click, rub or gallop. Abdomen:   Soft, non-tender. Bowel sounds normal. No masses,  No organomegaly. Extremities: Extremities normal, atraumatic, no cyanosis or edema. Pulses: 2+ and symmetric all extremities. Skin: Skin color, texture, turgor normal. No rashes or lesions Lymph nodes: Cervical, supraclavicular, and axillary nodes normal.  
Neurologic: CNII-XII intact. Normal strength, sensation and reflexes throughout. Lab/Data Review: All lab results for the last 24 hours reviewed. Assessment:  
 
Active Problems: Hyponatremia (11/3/2020) Patient is 76-year-old -American female with: 1.  Paroxysmal atrial fibrillation 2. [place] 3.  Dementia 4.  Hypertension 5.  Transaminitis, improved 6.  Near complete collapse of right lung 7.  Hyponatremia 8.  Failure to thrive 9.  Hypertension Plan:  
 
Troponin in the indeterminate range. Patient with tachycardia. EKG is acceptable. She denied having chest pain. Currently metoprolol 50 mg twice a day. We will check kidney function to make sure that no acute kidney injury. Last kidney function from the 21st.  Hemoglobin was 11.4 on the 20th. Patient with malignant pleural effusion. She had a thoracentesis before. Probably this is aggravating her underlying. Signed By: Murphy Michelle MD   
 November 24, 2020

## 2020-11-24 NOTE — PROGRESS NOTES
General Daily Progress Note Patient Name: Destini Burton YOB: 1940 Age: 
79 y.o. Admit Date: 11/3/2020 Subjective:  
Patient is a 79 y.o female with a past medical history of dementia, CHF, hyperlipidemia, and hypertension Patient tachycardic last night some EKG abnormality troponin mildly elevated seen by the cardiology no further work-up recommended at this time patient has no chest pain Resting in the bed not in distress, patient not eating says she dosen't have an appetite Objective:  
 
Visit Vitals BP (!) 139/97 Pulse (!) 122 Temp 98.8 °F (37.1 °C) Resp 16 Ht 5' 2.01\" (1.575 m) Wt 63.5 kg (140 lb) SpO2 92% BMI 25.60 kg/m² Recent Results (from the past 24 hour(s)) EKG, 12 LEAD, INITIAL Collection Time: 11/23/20  3:52 PM  
Result Value Ref Range Ventricular Rate 121 BPM  
 Atrial Rate 121 BPM  
 P-R Interval 124 ms QRS Duration 68 ms Q-T Interval 344 ms QTC Calculation (Bezet) 488 ms Calculated R Axis 77 degrees Calculated T Axis 108 degrees Diagnosis Sinus tachycardia Septal infarct , age undetermined T wave abnormality, consider anterior ischemia Abnormal ECG When compared with ECG of 07-NOV-2020 06:34, Sinus rhythm has replaced Atrial flutter Septal infarct is now Present T wave inversion now evident in Anterior leads Confirmed by Hector Puckett [phone]) on 11/23/2020 5:04:12 PM 
  
TROPONIN I Collection Time: 11/23/20  8:16 PM  
Result Value Ref Range Troponin-I, Qt. 0.10 (H) <0.05 ng/mL TROPONIN I Collection Time: 11/24/20  6:10 AM  
Result Value Ref Range Troponin-I, Qt. 0.06 (H) <0.05 ng/mL URINALYSIS W/MICROSCOPIC Collection Time: 11/24/20  8:30 AM  
Result Value Ref Range Color Dark Yellow Appearance Clear Clear Specific gravity 1.018 1.003 - 1.030    
 pH (UA) 5.0 5.0 - 8.0 Protein Negative Negative mg/dL Glucose Negative Negative mg/dL Ketone Negative Negative mg/dL Bilirubin Negative Negative Blood Negative Negative Urobilinogen 4.0 (H) 0.1 - 1.0 EU/dL Nitrites Negative Negative Leukocyte Esterase Negative Negative WBC 0-4 0 - 4 /hpf  
 RBC 0-5 0 - 5 /hpf Bacteria Negative Negative /hpf Hyaline cast 0-2 0 - 5 /lpf  
 
[unfilled] Review of Systems Constitutional: Negative for chills and fever. HENT: Negative. Eyes: Negative. Respiratory: Negative. Cardiovascular: Negative. Gastrointestinal: Negative for abdominal pain and nausea. Skin: Negative. Neurological: Negative. Physical Exam:   
 
Constitutional: pt is oriented to person, place, and time. HENT:  
Head: Normocephalic and atraumatic. Eyes: Pupils are equal, round, and reactive to light. EOM are normal.  
Cardiovascular: Normal rate, regular rhythm and normal heart sounds. Pulmonary/Chest: Breath sounds normal. No wheezes. No rales. Exhibits no tenderness. Abdominal: Soft. Bowel sounds are normal. There is no abdominal tenderness. There is no rebound and no guarding. Musculoskeletal: Normal range of motion. Neurological: pt is alert and oriented to person, place, and time. XR CHEST PA LAT Final Result Impression: Large right-sided pleural effusion and associated  
compression/consolidation of the right lung, increased. Mild atelectasis at the  
left base. Possible small left pleural effusion. XR CHEST PORT Final Result IMPRESSION: Diffuse airspace disease, right hemithorax, with improved aeration  
from previous. Differential diagnosis includes pneumonia, atelectasis, and/or  
reexpansion pulmonary edema. No pneumothorax. [address] Final Result IMPRESSION:   
Successful ultrasound-guided thoracentesis of right large pleural effusion, with  
approximately 1.3 L serous pleural fluid removed. Pleural fluid sample was also  
sent for lab analysis. CT CHEST WO CONT Final Result IMPRESSION: Near complete collapse of the right lung secondary to large pleural  
effusion XR HIP LT W OR WO PELV 2-3 VWS Final Result IMPRESSION:  
1. No acute bony findings. XR CHEST SNGL V Final Result Recent Results (from the past 24 hour(s)) EKG, 12 LEAD, INITIAL Collection Time: 11/23/20  3:52 PM  
Result Value Ref Range Ventricular Rate 121 BPM  
 Atrial Rate 121 BPM  
 P-R Interval 124 ms QRS Duration 68 ms Q-T Interval 344 ms QTC Calculation (Bezet) 488 ms Calculated R Axis 77 degrees Calculated T Axis 108 degrees Diagnosis Sinus tachycardia Septal infarct , age undetermined T wave abnormality, consider anterior ischemia Abnormal ECG When compared with ECG of 07-NOV-2020 06:34, Sinus rhythm has replaced Atrial flutter Septal infarct is now Present T wave inversion now evident in Anterior leads Confirmed by Maurisio Patient [phone]) on 11/23/2020 5:04:12 PM 
  
TROPONIN I Collection Time: 11/23/20  8:16 PM  
Result Value Ref Range Troponin-I, Qt. 0.10 (H) <0.05 ng/mL TROPONIN I Collection Time: 11/24/20  6:10 AM  
Result Value Ref Range Troponin-I, Qt. 0.06 (H) <0.05 ng/mL URINALYSIS W/MICROSCOPIC Collection Time: 11/24/20  8:30 AM  
Result Value Ref Range Color Dark Yellow Appearance Clear Clear Specific gravity 1.018 1.003 - 1.030    
 pH (UA) 5.0 5.0 - 8.0 Protein Negative Negative mg/dL Glucose Negative Negative mg/dL Ketone Negative Negative mg/dL Bilirubin Negative Negative Blood Negative Negative Urobilinogen 4.0 (H) 0.1 - 1.0 EU/dL Nitrites Negative Negative Leukocyte Esterase Negative Negative WBC 0-4 0 - 4 /hpf  
 RBC 0-5 0 - 5 /hpf Bacteria Negative Negative /hpf Hyaline cast 0-2 0 - 5 /lpf Results Procedure Component Value Units Date/Time CULTURE, URINE [[id number]] Collected:  11/24/20 0830 Order Status:  Completed Specimen:  Urine Updated:  11/24/20 5312 Labs:  
 
No results for input(s): WBC, HGB, HCT, PLT, HGBEXT, HCTEXT, PLTEXT, HGBEXT, HCTEXT, PLTEXT in the last 72 hours. Recent Labs  
  11/21/20 1830   
K 3.3*  
 CO2 31 BUN 7  
CREA 0.72 * CA 10.1 Recent Labs  
  11/21/20 1830 ALT 30 AP 85 TBILI 0.4 TP 6.2* ALB 1.8*  
GLOB 4.4* No results for input(s): INR, PTP, APTT, INREXT, INREXT in the last 72 hours. No results for input(s): FE, TIBC, PSAT, FERR in the last 72 hours. No results found for: FOL, RBCF No results for input(s): PH, PCO2, PO2 in the last 72 hours. Recent Labs  
  11/24/20 
0610 11/23/20 2016 TROIQ 0.06* 0.10* No results found for: CHOL, CHOLX, CHLST, CHOLV, HDL, HDLP, LDL, LDLC, DLDLP, TGLX, TRIGL, TRIGP, CHHD, CHHDX Lab Results Component Value Date/Time Glucose (POC) 98 11/23/2020 08:59 AM  
 Glucose (POC) 80 11/20/2020 02:59 AM  
 Glucose (POC) 220 (H) 11/08/2020 11:01 AM  
 Glucose (POC) 110 (H) 11/08/2020 08:26 AM  
 Glucose (POC) 86 11/06/2020 05:29 PM  
 
Lab Results Component Value Date/Time Color Dark Yellow 11/24/2020 08:30 AM  
 Appearance Clear 11/24/2020 08:30 AM  
 Specific gravity 1.018 11/24/2020 08:30 AM  
 pH (UA) 5.0 11/24/2020 08:30 AM  
 Protein Negative 11/24/2020 08:30 AM  
 Glucose Negative 11/24/2020 08:30 AM  
 Ketone Negative 11/24/2020 08:30 AM  
 Bilirubin Negative 11/24/2020 08:30 AM  
 Urobilinogen 4.0 (H) 11/24/2020 08:30 AM  
 Nitrites Negative 11/24/2020 08:30 AM  
 Leukocyte Esterase Negative 11/24/2020 08:30 AM  
 Bacteria Negative 11/24/2020 08:30 AM  
 WBC 0-4 11/24/2020 08:30 AM  
 RBC 0-5 11/24/2020 08:30 AM  
 
   
Assessment & Plan Metastatic Adenocarcinoma 11/13 Cytology- Right Pleural fluid positive for adenocarcinoma Oncology consult - Recommends hospice care due to dementia and poor performance status. Anemia of Chronic disease- stable 11/20-Hgb 11.4 Will continue to monitor H&H Tachycardia- Pt denies chest pain 
Cardio consulted Paroxysmal A. Fib- 11/7 ECG 
metoprolol tartrate (LOPRESSOR) tablet 50 mg  
heparin porcine injection 5,000 Units Hx of Congestive Heart Failure 11/08 proBNP 1,127 Hypokalemia Replace  potassium Pleural effusion 11/06 Chest x-ray shows opacities/pleural effusion - Chest CT shows Near complete collapse of the right lung secondary to large pleural 
Effusion status post thoracocentesis Static post thoracocentesis - cytology results positive for metastatic adenocarcinoma; culture negative Hyponatremia/likely from diuretic 
11/20- Sodium (139) -Stable Hypertension- BP controlled 
metoprolol tartrate (LOPRESSOR) tablet 50 mg Failure to Thrive /severe malnutrition 11/20 Hypoalbuminemia ( 1.7) w/low total protein(6.2) BMI: 25.60  
megestroL (MEGACE) 400 mg/10 mL (10 mL) oral suspension 400 mg Hx of Dementia 
adjustment disorder with a depressed mood Hx of Hyperlipidemia Hx of  lung cancer Covid screening negative Patient on metoprolol 50 mg twice a day Repeat the labs PT/OT recommends 5/days week in SNF Discussed with the  regarding discharge planning Waiting Medicaid application before discharge to nursing home Seen by the psychiatrist and he is competent to make decision about healthcare he is physically not capable of taking care of herself willing to have help and use third-party Current Facility-Administered Medications:  
  escitalopram oxalate (LEXAPRO) tablet 5 mg, 5 mg, Oral, DAILY, Abundio An MD, 5 mg at 11/24/20 0541   megestroL (MEGACE) 400 mg/10 mL (10 mL) oral suspension 400 mg, 400 mg, Oral, BID, Jesus Maldonado MD, 400 mg at 11/23/20 7079 
  metoprolol tartrate (LOPRESSOR) tablet 50 mg, 50 mg, Oral, Q12H, Anastacio MACIAS MD, 50 mg at 11/24/20 5769   influenza vaccine 2020-21 (4 yrs+)(PF) (FLUCELVAX QUAD) injection 0.5 mL, 0.5 mL, IntraMUSCular, PRIOR TO DISCHARGE, Jesus Maldonado MD 
  albuterol-ipratropium (DUO-NEB) 2.5 MG-0.5 MG/3 ML, 3 mL, Nebulization, Q6H PRN, Asim Red MD 
  heparin porcine injection 5,000 Units, 5,000 Units, SubCUTAneous, Q12H, Jesus Maldonado MD, 5,000 Units at 11/24/20 5610

## 2020-11-24 NOTE — PROGRESS NOTES
Pulmonary Progress Note Daily Progress Note: 11/24/2020 Subjective: The patient is seen for follow  up. Excerpts from admission 11/3/2020 or consult notes as follows:  
60-year-old lady came in because of generalized weakness lethargy past medical history of dementia congestive heart failure hypertension hyperlipidemia patient was discharged from home with a sling on her right arm as she fell. Now chest x-ray and CAT scan of the chest was done which shows right lower lobe collapse with pleural effusion unable to get much history out of the patient so pulmonary consult was called for further evaluation 11/8 denies cough or shortness of breath remains on room air 11/09 Today she denies cough and shortness of breath. She had a chest Xray this morning, awaiting read. She is on room air breathing well. 
11/10 She says she feels great and denies SOB and cough. CXR yesterday shows enlarging right pleural effusion and atelectasis and atelectasis of left base. 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 She is on room air. 11/12 She is on room air. Awaiting placement. Problem List: 
Problem List as of 11/24/2020 Never Reviewed Codes Class Noted - Resolved Hyponatremia ICD-10-CM: E87.1 ICD-9-CM: 276.1  11/3/2020 - Present Medications reviewed Current Facility-Administered Medications Medication Dose Route Frequency  escitalopram oxalate (LEXAPRO) tablet 5 mg  5 mg Oral DAILY  megestroL (MEGACE) 400 mg/10 mL (10 mL) oral suspension 400 mg  400 mg Oral BID  metoprolol tartrate (LOPRESSOR) tablet 50 mg  50 mg Oral Q12H  
 influenza vaccine 2020-21 (4 yrs+)(PF) (FLUCELVAX QUAD) injection 0.5 mL  0.5 mL IntraMUSCular PRIOR TO DISCHARGE  albuterol-ipratropium (DUO-NEB) 2.5 MG-0.5 MG/3 ML  3 mL Nebulization Q6H PRN  
 heparin porcine injection 5,000 Units  5,000 Units SubCUTAneous Q12H Review of Systems: A comprehensive review of systems was negative except for that written in the HPI. Objective:  
Physical Exam:  
 
Visit Vitals BP (!) 140/102 (BP 1 Location: Left arm, BP Patient Position: At rest) Pulse (!) 122 Temp 98.8 °F (37.1 °C) Resp 16 Ht 5' 2.01\" (1.575 m) Wt 63.5 kg (140 lb) SpO2 92% BMI 25.60 kg/m² O2 Device: Room air Temp (24hrs), Av.7 °F (37.1 °C), Min:98.3 °F (36.8 °C), Max:98.8 °F (37.1 °C) No intake/output data recorded.  1901 -  0700 In: -  
Out: [facility] [place] 2[place] Constitutional:  
 Awake alert seems oriented HENT:  
Head: Normocephalic and atraumatic. Eyes: Pupils are equal, round, and reactive to light. Extraocular movements intact Neck: Normal range of motion. Neck supple. No definite JVD Cardiovascular: Normal rate and regular rhythm. Pulmonary/Chest:  
 Clear anteriorly and left lateral with fairly good breath sounds right lateral and right posterior no definite wheezes or rales Abdominal: Soft. Bowel sounds are normal.  
Musculoskeletal: Normal range of motion. Skin: Skin is warm. Data Review:  
   
Recent Days: 
No results for input(s): WBC, HGB, HCT, PLT, HGBEXT, HCTEXT, PLTEXT, HGBEXT, HCTEXT, PLTEXT in the last 72 hours. Recent Labs  
  20 
1830   
K 3.3*  
 CO2 31 * BUN 7  
CREA 0.72  
CA 10.1 ALB 1.8* TBILI 0.4 ALT 30 Room air oxygen saturation 98% 24 Hour Results: 
Recent Results (from the past 24 hour(s)) EKG, 12 LEAD, INITIAL Collection Time: 20  3:52 PM  
Result Value Ref Range Ventricular Rate 121 BPM  
 Atrial Rate 121 BPM  
 P-R Interval 124 ms QRS Duration 68 ms Q-T Interval 344 ms QTC Calculation (Bezet) 488 ms Calculated R Axis 77 degrees Calculated T Axis 108 degrees Diagnosis Sinus tachycardia Septal infarct , age undetermined T wave abnormality, consider anterior ischemia Abnormal ECG 
 When compared with ECG of 07-NOV-2020 06:34, Sinus rhythm has replaced Atrial flutter Septal infarct is now Present T wave inversion now evident in Anterior leads Confirmed by Gabbie Olson [phone]) on 11/23/2020 5:04:12 PM 
  
TROPONIN I Collection Time: 11/23/20  8:16 PM  
Result Value Ref Range Troponin-I, Qt. 0.10 (H) <0.05 ng/mL TROPONIN I Collection Time: 11/24/20  6:10 AM  
Result Value Ref Range Troponin-I, Qt. 0.06 (H) <0.05 ng/mL URINALYSIS W/MICROSCOPIC Collection Time: 11/24/20  8:30 AM  
Result Value Ref Range Color Dark Yellow Appearance Clear Clear Specific gravity 1.018 1.003 - 1.030    
 pH (UA) 5.0 5.0 - 8.0 Protein Negative Negative mg/dL Glucose Negative Negative mg/dL Ketone Negative Negative mg/dL Bilirubin Negative Negative Blood Negative Negative Urobilinogen 4.0 (H) 0.1 - 1.0 EU/dL Nitrites Negative Negative Leukocyte Esterase Negative Negative WBC 0-4 0 - 4 /hpf  
 RBC 0-5 0 - 5 /hpf Bacteria Negative Negative /hpf Hyaline cast 0-2 0 - 5 /lpf Imaging: 
  
   
CXR Results  (Last 48 hours)  
  None  
   
  
    
Results from Hospital Encounter encounter on 11/03/20 XR HIP LT W OR WO PELV 2-3 VWS  
  Narrative Fall 1 week ago. 
  
No comparison. 
  
FINDINGS: AP pelvis and frog-leg view of the left hip. No acute fracture or 
dislocation. Deformity of the left femur trochanter and proximal diaphysis, 
appears chronic. No radiopaque foreign body. 
   
  Impression IMPRESSION: 
1. No acute bony findings. XR CHEST SNGL V  
  Narrative Chest single view. 
  
Comparison single view chest March 29, 2018. 
  
Right-sided Port-A-Cath stable position. New opacification of the lower two thirds right hemithorax. This is likely 
related to a combination of large volume pleural fluid and atelectatic lung. Left lung is aerated. Cardiac and mediastinal structures unchanged.  No 
 pneumothorax. Nonacute rib fractures noted. [place] [facility] [place] XR SHOULDER RT AP/LAT MIN 2 V  
  Narrative 3 views of the right shoulder reveal diffuse osteopenia. There is mild 
degenerative subluxation of the humerus with respect to the glenoid. No fracture 
or dislocation is identified. 
   
  Impression IMPRESSION: Degenerative changes.  
  
    
Results from Hospital Encounter encounter on 11/03/20 CT CHEST WO CONT  
  Narrative CT dose reduction was achieved through use of a standardized protocol tailored 
for this examination and automatic exposure control for dose modulation. 
  
Noncontrast study shows large right pleural effusion. Right middle and lower 
lobe are completely collapsed, with very few air bronchograms. The upper lobe is 
almost completely collapsed, sparingly anterior segment only. Mainstem bronchus 
is open. Proximal lobar bronchi are open. Left lung is clear. Multiple calcified 
mediastinal hilar lymph nodes. Small left pleural effusion. Small pericardial 
effusion. Normal caliber aorta. No significant upper abdominal or chest wall 
finding. Pronounced soft tissue edema in the visualized portion of the left arm 
   
  Impression IMPRESSION: Near complete collapse of the right lung secondary to large pleural 
effusion  
  
 
  
IMPRESSION:  
 
1. Malignant pleural Effusion Right side status post thoracentesis 11/06 by IR 
2. Chronic Obstructive Pulmonary Disease with Severe Acute Exacerbation requiring inpatient hospitalization and management; no wheezing today 3. A. fib 5. History of heart failure hyponatremia ? Hx of lung cancer 6. Prognosis guarded   
   
RECOMMENDATIONS/PLAN:  
 
Discussed with the patient detail about her condition she told me that she had a history of lung cancer diagnosed more than 3 years ago in [place] and apparently she got treated for it I do not have any records and also she is intermittently confused Pleural fluid Cytology of thoracentesis on 11/06 showed many groups of abnormal epithelial cells are noted, with large nuclei and cytoplasmic vacuoles, indicative of metastatic adenocarcinoma. 1. Status post thoracentesis via IR 1260 cc fluid removed. Pleural fluid culture negative. Chest Xray 11/09 showed increased large right pleural effusion and atelectasis, as well as atelectasis of the left base and possible small left pleural effusion. 2. Nebulizer treatment with Mucomyst 
3. Discontinue Zosyn She is agreeable to [facility] and rehab. 
  
Royal Tala MD

## 2020-11-24 NOTE — PROGRESS NOTES
Attempted to call patient's sister, Keny Jhaveri, at [phone] with no answer. Followed with call to Christine's daughter, Carmen Okeefe, at [phone] and we discussed discharge plans. Notified her of patient having capacity to make decisions per psych. She stated they have not called [facility] back but she is almost certain the patient is over re-sourced for Medicaid. CM informed her I needed to speak with Keny Jhaveri for a decision as the patient is medically stable. She reported that Keny Jhaveri has been arranging her room for her at home and painting it as they are planning to take her home and pay a personal care aide out of pocket for the times that they are not at home. Notified her that we could arrange for hospice at home, however, we need to speak with Keny Jhaveri to verify this plan. Carmen Okeefe reported she would follow up with Keny Jhaveri and have her call CM. CM awaiting call from Keny Jhaveri.

## 2020-11-25 NOTE — PROGRESS NOTES
Physician Progress Note Sven Bello 
CSN #:                  [id number] :                       1940 ADMIT DATE:       11/3/2020 2:03 PM 
[address] [place] DATE: 
RESPONDING 
PROVIDER #:        Eliazbeth Hodge MD 
 
 
 
 
QUERY TEXT: 
 
Patient admitted with generalized lethargy. Noted documentation of depression in  Psych consult. Please document in progress notes and discharge summary if you are treating/evaluating the following: The medical record reflects the following: 
Risk Factors: 79year old female Clinical Indicators:  Psych CN -  adjustment disorder with a depressed mood Treatment: Lexapro 5mg daily Please call [phone] with any questions Options provided: 
-- Major depression, recurrent: mild -- Major depression, recurrent: moderate 
-- Major depression, recurrent:  severe without psychotic features -- Other - I will add my own diagnosis -- Disagree - Not applicable / Not valid -- Disagree - Clinically unable to determine / Unknown 
-- Refer to Clinical Documentation Reviewer PROVIDER RESPONSE TEXT: 
 
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 Query created by:  Diego Thomas on 2020 8:31 AM 
 
 
Electronically signed by:  Elizabeth Hodge MD 2020 9:29 AM

## 2020-11-25 NOTE — PROGRESS NOTES
Pt. Vitals this morning 130/90,  and RR 40 at rest in semi supine position. Hold at this time. Recommend Reassessment tomorrow.

## 2020-11-25 NOTE — PROGRESS NOTES
At approximately 1700 I called and notified  Dr. Tacos Smart about the patients increased blood pressure. I was informed to put in an order for daily dose of  amlodipine 10mg.

## 2020-11-25 NOTE — PROGRESS NOTES
General Daily Progress Note Patient Name: Seth Ruiz YOB: 1940 Age: 
79 y.o. Admit Date: 11/3/2020 Subjective:  
Patient is a 79 y.o female with a past medical history of dementia, CHF, hyperlipidemia, and hypertension Patient tachycardic last night some EKG abnormality troponin mildly elevated seen by the cardiology no further work-up recommended at this time patient has no chest pain Resting the bed not in distress tachycardic hypotensive Objective:  
 
Visit Vitals BP (!) 140/99 Pulse (!) 137 Temp 96.9 °F (36.1 °C) Resp (!) 36 Ht 5' 2.01\" (1.575 m) Wt 63.5 kg (140 lb) SpO2 94% BMI 25.60 kg/m² Recent Results (from the past 24 hour(s)) METABOLIC PANEL, BASIC Collection Time: 11/24/20  7:55 PM  
Result Value Ref Range Sodium 141 136 - 145 mmol/L Potassium 3.6 3.5 - 5.1 mmol/L Chloride 105 97 - 108 mmol/L  
 CO2 30 21 - 32 mmol/L Anion gap 6 5 - 15 mmol/L Glucose 78 65 - 100 mg/dL BUN 9 6 - 20 mg/dL Creatinine 0.62 0.55 - 1.02 mg/dL BUN/Creatinine ratio 15 12 - 20 GFR est AA >60 >60 ml/min/1.73m2 GFR est non-AA >60 >60 ml/min/1.73m2 Calcium 10.4 (H) 8.5 - 10.1 mg/dL CBC W/O DIFF Collection Time: 11/24/20  7:55 PM  
Result Value Ref Range WBC 7.7 3.6 - 11.0 K/uL  
 RBC 4.87 3.80 - 5.20 M/uL  
 HGB 12.4 11.5 - 16.0 g/dL HCT 38.4 35.0 - 47.0 % MCV 78.9 (L) 80.0 - 99.0 FL  
 MCH 25.5 (L) 26.0 - 34.0 PG  
 MCHC 32.3 30.0 - 36.5 g/dL  
 RDW 18.6 (H) 11.5 - 14.5 % PLATELET 042 273 - 557 K/uL MPV 11.8 8.9 - 12.9 FL  
 
[unfilled] Review of Systems Constitutional: Negative for chills and fever. HENT: Negative. Eyes: Negative. Respiratory: Negative. Cardiovascular: Negative. Gastrointestinal: Negative for abdominal pain and nausea. Skin: Negative. Neurological: Negative. Physical Exam:   
 
Constitutional: pt is oriented to person, place, and time. HENT:  
Head: Normocephalic and atraumatic. Eyes: Pupils are equal, round, and reactive to light. EOM are normal.  
Cardiovascular: Normal rate, regular rhythm and normal heart sounds. Pulmonary/Chest: Breath sounds normal. No wheezes. No rales. Exhibits no tenderness. Abdominal: Soft. Bowel sounds are normal. There is no abdominal tenderness. There is no rebound and no guarding. Musculoskeletal: Normal range of motion. Neurological: pt is alert and oriented to person, place, and time. XR CHEST PA LAT Final Result Impression: Large right-sided pleural effusion and associated  
compression/consolidation of the right lung, increased. Mild atelectasis at the  
left base. Possible small left pleural effusion. XR CHEST PORT Final Result IMPRESSION: Diffuse airspace disease, right hemithorax, with improved aeration  
from previous. Differential diagnosis includes pneumonia, atelectasis, and/or  
reexpansion pulmonary edema. No pneumothorax. [address] Final Result IMPRESSION:   
Successful ultrasound-guided thoracentesis of right large pleural effusion, with  
approximately 1.3 L serous pleural fluid removed. Pleural fluid sample was also  
sent for lab analysis. CT CHEST WO CONT Final Result IMPRESSION: Near complete collapse of the right lung secondary to large pleural  
effusion XR HIP LT W OR WO PELV 2-3 VWS Final Result IMPRESSION:  
1. No acute bony findings. XR CHEST SNGL V Final Result XR CHEST PORT    (Results Pending) Recent Results (from the past 24 hour(s)) METABOLIC PANEL, BASIC Collection Time: 11/24/20  7:55 PM  
Result Value Ref Range Sodium 141 136 - 145 mmol/L Potassium 3.6 3.5 - 5.1 mmol/L Chloride 105 97 - 108 mmol/L  
 CO2 30 21 - 32 mmol/L Anion gap 6 5 - 15 mmol/L Glucose 78 65 - 100 mg/dL BUN 9 6 - 20 mg/dL Creatinine 0.62 0.55 - 1.02 mg/dL BUN/Creatinine ratio 15 12 - 20 GFR est AA >60 >60 ml/min/1.73m2 GFR est non-AA >60 >60 ml/min/1.73m2 Calcium 10.4 (H) 8.5 - 10.1 mg/dL CBC W/O DIFF Collection Time: 11/24/20  7:55 PM  
Result Value Ref Range WBC 7.7 3.6 - 11.0 K/uL  
 RBC 4.87 3.80 - 5.20 M/uL  
 HGB 12.4 11.5 - 16.0 g/dL HCT 38.4 35.0 - 47.0 % MCV 78.9 (L) 80.0 - 99.0 FL  
 MCH 25.5 (L) 26.0 - 34.0 PG  
 MCHC 32.3 30.0 - 36.5 g/dL  
 RDW 18.6 (H) 11.5 - 14.5 % PLATELET 589 554 - 576 K/uL MPV 11.8 8.9 - 12.9 FL Results Procedure Component Value Units Date/Time CULTURE, URINE [[id number]] Collected:  11/24/20 0830 Order Status:  Completed Specimen:  Urine Updated:  11/24/20 7338 Labs:  
 
Recent Labs  
  11/24/20 1955 WBC 7.7 HGB 12.4 HCT 38.4  Recent Labs  
  11/24/20 1955   
K 3.6  CO2 30 BUN 9  
CREA 0.62 GLU 78 CA 10.4* No results for input(s): ALT, AP, TBIL, TBILI, TP, ALB, GLOB, GGT, AML, LPSE in the last 72 hours. No lab exists for component: SGOT, GPT, AMYP, HLPSE No results for input(s): INR, PTP, APTT, INREXT, INREXT in the last 72 hours. No results for input(s): FE, TIBC, PSAT, FERR in the last 72 hours. No results found for: FOL, RBCF No results for input(s): PH, PCO2, PO2 in the last 72 hours. Recent Labs  
  11/24/20 0610 11/23/20 2016 TROIQ 0.06* 0.10* No results found for: CHOL, CHOLX, CHLST, CHOLV, HDL, HDLP, LDL, LDLC, DLDLP, TGLX, TRIGL, TRIGP, CHHD, CHHDX Lab Results Component Value Date/Time Glucose (POC) 98 11/23/2020 08:59 AM  
 Glucose (POC) 80 11/20/2020 02:59 AM  
 Glucose (POC) 220 (H) 11/08/2020 11:01 AM  
 Glucose (POC) 110 (H) 11/08/2020 08:26 AM  
 Glucose (POC) 86 11/06/2020 05:29 PM  
 
Lab Results Component Value Date/Time  Color Dark Yellow 11/24/2020 08:30 AM  
 Appearance Clear 11/24/2020 08:30 AM  
 Specific gravity 1.018 11/24/2020 08:30 AM  
 pH (UA) 5.0 11/24/2020 08:30 AM  
 Protein Negative 11/24/2020 08:30 AM  
 Glucose Negative 11/24/2020 08:30 AM  
 Ketone Negative 11/24/2020 08:30 AM  
 Bilirubin Negative 11/24/2020 08:30 AM  
 Urobilinogen 4.0 (H) 11/24/2020 08:30 AM  
 Nitrites Negative 11/24/2020 08:30 AM  
 Leukocyte Esterase Negative 11/24/2020 08:30 AM  
 Bacteria Negative 11/24/2020 08:30 AM  
 WBC 0-4 11/24/2020 08:30 AM  
 RBC 0-5 11/24/2020 08:30 AM  
 
   
Assessment & Plan Metastatic Adenocarcinoma 11/13 Cytology- Right Pleural fluid positive for adenocarcinoma Oncology consult - Recommends hospice care due to dementia and poor performance status. Anemia of Chronic disease- stable 11/20-Hgb 11.4 Will continue to monitor H&H Paroxysmal A. Fib- 11/7 ECG 
metoprolol tartrate (LOPRESSOR) tablet 50 mg Add Cardizem  mg daily 
heparin porcine injection 5,000 Units Hx of Congestive Heart Failure 11/08 proBNP 1,127 Hypokalemia Replace  potassium Pleural effusion 11/06 Chest x-ray shows opacities/pleural effusion - Chest CT shows Near complete collapse of the right lung secondary to large pleural 
Effusion status post thoracocentesis Static post thoracocentesis - cytology results positive for metastatic adenocarcinoma; culture negative Hyponatremia/likely from diuretic 
11/20- Sodium (139) -Stable Hypertension- BP controlled 
metoprolol tartrate (LOPRESSOR) tablet 50 mg Failure to Thrive /severe malnutrition 11/20 Hypoalbuminemia ( 1.7) w/low total protein(6.2) BMI: 25.60  
megestroL (MEGACE) 400 mg/10 mL (10 mL) oral suspension 400 mg Hx of Dementia 
adjustment disorder with a depressed mood Hx of Hyperlipidemia Hx of  lung cancer Covid screening negative Patient on metoprolol 50 mg twice a day Repeat the labs PT/OT recommends 5/days week in SNF Discussed with the  regarding discharge planning Waiting Medicaid application before discharge to nursing home Seen by the psychiatrist and he is competent to make decision about healthcare he is physically not capable of taking care of herself willing to have help and use third-party Discussed with the patient with the nursing supervisor Patient want DNR  talking to the family regarding going home with hospice Current Facility-Administered Medications:  
  amLODIPine (NORVASC) tablet 5 mg, 5 mg, Oral, DAILY, Jesus Maldonado MD, 5 mg at 11/25/20 0847 
  atorvastatin (LIPITOR) tablet 10 mg, 10 mg, Oral, DAILY, Jesus Maldonado MD, 10 mg at 11/24/20 2301   escitalopram oxalate (LEXAPRO) tablet 5 mg, 5 mg, Oral, DAILY, Cj BAINS MD, 5 mg at 11/24/20 3744   megestroL (MEGACE) 400 mg/10 mL (10 mL) oral suspension 400 mg, 400 mg, Oral, BID, Jesus Maldonado MD, 400 mg at 11/24/20 2302   metoprolol tartrate (LOPRESSOR) tablet 50 mg, 50 mg, Oral, Q12H, Edwin Smith MD, 50 mg at 11/25/20 0847 
  influenza vaccine 2020-21 (4 yrs+)(PF) (FLUCELVAX QUAD) injection 0.5 mL, 0.5 mL, IntraMUSCular, PRIOR TO DISCHARGE, Jesus Maldonado MD 
  albuterol-ipratropium (DUO-NEB) 2.5 MG-0.5 MG/3 ML, 3 mL, Nebulization, Q6H PRN, Asim Red MD 
  heparin porcine injection 5,000 Units, 5,000 Units, SubCUTAneous, Q12H, Jesus Maldonado MD, 5,000 Units at 11/24/20 2301

## 2020-11-25 NOTE — PROGRESS NOTES
OT tx attempted at 0930, vitals checked upon arrival with noted /90, -130 and RR 38-40 at rest. RN notified. Will hold at this time. Will continue to follow patient and attempt OT tx later as medically appropriate. Thank you.

## 2020-11-25 NOTE — PROGRESS NOTES
CM followed up with patient's sister, Bran Andersen, at [phone]. Discussed her plans moving forward. Her sister reported that her hands are tied because she is the POA but is unable to access her bank accounts without a note stating the patient \"lacks capacity\" for making decisions and handling her care. She is very frustrated at this because the patient has a lot of debt and she has been trying to take care of all of her bounced checks. She reported that the patient was living in [facility] and she is trying very hard to clean her house so she can return under hospice care, however, the house will not be ready until next week. CM explained we needed to have a plan sooner as she has been stable for a while. Bran Andersen became very frustrated stating that she is trying her very best to get things in place, but she is having to use all of her own money, that she has very little of, to make these arrangements since she cannot access her bank account. She is agreeable for the patient to have hospice at home, but stated she has no way to make this happen any quicker. She has her own health issues and appears to be trying as best she can with limited resources. ALVARADO will continue to follow.

## 2020-11-25 NOTE — PROGRESS NOTES
Pulmonary Progress Note Daily Progress Note: 11/25/2020 Subjective: The patient is seen for follow  up. Excerpts from admission 11/3/2020 or consult notes as follows:  
71-year-old lady came in because of generalized weakness lethargy past medical history of dementia congestive heart failure hypertension hyperlipidemia patient was discharged from home with a sling on her right arm as she fell. Now chest x-ray and CAT scan of the chest was done which shows right lower lobe collapse with pleural effusion unable to get much history out of the patient so pulmonary consult was called for further evaluation 11/8 denies cough or shortness of breath remains on room air 11/09 Today she denies cough and shortness of breath. She had a chest Xray this morning, awaiting read. She is on room air breathing well. 
11/10 She says she feels great and denies SOB and cough. CXR yesterday shows enlarging right pleural effusion and atelectasis and atelectasis of left base. 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 She is on room air. 11/12 She is on room air. Awaiting placement. Problem List: 
Problem List as of 11/25/2020 Never Reviewed Codes Class Noted - Resolved Hyponatremia ICD-10-CM: E87.1 ICD-9-CM: 276.1  11/3/2020 - Present Medications reviewed Current Facility-Administered Medications Medication Dose Route Frequency  amLODIPine (NORVASC) tablet 5 mg  5 mg Oral DAILY  atorvastatin (LIPITOR) tablet 10 mg  10 mg Oral DAILY  escitalopram oxalate (LEXAPRO) tablet 5 mg  5 mg Oral DAILY  megestroL (MEGACE) 400 mg/10 mL (10 mL) oral suspension 400 mg  400 mg Oral BID  metoprolol tartrate (LOPRESSOR) tablet 50 mg  50 mg Oral Q12H  
 influenza vaccine 2020-21 (4 yrs+)(PF) (FLUCELVAX QUAD) injection 0.5 mL  0.5 mL IntraMUSCular PRIOR TO DISCHARGE  albuterol-ipratropium (DUO-NEB) 2.5 MG-0.5 MG/3 ML  3 mL Nebulization Q6H PRN  
  heparin porcine injection 5,000 Units  5,000 Units SubCUTAneous Q12H Review of Systems: A comprehensive review of systems was negative except for that written in the HPI. Objective:  
Physical Exam:  
 
Visit Vitals BP (!) 140/99 Pulse (!) 137 Temp 96.9 °F (36.1 °C) Resp (!) 36 Ht 5' 2.01\" (1.575 m) Wt 63.5 kg (140 lb) SpO2 94% BMI 25.60 kg/m² O2 Device: Room air Temp (24hrs), Av.7 °F (36.5 °C), Min:96.9 °F (36.1 °C), Max:98.4 °F (36.9 °C) No intake/output data recorded. No intake/output data recorded. Constitutional:  
 Awake alert seems oriented HENT:  
Head: Normocephalic and atraumatic. Eyes: Pupils are equal, round, and reactive to light. Extraocular movements intact Neck: Normal range of motion. Neck supple. No definite JVD Cardiovascular: Normal rate and regular rhythm. Pulmonary/Chest:  
 Clear anteriorly and left lateral with fairly good breath sounds right lateral and right posterior no definite wheezes or rales Abdominal: Soft. Bowel sounds are normal.  
Musculoskeletal: Normal range of motion. Skin: Skin is warm. Data Review:  
   
Recent Days: 
Recent Labs  
  20 WBC 7.7 HGB 12.4 HCT 38.4  Recent Labs  
  20   
K 3.6  CO2 30  
GLU 78 BUN 9  
CREA 0.62  
CA 10.4* Room air oxygen saturation 98% 24 Hour Results: 
Recent Results (from the past 24 hour(s)) METABOLIC PANEL, BASIC Collection Time: 20  7:55 PM  
Result Value Ref Range Sodium 141 136 - 145 mmol/L Potassium 3.6 3.5 - 5.1 mmol/L Chloride 105 97 - 108 mmol/L  
 CO2 30 21 - 32 mmol/L Anion gap 6 5 - 15 mmol/L Glucose 78 65 - 100 mg/dL BUN 9 6 - 20 mg/dL Creatinine 0.62 0.55 - 1.02 mg/dL BUN/Creatinine ratio 15 12 - 20 GFR est AA >60 >60 ml/min/1.73m2 GFR est non-AA >60 >60 ml/min/1.73m2 Calcium 10.4 (H) 8.5 - 10.1 mg/dL CBC W/O DIFF  
 Collection Time: 11/24/20  7:55 PM  
Result Value Ref Range WBC 7.7 3.6 - 11.0 K/uL  
 RBC 4.87 3.80 - 5.20 M/uL  
 HGB 12.4 11.5 - 16.0 g/dL HCT 38.4 35.0 - 47.0 % MCV 78.9 (L) 80.0 - 99.0 FL  
 MCH 25.5 (L) 26.0 - 34.0 PG  
 MCHC 32.3 30.0 - 36.5 g/dL  
 RDW 18.6 (H) 11.5 - 14.5 % PLATELET 334 584 - 303 K/uL MPV 11.8 8.9 - 12.9 FL Imaging: 
  
   
CXR Results  (Last 48 hours)  
  None  
   
  
    
Results from Hospital Encounter encounter on 11/03/20 XR HIP LT W OR WO PELV 2-3 VWS  
  Narrative Fall 1 week ago. 
  
No comparison. 
  
FINDINGS: AP pelvis and frog-leg view of the left hip. No acute fracture or 
dislocation. Deformity of the left femur trochanter and proximal diaphysis, 
appears chronic. No radiopaque foreign body. 
   
  Impression IMPRESSION: 
1. No acute bony findings. XR CHEST SNGL V  
  Narrative Chest single view. 
  
Comparison single view chest March 29, 2018. 
  
Right-sided Port-A-Cath stable position. New opacification of the lower two thirds right hemithorax. This is likely 
related to a combination of large volume pleural fluid and atelectatic lung. Left lung is aerated. Cardiac and mediastinal structures unchanged. No 
pneumothorax. Nonacute rib fractures noted. Results from [facility] Encounter encounter on 10/26/20 XR SHOULDER RT AP/LAT MIN 2 V  
  Narrative 3 views of the right shoulder reveal diffuse osteopenia. There is mild 
degenerative subluxation of the humerus with respect to the glenoid. No fracture 
or dislocation is identified. 
   
  Impression IMPRESSION: Degenerative changes.  
  
    
Results from Hospital Encounter encounter on 11/03/20 CT CHEST WO CONT  
  Narrative CT dose reduction was achieved through use of a standardized protocol tailored 
for this examination and automatic exposure control for dose modulation. 
  
Noncontrast study shows large right pleural effusion. Right middle and lower lobe are completely collapsed, with very few air bronchograms. The upper lobe is 
almost completely collapsed, sparingly anterior segment only. Mainstem bronchus 
is open. Proximal lobar bronchi are open. Left lung is clear. Multiple calcified 
mediastinal hilar lymph nodes. Small left pleural effusion. Small pericardial 
effusion. Normal caliber aorta. No significant upper abdominal or chest wall 
finding. Pronounced soft tissue edema in the visualized portion of the left arm 
   
  Impression IMPRESSION: Near complete collapse of the right lung secondary to large pleural 
effusion  
  
 
  
IMPRESSION:  
 
1. Malignant pleural Effusion Right side status post thoracentesis 11/06 by IR 
2. Chronic Obstructive Pulmonary Disease with Severe Acute Exacerbation requiring inpatient hospitalization and management; no wheezing today 3. A. fib 5. History of heart failure hyponatremia ? Hx of lung cancer 6. Prognosis guarded   
   
RECOMMENDATIONS/PLAN:  
 
Discussed with the patient detail about her condition she told me that she had a history of lung cancer diagnosed more than 3 years ago in [place] and apparently she got treated for it I do not have any records and also she is intermittently confused Pleural fluid Cytology of thoracentesis on 11/06 showed many groups of abnormal epithelial cells are noted, with large nuclei and cytoplasmic vacuoles, indicative of metastatic adenocarcinoma. 1. Status post thoracentesis via IR 1260 cc fluid removed. Pleural fluid culture negative. Chest Xray 11/09 showed increased large right pleural effusion and atelectasis, as well as atelectasis of the left base and possible small left pleural effusion.   
2. Nebulizer treatment with Mucomyst 
 Awaiting placement  
  
 
 
 
 
Aracelis Lara MD

## 2020-11-26 NOTE — PROGRESS NOTES
Patients Blood pressure, heart rate and respirations were elevated. Dr. Tala Machado was notified via telephone call and was instructed to give a dose of amlodipine.

## 2020-11-26 NOTE — PROGRESS NOTES
Skin assessment was done and some redness was found on sacral area. Skin is intact. No other skin issues noted.

## 2020-11-26 NOTE — PROGRESS NOTES
Progress Note 
 
 
11/26/2020 1:17 PM 
NAME: Ayde Brady MRN:  [id number] Admit Diagnosis: Hyponatremia [E87.1] Problem List: 1. History of lung cancer 2. Metastatic adenocarcinoma 3.  Near complete collapse of right lung 4. Pleural effusion 5. Anemia of chronic disease 6. Dementia 7. Failure to thrive/severe malnutrition 8. Paroxysmal atrial fibrillation 9. History of congestive heart failure 10.  Hypertension 11.  Transaminitis, improved 12.  Hyponatremia Subjective: The patient is seen and examined in room 511. There were no acute cardiovascular events reported overnight. Currently, she denies any cardiovascular complaints. Medications Personally Reviewed: 
 
Current Facility-Administered Medications Medication Dose Route Frequency  dilTIAZem ER (CARDIZEM SR) capsule 120 mg  120 mg Oral Q12H  
 amLODIPine (NORVASC) tablet 5 mg  5 mg Oral DAILY  atorvastatin (LIPITOR) tablet 10 mg  10 mg Oral DAILY  escitalopram oxalate (LEXAPRO) tablet 5 mg  5 mg Oral DAILY  megestroL (MEGACE) 400 mg/10 mL (10 mL) oral suspension 400 mg  400 mg Oral BID  metoprolol tartrate (LOPRESSOR) tablet 50 mg  50 mg Oral Q12H  
 influenza vaccine 2020-21 (4 yrs+)(PF) (FLUCELVAX QUAD) injection 0.5 mL  0.5 mL IntraMUSCular PRIOR TO DISCHARGE  albuterol-ipratropium (DUO-NEB) 2.5 MG-0.5 MG/3 ML  3 mL Nebulization Q6H PRN  
 heparin porcine injection 5,000 Units  5,000 Units SubCUTAneous Q12H Objective:  
  
Physical Exam: 
Last 24hrs VS reviewed since prior progress note. Most recent are: 
 
Visit Vitals /71 Pulse (!) 114 Temp 98.5 °F (36.9 °C) Resp 20 Ht 5' 2.01\" (1.575 m) Wt 63.5 kg (140 lb) SpO2 99% BMI 25.60 kg/m² Intake/Output Summary (Last 24 hours) at 11/26/2020 1317 Last data filed at 11/25/2020 2040 Gross per 24 hour Intake  Output 375 ml Net -375 ml General Appearance: Well developed, in no acute distress. Chest: Lungs clear to auscultation bilaterally. Cardiovascular: JVP is not elevated, PMI is not attempted, normal intensity S1 with variable S2, and occasional ectopic beats Abdomen: Soft, non-tender, bowel sounds are active. Extremities: No edema bilaterally. Data Review Telemetry: Atrial fibrillation with controlled ventricular response Lab Data Personally Reviewed: 
 
Recent Labs  
  11/24/20 1955 WBC 7.7 HGB 12.4 HCT 38.4  No results for input(s): INR, PTP, APTT, INREXT in the last 72 hours. Recent Labs  
  11/24/20 1955   
K 3.6  CO2 30 BUN 9  
CREA 0.62 GLU 78 CA 10.4* Recent Labs  
  11/24/20 0610 11/23/20 2016 TROIQ 0.06* 0.10* No results found for: CHOL, CHOLX, CHLST, CHOLV, HDL, HDLP, LDL, LDLC, DLDLP, TGLX, TRIGL, TRIGP, CHHD, CHHDX No results for input(s): AP, TBIL, TP, ALB, GLOB, GGT, AML, LPSE in the last 72 hours. No lab exists for component: SGOT, GPT, AMYP, HLPSE No results for input(s): PH, PCO2, PO2 in the last 72 hours. Assessment/Plan: 1. Continue telemetry monitoring 2. Continue to monitor serum electrolytes, and renal function 3. Continue current cardiovascular medications including atorvastatin, diltiazem, heparin, and metoprolol 4. Discontinue amlodipine 5. Consider apixaban Leonie Briceno MD

## 2020-11-26 NOTE — PROGRESS NOTES
General Daily Progress Note Patient Name: Bala Sánchez YOB: 1940 Age: 
79 y.o. Admit Date: 11/3/2020 Subjective:  
Patient is a 79 y.o female with a past medical history of dementia, CHF, hyperlipidemia, and hypertension Patient tachycardic last night some EKG abnormality troponin mildly elevated seen by the cardiology no further work-up recommended at this time patient has no chest pain Patient resting in bed not in distress still not eating drinking well Objective:  
 
Visit Vitals /71 Pulse (!) 114 Temp 98.5 °F (36.9 °C) Resp 20 Ht 5' 2.01\" (1.575 m) Wt 63.5 kg (140 lb) SpO2 99% BMI 25.60 kg/m² No results found for this or any previous visit (from the past 24 hour(s)). [unfilled] Review of Systems Constitutional: Negative for chills and fever. HENT: Negative. Eyes: Negative. Respiratory: Negative. Cardiovascular: Negative. Gastrointestinal: Negative for abdominal pain and nausea. Skin: Negative. Neurological: Negative. Physical Exam:   
 
Constitutional: pt is oriented to person, place, and time. HENT:  
Head: Normocephalic and atraumatic. Eyes: Pupils are equal, round, and reactive to light. EOM are normal.  
Cardiovascular: Normal rate, regular rhythm and normal heart sounds. Pulmonary/Chest: Breath sounds normal. No wheezes. No rales. Exhibits no tenderness. Abdominal: Soft. Bowel sounds are normal. There is no abdominal tenderness. There is no rebound and no guarding. Musculoskeletal: Normal range of motion. Neurological: pt is alert and oriented to person, place, and time. XR CHEST PORT Final Result XR CHEST PA LAT Final Result Impression: Large right-sided pleural effusion and associated  
compression/consolidation of the right lung, increased. Mild atelectasis at the  
left base. Possible small left pleural effusion.   
  
XR CHEST PORT  
 Final Result IMPRESSION: Diffuse airspace disease, right hemithorax, with improved aeration  
from previous. Differential diagnosis includes pneumonia, atelectasis, and/or  
reexpansion pulmonary edema. No pneumothorax. [address] Final Result IMPRESSION:   
Successful ultrasound-guided thoracentesis of right large pleural effusion, with  
approximately 1.3 L serous pleural fluid removed. Pleural fluid sample was also  
sent for lab analysis. CT CHEST WO CONT Final Result IMPRESSION: Near complete collapse of the right lung secondary to large pleural  
effusion XR HIP LT W OR WO PELV 2-3 VWS Final Result IMPRESSION:  
1. No acute bony findings. XR CHEST SNGL V Final Result No results found for this or any previous visit (from the past 24 hour(s)). Results Procedure Component Value Units Date/Time CULTURE, URINE [[id number]] Collected:  11/24/20 0830 Order Status:  Completed Specimen:  Urine Updated:  11/25/20 9893 Special Requests: No Special Requests Culture result: No Growth (<1000 cfu/mL) Labs:  
 
Recent Labs  
  11/24/20 1955 WBC 7.7 HGB 12.4 HCT 38.4  Recent Labs  
  11/24/20 1955   
K 3.6  CO2 30 BUN 9  
CREA 0.62 GLU 78 CA 10.4* No results for input(s): ALT, AP, TBIL, TBILI, TP, ALB, GLOB, GGT, AML, LPSE in the last 72 hours. No lab exists for component: SGOT, GPT, AMYP, HLPSE No results for input(s): INR, PTP, APTT, INREXT, INREXT in the last 72 hours. No results for input(s): FE, TIBC, PSAT, FERR in the last 72 hours. No results found for: FOL, RBCF No results for input(s): PH, PCO2, PO2 in the last 72 hours. Recent Labs  
  11/24/20 
0610 11/23/20 2016 TROIQ 0.06* 0.10* No results found for: CHOL, CHOLX, CHLST, CHOLV, HDL, HDLP, LDL, LDLC, DLDLP, TGLX, TRIGL, TRIGP, CHHD, CHHDX Lab Results Component Value Date/Time Glucose (POC) 98 11/23/2020 08:59 AM  
 Glucose (POC) 80 11/20/2020 02:59 AM  
 Glucose (POC) 220 (H) 11/08/2020 11:01 AM  
 Glucose (POC) 110 (H) 11/08/2020 08:26 AM  
 Glucose (POC) 86 11/06/2020 05:29 PM  
 
Lab Results Component Value Date/Time Color Dark Yellow 11/24/2020 08:30 AM  
 Appearance Clear 11/24/2020 08:30 AM  
 Specific gravity 1.018 11/24/2020 08:30 AM  
 pH (UA) 5.0 11/24/2020 08:30 AM  
 Protein Negative 11/24/2020 08:30 AM  
 Glucose Negative 11/24/2020 08:30 AM  
 Ketone Negative 11/24/2020 08:30 AM  
 Bilirubin Negative 11/24/2020 08:30 AM  
 Urobilinogen 4.0 (H) 11/24/2020 08:30 AM  
 Nitrites Negative 11/24/2020 08:30 AM  
 Leukocyte Esterase Negative 11/24/2020 08:30 AM  
 Bacteria Negative 11/24/2020 08:30 AM  
 WBC 0-4 11/24/2020 08:30 AM  
 RBC 0-5 11/24/2020 08:30 AM  
 
   
Assessment & Plan Metastatic Adenocarcinoma 11/13 Cytology- Right Pleural fluid positive for adenocarcinoma Oncology consult - Recommends hospice care due to dementia and poor performance status. Anemia of Chronic disease- stable 11/20-Hgb 11.4 Will continue to monitor H&H Paroxysmal A. Fib- 11/7 ECG 
metoprolol tartrate (LOPRESSOR) tablet 50 mg Add Cardizem  mg daily 
heparin porcine injection 5,000 Units Hx of Congestive Heart Failure 11/08 proBNP 1,127 Hypokalemia Replace  potassium Pleural effusion 11/06 Chest x-ray shows opacities/pleural effusion - Chest CT shows Near complete collapse of the right lung secondary to large pleural 
Effusion status post thoracocentesis Static post thoracocentesis - cytology results positive for metastatic adenocarcinoma; culture negative Hyponatremia/likely from diuretic 
11/20- Sodium (139) -Stable Hypertension- BP controlled 
metoprolol tartrate (LOPRESSOR) tablet 50 mg Failure to Thrive /severe malnutrition 11/20 Hypoalbuminemia ( 1.7) w/low total protein(6.2) BMI: 25.60 megestroL (MEGACE) 400 mg/10 mL (10 mL) oral suspension 400 mg Hx of Dementia 
adjustment disorder with a depressed mood Hx of Hyperlipidemia Hx of  lung cancer Covid screening negative Patient on metoprolol 50 mg twice a day Repeat the labs PT/OT recommends 5/days week in SNF Discussed with the  regarding discharge planning Waiting Medicaid application before discharge to nursing home Seen by the psychiatrist and he is competent to make decision about healthcare he is physically not capable of taking care of herself willing to have help and use third-party Discussed with the patient with the nursing supervisor She is DNR now  talking to the family regarding going home with hospice Current Facility-Administered Medications:  
  dilTIAZem ER (CARDIZEM SR) capsule 120 mg, 120 mg, Oral, Q12H, Jesus Maldonado MD, 120 mg at 11/26/20 1107 
  amLODIPine (NORVASC) tablet 5 mg, 5 mg, Oral, DAILY, Jesus Maldonado MD, 5 mg at 11/26/20 1107 
  atorvastatin (LIPITOR) tablet 10 mg, 10 mg, Oral, DAILY, Jesus Maldonado MD, 10 mg at 11/26/20 1107   escitalopram oxalate (LEXAPRO) tablet 5 mg, 5 mg, Oral, DAILY, Abundio An MD, 5 mg at 11/26/20 1107   megestroL (MEGACE) 400 mg/10 mL (10 mL) oral suspension 400 mg, 400 mg, Oral, BID, Jesus Maldonado MD, 400 mg at 11/26/20 1105   metoprolol tartrate (LOPRESSOR) tablet 50 mg, 50 mg, Oral, Q12H, Taiwo MACIAS MD, 50 mg at 11/26/20 1107   influenza vaccine 2020-21 (4 yrs+)(PF) (FLUCELVAX QUAD) injection 0.5 mL, 0.5 mL, IntraMUSCular, PRIOR TO DISCHARGE, Jesus Maldonado MD 
  albuterol-ipratropium (DUO-NEB) 2.5 MG-0.5 MG/3 ML, 3 mL, Nebulization, Q6H PRN, Asim Red MD 
  heparin porcine injection 5,000 Units, 5,000 Units, SubCUTAneous, Q12H, Jesus Maldonado MD, 5,000 Units at 11/25/20 1116

## 2020-11-27 NOTE — PROGRESS NOTES
Patient's discharge plan is to go home with hospice, however patient's home will not be ready for her return until next week per her [address]. CM will follow up Monday.

## 2020-11-27 NOTE — PROGRESS NOTES
General Daily Progress Note Patient Name: Trinidad Gaytan YOB: 1940 Age: 
79 y.o. Admit Date: 11/3/2020 Subjective:  
Patient is a 79 y.o female with a past medical history of dementia, CHF, hyperlipidemia, and hypertension Patient tachycardic last night some EKG abnormality troponin mildly elevated seen by the cardiology no further work-up recommended at this time patient has no chest pain Patient resting in bed not in distress still not eating drinking well Objective:  
 
Visit Vitals /77 Pulse 79 Temp 98.3 °F (36.8 °C) Resp 18 Ht 5' 2.01\" (1.575 m) Wt 63.5 kg (140 lb) SpO2 96% BMI 25.60 kg/m² No results found for this or any previous visit (from the past 24 hour(s)). [unfilled] Review of Systems Constitutional: Negative for chills and fever. HENT: Negative. Eyes: Negative. Respiratory: Negative. Cardiovascular: Negative. Gastrointestinal: Negative for abdominal pain and nausea. Skin: Negative. Neurological: Negative. Physical Exam:   
 
Constitutional: pt is oriented to person, place, and time. HENT:  
Head: Normocephalic and atraumatic. Eyes: Pupils are equal, round, and reactive to light. EOM are normal.  
Cardiovascular: Normal rate, regular rhythm and normal heart sounds. Pulmonary/Chest: Breath sounds normal. No wheezes. No rales. Exhibits no tenderness. Abdominal: Soft. Bowel sounds are normal. There is no abdominal tenderness. There is no rebound and no guarding. Musculoskeletal: Normal range of motion. Neurological: pt is alert and oriented to person, place, and time. XR CHEST PORT Final Result XR CHEST PA LAT Final Result Impression: Large right-sided pleural effusion and associated  
compression/consolidation of the right lung, increased. Mild atelectasis at the  
left base. Possible small left pleural effusion. XR CHEST PORT Final Result IMPRESSION: Diffuse airspace disease, right hemithorax, with improved aeration  
from previous. Differential diagnosis includes pneumonia, atelectasis, and/or  
reexpansion pulmonary edema. No pneumothorax. [address] Final Result IMPRESSION:   
Successful ultrasound-guided thoracentesis of right large pleural effusion, with  
approximately 1.3 L serous pleural fluid removed. Pleural fluid sample was also  
sent for lab analysis. CT CHEST WO CONT Final Result IMPRESSION: Near complete collapse of the right lung secondary to large pleural  
effusion XR HIP LT W OR WO PELV 2-3 VWS Final Result IMPRESSION:  
1. No acute bony findings. XR CHEST SNGL V Final Result No results found for this or any previous visit (from the past 24 hour(s)). Results Procedure Component Value Units Date/Time CULTURE, URINE [[id number]] Collected:  11/24/20 0830 Order Status:  Completed Specimen:  Urine Updated:  11/25/20 1533 Special Requests: No Special Requests Culture result: No Growth (<1000 cfu/mL) Labs:  
 
Recent Labs  
  11/24/20 1955 WBC 7.7 HGB 12.4 HCT 38.4  Recent Labs  
  11/24/20 1955   
K 3.6  CO2 30 BUN 9  
CREA 0.62 GLU 78 CA 10.4* No results for input(s): ALT, AP, TBIL, TBILI, TP, ALB, GLOB, GGT, AML, LPSE in the last 72 hours. No lab exists for component: SGOT, GPT, AMYP, HLPSE No results for input(s): INR, PTP, APTT, INREXT, INREXT in the last 72 hours. No results for input(s): FE, TIBC, PSAT, FERR in the last 72 hours. No results found for: FOL, RBCF No results for input(s): PH, PCO2, PO2 in the last 72 hours. No results for input(s): CPK, CKNDX, TROIQ in the last 72 hours. No lab exists for component: CPKMB No results found for: CHOL, CHOLX, CHLST, CHOLV, HDL, HDLP, LDL, LDLC, DLDLP, TGLX, TRIGL, TRIGP, CHHD, CHHDX Lab Results Component Value Date/Time Glucose (POC) 98 11/23/2020 08:59 AM  
 Glucose (POC) 80 11/20/2020 02:59 AM  
 Glucose (POC) 220 (H) 11/08/2020 11:01 AM  
 Glucose (POC) 110 (H) 11/08/2020 08:26 AM  
 Glucose (POC) 86 11/06/2020 05:29 PM  
 
Lab Results Component Value Date/Time Color Dark Yellow 11/24/2020 08:30 AM  
 Appearance Clear 11/24/2020 08:30 AM  
 Specific gravity 1.018 11/24/2020 08:30 AM  
 pH (UA) 5.0 11/24/2020 08:30 AM  
 Protein Negative 11/24/2020 08:30 AM  
 Glucose Negative 11/24/2020 08:30 AM  
 Ketone Negative 11/24/2020 08:30 AM  
 Bilirubin Negative 11/24/2020 08:30 AM  
 Urobilinogen 4.0 (H) 11/24/2020 08:30 AM  
 Nitrites Negative 11/24/2020 08:30 AM  
 Leukocyte Esterase Negative 11/24/2020 08:30 AM  
 Bacteria Negative 11/24/2020 08:30 AM  
 WBC 0-4 11/24/2020 08:30 AM  
 RBC 0-5 11/24/2020 08:30 AM  
 
   
Assessment & Plan Metastatic Adenocarcinoma 11/13 Cytology- Right Pleural fluid positive for adenocarcinoma Oncology consult - Recommends hospice care due to dementia and poor performance status. Anemia of Chronic disease- stable 11/20-Hgb 11.4 Will continue to monitor H&H Paroxysmal A. Fib- 11/7 ECG 
metoprolol tartrate (LOPRESSOR) tablet 50 mg Add Cardizem  mg daily 
heparin porcine injection 5,000 Units Hx of Congestive Heart Failure 11/08 proBNP 1,127 Hypokalemia Replace  potassium Pleural effusion 11/06 Chest x-ray shows opacities/pleural effusion - Chest CT shows Near complete collapse of the right lung secondary to large pleural 
Effusion status post thoracocentesis Static post thoracocentesis - cytology results positive for metastatic adenocarcinoma; culture negative Hyponatremia/likely from diuretic 
11/20- Sodium (139) -Stable Hypertension- BP controlled 
metoprolol tartrate (LOPRESSOR) tablet 50 mg Failure to Thrive /severe malnutrition 11/20 Hypoalbuminemia ( 1.7) w/low total protein(6.2) BMI: 25.60 megestroL (MEGACE) 400 mg/10 mL (10 mL) oral suspension 400 mg Hx of Dementia 
adjustment disorder with a depressed mood Hx of Hyperlipidemia Hx of  lung cancer Covid screening negative Patient on metoprolol 50 mg twice a day Repeat the labs PT/OT recommends 5/days week in SNF Discussed with the  regarding discharge planning Waiting Medicaid application before discharge to nursing home Seen by the psychiatrist and he is competent to make decision about healthcare he is physically not capable of taking care of herself willing to have help and use third-party Discussed with the patient with the nursing supervisor She is DNR now  talking to the family regarding going home with hospice Current Facility-Administered Medications:  
  apixaban (ELIQUIS) tablet 2.5 mg, 2.5 mg, Oral, BID, Ray CHUNG MD, Stopped at 11/27/20 1300 
  dilTIAZem ER (CARDIZEM SR) capsule 120 mg, 120 mg, Oral, Q12H, Jesus Maldonado MD, 120 mg at 11/27/20 0847 
  atorvastatin (LIPITOR) tablet 10 mg, 10 mg, Oral, DAILY, Jesus Maldonado MD, 10 mg at 11/27/20 1698   escitalopram oxalate (LEXAPRO) tablet 5 mg, 5 mg, Oral, DAILY, Abundio An MD, 5 mg at 11/27/20 0847 
  megestroL (MEGACE) 400 mg/10 mL (10 mL) oral suspension 400 mg, 400 mg, Oral, BID, Jesus Maldonado MD, 400 mg at 11/27/20 4603   metoprolol tartrate (LOPRESSOR) tablet 50 mg, 50 mg, Oral, Q12H, Edwin Smith MD, 50 mg at 11/27/20 0847 
  influenza vaccine 2020-21 (4 yrs+)(PF) (FLUCELVAX QUAD) injection 0.5 mL, 0.5 mL, IntraMUSCular, PRIOR TO DISCHARGE, Jesus Maldonado MD 
  albuterol-ipratropium (DUO-NEB) 2.5 MG-0.5 MG/3 ML, 3 mL, Nebulization, Q6H PRN, Laurie Red MD

## 2020-11-27 NOTE — PROGRESS NOTES
Progress Note 
 
 
11/27/2020 7:12 AM 
NAME: Matt Nicole MRN:  [id number] Admit Diagnosis: Hyponatremia [E87.1] Problem List: 1. History of lung cancer 2. Metastatic adenocarcinoma 3.  Near complete collapse of right lung 4. Pleural effusion 5. Anemia of chronic disease 6. Dementia 7. Failure to thrive/severe malnutrition 8. Paroxysmal atrial fibrillation 9. History of congestive heart failure 10.  Hypertension 
  
11.  Transaminitis, improved 12.  Hyponatremia Subjective: The patient is seen and examined in room 511. There were no acute cardiovascular events reported overnight. Currently, she denies any cardiovascular complaints. Medications Personally Reviewed: 
 
Current Facility-Administered Medications Medication Dose Route Frequency  dilTIAZem ER (CARDIZEM SR) capsule 120 mg  120 mg Oral Q12H  
 atorvastatin (LIPITOR) tablet 10 mg  10 mg Oral DAILY  escitalopram oxalate (LEXAPRO) tablet 5 mg  5 mg Oral DAILY  megestroL (MEGACE) 400 mg/10 mL (10 mL) oral suspension 400 mg  400 mg Oral BID  metoprolol tartrate (LOPRESSOR) tablet 50 mg  50 mg Oral Q12H  
 influenza vaccine 2020-21 (4 yrs+)(PF) (FLUCELVAX QUAD) injection 0.5 mL  0.5 mL IntraMUSCular PRIOR TO DISCHARGE  albuterol-ipratropium (DUO-NEB) 2.5 MG-0.5 MG/3 ML  3 mL Nebulization Q6H PRN  
 heparin porcine injection 5,000 Units  5,000 Units SubCUTAneous Q12H Objective:  
  
Physical Exam: 
Last 24hrs VS reviewed since prior progress note. Most recent are: 
 
Visit Vitals /80 Pulse 97 Temp 97.5 °F (36.4 °C) Resp 18 Ht 5' 2.01\" (1.575 m) Wt 63.5 kg (140 lb) SpO2 96% BMI 25.60 kg/m² No intake or output data in the 24 hours ending 11/27/20 4612 Physical exam: Essentially unchanged Data Review Telemetry: Atrial fibrillation/flutter with controlled ventricular response Lab Data Personally Reviewed: 
 
Recent Labs  
  11/24/20 1955 WBC 7.7 HGB 12.4 HCT 38.4  No results for input(s): INR, PTP, APTT, INREXT in the last 72 hours. Recent Labs  
  11/24/20 1955   
K 3.6  CO2 30 BUN 9  
CREA 0.62 GLU 78 CA 10.4* No results for input(s): CPK, CKNDX, TROIQ in the last 72 hours. No lab exists for component: CPKMB No results found for: CHOL, CHOLX, CHLST, CHOLV, HDL, HDLP, LDL, LDLC, DLDLP, TGLX, TRIGL, TRIGP, CHHD, CHHDX No results for input(s): AP, TBIL, TP, ALB, GLOB, GGT, AML, LPSE in the last 72 hours. No lab exists for component: SGOT, GPT, AMYP, HLPSE No results for input(s): PH, PCO2, PO2 in the last 72 hours. Assessment/Plan: 1. Continue telemetry monitoring 2. Continue to monitor serum electrolytes, and renal function 3. Continue current cardiovascular medications including atorvastatin, diltiazem, heparin, and metoprolol 4. Consider apixaban, and stop heparin Feliciano Garcia MD

## 2020-11-27 NOTE — PROGRESS NOTES
Comprehensive Nutrition Assessment Type and Reason for Visit: Reassess(Interim) Nutrition Recommendations/Plan: 
 
Continue current regular, soft solids diet 
            Ensure clear, Mixed berry TID Discontinue Gelatein Allow snacks as desired 
  
Continue with appetite stimulant Consider adding MVI with pt poor PO intakes 
  
If pt decides to not go Hospice, TF would be best option to meet nutritional needs  
Please document % of all meal/snack consumed, wts, BMs 
 
Nutrition Assessment:  Admitted for FTT, needs SNF placement, noted pt in ED 10/26 for fall with fx. Pt now s/p thoracentesis with 1.26L removed (11/6), cytology +metastatic adenocarcinoma. MD's rec hospice however no decision yet. Per CM, current plans to d/c to [facility], working on [employer]. Per EMR pt with poor appetite and intakes ~10% of meals, no recent intakes documented. RN and  ambassadors alerted RD pt not eating anything x2 weeks at least, disinterested in food despite encouragement. Per RN, pt not drinking Ensure Enlive or eating Magic Cup, loves Ensure Clear and drinks >75% of these TID. Noted Megace started 11/16 as appetite stimulant, limited efficacy appreciated. On previous visit, noted pt ate minimal B, at L ate 1 bite ice cream, drank x3 french, RD opened Ensure Clear for her. Refused Gelatein, will d/c. Regardless, intakes of just Ensure Clear and Prosource Gelatein NOT meeting pt nutritional needs, pt will need TF for long-term nutrition if within goals of care. Labs reviewed. Meds: Heparin, megace, zosyn, KCl. Malnutrition Assessment: 
Malnutrition Status: Moderate malnutrition Context:  Acute illness Estimated Daily Nutrient Needs: 
Energy (kcal): 1869kcals (30kcals/kg); Weight Used for Energy Requirements: Current Protein (g): 81g (1.3g/kg); Weight Used for Protein Requirements: Current Fluid (ml/day): 1869ml; Method Used for Fluid Requirements: 1 ml/kcal 
 
 
 Nutrition Related Findings:  NFPE finding mild muscle wasing. No edema. No N/V/D/C per RN, last BM 11/25, none today per RN. Pt previously endorsed no issues with c/s, SLP not following. Wounds:   
None Current Nutrition Therapies: DIET REGULAR 
DIET NUTRITIONAL SUPPLEMENTS Breakfast, Lunch, Dinner; Ensure Clear (mixed berry) DIET NUTRITIONAL SUPPLEMENTS Dinner, Lunch; Prosource (Gelatein Jello) Anthropometric Measures: 
· Height:  5' 2.01\" (157.5 cm) · Current Body Wt:  60.9 kg (134 lb 4.2 oz)(11/23) · Admission Body Wt:  139 lb 15.9 oz(11/3) · Usual Body Wt:  54.4 kg (120 lb 0.2 oz)(per pt, stable) · Ideal Body Wt:  110 lbs:  122.1 % · BMI Category:  Normal weight (BMI 22.0-24.9) age over 72 Wt hx: 60.9kg (11/23), 62.3kg (11/9), 59.9kg (11/5), 63.6kg (11/3) No prior wt hx to assess, UBW less than pt current BW per EMR. Noted wt loss of 2.7kg x 1 month (4%, not significant). Noted poor documentation of wts. Nutrition Diagnosis:  
· Inadequate oral intake related to cognitive or neurological impairment(lethargy) as evidenced by intake 0-25% Nutrition Interventions:  
Food and/or Nutrient Delivery: Continue current diet, Continue oral nutrition supplement Nutrition Education and Counseling: No recommendations at this time Coordination of Nutrition Care: Continue to monitor while inpatient, Feeding assistance/environmental change Goals: 
Intakes >/=50% of EENs (not progressing) Wt maintenance within +/-0.5kg  (OMKAR) Nutrition Monitoring and Evaluation:  
Behavioral-Environmental Outcomes: None identified Food/Nutrient Intake Outcomes: Food and nutrient intake, Supplement intake Physical Signs/Symptoms Outcomes: Weight, Nutrition focused physical findings, Constipation, Biochemical data Discharge Planning: Too soon to determine Electronically signed by Jeannie Rosa on 11/27/2020 at 4:39 PM 
 
Contact: [phone]

## 2020-11-27 NOTE — PROGRESS NOTES
Pt visited 2x; am and PM but both visits declined to do any OOB ; \"im not feeling well ; pt did not state any pain ; to be revisited another as indicated

## 2020-11-28 NOTE — PROGRESS NOTES
At 1450 Dr. Edwige Rick informed that patient's oxygen saturation was 89% and patient has thick white secretions that patient can't expel. Per Dr. Edwige Rick verbal order for Mucinex 600 mg PO twice daily. Patient placed on 2 liters of oxygen and to be reevaluated. Dr. Edwige Rick informed.

## 2020-11-28 NOTE — PROGRESS NOTES
General Daily Progress Note Patient Name: Julian Sauceda YOB: 1940 Age: 
79 y.o. Admit Date: 11/3/2020 Subjective:  
Patient is a 79 y.o female with a past medical history of dementia, CHF, hyperlipidemia, and hypertension Has cough Patient resting in bed not in distress still not eating drinking well Objective:  
 
Visit Vitals BP (!) 145/89 Pulse 95 Temp 98.7 °F (37.1 °C) Resp 24 Ht 5' 2.01\" (1.575 m) Wt 63.5 kg (140 lb) SpO2 (!) 89% BMI 25.60 kg/m² No results found for this or any previous visit (from the past 24 hour(s)). [unfilled] Review of Systems Constitutional: Negative for chills and fever. HENT: Negative. Eyes: Negative. Respiratory: Negative. Cardiovascular: Negative. Gastrointestinal: Negative for abdominal pain and nausea. Skin: Negative. Neurological: Negative. Physical Exam:   
 
Constitutional: pt is oriented to person, place, and time. HENT:  
Head: Normocephalic and atraumatic. Eyes: Pupils are equal, round, and reactive to light. EOM are normal.  
Cardiovascular: Normal rate, regular rhythm and normal heart sounds. Pulmonary/Chest: Breath sounds normal. No wheezes. No rales. Exhibits no tenderness. Abdominal: Soft. Bowel sounds are normal. There is no abdominal tenderness. There is no rebound and no guarding. Musculoskeletal: Normal range of motion. Neurological: pt is alert and oriented to person, place, and time. XR CHEST PORT Final Result XR CHEST PA LAT Final Result Impression: Large right-sided pleural effusion and associated  
compression/consolidation of the right lung, increased. Mild atelectasis at the  
left base. Possible small left pleural effusion. XR CHEST PORT Final Result IMPRESSION: Diffuse airspace disease, right hemithorax, with improved aeration  
from previous.  Differential diagnosis includes pneumonia, atelectasis, and/or  
reexpansion pulmonary edema. No pneumothorax. [address] Final Result IMPRESSION:   
Successful ultrasound-guided thoracentesis of right large pleural effusion, with  
approximately 1.3 L serous pleural fluid removed. Pleural fluid sample was also  
sent for lab analysis. CT CHEST WO CONT Final Result IMPRESSION: Near complete collapse of the right lung secondary to large pleural  
effusion XR HIP LT W OR WO PELV 2-3 VWS Final Result IMPRESSION:  
1. No acute bony findings. XR CHEST SNGL V Final Result No results found for this or any previous visit (from the past 24 hour(s)). Results Procedure Component Value Units Date/Time CULTURE, URINE [[id number]] Collected:  11/24/20 0830 Order Status:  Completed Specimen:  Urine Updated:  11/25/20 4095 Special Requests: No Special Requests Culture result: No Growth (<1000 cfu/mL) Labs:  
 
No results for input(s): WBC, HGB, HCT, PLT, HGBEXT, HCTEXT, PLTEXT, HGBEXT, HCTEXT, PLTEXT in the last 72 hours. No results for input(s): NA, K, CL, CO2, BUN, CREA, GLU, CA, MG, PHOS, URICA in the last 72 hours. No results for input(s): ALT, AP, TBIL, TBILI, TP, ALB, GLOB, GGT, AML, LPSE in the last 72 hours. No lab exists for component: SGOT, GPT, AMYP, HLPSE No results for input(s): INR, PTP, APTT, INREXT, INREXT in the last 72 hours. No results for input(s): FE, TIBC, PSAT, FERR in the last 72 hours. No results found for: FOL, RBCF No results for input(s): PH, PCO2, PO2 in the last 72 hours. No results for input(s): CPK, CKNDX, TROIQ in the last 72 hours. No lab exists for component: CPKMB No results found for: CHOL, CHOLX, CHLST, CHOLV, HDL, HDLP, LDL, LDLC, DLDLP, TGLX, TRIGL, TRIGP, CHHD, CHHDX Lab Results Component Value Date/Time  Glucose (POC) 98 11/23/2020 08:59 AM  
 Glucose (POC) 80 11/20/2020 02:59 AM  
 Glucose (POC) 220 (H) 11/08/2020 11:01 AM  
 Glucose (POC) 110 (H) 11/08/2020 08:26 AM  
 Glucose (POC) 86 11/06/2020 05:29 PM  
 
Lab Results Component Value Date/Time Color Dark Yellow 11/24/2020 08:30 AM  
 Appearance Clear 11/24/2020 08:30 AM  
 Specific gravity 1.018 11/24/2020 08:30 AM  
 pH (UA) 5.0 11/24/2020 08:30 AM  
 Protein Negative 11/24/2020 08:30 AM  
 Glucose Negative 11/24/2020 08:30 AM  
 Ketone Negative 11/24/2020 08:30 AM  
 Bilirubin Negative 11/24/2020 08:30 AM  
 Urobilinogen 4.0 (H) 11/24/2020 08:30 AM  
 Nitrites Negative 11/24/2020 08:30 AM  
 Leukocyte Esterase Negative 11/24/2020 08:30 AM  
 Bacteria Negative 11/24/2020 08:30 AM  
 WBC 0-4 11/24/2020 08:30 AM  
 RBC 0-5 11/24/2020 08:30 AM  
 
   
Assessment & Plan Metastatic Adenocarcinoma 11/13 Cytology- Right Pleural fluid positive for adenocarcinoma Oncology consult - Recommends hospice care due to dementia and poor performance status. Anemia of Chronic disease- stable 11/20-Hgb 11.4 Will continue to monitor H&H Paroxysmal A. Fib- 11/7 ECG 
metoprolol tartrate (LOPRESSOR) tablet 50 mg Add Cardizem  mg daily 
heparin porcine injection 5,000 Units Hx of Congestive Heart Failure 11/08 proBNP 1,127 Hypokalemia Replace  potassium Pleural effusion 11/06 Chest x-ray shows opacities/pleural effusion - Chest CT shows Near complete collapse of the right lung secondary to large pleural 
Effusion status post thoracocentesis Static post thoracocentesis - cytology results positive for metastatic adenocarcinoma; culture negative Hyponatremia/likely from diuretic 
11/20- Sodium (139) -Stable Hypertension- BP controlled 
metoprolol tartrate (LOPRESSOR) tablet 50 mg Failure to Thrive /severe malnutrition 11/20 Hypoalbuminemia ( 1.7) w/low total protein(6.2) BMI: 25.60  
megestroL (MEGACE) 400 mg/10 mL (10 mL) oral suspension 400 mg Hx of Dementia 
adjustment disorder with a depressed mood Hx of Hyperlipidemia Hx of  lung cancer Covid screening negative Patient on metoprolol 50 mg twice a day Repeat the labs PT/OT recommends 5/days week in SNF Discussed with the  regarding discharge planning Waiting Medicaid application before discharge to nursing home Seen by the psychiatrist and he is competent to make decision about healthcare he is physically not capable of taking care of herself willing to have help and use third-party Discussed with the patient with the nursing supervisor She is DNR now  talking to the family regarding going home with hospice Add dulera and steroids Current Facility-Administered Medications:  
  apixaban (ELIQUIS) tablet 2.5 mg, 2.5 mg, Oral, BID, Kenneth Craig MD, 2.5 mg at 11/28/20 8028   dilTIAZem ER (CARDIZEM SR) capsule 120 mg, 120 mg, Oral, Q12H, Jesus Maldonado MD, 120 mg at 11/28/20 1675   atorvastatin (LIPITOR) tablet 10 mg, 10 mg, Oral, DAILY, Jesus Maldonado MD, 10 mg at 11/28/20 7648   escitalopram oxalate (LEXAPRO) tablet 5 mg, 5 mg, Oral, DAILY, Easton BAINS MD, 5 mg at 11/28/20 4105   megestroL (MEGACE) 400 mg/10 mL (10 mL) oral suspension 400 mg, 400 mg, Oral, BID, Jesus Maldonado MD, 400 mg at 11/28/20 0916 
  metoprolol tartrate (LOPRESSOR) tablet 50 mg, 50 mg, Oral, Q12H, Placida Countess GILBERTO MD, 50 mg at 11/28/20 7207   influenza vaccine 2020-21 (4 yrs+)(PF) (FLUCELVAX QUAD) injection 0.5 mL, 0.5 mL, IntraMUSCular, PRIOR TO DISCHARGE, Jesus Maldonado MD 
  albuterol-ipratropium (DUO-NEB) 2.5 MG-0.5 MG/3 ML, 3 mL, Nebulization, Q6H PRN, Zaynab Red MD

## 2020-11-28 NOTE — PROGRESS NOTES
Progress Note 
 
 
11/28/2020 6:59 AM 
NAME: Waldemar Lay MRN:  [id number] Admit Diagnosis: Hyponatremia [E87.1] Problem List: 1.  History of lung cancer 2.  Metastatic adenocarcinoma 3.  Near complete collapse of right lung 4.  Pleural effusion 5.  Anemia of chronic disease 6.  Dementia 7.  Failure to thrive/severe malnutrition 8.  Paroxysmal atrial fibrillation 9.  History of congestive heart failure 10.  Hypertension 
  
11.  Transaminitis, improved 12.  Hyponatremia Subjective: The patient is seen and examined in room 511. There were no acute cardiovascular events reported overnight. Currently, she denies any cardiovascular complaints Medications Personally Reviewed: 
 
Current Facility-Administered Medications Medication Dose Route Frequency  apixaban (ELIQUIS) tablet 2.5 mg  2.5 mg Oral BID  dilTIAZem ER (CARDIZEM SR) capsule 120 mg  120 mg Oral Q12H  
 atorvastatin (LIPITOR) tablet 10 mg  10 mg Oral DAILY  escitalopram oxalate (LEXAPRO) tablet 5 mg  5 mg Oral DAILY  megestroL (MEGACE) 400 mg/10 mL (10 mL) oral suspension 400 mg  400 mg Oral BID  metoprolol tartrate (LOPRESSOR) tablet 50 mg  50 mg Oral Q12H  
 influenza vaccine 2020-21 (4 yrs+)(PF) (FLUCELVAX QUAD) injection 0.5 mL  0.5 mL IntraMUSCular PRIOR TO DISCHARGE  albuterol-ipratropium (DUO-NEB) 2.5 MG-0.5 MG/3 ML  3 mL Nebulization Q6H PRN Objective:  
  
Physical Exam: 
Last 24hrs VS reviewed since prior progress note. Most recent are: 
 
Visit Vitals /82 (BP 1 Location: Left arm) Pulse 77 Temp 98.3 °F (36.8 °C) Resp 16 Ht 5' 2.01\" (1.575 m) Wt 63.5 kg (140 lb) SpO2 95% BMI 25.60 kg/m² Intake/Output Summary (Last 24 hours) at 11/28/2020 1665 Last data filed at 11/28/2020 2752 Gross per 24 hour Intake  Output 900 ml Net -900 ml Physical exam: Essentially unchanged Data Review Telemetry: Sinus rhythm without ventricular ectopy EKG:  
[]  No new EKG for review Lab Data Personally Reviewed: 
 
No results for input(s): WBC, HGB, HCT, PLT, HGBEXT, HCTEXT, PLTEXT in the last 72 hours. No results for input(s): INR, PTP, APTT, INREXT in the last 72 hours. No results for input(s): NA, K, CL, CO2, BUN, CREA, GLU, CA, MG in the last 72 hours. No results for input(s): CPK, CKNDX, TROIQ in the last 72 hours. No lab exists for component: CPKMB No results found for: CHOL, CHOLX, CHLST, CHOLV, HDL, HDLP, LDL, LDLC, DLDLP, TGLX, TRIGL, TRIGP, CHHD, CHHDX No results for input(s): AP, TBIL, TP, ALB, GLOB, GGT, AML, LPSE in the last 72 hours. No lab exists for component: SGOT, GPT, AMYP, HLPSE No results for input(s): PH, PCO2, PO2 in the last 72 hours. Assessment/Plan: 1.  Continue telemetry monitoring 2.  Continue to monitor serum electrolytes, and renal function 3.  Continue current cardiovascular medications including apixaban, atorvastatin, diltiazem, and metoprolol Wade Russell MD

## 2020-11-29 NOTE — PROGRESS NOTES
General Daily Progress Note Patient Name: Nery Nelson YOB: 1940 Age: 
79 y.o. Admit Date: 11/3/2020 Subjective:  
Patient is a 79 y.o female with a past medical history of dementia, CHF, hyperlipidemia, and hypertension Has cough Patient resting in bed not in distress still not eating drinking well Objective:  
 
Visit Vitals BP (!) 140/76 (BP 1 Location: Left arm, BP Patient Position: At rest) Pulse 89 Temp 99.9 °F (37.7 °C) Resp 24 Ht 5' 2.01\" (1.575 m) Wt 63.5 kg (140 lb) SpO2 98% BMI 25.60 kg/m² No results found for this or any previous visit (from the past 24 hour(s)). [unfilled] Review of Systems Constitutional: Negative for chills and fever. HENT: Negative. Eyes: Negative. Respiratory: Negative. Cardiovascular: Negative. Gastrointestinal: Negative for abdominal pain and nausea. Skin: Negative. Neurological: Negative. Physical Exam:   
 
Constitutional: pt is oriented to person, place, and time. HENT:  
Head: Normocephalic and atraumatic. Eyes: Pupils are equal, round, and reactive to light. EOM are normal.  
Cardiovascular: Normal rate, regular rhythm and normal heart sounds. Pulmonary/Chest: Breath sounds normal. No wheezes. No rales. Exhibits no tenderness. Abdominal: Soft. Bowel sounds are normal. There is no abdominal tenderness. There is no rebound and no guarding. Musculoskeletal: Normal range of motion. Neurological: pt is alert and oriented to person, place, and time. XR CHEST PORT Final Result XR CHEST PA LAT Final Result Impression: Large right-sided pleural effusion and associated  
compression/consolidation of the right lung, increased. Mild atelectasis at the  
left base. Possible small left pleural effusion. XR CHEST PORT Final Result IMPRESSION: Diffuse airspace disease, right hemithorax, with improved aeration from previous. Differential diagnosis includes pneumonia, atelectasis, and/or  
reexpansion pulmonary edema. No pneumothorax. [address] Final Result IMPRESSION:   
Successful ultrasound-guided thoracentesis of right large pleural effusion, with  
approximately 1.3 L serous pleural fluid removed. Pleural fluid sample was also  
sent for lab analysis. CT CHEST WO CONT Final Result IMPRESSION: Near complete collapse of the right lung secondary to large pleural  
effusion XR HIP LT W OR WO PELV 2-3 VWS Final Result IMPRESSION:  
1. No acute bony findings. XR CHEST SNGL V Final Result No results found for this or any previous visit (from the past 24 hour(s)). Results Procedure Component Value Units Date/Time CULTURE, URINE [[id number]] Collected:  11/24/20 0830 Order Status:  Completed Specimen:  Urine Updated:  11/25/20 1533 Special Requests: No Special Requests Culture result: No Growth (<1000 cfu/mL) Labs:  
 
No results for input(s): WBC, HGB, HCT, PLT, HGBEXT, HCTEXT, PLTEXT, HGBEXT, HCTEXT, PLTEXT in the last 72 hours. No results for input(s): NA, K, CL, CO2, BUN, CREA, GLU, CA, MG, PHOS, URICA in the last 72 hours. No results for input(s): ALT, AP, TBIL, TBILI, TP, ALB, GLOB, GGT, AML, LPSE in the last 72 hours. No lab exists for component: SGOT, GPT, AMYP, HLPSE No results for input(s): INR, PTP, APTT, INREXT, INREXT in the last 72 hours. No results for input(s): FE, TIBC, PSAT, FERR in the last 72 hours. No results found for: FOL, RBCF No results for input(s): PH, PCO2, PO2 in the last 72 hours. No results for input(s): CPK, CKNDX, TROIQ in the last 72 hours. No lab exists for component: CPKMB No results found for: CHOL, CHOLX, CHLST, CHOLV, HDL, HDLP, LDL, LDLC, DLDLP, TGLX, TRIGL, TRIGP, CHHD, CHHDX Lab Results Component Value Date/Time  Glucose (POC) 98 11/23/2020 08:59 AM  
 Glucose (POC) 80 11/20/2020 02:59 AM  
 Glucose (POC) 220 (H) 11/08/2020 11:01 AM  
 Glucose (POC) 110 (H) 11/08/2020 08:26 AM  
 Glucose (POC) 86 11/06/2020 05:29 PM  
 
Lab Results Component Value Date/Time Color Dark Yellow 11/24/2020 08:30 AM  
 Appearance Clear 11/24/2020 08:30 AM  
 Specific gravity 1.018 11/24/2020 08:30 AM  
 pH (UA) 5.0 11/24/2020 08:30 AM  
 Protein Negative 11/24/2020 08:30 AM  
 Glucose Negative 11/24/2020 08:30 AM  
 Ketone Negative 11/24/2020 08:30 AM  
 Bilirubin Negative 11/24/2020 08:30 AM  
 Urobilinogen 4.0 (H) 11/24/2020 08:30 AM  
 Nitrites Negative 11/24/2020 08:30 AM  
 Leukocyte Esterase Negative 11/24/2020 08:30 AM  
 Bacteria Negative 11/24/2020 08:30 AM  
 WBC 0-4 11/24/2020 08:30 AM  
 RBC 0-5 11/24/2020 08:30 AM  
 
   
Assessment & Plan Metastatic Adenocarcinoma 11/13 Cytology- Right Pleural fluid positive for adenocarcinoma Oncology consult - Recommends hospice care due to dementia and poor performance status. Anemia of Chronic disease- stable 11/20-Hgb 11.4 Will continue to monitor H&H Paroxysmal A. Fib- 11/7 ECG 
metoprolol tartrate (LOPRESSOR) tablet 50 mg Add Cardizem  mg daily 
heparin porcine injection 5,000 Units Hx of Congestive Heart Failure 11/08 proBNP 1,127 Hypokalemia Replace  potassium Pleural effusion 11/06 Chest x-ray shows opacities/pleural effusion - Chest CT shows Near complete collapse of the right lung secondary to large pleural 
Effusion status post thoracocentesis Static post thoracocentesis - cytology results positive for metastatic adenocarcinoma; culture negative Hyponatremia/likely from diuretic 
11/20- Sodium (139) -Stable Hypertension- BP controlled 
metoprolol tartrate (LOPRESSOR) tablet 50 mg Failure to Thrive /severe malnutrition 11/20 Hypoalbuminemia ( 1.7) w/low total protein(6.2) BMI: 25.60  
megestroL (MEGACE) 400 mg/10 mL (10 mL) oral suspension 400 mg Hx of Dementia adjustment disorder with a depressed mood Hx of Hyperlipidemia Hx of  lung cancer Covid screening negative Patient on metoprolol 50 mg twice a day Repeat the labs PT/OT recommends 5/days week in SNF Discussed with the  regarding discharge planning Waiting Medicaid application before discharge to nursing home Seen by the psychiatrist and he is competent to make decision about healthcare he is physically not capable of taking care of herself willing to have help and use third-party Discussed with the patient with the nursing supervisor She is DNR now  talking to the family regarding going home with hospice Add dulera and steroids Current Facility-Administered Medications:  
  guaiFENesin ER (MUCINEX) tablet 600 mg, 600 mg, Oral, Q12H, Monae MACIAS MD, 600 mg at 11/29/20 8722   mometasone-formoterol (DULERA) 200mcg-5mcg/puff, 2 Puff, Inhalation, BID RT, Kale Warner MD, 2 Puff at 11/29/20 0866   predniSONE (DELTASONE) tablet 10 mg, 10 mg, Oral, DAILY WITH BREAKFAST, Monae MACIAS MD, 10 mg at 11/29/20 5813   apixaban (ELIQUIS) tablet 2.5 mg, 2.5 mg, Oral, BID, Dudley CHUNG MD, 2.5 mg at 11/29/20 0793   dilTIAZem ER (CARDIZEM SR) capsule 120 mg, 120 mg, Oral, Q12H, Jesus Maldonado MD, 120 mg at 11/29/20 7543   atorvastatin (LIPITOR) tablet 10 mg, 10 mg, Oral, DAILY, Jesus Maldonado MD, 10 mg at 11/29/20 5758   escitalopram oxalate (LEXAPRO) tablet 5 mg, 5 mg, Oral, DAILY, Abundio An MD, 5 mg at 11/29/20 0900   megestroL (MEGACE) 400 mg/10 mL (10 mL) oral suspension 400 mg, 400 mg, Oral, BID, Jesus Maldonado MD, 400 mg at 11/29/20 4749   metoprolol tartrate (LOPRESSOR) tablet 50 mg, 50 mg, Oral, Q12H, Monae MACIAS MD, 50 mg at 11/29/20 7387   influenza vaccine 2020-21 (4 yrs+)(PF) (FLUCELVAX QUAD) injection 0.5 mL, 0.5 mL, IntraMUSCular, PRIOR TO DISCHARGE, Kadeem Maldonado MD 
   albuterol-ipratropium (DUO-NEB) 2.5 MG-0.5 MG/3 ML, 3 mL, Nebulization, Q6H PRN, Asim Red MD 
 
 
 
 
   
   
  
 
 
 
 
  
   
   
   
   
   
   
   
   
   
   
   
   
   
 
 
 
 
  
General Daily Progress Note Patient Name: Lety Wilder YOB: 1940 Age: 
79 y.o. Admit Date: 11/3/2020 Subjective:  
Patient is a 79 y.o female with a past medical history of dementia, CHF, hyperlipidemia, and hypertension Has cough Patient resting in bed not in distress still not eating drinking well Objective:  
 
Visit Vitals BP (!) 140/76 (BP 1 Location: Left arm, BP Patient Position: At rest) Pulse 89 Temp 99.9 °F (37.7 °C) Resp 24 Ht 5' 2.01\" (1.575 m) Wt 63.5 kg (140 lb) SpO2 98% BMI 25.60 kg/m² No results found for this or any previous visit (from the past 24 hour(s)). [unfilled] Review of Systems Constitutional: Negative for chills and fever. HENT: Negative. Eyes: Negative. Respiratory: Negative. Cardiovascular: Negative. Gastrointestinal: Negative for abdominal pain and nausea. Skin: Negative. Neurological: Negative. Physical Exam:   
 
Constitutional: pt is oriented to person, place, and time. HENT:  
Head: Normocephalic and atraumatic. Eyes: Pupils are equal, round, and reactive to light. EOM are normal.  
Cardiovascular: Normal rate, regular rhythm and normal heart sounds. Pulmonary/Chest: Breath sounds normal. No wheezes. No rales. Exhibits no tenderness. Abdominal: Soft. Bowel sounds are normal. There is no abdominal tenderness. There is no rebound and no guarding. Musculoskeletal: Normal range of motion. Neurological: pt is alert and oriented to person, place, and time. XR CHEST PORT Final Result XR CHEST PA LAT Final Result Impression: Large right-sided pleural effusion and associated  
compression/consolidation of the right lung, increased.   Mild atelectasis at the  
left base. Possible small left pleural effusion. XR CHEST PORT Final Result IMPRESSION: Diffuse airspace disease, right hemithorax, with improved aeration  
from previous. Differential diagnosis includes pneumonia, atelectasis, and/or  
reexpansion pulmonary edema. No pneumothorax. [address] Final Result IMPRESSION:   
Successful ultrasound-guided thoracentesis of right large pleural effusion, with  
approximately 1.3 L serous pleural fluid removed. Pleural fluid sample was also  
sent for lab analysis. CT CHEST WO CONT Final Result IMPRESSION: Near complete collapse of the right lung secondary to large pleural  
effusion XR HIP LT W OR WO PELV 2-3 VWS Final Result IMPRESSION:  
1. No acute bony findings. XR CHEST SNGL V Final Result No results found for this or any previous visit (from the past 24 hour(s)). Results Procedure Component Value Units Date/Time CULTURE, URINE [[id number]] Collected:  11/24/20 0830 Order Status:  Completed Specimen:  Urine Updated:  11/25/20 1696 Special Requests: No Special Requests Culture result: No Growth (<1000 cfu/mL) Labs:  
 
No results for input(s): WBC, HGB, HCT, PLT, HGBEXT, HCTEXT, PLTEXT, HGBEXT, HCTEXT, PLTEXT in the last 72 hours. No results for input(s): NA, K, CL, CO2, BUN, CREA, GLU, CA, MG, PHOS, URICA in the last 72 hours. No results for input(s): ALT, AP, TBIL, TBILI, TP, ALB, GLOB, GGT, AML, LPSE in the last 72 hours. No lab exists for component: SGOT, GPT, AMYP, HLPSE No results for input(s): INR, PTP, APTT, INREXT, INREXT in the last 72 hours. No results for input(s): FE, TIBC, PSAT, FERR in the last 72 hours. No results found for: FOL, RBCF No results for input(s): PH, PCO2, PO2 in the last 72 hours. No results for input(s): CPK, CKNDX, TROIQ in the last 72 hours. No lab exists for component: CPKMB No results found for: CHOL, CHOLX, CHLST, CHOLV, HDL, HDLP, LDL, LDLC, DLDLP, TGLX, TRIGL, TRIGP, CHHD, CHHDX Lab Results Component Value Date/Time Glucose (POC) 98 11/23/2020 08:59 AM  
 Glucose (POC) 80 11/20/2020 02:59 AM  
 Glucose (POC) 220 (H) 11/08/2020 11:01 AM  
 Glucose (POC) 110 (H) 11/08/2020 08:26 AM  
 Glucose (POC) 86 11/06/2020 05:29 PM  
 
Lab Results Component Value Date/Time Color Dark Yellow 11/24/2020 08:30 AM  
 Appearance Clear 11/24/2020 08:30 AM  
 Specific gravity 1.018 11/24/2020 08:30 AM  
 pH (UA) 5.0 11/24/2020 08:30 AM  
 Protein Negative 11/24/2020 08:30 AM  
 Glucose Negative 11/24/2020 08:30 AM  
 Ketone Negative 11/24/2020 08:30 AM  
 Bilirubin Negative 11/24/2020 08:30 AM  
 Urobilinogen 4.0 (H) 11/24/2020 08:30 AM  
 Nitrites Negative 11/24/2020 08:30 AM  
 Leukocyte Esterase Negative 11/24/2020 08:30 AM  
 Bacteria Negative 11/24/2020 08:30 AM  
 WBC 0-4 11/24/2020 08:30 AM  
 RBC 0-5 11/24/2020 08:30 AM  
 
   
Assessment & Plan Metastatic Adenocarcinoma 11/13 Cytology- Right Pleural fluid positive for adenocarcinoma Oncology consult - Recommends hospice care due to dementia and poor performance status. Anemia of Chronic disease- stable 11/20-Hgb 11.4 Will continue to monitor H&H Paroxysmal A. Fib- 11/7 ECG 
metoprolol tartrate (LOPRESSOR) tablet 50 mg Add Cardizem  mg daily 
heparin porcine injection 5,000 Units Hx of Congestive Heart Failure 11/08 proBNP 1,127 Hypokalemia Replace  potassium Pleural effusion 11/06 Chest x-ray shows opacities/pleural effusion - Chest CT shows Near complete collapse of the right lung secondary to large pleural 
Effusion status post thoracocentesis Static post thoracocentesis - cytology results positive for metastatic adenocarcinoma; culture negative Hyponatremia/likely from diuretic 
11/20- Sodium (139) -Stable Hypertension- BP controlled 
metoprolol tartrate (LOPRESSOR) tablet 50 mg  
 
 Failure to Thrive /severe malnutrition 11/20 Hypoalbuminemia ( 1.7) w/low total protein(6.2) BMI: 25.60  
megestroL (MEGACE) 400 mg/10 mL (10 mL) oral suspension 400 mg Hx of Dementia 
adjustment disorder with a depressed mood Hx of Hyperlipidemia Hx of  lung cancer Covid screening negative Patient on metoprolol 50 mg twice a day Repeat the labs PT/OT recommends 5/days week in SNF Discussed with the  regarding discharge planning Waiting Medicaid application before discharge to nursing home Seen by the psychiatrist and he is competent to make decision about healthcare he is physically not capable of taking care of herself willing to have help and use third-party Discussed with the patient with the nursing supervisor She is DNR now  talking to the family regarding going home with hospice Add dulera and steroids Current Facility-Administered Medications:  
  guaiFENesin ER (MUCINEX) tablet 600 mg, 600 mg, Oral, Q12H, Amy MACIAS MD, 600 mg at 11/29/20 8736   mometasone-formoterol (DULERA) 200mcg-5mcg/puff, 2 Puff, Inhalation, BID RT, Lindsay Judge MD, 2 Puff at 11/29/20 3956   predniSONE (DELTASONE) tablet 10 mg, 10 mg, Oral, DAILY WITH BREAKFAST, Amy MACIAS MD, 10 mg at 11/29/20 5264   apixaban (ELIQUIS) tablet 2.5 mg, 2.5 mg, Oral, BID, Jin CHUNG MD, 2.5 mg at 11/29/20 8653   dilTIAZem ER (CARDIZEM SR) capsule 120 mg, 120 mg, Oral, Q12H, Jesus Maldonado MD, 120 mg at 11/29/20 1200   atorvastatin (LIPITOR) tablet 10 mg, 10 mg, Oral, DAILY, Jesus Maldonado MD, 10 mg at 11/29/20 3599   escitalopram oxalate (LEXAPRO) tablet 5 mg, 5 mg, Oral, DAILY, Abundio An MD, 5 mg at 11/29/20 0900   megestroL (MEGACE) 400 mg/10 mL (10 mL) oral suspension 400 mg, 400 mg, Oral, BID, Jesus Maldonado MD, 400 mg at 11/29/20 3829   metoprolol tartrate (LOPRESSOR) tablet 50 mg, 50 mg, Oral, Q12H, Sarah, Shruthi Armstrong MD, 50 mg at 11/29/20 8632   influenza vaccine 2020-21 (4 yrs+)(PF) (FLUCELVAX QUAD) injection 0.5 mL, 0.5 mL, IntraMUSCular, PRIOR TO DISCHARGE, Jesus Maldonado MD 
  albuterol-ipratropium (DUO-NEB) 2.5 MG-0.5 MG/3 ML, 3 mL, Nebulization, Q6H PRN, Asim Red MD 
 
 
 
 
   
   
  
 
 
 
 
  
   
   
   
   
   
   
   
   
   
   
   
   
   
 
 
 
 
  
General Daily Progress Note Patient Name: Seth Ruiz YOB: 1940 Age: 
79 y.o. Admit Date: 11/3/2020 Subjective:  
Patient is a 79 y.o female with a past medical history of dementia, CHF, hyperlipidemia, and hypertension Has cough. drowsy Objective:  
 
Visit Vitals BP (!) 140/76 (BP 1 Location: Left arm, BP Patient Position: At rest) Pulse 89 Temp 99.9 °F (37.7 °C) Resp 24 Ht 5' 2.01\" (1.575 m) Wt 63.5 kg (140 lb) SpO2 98% BMI 25.60 kg/m² No results found for this or any previous visit (from the past 24 hour(s)). [unfilled] Review of Systems Constitutional: Negative for chills and fever. HENT: Negative. Eyes: Negative. Respiratory: Negative. Cardiovascular: Negative. Gastrointestinal: Negative for abdominal pain and nausea. Skin: Negative. Neurological: Negative. Physical Exam:   
 
Constitutional: pt is oriented to person, place, and time. HENT:  
Head: Normocephalic and atraumatic. Eyes: Pupils are equal, round, and reactive to light. EOM are normal.  
Cardiovascular: Normal rate, regular rhythm and normal heart sounds. Pulmonary/Chest: Breath sounds normal. No wheezes. No rales. Exhibits no tenderness. Abdominal: Soft. Bowel sounds are normal. There is no abdominal tenderness. There is no rebound and no guarding. Musculoskeletal: Normal range of motion. Neurological: pt is alert and oriented to person, place, and time. XR CHEST PORT Final Result XR CHEST PA LAT Final Result Impression: Large right-sided pleural effusion and associated  
compression/consolidation of the right lung, increased. Mild atelectasis at the  
left base. Possible small left pleural effusion. XR CHEST PORT Final Result IMPRESSION: Diffuse airspace disease, right hemithorax, with improved aeration  
from previous. Differential diagnosis includes pneumonia, atelectasis, and/or  
reexpansion pulmonary edema. No pneumothorax. [address] Final Result IMPRESSION:   
Successful ultrasound-guided thoracentesis of right large pleural effusion, with  
approximately 1.3 L serous pleural fluid removed. Pleural fluid sample was also  
sent for lab analysis. CT CHEST WO CONT Final Result IMPRESSION: Near complete collapse of the right lung secondary to large pleural  
effusion XR HIP LT W OR WO PELV 2-3 VWS Final Result IMPRESSION:  
1. No acute bony findings. XR CHEST SNGL V Final Result No results found for this or any previous visit (from the past 24 hour(s)). Results Procedure Component Value Units Date/Time CULTURE, URINE [[id number]] Collected:  11/24/20 0830 Order Status:  Completed Specimen:  Urine Updated:  11/25/20 1533 Special Requests: No Special Requests Culture result: No Growth (<1000 cfu/mL) Labs:  
 
No results for input(s): WBC, HGB, HCT, PLT, HGBEXT, HCTEXT, PLTEXT, HGBEXT, HCTEXT, PLTEXT in the last 72 hours. No results for input(s): NA, K, CL, CO2, BUN, CREA, GLU, CA, MG, PHOS, URICA in the last 72 hours. No results for input(s): ALT, AP, TBIL, TBILI, TP, ALB, GLOB, GGT, AML, LPSE in the last 72 hours. No lab exists for component: SGOT, GPT, AMYP, HLPSE No results for input(s): INR, PTP, APTT, INREXT, INREXT in the last 72 hours. No results for input(s): FE, TIBC, PSAT, FERR in the last 72 hours.   
No results found for: FOL, RBCF  
 No results for input(s): PH, PCO2, PO2 in the last 72 hours. No results for input(s): CPK, CKNDX, TROIQ in the last 72 hours. No lab exists for component: CPKMB No results found for: CHOL, CHOLX, CHLST, CHOLV, HDL, HDLP, LDL, LDLC, DLDLP, TGLX, TRIGL, TRIGP, CHHD, CHHDX Lab Results Component Value Date/Time Glucose (POC) 98 11/23/2020 08:59 AM  
 Glucose (POC) 80 11/20/2020 02:59 AM  
 Glucose (POC) 220 (H) 11/08/2020 11:01 AM  
 Glucose (POC) 110 (H) 11/08/2020 08:26 AM  
 Glucose (POC) 86 11/06/2020 05:29 PM  
 
Lab Results Component Value Date/Time Color Dark Yellow 11/24/2020 08:30 AM  
 Appearance Clear 11/24/2020 08:30 AM  
 Specific gravity 1.018 11/24/2020 08:30 AM  
 pH (UA) 5.0 11/24/2020 08:30 AM  
 Protein Negative 11/24/2020 08:30 AM  
 Glucose Negative 11/24/2020 08:30 AM  
 Ketone Negative 11/24/2020 08:30 AM  
 Bilirubin Negative 11/24/2020 08:30 AM  
 Urobilinogen 4.0 (H) 11/24/2020 08:30 AM  
 Nitrites Negative 11/24/2020 08:30 AM  
 Leukocyte Esterase Negative 11/24/2020 08:30 AM  
 Bacteria Negative 11/24/2020 08:30 AM  
 WBC 0-4 11/24/2020 08:30 AM  
 RBC 0-5 11/24/2020 08:30 AM  
 
   
Assessment & Plan Metastatic Adenocarcinoma 11/13 Cytology- Right Pleural fluid positive for adenocarcinoma Oncology consult - Recommends hospice care due to dementia and poor performance status. Anemia of Chronic disease- stable 11/20-Hgb 11.4 Will continue to monitor H&H Paroxysmal A. Fib- 11/7 ECG 
metoprolol tartrate (LOPRESSOR) tablet 50 mg Add Cardizem  mg daily 
heparin porcine injection 5,000 Units Hx of Congestive Heart Failure 11/08 proBNP 1,127 Hypokalemia Replace  potassium Pleural effusion 11/06 Chest x-ray shows opacities/pleural effusion - Chest CT shows Near complete collapse of the right lung secondary to large pleural 
Effusion status post thoracocentesis Static post thoracocentesis - cytology results positive for metastatic adenocarcinoma; culture negative Hyponatremia/likely from diuretic 
11/20- Sodium (139) -Stable Hypertension- BP controlled 
metoprolol tartrate (LOPRESSOR) tablet 50 mg Failure to Thrive /severe malnutrition 11/20 Hypoalbuminemia ( 1.7) w/low total protein(6.2) BMI: 25.60  
megestroL (MEGACE) 400 mg/10 mL (10 mL) oral suspension 400 mg Hx of Dementia 
adjustment disorder with a depressed mood Hx of Hyperlipidemia Hx of  lung cancer Covid screening negative Patient on metoprolol 50 mg twice a day Repeat the labs PT/OT recommends 5/days week in SNF Discussed with the  regarding discharge planning Waiting Medicaid application before discharge to nursing home Seen by the psychiatrist and he is competent to make decision about healthcare he is physically not capable of taking care of herself willing to have help and use third-party Discussed with the patient with the nursing supervisor She is DNR now  talking to the family regarding going home with hospice Add dulera and steroids Current Facility-Administered Medications:  
  guaiFENesin ER (MUCINEX) tablet 600 mg, 600 mg, Oral, Q12H, Mylene MACIAS MD, 600 mg at 11/29/20 9653   mometasone-formoterol (DULERA) 200mcg-5mcg/puff, 2 Puff, Inhalation, BID RT, Magdaleno Baer MD, 2 Puff at 11/29/20 4070   predniSONE (DELTASONE) tablet 10 mg, 10 mg, Oral, DAILY WITH BREAKFAST, Mylene MACIAS MD, 10 mg at 11/29/20 6822   apixaban (ELIQUIS) tablet 2.5 mg, 2.5 mg, Oral, BID, Fani CHUNG MD, 2.5 mg at 11/29/20 5312   dilTIAZem ER (CARDIZEM SR) capsule 120 mg, 120 mg, Oral, Q12H, Jesus Maldonado MD, 120 mg at 11/29/20 8185   atorvastatin (LIPITOR) tablet 10 mg, 10 mg, Oral, DAILY, Jesus Maldonado MD, 10 mg at 11/29/20 1336   escitalopram oxalate (LEXAPRO) tablet 5 mg, 5 mg, Oral, DAILY, Abundio An MD, 5 mg at 11/29/20 0900   megestroL (MEGACE) 400 mg/10 mL (10 mL) oral suspension 400 mg, 400 mg, Oral, BID, Jesus Maldonado MD, 400 mg at 11/29/20 2336   metoprolol tartrate (LOPRESSOR) tablet 50 mg, 50 mg, Oral, Q12H, Arvin MACIAS MD, 50 mg at 11/29/20 0909   influenza vaccine 2020-21 (4 yrs+)(PF) (FLUCELVAX QUAD) injection 0.5 mL, 0.5 mL, IntraMUSCular, PRIOR TO DISCHARGE, Jesus Maldonado MD 
  albuterol-ipratropium (DUO-NEB) 2.5 MG-0.5 MG/3 ML, 3 mL, Nebulization, Q6H PRN, Lilibeth Red MD

## 2020-11-30 NOTE — PROGRESS NOTES
PHYSICAL THERAPY TREATMENT Patient: Connie Nayak (79 y.o. female) Date: 11/30/2020 Diagnosis: Hyponatremia [E87.1] <principal problem not specified> Precautions:   
Chart, physical therapy assessment, plan of care and goals were reviewed. ASSESSMENT Patient continues with skilled PT services and is progressing slowly  towards goals. Pt needed increased  level of assist today for  bed mobility with modA , cueing  for proper hand placement on bed rails and additional time , close SUP for sitting balance , c/o dizziness on sitting , felt better for prolonged sitting , maxA for sit <>stand transfers , unable to stand completely , maxA for scooting up on the bed . Completed ther ex's for b/l Le to increase strength and endurance. Pt needed rest breaks throughout the PT session , SOB with activity , O2 sats- 96%  . Current Level of Function Impacting Discharge (mobility/balance): Requires mod/maxA for functional mobility Other factors to consider for discharge: Decreased activity tolerance , severity of deficits PLAN : 
Patient continues to benefit from skilled intervention to address the above impairments. Continue treatment per established plan of care. to address goals. Recommendation for discharge: (in order for the patient to meet his/her long term goals) Therapy up to 5 days/week in SNF setting This discharge recommendation: 
Has been made in collaboration with the attending provider and/or case management IF patient discharges home will need the following DME: to be determined (TBD) SUBJECTIVE:  
Patient stated I am feeling ok  OBJECTIVE DATA SUMMARY:  
Critical Behavior: 
Neurologic State: Alert Orientation Level: Oriented to person Cognition: Follows commands Functional Mobility Training: 
Bed Mobility: 
Rolling: Minimum assistance Supine to Sit: Moderate assistance Sit to Supine: Moderate assistance Scooting: Maximum assistance Transfers: Sit to Stand: Maximum assistance Stand to Sit: Maximum assistance Balance: 
Sitting: Intact; With support Therapeutic Exercises:  
 
 
EXERCISE Sets Reps Active Active Assist  
Passive Self ROM Comments Ankle Pumps  12 [x] [] [] [] Quad Sets/Glut Sets   [] [] [] [] Hamstring Sets   [] [] [] [] Short Arc Quads   [] [] [] [] Heel Slides   [] [] [] [] Straight Leg Raises   [] [] [] [] Hip abd/add  10 [x] [] [] [] Long Arc Quads  10 [x] [] [] [] Marching   [] [] [] []   
   [] [] [] []   
  
Pain Ratin/10 Activity Tolerance:  
Fair, requires frequent rest breaks, and observed SOB with activity Please refer to the flowsheet for vital signs taken during this treatment. After treatment patient left in no apparent distress:  
Supine in bed, Call bell within reach, and Bed / chair alarm activated COMMUNICATION/COLLABORATION:  
The patients plan of care was discussed with: Registered nurse. Problem: Mobility Impaired (Adult and Pediatric) Goal: *Acute Goals and Plan of Care (Insert Text) Description: Patient will move from supine to sit and sit to supine , scoot up and down, and roll side to side in bed with min assistance  within 7 day(s). Patient will transfer from bed to chair and chair to bed with min assistance  using the least restrictive device within 7 day(s). Patient will improve static sitting balance to minimal assistance within 1 week(s)--MET Patient will ambulate 30 feet with minimal assistance with least restrictive device within 1 weeks. Outcome: Progressing Towards Goal 
  
 
Sara Rios Time Calculation: 38 mins

## 2020-11-30 NOTE — PROGRESS NOTES
General Daily Progress Note Patient Name: Lety Wilder YOB: 1940 Age: 
79 y.o. Admit Date: 11/3/2020 Subjective:  
Ms. Casimiro Pagan was seen during rounds laying in bed awake, alert and in good spirits; says \"she slept well and can't complain\". Pt. denies chest pain, shortness of breath, cough, nausea or vomiting. No acute complaints reported at this time. Pt did not eat breakfast and says she dosen't have much of an appetite. Objective:  
 
Visit Vitals BP (!) 147/95 (BP 1 Location: Left arm) Pulse 86 Temp 98.3 °F (36.8 °C) Resp 16 Ht 5' 2.01\" (1.575 m) Wt 63.5 kg (140 lb) SpO2 100% BMI 25.60 kg/m² No results found for this or any previous visit (from the past 24 hour(s)). [unfilled] Review of Systems Constitutional: Negative for chills and fever. HENT: Negative. Eyes: Negative. Respiratory: Negative. Cardiovascular: Negative. Gastrointestinal: Negative for abdominal pain and nausea. Skin: Negative. Neurological: Negative. Physical Exam:   
 
Constitutional: pt is oriented to person, place, and time. HENT:  
Head: Normocephalic and atraumatic. Eyes: Pupils are equal, round, and reactive to light. EOM are normal.  
Cardiovascular: Normal rate, regular rhythm and normal heart sounds. Pulmonary/Chest: Breath sounds normal. No wheezes. No rales. Exhibits no tenderness. Abdominal: Soft. Bowel sounds are normal. There is no abdominal tenderness. There is no rebound and no guarding. Musculoskeletal: Normal range of motion. Neurological: pt is alert and oriented to person, place, and time. XR CHEST PORT Final Result XR CHEST PA LAT Final Result Impression: Large right-sided pleural effusion and associated  
compression/consolidation of the right lung, increased. Mild atelectasis at the  
left base. Possible small left pleural effusion. XR CHEST PORT Final Result IMPRESSION: Diffuse airspace disease, right hemithorax, with improved aeration  
from previous. Differential diagnosis includes pneumonia, atelectasis, and/or  
reexpansion pulmonary edema. No pneumothorax. [address] Final Result IMPRESSION:   
Successful ultrasound-guided thoracentesis of right large pleural effusion, with  
approximately 1.3 L serous pleural fluid removed. Pleural fluid sample was also  
sent for lab analysis. CT CHEST WO CONT Final Result IMPRESSION: Near complete collapse of the right lung secondary to large pleural  
effusion XR HIP LT W OR WO PELV 2-3 VWS Final Result IMPRESSION:  
1. No acute bony findings. XR CHEST SNGL V Final Result No results found for this or any previous visit (from the past 24 hour(s)). Results Procedure Component Value Units Date/Time CULTURE, URINE [[id number]] Collected:  11/24/20 0830 Order Status:  Completed Specimen:  Urine Updated:  11/25/20 1533 Special Requests: No Special Requests Culture result: No Growth (<1000 cfu/mL) Labs:  
 
No results for input(s): WBC, HGB, HCT, PLT, HGBEXT, HCTEXT, PLTEXT, HGBEXT, HCTEXT, PLTEXT in the last 72 hours. No results for input(s): NA, K, CL, CO2, BUN, CREA, GLU, CA, MG, PHOS, URICA in the last 72 hours. No results for input(s): ALT, AP, TBIL, TBILI, TP, ALB, GLOB, GGT, AML, LPSE in the last 72 hours. No lab exists for component: SGOT, GPT, AMYP, HLPSE No results for input(s): INR, PTP, APTT, INREXT, INREXT in the last 72 hours. No results for input(s): FE, TIBC, PSAT, FERR in the last 72 hours. No results found for: FOL, RBCF No results for input(s): PH, PCO2, PO2 in the last 72 hours. No results for input(s): CPK, CKNDX, TROIQ in the last 72 hours. No lab exists for component: CPKMB No results found for: CHOL, CHOLX, CHLST, CHOLV, HDL, HDLP, LDL, LDLC, DLDLP, TGLX, TRIGL, TRIGP, CHHD, CHHDX Lab Results Component Value Date/Time Glucose (POC) 98 11/23/2020 08:59 AM  
 Glucose (POC) 80 11/20/2020 02:59 AM  
 Glucose (POC) 220 (H) 11/08/2020 11:01 AM  
 Glucose (POC) 110 (H) 11/08/2020 08:26 AM  
 Glucose (POC) 86 11/06/2020 05:29 PM  
 
Lab Results Component Value Date/Time Color Dark Yellow 11/24/2020 08:30 AM  
 Appearance Clear 11/24/2020 08:30 AM  
 Specific gravity 1.018 11/24/2020 08:30 AM  
 pH (UA) 5.0 11/24/2020 08:30 AM  
 Protein Negative 11/24/2020 08:30 AM  
 Glucose Negative 11/24/2020 08:30 AM  
 Ketone Negative 11/24/2020 08:30 AM  
 Bilirubin Negative 11/24/2020 08:30 AM  
 Urobilinogen 4.0 (H) 11/24/2020 08:30 AM  
 Nitrites Negative 11/24/2020 08:30 AM  
 Leukocyte Esterase Negative 11/24/2020 08:30 AM  
 Bacteria Negative 11/24/2020 08:30 AM  
 WBC 0-4 11/24/2020 08:30 AM  
 RBC 0-5 11/24/2020 08:30 AM  
 
   
Assessment & Plan Metastatic Adenocarcinoma 11/13 Cytology- Right Pleural fluid positive for adenocarcinoma Oncology consult - Recommends hospice care due to dementia and poor performance status. Anemia of Chronic disease- stable 11/20-Hgb 11.4 Will continue to monitor H&H Paroxysmal A. Fib- 11/7 ECG 
metoprolol tartrate (LOPRESSOR) tablet 50 mg Add Cardizem  mg daily 
heparin porcine injection 5,000 Units Hx of Congestive Heart Failure 11/08 proBNP 1,127 Hypokalemia Replace  potassium Pleural effusion 11/06 Chest x-ray shows opacities/pleural effusion - Chest CT shows Near complete collapse of the right lung secondary to large pleural 
Effusion status post thoracocentesis Static post thoracocentesis - cytology results positive for metastatic adenocarcinoma; culture negative Hyponatremia/likely from diuretic 
11/20- Sodium (139) -Stable Hypertension- BP controlled 
metoprolol tartrate (LOPRESSOR) tablet 50 mg Failure to Thrive /severe malnutrition 11/20 Hypoalbuminemia ( 1.7) w/low total protein(6.2) BMI: 25.60 megestroL (MEGACE) 400 mg/10 mL (10 mL) oral suspension 400 mg Hx of Dementia 
adjustment disorder with a depressed mood Hx of Hyperlipidemia Hx of  lung cancer Covid screening negative Patient on metoprolol 50 mg twice a day Repeat the labs PT/OT recommends 5/days week in SNF Discussed with the  regarding discharge planning Waiting Medicaid application before discharge to nursing home Seen by the psychiatrist and he is competent to make decision about healthcare She is physically not capable of taking care of herself willing to have help and use third-party Also I think patient unable to take care of a financial issue at this this time due to her  medical condition Discussed with the patient with the nursing supervisor She is DNR now  talking to the family regarding going home with hospice Add dulera and steroids Current Facility-Administered Medications:  
  guaiFENesin ER (MUCINEX) tablet 600 mg, 600 mg, Oral, Q12H, Emery MACIAS MD, 600 mg at 11/30/20 2514   mometasone-formoterol (DULERA) 200mcg-5mcg/puff, 2 Puff, Inhalation, BID RT, Beth Gallegos MD, 2 Puff at 11/30/20 1075   predniSONE (DELTASONE) tablet 10 mg, 10 mg, Oral, DAILY WITH BREAKFAST, Emery MACIAS MD, 10 mg at 11/30/20 5568   apixaban (ELIQUIS) tablet 2.5 mg, 2.5 mg, Oral, BID, Rodrigo CHUNG MD, 2.5 mg at 11/30/20 8264   dilTIAZem ER (CARDIZEM SR) capsule 120 mg, 120 mg, Oral, Q12H, Jesus Maldonado MD, 120 mg at 11/30/20 0851 
  atorvastatin (LIPITOR) tablet 10 mg, 10 mg, Oral, DAILY, Jesus Maldonado MD, 10 mg at 11/30/20 2411   escitalopram oxalate (LEXAPRO) tablet 5 mg, 5 mg, Oral, DAILY, María BAINS MD, 5 mg at 11/30/20 5530   megestroL (MEGACE) 400 mg/10 mL (10 mL) oral suspension 400 mg, 400 mg, Oral, BID, Jesus Maldonado MD, 400 mg at 11/30/20 8441   metoprolol tartrate (LOPRESSOR) tablet 50 mg, 50 mg, Oral, Q12H, Agada, Jose A Card MD, 50 mg at 11/30/20 0851 
  influenza vaccine 2020-21 (4 yrs+)(PF) (FLUCELVAX QUAD) injection 0.5 mL, 0.5 mL, IntraMUSCular, PRIOR TO DISCHARGE, Jesus Maldonado MD 
  albuterol-ipratropium (DUO-NEB) 2.5 MG-0.5 MG/3 ML, 3 mL, Nebulization, Q6H PRN, Orion Red MD

## 2020-11-30 NOTE — PROGRESS NOTES
General Daily Progress Note Patient Name: Glenna Barbour YOB: 1940 Age: 
79 y.o. Admit Date: 11/3/2020 Subjective:  
Ms. Vel Ball was seen during rounds laying in bed awake, alert and in good spirits; says \"she slept well and can't complain\". Pt. denies chest pain, shortness of breath, cough, nausea or vomiting. No acute complaints reported at this time. Pt did not eat breakfast and says she dosen't have much of an appetite. Objective:  
 
Visit Vitals BP (!) 147/95 (BP 1 Location: Left arm) Pulse 86 Temp 98.3 °F (36.8 °C) Resp 16 Ht 5' 2.01\" (1.575 m) Wt 63.5 kg (140 lb) SpO2 100% BMI 25.60 kg/m² No results found for this or any previous visit (from the past 24 hour(s)). [unfilled] Review of Systems Constitutional: Negative for chills and fever. HENT: Negative. Eyes: Negative. Respiratory: Negative. Cardiovascular: Negative. Gastrointestinal: Negative for abdominal pain and nausea. Skin: Negative. Neurological: Negative. Physical Exam:   
 
Constitutional: pt is oriented to person, place, and time. HENT:  
Head: Normocephalic and atraumatic. Eyes: Pupils are equal, round, and reactive to light. EOM are normal.  
Cardiovascular: Normal rate, regular rhythm and normal heart sounds. Pulmonary/Chest: Breath sounds normal. No wheezes. No rales. Exhibits no tenderness. Abdominal: Soft. Bowel sounds are normal. There is no abdominal tenderness. There is no rebound and no guarding. Musculoskeletal: Normal range of motion. Neurological: pt is alert and oriented to person, place, and time. XR CHEST PORT Final Result XR CHEST PA LAT Final Result Impression: Large right-sided pleural effusion and associated  
compression/consolidation of the right lung, increased. Mild atelectasis at the  
left base. Possible small left pleural effusion. XR CHEST PORT Final Result IMPRESSION: Diffuse airspace disease, right hemithorax, with improved aeration  
from previous. Differential diagnosis includes pneumonia, atelectasis, and/or  
reexpansion pulmonary edema. No pneumothorax. [address] Final Result IMPRESSION:   
Successful ultrasound-guided thoracentesis of right large pleural effusion, with  
approximately 1.3 L serous pleural fluid removed. Pleural fluid sample was also  
sent for lab analysis. CT CHEST WO CONT Final Result IMPRESSION: Near complete collapse of the right lung secondary to large pleural  
effusion XR HIP LT W OR WO PELV 2-3 VWS Final Result IMPRESSION:  
1. No acute bony findings. XR CHEST SNGL V Final Result No results found for this or any previous visit (from the past 24 hour(s)). Results Procedure Component Value Units Date/Time CULTURE, URINE [[id number]] Collected:  11/24/20 0830 Order Status:  Completed Specimen:  Urine Updated:  11/25/20 1533 Special Requests: No Special Requests Culture result: No Growth (<1000 cfu/mL) Labs:  
 
No results for input(s): WBC, HGB, HCT, PLT, HGBEXT, HCTEXT, PLTEXT, HGBEXT, HCTEXT, PLTEXT in the last 72 hours. No results for input(s): NA, K, CL, CO2, BUN, CREA, GLU, CA, MG, PHOS, URICA in the last 72 hours. No results for input(s): ALT, AP, TBIL, TBILI, TP, ALB, GLOB, GGT, AML, LPSE in the last 72 hours. No lab exists for component: SGOT, GPT, AMYP, HLPSE No results for input(s): INR, PTP, APTT, INREXT, INREXT in the last 72 hours. No results for input(s): FE, TIBC, PSAT, FERR in the last 72 hours. No results found for: FOL, RBCF No results for input(s): PH, PCO2, PO2 in the last 72 hours. No results for input(s): CPK, CKNDX, TROIQ in the last 72 hours. No lab exists for component: CPKMB No results found for: CHOL, CHOLX, CHLST, CHOLV, HDL, HDLP, LDL, LDLC, DLDLP, TGLX, TRIGL, TRIGP, CHHD, CHHDX Lab Results Component Value Date/Time Glucose (POC) 98 11/23/2020 08:59 AM  
 Glucose (POC) 80 11/20/2020 02:59 AM  
 Glucose (POC) 220 (H) 11/08/2020 11:01 AM  
 Glucose (POC) 110 (H) 11/08/2020 08:26 AM  
 Glucose (POC) 86 11/06/2020 05:29 PM  
 
Lab Results Component Value Date/Time Color Dark Yellow 11/24/2020 08:30 AM  
 Appearance Clear 11/24/2020 08:30 AM  
 Specific gravity 1.018 11/24/2020 08:30 AM  
 pH (UA) 5.0 11/24/2020 08:30 AM  
 Protein Negative 11/24/2020 08:30 AM  
 Glucose Negative 11/24/2020 08:30 AM  
 Ketone Negative 11/24/2020 08:30 AM  
 Bilirubin Negative 11/24/2020 08:30 AM  
 Urobilinogen 4.0 (H) 11/24/2020 08:30 AM  
 Nitrites Negative 11/24/2020 08:30 AM  
 Leukocyte Esterase Negative 11/24/2020 08:30 AM  
 Bacteria Negative 11/24/2020 08:30 AM  
 WBC 0-4 11/24/2020 08:30 AM  
 RBC 0-5 11/24/2020 08:30 AM  
 
   
Assessment & Plan Metastatic Adenocarcinoma 11/13 Cytology- Right Pleural fluid positive for adenocarcinoma Oncology consult - Recommends hospice care due to dementia and poor performance status. Anemia of Chronic disease- stable 11/20-Hgb 11.4 Will continue to monitor H&H Paroxysmal A. Fib- 11/7 ECG 
metoprolol tartrate (LOPRESSOR) tablet 50 mg Add Cardizem  mg daily 
heparin porcine injection 5,000 Units Hx of Congestive Heart Failure 11/08 proBNP 1,127 Hypokalemia Replace  potassium Pleural effusion 11/06 Chest x-ray shows opacities/pleural effusion - Chest CT shows Near complete collapse of the right lung secondary to large pleural 
Effusion status post thoracocentesis Static post thoracocentesis - cytology results positive for metastatic adenocarcinoma; culture negative Hyponatremia/likely from diuretic 
11/20- Sodium (139) -Stable Hypertension- BP controlled 
metoprolol tartrate (LOPRESSOR) tablet 50 mg Failure to Thrive /severe malnutrition 11/20 Hypoalbuminemia ( 1.7) w/low total protein(6.2) BMI: 25.60 megestroL (MEGACE) 400 mg/10 mL (10 mL) oral suspension 400 mg Hx of Dementia 
adjustment disorder with a depressed mood Hx of Hyperlipidemia Hx of  lung cancer Covid screening negative Patient on metoprolol 50 mg twice a day Repeat the labs PT/OT recommends 5/days week in SNF Discussed with the  regarding discharge planning Waiting Medicaid application before discharge to nursing home Seen by the psychiatrist and he is competent to make decision about healthcare She is physically not capable of taking care of herself willing to have help and use third-party Discussed with the patient with the nursing supervisor She is DNR now  talking to the family regarding going home with hospice Add dulera and steroids Current Facility-Administered Medications:  
  guaiFENesin ER (MUCINEX) tablet 600 mg, 600 mg, Oral, Q12H, Maksim MACIAS MD, 600 mg at 11/30/20 3092   mometasone-formoterol (DULERA) 200mcg-5mcg/puff, 2 Puff, Inhalation, BID RT, Jojo Riojas MD, 2 Puff at 11/30/20 3385   predniSONE (DELTASONE) tablet 10 mg, 10 mg, Oral, DAILY WITH BREAKFAST, Maksim MACIAS MD, 10 mg at 11/30/20 9946   apixaban (ELIQUIS) tablet 2.5 mg, 2.5 mg, Oral, BID, Saman CHUNG MD, 2.5 mg at 11/30/20 8254   dilTIAZem ER (CARDIZEM SR) capsule 120 mg, 120 mg, Oral, Q12H, Jesus Maldonado MD, 120 mg at 11/30/20 0851 
  atorvastatin (LIPITOR) tablet 10 mg, 10 mg, Oral, DAILY, Jesus Maldonado MD, 10 mg at 11/30/20 4025   escitalopram oxalate (LEXAPRO) tablet 5 mg, 5 mg, Oral, DAILY, Hoang BAINS MD, 5 mg at 11/30/20 5525   megestroL (MEGACE) 400 mg/10 mL (10 mL) oral suspension 400 mg, 400 mg, Oral, BID, Jesus Maldonado MD, 400 mg at 11/30/20 2585   metoprolol tartrate (LOPRESSOR) tablet 50 mg, 50 mg, Oral, Q12H, Edwin Smith MD, 50 mg at 11/30/20 0851 
  influenza vaccine 2020-21 (4 yrs+)(PF) (FLUCELVAX QUAD) injection 0.5 mL, 0.5 mL, IntraMUSCular, PRIOR TO DISCHARGE, Jesus Maldonado MD 
  albuterol-ipratropium (DUO-NEB) 2.5 MG-0.5 MG/3 ML, 3 mL, Nebulization, Q6H PRN, Kathryn Red MD

## 2020-11-30 NOTE — PROGRESS NOTES
CM received call from patient's niece, Juanis Cruz, notifying CM that patient's room cannot be ready for another 2 weeks. CM explained we unfortunately cannot hold the patient here. She stated they are at a stand still with not being able to access the patient's brokerage account. She asked why patient could not go to SNF short term. Explained that most facilities need a back up plan once skilled days have been exhausted. She requested I send a referral to [facility] [id number]. Choice obtained and referral has been sent. CM will continue to follow.

## 2020-12-01 NOTE — PROGRESS NOTES
General Daily Progress Note Patient Name: Libertad De Oliveira YOB: 1940 Age: 
79 y.o. Admit Date: 11/3/2020 Subjective:  
Ms. Sheryl Winchester was seen during rounds laying in bed lethargic, some shortness of breath, cough, but denies nausea or vomiting. No other acute complaints reported at this time. Objective:  
 
Visit Vitals /86 (BP 1 Location: Left arm) Pulse (!) 109 Temp 98 °F (36.7 °C) Resp 16 Ht 5' 2.01\" (1.575 m) Wt 63.5 kg (140 lb) SpO2 99% BMI 25.60 kg/m² No results found for this or any previous visit (from the past 24 hour(s)). [unfilled] Review of Systems Constitutional: Negative for chills and fever. HENT: Negative. Eyes: Negative. Respiratory: SOB. Cardiovascular: Negative. Gastrointestinal: Negative for abdominal pain and nausea. Skin: Negative. Neurological: Negative. Physical Exam:   
 
Constitutional: pt is lethargic HENT:  
Head: Normocephalic and atraumatic. Eyes: Pupils are equal, round, and reactive to light. EOM are normal.  
Cardiovascular: Normal rate, regular rhythm and normal heart sounds. Pulmonary/Chest: Breath sounds normal. No wheezes. No rales. Exhibits no tenderness. Abdominal: Soft. Bowel sounds are normal. There is no abdominal tenderness. There is no rebound and no guarding. Musculoskeletal: Normal range of motion. Neurological: pt is alert and oriented to person, place, and time. XR CHEST PORT Final Result XR CHEST PA LAT Final Result Impression: Large right-sided pleural effusion and associated  
compression/consolidation of the right lung, increased. Mild atelectasis at the  
left base. Possible small left pleural effusion. XR CHEST PORT Final Result IMPRESSION: Diffuse airspace disease, right hemithorax, with improved aeration  
from previous. Differential diagnosis includes pneumonia, atelectasis, and/or reexpansion pulmonary edema. No pneumothorax. [address] Final Result IMPRESSION:   
Successful ultrasound-guided thoracentesis of right large pleural effusion, with  
approximately 1.3 L serous pleural fluid removed. Pleural fluid sample was also  
sent for lab analysis. CT CHEST WO CONT Final Result IMPRESSION: Near complete collapse of the right lung secondary to large pleural  
effusion XR HIP LT W OR WO PELV 2-3 VWS Final Result IMPRESSION:  
1. No acute bony findings. XR CHEST SNGL V Final Result No results found for this or any previous visit (from the past 24 hour(s)). Results Procedure Component Value Units Date/Time CULTURE, URINE [[id number]] Collected:  11/24/20 0830 Order Status:  Completed Specimen:  Urine Updated:  11/25/20 1533 Special Requests: No Special Requests Culture result: No Growth (<1000 cfu/mL) Labs:  
 
No results for input(s): WBC, HGB, HCT, PLT, HGBEXT, HCTEXT, PLTEXT, HGBEXT, HCTEXT, PLTEXT in the last 72 hours. No results for input(s): NA, K, CL, CO2, BUN, CREA, GLU, CA, MG, PHOS, URICA in the last 72 hours. No results for input(s): ALT, AP, TBIL, TBILI, TP, ALB, GLOB, GGT, AML, LPSE in the last 72 hours. No lab exists for component: SGOT, GPT, AMYP, HLPSE No results for input(s): INR, PTP, APTT, INREXT, INREXT in the last 72 hours. No results for input(s): FE, TIBC, PSAT, FERR in the last 72 hours. No results found for: FOL, RBCF No results for input(s): PH, PCO2, PO2 in the last 72 hours. No results for input(s): CPK, CKNDX, TROIQ in the last 72 hours. No lab exists for component: CPKMB No results found for: CHOL, CHOLX, CHLST, CHOLV, HDL, HDLP, LDL, LDLC, DLDLP, TGLX, TRIGL, TRIGP, CHHD, CHHDX Lab Results Component Value Date/Time  Glucose (POC) 98 11/23/2020 08:59 AM  
 Glucose (POC) 80 11/20/2020 02:59 AM  
 Glucose (POC) 220 (H) 11/08/2020 11:01 AM  
 Glucose (POC) 110 (H) 11/08/2020 08:26 AM  
 Glucose (POC) 86 11/06/2020 05:29 PM  
 
Lab Results Component Value Date/Time Color Dark Yellow 11/24/2020 08:30 AM  
 Appearance Clear 11/24/2020 08:30 AM  
 Specific gravity 1.018 11/24/2020 08:30 AM  
 pH (UA) 5.0 11/24/2020 08:30 AM  
 Protein Negative 11/24/2020 08:30 AM  
 Glucose Negative 11/24/2020 08:30 AM  
 Ketone Negative 11/24/2020 08:30 AM  
 Bilirubin Negative 11/24/2020 08:30 AM  
 Urobilinogen 4.0 (H) 11/24/2020 08:30 AM  
 Nitrites Negative 11/24/2020 08:30 AM  
 Leukocyte Esterase Negative 11/24/2020 08:30 AM  
 Bacteria Negative 11/24/2020 08:30 AM  
 WBC 0-4 11/24/2020 08:30 AM  
 RBC 0-5 11/24/2020 08:30 AM  
 
   
Assessment & Plan Metastatic Adenocarcinoma 11/13 Cytology- Right Pleural fluid positive for adenocarcinoma Oncology consult - Recommends hospice care due to dementia and poor performance status. Anemia of Chronic disease- stable 11/20-Hgb 11.4 Will continue to monitor H&H Paroxysmal A. Fib- 11/7 ECG 
metoprolol tartrate (LOPRESSOR) tablet 50 mg Add Cardizem  mg daily 
heparin porcine injection 5,000 Units Hx of Congestive Heart Failure 11/08 proBNP 1,127 Hypokalemia Replace  potassium Pleural effusion 11/06 Chest x-ray shows opacities/pleural effusion - Chest CT shows Near complete collapse of the right lung secondary to large pleural 
Effusion status post thoracocentesis Static post thoracocentesis - cytology results positive for metastatic adenocarcinoma; culture negative Hyponatremia/likely from diuretic 
11/20- Sodium (139) -Stable Hypertension- BP controlled 
metoprolol tartrate (LOPRESSOR) tablet 50 mg Failure to Thrive /severe malnutrition 11/20 Hypoalbuminemia ( 1.7) w/low total protein(6.2) BMI: 25.60  
megestroL (MEGACE) 400 mg/10 mL (10 mL) oral suspension 400 mg Hx of Dementia 
adjustment disorder with a depressed mood Hx of Hyperlipidemia Hx of  lung cancer Covid screening negative Patient on metoprolol 50 mg twice a day Repeat the labs PT/OT recommends 5/days week in SNF Discussed with the  regarding discharge planning Waiting Medicaid application before discharge to nursing home Seen by the psychiatrist and he is competent to make decision about healthcare She is physically not capable of taking care of herself willing to have help and use third-party Also I think patient unable to take care of a financial issue at this this time due to her  medical condition Discussed with the patient with the nursing supervisor She is DNR now  talking to the family regarding going home with hospice Add dulera and steroids As of 12/01- CM received call from patient's niece, Shukri Carty, notifying CM that patient's room cannot be ready for another 2 weeks The nicecerequested a referral to [facility] [address]. Choice obtained and referral has been sent. CM will continue to follow. Current Facility-Administered Medications:  
  mirtazapine (REMERON SOL-TAB) disintegrating tablet 15 mg, 15 mg, Oral, QHS, Jesus Maldonado MD, 15 mg at 11/30/20 2235 
  guaiFENesin ER (MUCINEX) tablet 600 mg, 600 mg, Oral, Q12H, Ximena MACIAS MD, 600 mg at 12/01/20 3414   mometasone-formoterol (DULERA) 200mcg-5mcg/puff, 2 Puff, Inhalation, BID RT, Belia Austin MD, 2 Puff at 12/01/20 6540   predniSONE (DELTASONE) tablet 10 mg, 10 mg, Oral, DAILY WITH BREAKFAST, Ximena MACIAS MD, 10 mg at 12/01/20 0700   apixaban (ELIQUIS) tablet 2.5 mg, 2.5 mg, Oral, BID, Mercedez CHUNG MD, 2.5 mg at 12/01/20 0824 
  dilTIAZem ER (CARDIZEM SR) capsule 120 mg, 120 mg, Oral, Q12H, Jesus Maldonado MD, 120 mg at 12/01/20 5734 
  atorvastatin (LIPITOR) tablet 10 mg, 10 mg, Oral, DAILY, Jesus Maldonado MD, 10 mg at 12/01/20 8546   escitalopram oxalate (LEXAPRO) tablet 5 mg, 5 mg, Oral, DAILY, Nessa Serna MD, 5 mg at 12/01/20 0824 
  megestroL (MEGACE) 400 mg/10 mL (10 mL) oral suspension 400 mg, 400 mg, Oral, BID, Jesus Maldonado MD, 400 mg at 12/01/20 9688   metoprolol tartrate (LOPRESSOR) tablet 50 mg, 50 mg, Oral, Q12H, Edwin Smith MD, 50 mg at 12/01/20 2780 
  influenza vaccine 2020-21 (4 yrs+)(PF) (FLUCELVAX QUAD) injection 0.5 mL, 0.5 mL, IntraMUSCular, PRIOR TO DISCHARGE, Jesus Maldonado MD 
  albuterol-ipratropium (DUO-NEB) 2.5 MG-0.5 MG/3 ML, 3 mL, Nebulization, Q6H PRN, Sruthi Red MD

## 2020-12-01 NOTE — PROGRESS NOTES
Comprehensive Nutrition Assessment Type and Reason for Visit: Reassess(Interim) Nutrition Recommendations/Plan: *** Nutrition Assessment:  Admitted for FTT, needs SNF placement, noted pt in ED 10/26 for fall with fx. Pt now s/p thoracentesis with 1.26L removed (11/6), cytology +metastatic adenocarcinoma. MD's rec hospice however no decision yet. Per CM, current plans to d/c to [place], working on [facility]. Per EMR pt with poor appetite and intakes ~10% of meals, no recent intakes documented. RN and  ambassadors alerted RD pt not eating anything x2 weeks at least, disinterested in food despite encouragement. Per RN, pt not drinking Ensure Enlive or eating Magic Cup, loves Ensure Clear and drinks >75% of these TID. Noted Megace started 11/16 as appetite stimulant, limited efficacy appreciated. On previous visit, noted pt ate minimal B, at L ate 1 bite ice cream, drank x3 french, RD opened Ensure Clear for her. Refused Gelatein, will d/c. Regardless, intakes of just Ensure Clear and Prosource Gelatein NOT meeting pt nutritional needs, pt will need TF for long-term nutrition if within goals of care. Labs reviewed. Meds: Heparin, megace, zosyn, KCl. Admitted for FTT, needs SNF placement, noted pt in ED 10/26 for fall with fx. Pt now s/p thoracentesis with 1.26L removed (11/6), cytology +metastatic adenocarcinoma. MD's rec hospice however no decision yet. Per CM, current plans to d/c to [place], working on [facility]. Per EMR pt with poor appetite and intakes ~10% of meals, no recent intakes documented. RN and  ambassadors alerted RD pt not eating anything x2 weeks at least, disinterested in food despite encouragement. Per RN, pt not drinking Ensure Enlive or eating Magic Cup, loves Ensure Clear and drinks >75% of these TID. Noted Megace started 11/16 as appetite stimulant, limited efficacy appreciated.  On previous visit, noted pt ate minimal B, at L ate 1 bite ice cream, drank x3 french, RD opened Ensure Clear for her. Refused Gelatein, will d/c. Regardless, intakes of just Ensure Clear and Prosource Gelatein NOT meeting pt nutritional needs, pt will need TF for long-term nutrition if within goals of care. Labs reviewed. Meds: Heparin, megace, zosyn, KCl. Malnutrition Assessment: 
Malnutrition Status: Moderate malnutrition Context:  Acute illness Findings of the 6 clinical characteristics of malnutrition:  
{Malnutrition Characteristics:94698} Estimated Daily Nutrient Needs: 
Energy (kcal): 1869kcals (30kcals/kg); Weight Used for Energy Requirements: Current Protein (g): 81g (1.3g/kg); Weight Used for Protein Requirements: Current Fluid (ml/day): 1869ml; Method Used for Fluid Requirements: 1 ml/kcal 
 
 
Nutrition Related Findings:  NFPE finding mild muscle wasing. No edema. No N/V/D/C per RN, last BM 11/25, none today per RN. Pt previously endorsed no issues with c/s, SLP not following. Wounds:   
None Current Nutrition Therapies: 
{Current Nutrition Therapy:34482} Anthropometric Measures: 
· Height:  5' 2.01\" (157.5 cm) · Current Body Wt:  60.9 kg (134 lb 4.2 oz)(11/23) · Admission Body Wt:  139 lb 15.9 oz(11/3) · Usual Body Wt:  54.4 kg (120 lb 0.2 oz)(per pt, stable) · Ideal Body Wt:  110 lbs:  122.1 % · Adjusted Body Weight:   ; Weight Adjustment for: · Adjusted BMI:      
· BMI Category:  Normal weight (BMI 22.0-24.9) age over 72 Nutrition Diagnosis:  
{PES Statements:46508} Nutrition Interventions:  
Food and/or Nutrient Delivery: Continue current diet, Continue oral nutrition supplement Nutrition Education and Counseling: No recommendations at this time Coordination of Nutrition Care: Continue to monitor while inpatient, Feeding assistance/environmental change Goals: 
Intakes >/=50% of EENs, wt maintenance within +/-0.5kg Nutrition Monitoring and Evaluation: Behavioral-Environmental Outcomes: None identified Food/Nutrient Intake Outcomes: Food and nutrient intake, Supplement intake Physical Signs/Symptoms Outcomes: Weight, Nutrition focused physical findings, Constipation, Biochemical data Discharge Planning: Too soon to determine Electronically signed by Trudi Cruz on 12/1/2020 at 6:13 PM 
 
Contact: ***

## 2020-12-01 NOTE — DISCHARGE SUMMARY
Discharge Summary PATIENT ID: Waldemar Lay MRN: [id number] YOB: 1940 DATE OF ADMISSION: 11/3/2020  2:03 PM   
DATE OF DISCHARGE:  
PRIMARY CARE PROVIDER: Abi Brand MD  
 
ATTENDING PHYSICIAN: Margi Clinton DISCHARGING PROVIDER: Margi Clinton CONSULTATIONS: IP CONSULT TO HOSPITALIST 
IP CONSULT TO PULMONOLOGY 
IP CONSULT TO INTERVENTIONAL RADIOLOGY 
IP CONSULT TO ONCOLOGY 
IP CONSULT TO PSYCHIATRY IP CONSULT TO PSYCHIATRY IP CONSULT TO CARDIOLOGY PROCEDURES/SURGERIES: * No surgery found * ADMITTING DIAGNOSES:   
Patient Active Problem List  
 Diagnosis Date Noted  Hyponatremia 11/03/2020 DISCHARGE DIAGNOSES / PLAN:   
1. Dementia 2. Hx of Congestive Heart Failure 3. Hyponatremia/likely from diuretic 4. Hypertension 5. Hyperlipidemia 6. Failure to Thrive  
7.  Chest x-ray shows opacities/pleural effusion - Chest CT shows Near complete collapse of the right lung secondary to large pleural 
Effusion status post thoracocentesis 8. Paroxysmal A. Fib 9. Covid screening negative 10. Hypokalemia 11. Metastatic adenocarcinoma 12. History of lung cancer 1. ADDITIONAL CARE RECOMMENDATIONS:  
 
 
 
DISCHARGE MEDICATIONS: 
Current Discharge Medication List  
  
START taking these medications Details  
!! atorvastatin (LIPITOR) 10 mg tablet Take 1 Tab by mouth daily. Qty: 30 Tab, Refills: 1  
  
apixaban (ELIQUIS) 2.5 mg tablet Take 1 Tab by mouth two (2) times a day. Indications: treatment to prevent blood clots in chronic atrial fibrillation, treatment to prevent a blood clot in the lung 
Qty: 30 Tab, Refills: 1  
  
dilTIAZem ER (CARDIZEM SR) 120 mg capsule Take 1 Cap by mouth every twelve (12) hours. Qty: 30 Cap, Refills: 0  
  
escitalopram oxalate (LEXAPRO) 5 mg tablet Take 1 Tab by mouth daily. Indications: anxiousness associated with depression 
Qty: 30 Tab, Refills: 1 megestroL (MEGACE) 400 mg/10 mL (10 mL) suspension Take 10 mL by mouth two (2) times a day. Qty: 30 Bottle, Refills: 1  
  
metoprolol tartrate (LOPRESSOR) 50 mg tablet Take 1 Tab by mouth every twelve (12) hours. Indications: high blood pressure Qty: 30 Tab, Refills: 1  
  
mirtazapine (REMERON SOL-TAB) 15 mg disintegrating tablet Take 1 Tab by mouth nightly. Qty: 30 Tab, Refills: 0  
  
mometasone-formoterol (DULERA) 200-5 mcg/actuation HFA inhaler Take 2 Puffs by inhalation two (2) times a day. Qty: 1 Each, Refills: 1  
  
predniSONE (DELTASONE) 10 mg tablet Take 10 mg by mouth daily (with breakfast). Qty: 30 Tab, Refills: 0  
  
!! albuterol-ipratropium (DUO-NEB) 2.5 mg-0.5 mg/3 ml nebu 3 mL by Nebulization route every six (6) hours as needed for Wheezing. Qty: 30 Nebule, Refills: 0  
  
!! albuterol-ipratropium (DUO-NEB) 2.5 mg-0.5 mg/3 ml nebu 3 mL by Nebulization route every six (6) hours as needed for Wheezing. Qty: 30 Nebule, Refills: 1  
  
amoxicillin-clavulanate (Augmentin) 875-125 mg per tablet Take 1 Tab by mouth two (2) times a day. Qty: 20 Tab, Refills: 0  
  
 !! - Potential duplicate medications found. Please discuss with provider. CONTINUE these medications which have NOT CHANGED Details  
carvediloL (COREG) 6.25 mg tablet Take 6.25 mg by mouth two (2) times daily (with meals). ferrous sulfate (IRON) 325 mg (65 mg iron) EC tablet Take 325 mg by mouth two (2) times a day. spironolactone (Aldactone) 25 mg tablet Take 25 mg by mouth daily. Take 1/2 daily  
  
!! atorvastatin (Lipitor) 40 mg tablet Take 40 mg by mouth daily. !! - Potential duplicate medications found. Please discuss with provider. STOP taking these medications  
  
 potassium chloride SA (MICRO-K) 10 mEq capsule Comments:  
Reason for Stopping:   
   
 hydrALAZINE (APRESOLINE) 100 mg tablet Comments:  
Reason for Stopping:   
   
 furosemide (Lasix) 40 mg tablet Comments:  
Reason for Stopping: NOTIFY YOUR PHYSICIAN FOR ANY OF THE FOLLOWING:  
Fever over 101 degrees for 24 hours. Chest pain, shortness of breath, fever, chills, nausea, vomiting, diarrhea, change in mentation, falling, weakness, bleeding. Severe pain or pain not relieved by medications. Or, any other signs or symptoms that you may have questions about. DISPOSITION: 
x  Home With: 
 OT  PT  HH  RN  
  
 Long term SNF/Inpatient Rehab Independent/assisted living Hospice Other:  
 
 
PATIENT CONDITION AT DISCHARGE: Stable PHYSICAL EXAMINATION AT DISCHARGE: 
General:          Alert, cooperative, no distress, appears stated age. HEENT:           Atraumatic, anicteric sclerae, pink conjunctivae No oral ulcers, mucosa moist, throat clear, dentition fair Neck:               Supple, symmetrical 
Lungs:             Clear to auscultation bilaterally. No Wheezing or Rhonchi. No rales. Chest wall:      No tenderness  No Accessory muscle use. Heart:              Regular  rhythm,  No  murmur   No edema Abdomen:        Soft, non-tender. Not distended. Bowel sounds normal 
Extremities:     No cyanosis. No clubbing,   
                        Skin turgor normal, Capillary refill normal 
Skin:                Not pale. Not Jaundiced  No rashes Psych:             Not anxious or agitated. Neurologic:      Alert, moves all extremities, answers questions appropriately and responds to commands XR CHEST PORT Final Result XR CHEST PA LAT Final Result Impression: Large right-sided pleural effusion and associated  
compression/consolidation of the right lung, increased. Mild atelectasis at the  
left base. Possible small left pleural effusion. XR CHEST PORT Final Result IMPRESSION: Diffuse airspace disease, right hemithorax, with improved aeration  
from previous. Differential diagnosis includes pneumonia, atelectasis, and/or reexpansion pulmonary edema. No pneumothorax. [address] Final Result IMPRESSION:   
Successful ultrasound-guided thoracentesis of right large pleural effusion, with  
approximately 1.3 L serous pleural fluid removed. Pleural fluid sample was also  
sent for lab analysis. CT CHEST WO CONT Final Result IMPRESSION: Near complete collapse of the right lung secondary to large pleural  
effusion XR HIP LT W OR WO PELV 2-3 VWS Final Result IMPRESSION:  
1. No acute bony findings. XR CHEST SNGL V Final Result Recent Results (from the past 24 hour(s)) CBC WITH AUTOMATED DIFF Collection Time: 12/01/20 11:38 AM  
Result Value Ref Range WBC 8.2 3.6 - 11.0 K/uL  
 RBC 4.48 3.80 - 5.20 M/uL  
 HGB 11.3 (L) 11.5 - 16.0 g/dL HCT 35.8 35.0 - 47.0 % MCV 79.9 (L) 80.0 - 99.0 FL  
 MCH 25.2 (L) 26.0 - 34.0 PG  
 MCHC 31.6 30.0 - 36.5 g/dL  
 RDW 18.3 (H) 11.5 - 14.5 % PLATELET 965 290 - 268 K/uL MPV 10.7 8.9 - 12.9 FL  
 NEUTROPHILS 82 (H) 32 - 75 % LYMPHOCYTES 7 (L) 12 - 49 % MONOCYTES 10 5 - 13 % EOSINOPHILS 0 0 - 7 % BASOPHILS 0 0 - 1 % IMMATURE GRANULOCYTES 1 (H) 0.0 - 0.5 % ABS. NEUTROPHILS 6.8 1.8 - 8.0 K/UL  
 ABS. LYMPHOCYTES 0.5 (L) 0.8 - 3.5 K/UL  
 ABS. MONOCYTES 0.8 0.0 - 1.0 K/UL  
 ABS. EOSINOPHILS 0.0 0.0 - 0.4 K/UL  
 ABS. BASOPHILS 0.0 0.0 - 0.1 K/UL  
 ABS. IMM. GRANS. 0.1 (H) 0.00 - 0.04 K/UL  
 DF AUTOMATED METABOLIC PANEL, COMPREHENSIVE Collection Time: 12/01/20 11:38 AM  
Result Value Ref Range Sodium 146 (H) 136 - 145 mmol/L Potassium 3.2 (L) 3.5 - 5.1 mmol/L Chloride 109 (H) 97 - 108 mmol/L  
 CO2 31 21 - 32 mmol/L Anion gap 6 5 - 15 mmol/L Glucose 132 (H) 65 - 100 mg/dL BUN 16 6 - 20 mg/dL Creatinine 0.64 0.55 - 1.02 mg/dL BUN/Creatinine ratio 25 (H) 12 - 20 GFR est AA >60 >60 ml/min/1.73m2 GFR est non-AA >60 >60 ml/min/1.73m2 Calcium 10.2 (H) 8.5 - 10.1 mg/dL Bilirubin, total 0.4 0.2 - 1.0 mg/dL AST (SGOT) 39 (H) 15 - 37 U/L  
 ALT (SGPT) 34 12 - 78 U/L Alk. phosphatase 78 45 - 117 U/L Protein, total 6.3 (L) 6.4 - 8.2 g/dL Albumin 1.6 (L) 3.5 - 5.0 g/dL Globulin 4.7 (H) 2.0 - 4.0 g/dL A-G Ratio 0.3 (L) 1.1 - 2.2 HOSPITAL COURSE: 
History of present illness precipitation physical at the time of admission as the patient was admitted because of altered mental status history of dementia history of CHF hyponatremia likely from diuretic chest x-ray shows large pleural effusion CT scan shows complete collapse of the right lung secondary to large pleural effusion seen by the pulmonologist and the cardiologist during this admission patient have thoracocentesis done and remove 1200 cc patient tolerated the procedure well patient have paroxysmal A. fib fib seen by the cardiology patient converted to normal sinus rhythm patient echo done shows ejection fraction about 97% stage I diastolic dysfunction, this morning patient was alert awake denies any chest pain shortness of breath nausea no vomiting only concern about his poor appetite but is improving 
 
 
 
admitted because of large pleural effusion collapse of the lung seen by the pulmonologist had a thoracocentesis done results came back positive for adenocarcinoma seen by the oncology because of other medical condition including failure to thrive poor appetite history of lung cancer in the past never been treated dementia patient is not a good candidate for any chemotherapy discussed with the patient family they make her DNR/DNI patient is going to skilled care rehab Overall prognosis very poor Patient is DNR Patient going to skilled care rehab then  going home with hospice Signed: Alycia Farah MD 
12/1/2020 
12:04 PM

## 2020-12-01 NOTE — PROGRESS NOTES
Pt is very lethargic and weak, at this time I feel that she is unable to sign her DDNR paperwork. I Spoke with Gloria Agrawal (sister) Antony Hernandez who knew her sisters wishes and verbalized that the pt did not want to be resuscitated. This information was also relayed to nurse Diaz Romero RN. Family does not live near hospital to sign the [facility] information and is okay with the two nurse sign off with the provider so that the patients wishes could continue when she leaves the facility.

## 2020-12-01 NOTE — PROGRESS NOTES
Patient has been accepted to go to [facility] today, room 10B, report can be called to [phone] .  has asked Dr. Tato Moss for updated DC order and summary. ADDENDUM: 
Spoke with patient's sister Leslie Mccall, at [phone], and notified her of discharge to [facility]. All questions answered. No additional concerns at this time. IMM completed.

## 2020-12-01 NOTE — PROGRESS NOTES
PT tx attempted at [phone] however pt in the process of d/c today. Will continue to follow patient and attempt PT at a later time. Thank you.

## 2020-12-02 NOTE — PROGRESS NOTES
Pt in stable condition Phone call to pt's niece  [facility] at [phone] to inform her pt was picked up by transport and on her way to [facility] verbalized understanding Pt left unit via stretcher with transport personnel

## 2021-01-01 ENCOUNTER — HOSPITAL ENCOUNTER (EMERGENCY)
Age: 81
Discharge: HOME | End: 2021-01-09
Attending: INTERNAL MEDICINE
Payer: MEDICARE

## 2021-01-01 ENCOUNTER — APPOINTMENT (OUTPATIENT)
Dept: GENERAL RADIOLOGY | Age: 81
DRG: 208 | End: 2021-01-01
Attending: INTERNAL MEDICINE
Payer: MEDICARE

## 2021-01-01 ENCOUNTER — APPOINTMENT (OUTPATIENT)
Dept: GENERAL RADIOLOGY | Age: 81
DRG: 208 | End: 2021-01-01
Attending: FAMILY MEDICINE
Payer: MEDICARE

## 2021-01-01 ENCOUNTER — APPOINTMENT (OUTPATIENT)
Dept: CT IMAGING | Age: 81
DRG: 208 | End: 2021-01-01
Attending: NURSE PRACTITIONER
Payer: MEDICARE

## 2021-01-01 ENCOUNTER — APPOINTMENT (OUTPATIENT)
Dept: GENERAL RADIOLOGY | Age: 81
DRG: 208 | End: 2021-01-01
Attending: EMERGENCY MEDICINE
Payer: MEDICARE

## 2021-01-01 ENCOUNTER — HOSPITAL ENCOUNTER (INPATIENT)
Age: 81
LOS: 5 days | Discharge: NURSING, REHAB OR LONG-TERM CARE | DRG: 291 | End: 2021-01-29
Attending: EMERGENCY MEDICINE | Admitting: INTERNAL MEDICINE
Payer: MEDICARE

## 2021-01-01 ENCOUNTER — HOSPITAL ENCOUNTER (INPATIENT)
Dept: CT IMAGING | Age: 81
DRG: 208 | End: 2021-01-01
Attending: NURSE PRACTITIONER | Admitting: INTERNAL MEDICINE
Payer: MEDICARE

## 2021-01-01 ENCOUNTER — APPOINTMENT (OUTPATIENT)
Dept: NON INVASIVE DIAGNOSTICS | Age: 81
DRG: 291 | End: 2021-01-01
Attending: INTERNAL MEDICINE
Payer: MEDICARE

## 2021-01-01 ENCOUNTER — APPOINTMENT (OUTPATIENT)
Dept: GENERAL RADIOLOGY | Age: 81
DRG: 208 | End: 2021-01-01
Attending: NURSE PRACTITIONER
Payer: MEDICARE

## 2021-01-01 ENCOUNTER — APPOINTMENT (OUTPATIENT)
Dept: GENERAL RADIOLOGY | Age: 81
DRG: 291 | End: 2021-01-01
Attending: EMERGENCY MEDICINE
Payer: MEDICARE

## 2021-01-01 ENCOUNTER — HOSPITAL ENCOUNTER (INPATIENT)
Age: 81
LOS: 8 days | DRG: 208 | End: 2021-02-15
Attending: INTERNAL MEDICINE | Admitting: INTERNAL MEDICINE
Payer: MEDICARE

## 2021-01-01 VITALS
WEIGHT: 110.89 LBS | TEMPERATURE: 98.4 F | BODY MASS INDEX: 20.41 KG/M2 | DIASTOLIC BLOOD PRESSURE: 86 MMHG | OXYGEN SATURATION: 97 % | HEART RATE: 108 BPM | HEIGHT: 62 IN | SYSTOLIC BLOOD PRESSURE: 131 MMHG | RESPIRATION RATE: 20 BRPM

## 2021-01-01 VITALS
WEIGHT: 140 LBS | RESPIRATION RATE: 20 BRPM | SYSTOLIC BLOOD PRESSURE: 157 MMHG | BODY MASS INDEX: 25.76 KG/M2 | HEART RATE: 119 BPM | OXYGEN SATURATION: 99 % | HEIGHT: 62 IN | DIASTOLIC BLOOD PRESSURE: 99 MMHG | TEMPERATURE: 98.7 F

## 2021-01-01 VITALS
HEIGHT: 63 IN | BODY MASS INDEX: 23.04 KG/M2 | DIASTOLIC BLOOD PRESSURE: 77 MMHG | OXYGEN SATURATION: 97 % | SYSTOLIC BLOOD PRESSURE: 129 MMHG | HEART RATE: 67 BPM | TEMPERATURE: 98.9 F | WEIGHT: 130 LBS | RESPIRATION RATE: 18 BRPM

## 2021-01-01 DIAGNOSIS — E87.79 OTHER HYPERVOLEMIA: ICD-10-CM

## 2021-01-01 DIAGNOSIS — I48.91 NEW ONSET A-FIB (HCC): ICD-10-CM

## 2021-01-01 DIAGNOSIS — I50.43 ACUTE ON CHRONIC COMBINED SYSTOLIC AND DIASTOLIC CHF (CONGESTIVE HEART FAILURE) (HCC): ICD-10-CM

## 2021-01-01 DIAGNOSIS — I48.11 LONGSTANDING PERSISTENT ATRIAL FIBRILLATION (HCC): Primary | ICD-10-CM

## 2021-01-01 DIAGNOSIS — F02.80 DEMENTIA ASSOCIATED WITH OTHER UNDERLYING DISEASE WITHOUT BEHAVIORAL DISTURBANCE (HCC): ICD-10-CM

## 2021-01-01 DIAGNOSIS — R06.03 RESPIRATORY DISTRESS: Primary | ICD-10-CM

## 2021-01-01 DIAGNOSIS — J44.1 ACUTE EXACERBATION OF CHRONIC OBSTRUCTIVE PULMONARY DISEASE (COPD) (HCC): ICD-10-CM

## 2021-01-01 DIAGNOSIS — N30.01 ACUTE CYSTITIS WITH HEMATURIA: Primary | ICD-10-CM

## 2021-01-01 LAB
ABO + RH BLD: NORMAL
ALBUMIN SERPL-MCNC: 1.4 G/DL (ref 3.5–5)
ALBUMIN SERPL-MCNC: 1.7 G/DL (ref 3.5–5)
ALBUMIN SERPL-MCNC: 2.2 G/DL (ref 3.5–5)
ALBUMIN SERPL-MCNC: 2.7 G/DL (ref 3.5–5)
ALBUMIN/GLOB SERPL: 0.3 {RATIO} (ref 1.1–2.2)
ALBUMIN/GLOB SERPL: 0.3 {RATIO} (ref 1.1–2.2)
ALBUMIN/GLOB SERPL: 0.5 {RATIO} (ref 1.1–2.2)
ALBUMIN/GLOB SERPL: 0.5 {RATIO} (ref 1.1–2.2)
ALP SERPL-CCNC: 146 U/L (ref 45–117)
ALP SERPL-CCNC: 176 U/L (ref 45–117)
ALP SERPL-CCNC: 423 U/L (ref 45–117)
ALP SERPL-CCNC: 94 U/L (ref 45–117)
ALT SERPL-CCNC: 25 U/L (ref 12–78)
ALT SERPL-CCNC: 63 U/L (ref 12–78)
ALT SERPL-CCNC: 69 U/L (ref 12–78)
ALT SERPL-CCNC: 94 U/L (ref 12–78)
ANION GAP SERPL CALC-SCNC: 10 MMOL/L (ref 5–15)
ANION GAP SERPL CALC-SCNC: 14 MMOL/L (ref 5–15)
ANION GAP SERPL CALC-SCNC: 4 MMOL/L (ref 5–15)
ANION GAP SERPL CALC-SCNC: 5 MMOL/L (ref 5–15)
ANION GAP SERPL CALC-SCNC: 9 MMOL/L (ref 5–15)
APPEARANCE UR: ABNORMAL
APTT PPP: 36.3 SEC (ref 23–35.7)
APTT PPP: 39.3 SEC (ref 23–35.7)
APTT PPP: 55.8 SEC (ref 23–35.7)
ARTERIAL PATENCY WRIST A: ABNORMAL
ARTERIAL PATENCY WRIST A: ABNORMAL
AST SERPL W P-5'-P-CCNC: 119 U/L (ref 15–37)
AST SERPL W P-5'-P-CCNC: 35 U/L (ref 15–37)
AST SERPL W P-5'-P-CCNC: 38 U/L (ref 15–37)
AST SERPL W P-5'-P-CCNC: 56 U/L (ref 15–37)
ATRIAL RATE: 150 BPM
ATRIAL RATE: 294 BPM
BACTERIA SPEC CULT: NORMAL
BACTERIA URNS QL MICRO: ABNORMAL /HPF
BACTERIA URNS QL MICRO: NEGATIVE /HPF
BASE DEFICIT BLDA-SCNC: 0.5 MMOL/L (ref 0–2)
BASE DEFICIT BLDA-SCNC: 6 MMOL/L (ref 0–2)
BASOPHILS # BLD: 0 K/UL (ref 0–0.1)
BASOPHILS NFR BLD: 0 % (ref 0–1)
BDY SITE: ABNORMAL
BDY SITE: ABNORMAL
BILIRUB SERPL-MCNC: 0.4 MG/DL (ref 0.2–1)
BILIRUB SERPL-MCNC: 0.4 MG/DL (ref 0.2–1)
BILIRUB SERPL-MCNC: 0.5 MG/DL (ref 0.2–1)
BILIRUB SERPL-MCNC: 0.7 MG/DL (ref 0.2–1)
BILIRUB UR QL: NEGATIVE
BLOOD GROUP ANTIBODIES SERPL: NEGATIVE
BNP SERPL-MCNC: 1633 PG/ML
BNP SERPL-MCNC: 2016 PG/ML
BNP SERPL-MCNC: 9941 PG/ML
BNP SERPL-MCNC: ABNORMAL PG/ML
BUN SERPL-MCNC: 24 MG/DL (ref 6–20)
BUN SERPL-MCNC: 30 MG/DL (ref 6–20)
BUN SERPL-MCNC: 58 MG/DL (ref 6–20)
BUN SERPL-MCNC: 61 MG/DL (ref 6–20)
BUN SERPL-MCNC: 8 MG/DL (ref 6–20)
BUN/CREAT SERPL: 10 (ref 12–20)
BUN/CREAT SERPL: 21 (ref 12–20)
BUN/CREAT SERPL: 22 (ref 12–20)
BUN/CREAT SERPL: 22 (ref 12–20)
BUN/CREAT SERPL: 27 (ref 12–20)
CA-I BLD-MCNC: 10.3 MG/DL (ref 8.5–10.1)
CA-I BLD-MCNC: 10.3 MG/DL (ref 8.5–10.1)
CA-I BLD-MCNC: 8.9 MG/DL (ref 8.5–10.1)
CA-I BLD-MCNC: 9.2 MG/DL (ref 8.5–10.1)
CA-I BLD-MCNC: 9.8 MG/DL (ref 8.5–10.1)
CALCULATED P AXIS, ECG09: 72 DEGREES
CALCULATED R AXIS, ECG10: 54 DEGREES
CALCULATED R AXIS, ECG10: 61 DEGREES
CALCULATED T AXIS, ECG11: 119 DEGREES
CALCULATED T AXIS, ECG11: 69 DEGREES
CHLORIDE SERPL-SCNC: 103 MMOL/L (ref 97–108)
CHLORIDE SERPL-SCNC: 103 MMOL/L (ref 97–108)
CHLORIDE SERPL-SCNC: 106 MMOL/L (ref 97–108)
CHLORIDE SERPL-SCNC: 108 MMOL/L (ref 97–108)
CHLORIDE SERPL-SCNC: 110 MMOL/L (ref 97–108)
CK SERPL-CCNC: 99 NG/ML (ref 26–192)
CO2 SERPL-SCNC: 25 MMOL/L (ref 21–32)
CO2 SERPL-SCNC: 26 MMOL/L (ref 21–32)
CO2 SERPL-SCNC: 27 MMOL/L (ref 21–32)
CO2 SERPL-SCNC: 27 MMOL/L (ref 21–32)
CO2 SERPL-SCNC: 28 MMOL/L (ref 21–32)
COLOR UR: ABNORMAL
COVID-19 RAPID TEST, COVR: DETECTED
CREAT SERPL-MCNC: 0.82 MG/DL (ref 0.55–1.02)
CREAT SERPL-MCNC: 1.08 MG/DL (ref 0.55–1.02)
CREAT SERPL-MCNC: 1.1 MG/DL (ref 0.55–1.02)
CREAT SERPL-MCNC: 2.59 MG/DL (ref 0.55–1.02)
CREAT SERPL-MCNC: 2.85 MG/DL (ref 0.55–1.02)
D DIMER PPP FEU-MCNC: 13.31 UG/ML(FEU)
DATE LAST DOSE: ABNORMAL
DIAGNOSIS, 93000: NORMAL
DIAGNOSIS, 93000: NORMAL
DIFFERENTIAL METHOD BLD: ABNORMAL
DIGOXIN SERPL-MCNC: 2.7 NG/ML (ref 0.9–2)
ECHO AO ROOT DIAM: 3.2 CM
ECHO AV PEAK GRADIENT: 9 MMHG
ECHO EST RA PRESSURE: 8 MMHG
ECHO LA AREA 4C: 21.4 CM2
ECHO LA MAJOR AXIS: 3.2 CM
ECHO LA MINOR AXIS: 2.21 CM
ECHO LV EDV A2C: 48.2 CM3
ECHO LV EDV A4C: 59 CM3
ECHO LV EJECTION FRACTION BIPLANE: 69.9 % (ref 55–100)
ECHO LV ESV A2C: 11.1 CM3
ECHO LV ESV A4C: 24 CM3
ECHO LV INTERNAL DIMENSION DIASTOLIC: 3.64 CM (ref 3.9–5.3)
ECHO LV INTERNAL DIMENSION SYSTOLIC: 2.23 CM
ECHO LV IVSD: 1.01 CM (ref 0.6–0.9)
ECHO LV MASS 2D: 121.1 G (ref 67–162)
ECHO LV MASS INDEX 2D: 83.5 G/M2 (ref 43–95)
ECHO LV POSTERIOR WALL DIASTOLIC: 1.13 CM (ref 0.6–0.9)
ECHO LVOT PEAK GRADIENT: 4 MMHG
ECHO PV PEAK INSTANTANEOUS GRADIENT SYSTOLIC: 5 MMHG
ECHO PV REGURGITANT MAX VELOCITY: 100 CM/S
ECHO PV REGURGITANT MAX VELOCITY: 107 CM/S
ECHO PV REGURGITANT MAX VELOCITY: 147 CM/S
ECHO PVEIN A DURATION: 148 MS
ECHO PVEIN A VELOCITY: 42.8 CM/S
ECHO RA AREA 4C: 11.65 CM2
ECHO RIGHT VENTRICULAR SYSTOLIC PRESSURE (RVSP): 55 MMHG
ECHO RV INTERNAL DIMENSION: 3.81 CM
ECHO TV MAX VELOCITY: 342 CM/S
ECHO TV REGURGITANT PEAK GRADIENT: 47 MMHG
EOSINOPHIL # BLD: 0 K/UL (ref 0–0.4)
EOSINOPHIL # BLD: 0.1 K/UL (ref 0–0.4)
EOSINOPHIL NFR BLD: 0 % (ref 0–7)
EOSINOPHIL NFR BLD: 1 % (ref 0–7)
EPAP/CPAP/PEEP, PAPEEP: 10
ERYTHROCYTE [DISTWIDTH] IN BLOOD BY AUTOMATED COUNT: 18.3 % (ref 11.5–14.5)
ERYTHROCYTE [DISTWIDTH] IN BLOOD BY AUTOMATED COUNT: 18.5 % (ref 11.5–14.5)
ERYTHROCYTE [DISTWIDTH] IN BLOOD BY AUTOMATED COUNT: 19 % (ref 11.5–14.5)
ERYTHROCYTE [DISTWIDTH] IN BLOOD BY AUTOMATED COUNT: 20.5 % (ref 11.5–14.5)
ERYTHROCYTE [DISTWIDTH] IN BLOOD BY AUTOMATED COUNT: 20.6 % (ref 11.5–14.5)
FIO2 ON VENT: 100 %
GAS FLOW.O2 O2 DELIVERY SYS: 3 L/MIN
GAS FLOW.O2 SETTING OXYMISER: 16 L/MIN
GLOBULIN SER CALC-MCNC: 4.3 G/DL (ref 2–4)
GLOBULIN SER CALC-MCNC: 4.5 G/DL (ref 2–4)
GLOBULIN SER CALC-MCNC: 5.1 G/DL (ref 2–4)
GLOBULIN SER CALC-MCNC: 5.8 G/DL (ref 2–4)
GLUCOSE BLD STRIP.AUTO-MCNC: 101 MG/DL (ref 65–100)
GLUCOSE BLD STRIP.AUTO-MCNC: 110 MG/DL (ref 65–100)
GLUCOSE BLD STRIP.AUTO-MCNC: 119 MG/DL (ref 65–100)
GLUCOSE BLD STRIP.AUTO-MCNC: 125 MG/DL (ref 65–100)
GLUCOSE BLD STRIP.AUTO-MCNC: 73 MG/DL (ref 65–100)
GLUCOSE SERPL-MCNC: 104 MG/DL (ref 65–100)
GLUCOSE SERPL-MCNC: 78 MG/DL (ref 65–100)
GLUCOSE SERPL-MCNC: 86 MG/DL (ref 65–100)
GLUCOSE SERPL-MCNC: 86 MG/DL (ref 65–100)
GLUCOSE SERPL-MCNC: 95 MG/DL (ref 65–100)
GLUCOSE UR STRIP.AUTO-MCNC: NEGATIVE MG/DL
HCO3 BLDA-SCNC: 20 MMOL/L (ref 22–26)
HCO3 BLDA-SCNC: 24 MMOL/L (ref 22–26)
HCT VFR BLD AUTO: 38.6 % (ref 35–47)
HCT VFR BLD AUTO: 41.2 % (ref 35–47)
HCT VFR BLD AUTO: 42.4 % (ref 35–47)
HCT VFR BLD AUTO: 43.2 % (ref 35–47)
HCT VFR BLD AUTO: 46 % (ref 35–47)
HGB BLD-MCNC: 12.2 G/DL (ref 11.5–16)
HGB BLD-MCNC: 12.3 G/DL (ref 11.5–16)
HGB BLD-MCNC: 13 G/DL (ref 11.5–16)
HGB BLD-MCNC: 13.3 G/DL (ref 11.5–16)
HGB BLD-MCNC: 14.1 G/DL (ref 11.5–16)
HGB UR QL STRIP: ABNORMAL
HGB UR QL STRIP: NEGATIVE
HGB UR QL STRIP: NEGATIVE
IMM GRANULOCYTES # BLD AUTO: 0.1 K/UL (ref 0–0.04)
IMM GRANULOCYTES NFR BLD AUTO: 1 % (ref 0–0.5)
IMM GRANULOCYTES NFR BLD AUTO: 2 % (ref 0–0.5)
INR PPP: 3.6 (ref 0.9–1.1)
IPAP/PIP, IPAPIP: 28
KETONES UR QL STRIP.AUTO: NEGATIVE MG/DL
LACTATE SERPL-SCNC: 1.8 MMOL/L (ref 0.4–2)
LACTATE SERPL-SCNC: 9.5 MMOL/L (ref 0.4–2)
LEUKOCYTE ESTERASE UR QL STRIP.AUTO: ABNORMAL
LYMPHOCYTES # BLD: 0.9 K/UL (ref 0.8–3.5)
LYMPHOCYTES # BLD: 0.9 K/UL (ref 0.8–3.5)
LYMPHOCYTES # BLD: 1.5 K/UL (ref 0.8–3.5)
LYMPHOCYTES # BLD: 2.1 K/UL (ref 0.8–3.5)
LYMPHOCYTES NFR BLD: 11 % (ref 12–49)
LYMPHOCYTES NFR BLD: 16 % (ref 12–49)
LYMPHOCYTES NFR BLD: 17 % (ref 12–49)
LYMPHOCYTES NFR BLD: 5 % (ref 12–49)
MCH RBC QN AUTO: 25.8 PG (ref 26–34)
MCH RBC QN AUTO: 26 PG (ref 26–34)
MCH RBC QN AUTO: 26.1 PG (ref 26–34)
MCH RBC QN AUTO: 26.6 PG (ref 26–34)
MCH RBC QN AUTO: 26.9 PG (ref 26–34)
MCHC RBC AUTO-ENTMCNC: 29 G/DL (ref 30–36.5)
MCHC RBC AUTO-ENTMCNC: 30.7 G/DL (ref 30–36.5)
MCHC RBC AUTO-ENTMCNC: 30.8 G/DL (ref 30–36.5)
MCHC RBC AUTO-ENTMCNC: 31.6 G/DL (ref 30–36.5)
MCHC RBC AUTO-ENTMCNC: 31.6 G/DL (ref 30–36.5)
MCV RBC AUTO: 82.1 FL (ref 80–99)
MCV RBC AUTO: 84.3 FL (ref 80–99)
MCV RBC AUTO: 85 FL (ref 80–99)
MCV RBC AUTO: 87.3 FL (ref 80–99)
MCV RBC AUTO: 89.1 FL (ref 80–99)
MONOCYTES # BLD: 0.6 K/UL (ref 0–1)
MONOCYTES # BLD: 0.8 K/UL (ref 0–1)
MONOCYTES # BLD: 1.1 K/UL (ref 0–1)
MONOCYTES # BLD: 1.1 K/UL (ref 0–1)
MONOCYTES NFR BLD: 7 % (ref 5–13)
MONOCYTES NFR BLD: 8 % (ref 5–13)
MONOCYTES NFR BLD: 9 % (ref 5–13)
MONOCYTES NFR BLD: 9 % (ref 5–13)
MRSA DNA SPEC QL NAA+PROBE: NOT DETECTED
MUCOUS THREADS URNS QL MICRO: ABNORMAL /LPF
NEUTS SEG # BLD: 14.6 K/UL (ref 1.8–8)
NEUTS SEG # BLD: 6.2 K/UL (ref 1.8–8)
NEUTS SEG # BLD: 6.2 K/UL (ref 1.8–8)
NEUTS SEG # BLD: 9.4 K/UL (ref 1.8–8)
NEUTS SEG NFR BLD: 72 % (ref 32–75)
NEUTS SEG NFR BLD: 74 % (ref 32–75)
NEUTS SEG NFR BLD: 79 % (ref 32–75)
NEUTS SEG NFR BLD: 87 % (ref 32–75)
NITRITE UR QL STRIP.AUTO: NEGATIVE
NRBC # BLD: 0 K/UL (ref 0–0.01)
NRBC # BLD: 0.21 K/UL (ref 0–0.01)
NRBC BLD-RTO: 0 PER 100 WBC
NRBC BLD-RTO: 1.4 PER 100 WBC
PCO2 BLDA: 62 MMHG (ref 35–45)
PCO2 BLDA: 84 MMHG (ref 35–45)
PERFORMED BY, TECHID: ABNORMAL
PERFORMED BY, TECHID: NORMAL
PH BLDA: 7.16 [PH] (ref 7.35–7.45)
PH BLDA: 7.18 [PH] (ref 7.35–7.45)
PH UR STRIP: 5 [PH] (ref 5–8)
PH UR STRIP: 6 [PH] (ref 5–8)
PH UR STRIP: 8 [PH] (ref 5–8)
PLATELET # BLD AUTO: 292 K/UL (ref 150–400)
PLATELET # BLD AUTO: 338 K/UL (ref 150–400)
PLATELET # BLD AUTO: 352 K/UL (ref 150–400)
PLATELET # BLD AUTO: 387 K/UL (ref 150–400)
PLATELET # BLD AUTO: 431 K/UL (ref 150–400)
PMV BLD AUTO: 10.1 FL (ref 8.9–12.9)
PMV BLD AUTO: 11.4 FL (ref 8.9–12.9)
PMV BLD AUTO: 11.4 FL (ref 8.9–12.9)
PMV BLD AUTO: 11.5 FL (ref 8.9–12.9)
PMV BLD AUTO: 12.3 FL (ref 8.9–12.9)
PO2 BLDA: 146 MMHG (ref 75–100)
PO2 BLDA: 73 MMHG (ref 75–100)
POTASSIUM SERPL-SCNC: 4.2 MMOL/L (ref 3.5–5.1)
POTASSIUM SERPL-SCNC: 4.2 MMOL/L (ref 3.5–5.1)
POTASSIUM SERPL-SCNC: 4.3 MMOL/L (ref 3.5–5.1)
POTASSIUM SERPL-SCNC: 4.8 MMOL/L (ref 3.5–5.1)
POTASSIUM SERPL-SCNC: 6.7 MMOL/L (ref 3.5–5.1)
PROT SERPL-MCNC: 5.9 G/DL (ref 6.4–8.2)
PROT SERPL-MCNC: 6.5 G/DL (ref 6.4–8.2)
PROT SERPL-MCNC: 7.5 G/DL (ref 6.4–8.2)
PROT SERPL-MCNC: 7.8 G/DL (ref 6.4–8.2)
PROT UR STRIP-MCNC: >300 MG/DL
PROT UR STRIP-MCNC: NEGATIVE MG/DL
PROT UR STRIP-MCNC: NEGATIVE MG/DL
PROTHROMBIN TIME: 35.1 SEC (ref 11.9–14.7)
Q-T INTERVAL, ECG07: 304 MS
Q-T INTERVAL, ECG07: 308 MS
QRS DURATION, ECG06: 62 MS
QRS DURATION, ECG06: 66 MS
QTC CALCULATION (BEZET), ECG08: 440 MS
QTC CALCULATION (BEZET), ECG08: 482 MS
RBC # BLD AUTO: 4.7 M/UL (ref 3.8–5.2)
RBC # BLD AUTO: 4.76 M/UL (ref 3.8–5.2)
RBC # BLD AUTO: 4.89 M/UL (ref 3.8–5.2)
RBC # BLD AUTO: 4.95 M/UL (ref 3.8–5.2)
RBC # BLD AUTO: 5.41 M/UL (ref 3.8–5.2)
RBC #/AREA URNS HPF: >100 /HPF (ref 0–5)
RBC #/AREA URNS HPF: ABNORMAL /HPF (ref 0–5)
REPORTED DOSE,DOSE: ABNORMAL UNITS
SAO2 % BLD: 90 %
SAO2 % BLD: 99 %
SAO2% DEVICE SAO2% SENSOR NAME: ABNORMAL
SAO2% DEVICE SAO2% SENSOR NAME: ABNORMAL
SARS-COV-2, COV2: NORMAL
SERVICE CMNT-IMP: ABNORMAL
SODIUM SERPL-SCNC: 138 MMOL/L (ref 136–145)
SODIUM SERPL-SCNC: 140 MMOL/L (ref 136–145)
SODIUM SERPL-SCNC: 141 MMOL/L (ref 136–145)
SODIUM SERPL-SCNC: 142 MMOL/L (ref 136–145)
SODIUM SERPL-SCNC: 144 MMOL/L (ref 136–145)
SP GR UR REFRACTOMETRY: 1.01 (ref 1–1.03)
SP GR UR REFRACTOMETRY: 1.02 (ref 1–1.03)
SP GR UR REFRACTOMETRY: 1.02 (ref 1–1.03)
SPECIAL REQUESTS,SREQ: NORMAL
SPECIMEN EXP DATE BLD: NORMAL
SPECIMEN SOURCE: ABNORMAL
THERAPEUTIC RANGE,PTTT: ABNORMAL SEC (ref 68–109)
TRI-PHOS CRY URNS QL MICRO: ABNORMAL
TROPONIN I SERPL-MCNC: 0.06 NG/ML
TROPONIN I SERPL-MCNC: 0.27 NG/ML
TROPONIN I SERPL-MCNC: <0.05 NG/ML
UA: UC IF INDICATED,UAUC: ABNORMAL
UROBILINOGEN UR QL STRIP.AUTO: 0.1 EU/DL (ref 0.1–1)
UROBILINOGEN UR QL STRIP.AUTO: 0.1 EU/DL (ref 0.1–1)
UROBILINOGEN UR QL STRIP.AUTO: 2 EU/DL (ref 0.1–1)
VENTILATION MODE VENT: ABNORMAL
VENTRICULAR RATE, ECG03: 126 BPM
VENTRICULAR RATE, ECG03: 147 BPM
WBC # BLD AUTO: 12.6 K/UL (ref 3.6–11)
WBC # BLD AUTO: 14.8 K/UL (ref 3.6–11)
WBC # BLD AUTO: 16.6 K/UL (ref 3.6–11)
WBC # BLD AUTO: 7.7 K/UL (ref 3.6–11)
WBC # BLD AUTO: 8.5 K/UL (ref 3.6–11)
WBC URNS QL MICRO: ABNORMAL /HPF (ref 0–4)

## 2021-01-01 PROCEDURE — 74011000250 HC RX REV CODE- 250: Performed by: INTERNAL MEDICINE

## 2021-01-01 PROCEDURE — 74011250636 HC RX REV CODE- 250/636: Performed by: INTERNAL MEDICINE

## 2021-01-01 PROCEDURE — 74011250636 HC RX REV CODE- 250/636: Performed by: NURSE PRACTITIONER

## 2021-01-01 PROCEDURE — 74011250637 HC RX REV CODE- 250/637: Performed by: EMERGENCY MEDICINE

## 2021-01-01 PROCEDURE — 84484 ASSAY OF TROPONIN QUANT: CPT

## 2021-01-01 PROCEDURE — 82803 BLOOD GASES ANY COMBINATION: CPT

## 2021-01-01 PROCEDURE — 71045 X-RAY EXAM CHEST 1 VIEW: CPT

## 2021-01-01 PROCEDURE — 36415 COLL VENOUS BLD VENIPUNCTURE: CPT

## 2021-01-01 PROCEDURE — 74011250637 HC RX REV CODE- 250/637: Performed by: INTERNAL MEDICINE

## 2021-01-01 PROCEDURE — 02HV33Z INSERTION OF INFUSION DEVICE INTO SUPERIOR VENA CAVA, PERCUTANEOUS APPROACH: ICD-10-PCS | Performed by: INTERNAL MEDICINE

## 2021-01-01 PROCEDURE — 96374 THER/PROPH/DIAG INJ IV PUSH: CPT

## 2021-01-01 PROCEDURE — 99285 EMERGENCY DEPT VISIT HI MDM: CPT

## 2021-01-01 PROCEDURE — 65270000029 HC RM PRIVATE

## 2021-01-01 PROCEDURE — 85025 COMPLETE CBC W/AUTO DIFF WBC: CPT

## 2021-01-01 PROCEDURE — 87635 SARS-COV-2 COVID-19 AMP PRB: CPT

## 2021-01-01 PROCEDURE — 85730 THROMBOPLASTIN TIME PARTIAL: CPT

## 2021-01-01 PROCEDURE — 0BH17EZ INSERTION OF ENDOTRACHEAL AIRWAY INTO TRACHEA, VIA NATURAL OR ARTIFICIAL OPENING: ICD-10-PCS | Performed by: INTERNAL MEDICINE

## 2021-01-01 PROCEDURE — 80162 ASSAY OF DIGOXIN TOTAL: CPT

## 2021-01-01 PROCEDURE — 97535 SELF CARE MNGMENT TRAINING: CPT

## 2021-01-01 PROCEDURE — 74011000250 HC RX REV CODE- 250: Performed by: NURSE PRACTITIONER

## 2021-01-01 PROCEDURE — 86901 BLOOD TYPING SEROLOGIC RH(D): CPT

## 2021-01-01 PROCEDURE — 94762 N-INVAS EAR/PLS OXIMTRY CONT: CPT

## 2021-01-01 PROCEDURE — 74011250636 HC RX REV CODE- 250/636: Performed by: EMERGENCY MEDICINE

## 2021-01-01 PROCEDURE — 83880 ASSAY OF NATRIURETIC PEPTIDE: CPT

## 2021-01-01 PROCEDURE — 87086 URINE CULTURE/COLONY COUNT: CPT

## 2021-01-01 PROCEDURE — 96375 TX/PRO/DX INJ NEW DRUG ADDON: CPT

## 2021-01-01 PROCEDURE — 97162 PT EVAL MOD COMPLEX 30 MIN: CPT

## 2021-01-01 PROCEDURE — 74011250636 HC RX REV CODE- 250/636: Performed by: FAMILY MEDICINE

## 2021-01-01 PROCEDURE — 87040 BLOOD CULTURE FOR BACTERIA: CPT

## 2021-01-01 PROCEDURE — 80048 BASIC METABOLIC PNL TOTAL CA: CPT

## 2021-01-01 PROCEDURE — 93005 ELECTROCARDIOGRAM TRACING: CPT

## 2021-01-01 PROCEDURE — 82550 ASSAY OF CK (CPK): CPT

## 2021-01-01 PROCEDURE — 87641 MR-STAPH DNA AMP PROBE: CPT

## 2021-01-01 PROCEDURE — 74011250637 HC RX REV CODE- 250/637: Performed by: NURSE PRACTITIONER

## 2021-01-01 PROCEDURE — 81001 URINALYSIS AUTO W/SCOPE: CPT

## 2021-01-01 PROCEDURE — 80053 COMPREHEN METABOLIC PANEL: CPT

## 2021-01-01 PROCEDURE — 0W993ZZ DRAINAGE OF RIGHT PLEURAL CAVITY, PERCUTANEOUS APPROACH: ICD-10-PCS | Performed by: EMERGENCY MEDICINE

## 2021-01-01 PROCEDURE — 74011000250 HC RX REV CODE- 250: Performed by: PHYSICIAN ASSISTANT

## 2021-01-01 PROCEDURE — 74011000258 HC RX REV CODE- 258: Performed by: INTERNAL MEDICINE

## 2021-01-01 PROCEDURE — 87077 CULTURE AEROBIC IDENTIFY: CPT

## 2021-01-01 PROCEDURE — 36600 WITHDRAWAL OF ARTERIAL BLOOD: CPT

## 2021-01-01 PROCEDURE — 82962 GLUCOSE BLOOD TEST: CPT

## 2021-01-01 PROCEDURE — 74011000258 HC RX REV CODE- 258: Performed by: NURSE PRACTITIONER

## 2021-01-01 PROCEDURE — 85610 PROTHROMBIN TIME: CPT

## 2021-01-01 PROCEDURE — 65610000006 HC RM INTENSIVE CARE

## 2021-01-01 PROCEDURE — 96366 THER/PROPH/DIAG IV INF ADDON: CPT

## 2021-01-01 PROCEDURE — 83605 ASSAY OF LACTIC ACID: CPT

## 2021-01-01 PROCEDURE — 94760 N-INVAS EAR/PLS OXIMETRY 1: CPT

## 2021-01-01 PROCEDURE — 97530 THERAPEUTIC ACTIVITIES: CPT

## 2021-01-01 PROCEDURE — 99284 EMERGENCY DEPT VISIT MOD MDM: CPT

## 2021-01-01 PROCEDURE — 87186 SC STD MICRODIL/AGAR DIL: CPT

## 2021-01-01 PROCEDURE — 74011250636 HC RX REV CODE- 250/636

## 2021-01-01 PROCEDURE — 5A1935Z RESPIRATORY VENTILATION, LESS THAN 24 CONSECUTIVE HOURS: ICD-10-PCS | Performed by: INTERNAL MEDICINE

## 2021-01-01 PROCEDURE — 77010033678 HC OXYGEN DAILY

## 2021-01-01 PROCEDURE — 74011250636 HC RX REV CODE- 250/636: Performed by: PHYSICIAN ASSISTANT

## 2021-01-01 PROCEDURE — 94660 CPAP INITIATION&MGMT: CPT

## 2021-01-01 PROCEDURE — 97166 OT EVAL MOD COMPLEX 45 MIN: CPT

## 2021-01-01 PROCEDURE — 85379 FIBRIN DEGRADATION QUANT: CPT

## 2021-01-01 PROCEDURE — 74011000250 HC RX REV CODE- 250: Performed by: EMERGENCY MEDICINE

## 2021-01-01 PROCEDURE — 74011000258 HC RX REV CODE- 258: Performed by: PHYSICIAN ASSISTANT

## 2021-01-01 PROCEDURE — 74011000250 HC RX REV CODE- 250: Performed by: FAMILY MEDICINE

## 2021-01-01 PROCEDURE — 94002 VENT MGMT INPAT INIT DAY: CPT

## 2021-01-01 PROCEDURE — 93306 TTE W/DOPPLER COMPLETE: CPT

## 2021-01-01 PROCEDURE — 5A09357 ASSISTANCE WITH RESPIRATORY VENTILATION, LESS THAN 24 CONSECUTIVE HOURS, CONTINUOUS POSITIVE AIRWAY PRESSURE: ICD-10-PCS | Performed by: INTERNAL MEDICINE

## 2021-01-01 PROCEDURE — 96365 THER/PROPH/DIAG IV INF INIT: CPT

## 2021-01-01 PROCEDURE — 85027 COMPLETE CBC AUTOMATED: CPT

## 2021-01-01 PROCEDURE — 96376 TX/PRO/DX INJ SAME DRUG ADON: CPT

## 2021-01-01 PROCEDURE — 74011000250 HC RX REV CODE- 250

## 2021-01-01 RX ORDER — AMIODARONE HYDROCHLORIDE 200 MG/1
200 TABLET ORAL 2 TIMES DAILY
Qty: 60 TAB | Refills: 0 | Status: SHIPPED | OUTPATIENT
Start: 2021-01-01

## 2021-01-01 RX ORDER — NOREPINEPHRINE BITARTRATE/D5W 8 MG/250ML
PLASTIC BAG, INJECTION (ML) INTRAVENOUS
Status: COMPLETED
Start: 2021-01-01 | End: 2021-01-01

## 2021-01-01 RX ORDER — ASPIRIN 81 MG/1
81 TABLET ORAL DAILY
Status: CANCELLED | OUTPATIENT
Start: 2021-01-01

## 2021-01-01 RX ORDER — SODIUM BICARBONATE 1 MEQ/ML
SYRINGE (ML) INTRAVENOUS
Status: DISCONTINUED | OUTPATIENT
Start: 2021-01-01 | End: 2021-01-01 | Stop reason: HOSPADM

## 2021-01-01 RX ORDER — ATORVASTATIN CALCIUM 40 MG/1
40 TABLET, FILM COATED ORAL DAILY
Status: DISCONTINUED | OUTPATIENT
Start: 2021-01-01 | End: 2021-01-01

## 2021-01-01 RX ORDER — ACETAMINOPHEN 325 MG/1
650 TABLET ORAL
Status: DISCONTINUED | OUTPATIENT
Start: 2021-01-01 | End: 2021-01-01 | Stop reason: HOSPADM

## 2021-01-01 RX ORDER — ASPIRIN 81 MG/1
81 TABLET ORAL DAILY
Qty: 30 TAB | Refills: 0 | Status: SHIPPED | OUTPATIENT
Start: 2021-01-01 | End: 2021-01-01

## 2021-01-01 RX ORDER — AMOXICILLIN AND CLAVULANATE POTASSIUM 875; 125 MG/1; MG/1
1 TABLET, FILM COATED ORAL 2 TIMES DAILY
Status: DISCONTINUED | OUTPATIENT
Start: 2021-01-01 | End: 2021-01-01

## 2021-01-01 RX ORDER — DIGOXIN 0.25 MG/ML
0.25 INJECTION INTRAMUSCULAR; INTRAVENOUS
Status: COMPLETED | OUTPATIENT
Start: 2021-01-01 | End: 2021-01-01

## 2021-01-01 RX ORDER — EPINEPHRINE 0.1 MG/ML
INJECTION INTRACARDIAC; INTRAVENOUS
Status: DISCONTINUED | OUTPATIENT
Start: 2021-01-01 | End: 2021-01-01 | Stop reason: HOSPADM

## 2021-01-01 RX ORDER — AMIODARONE HYDROCHLORIDE 200 MG/1
400 TABLET ORAL DAILY
Status: DISCONTINUED | OUTPATIENT
Start: 2021-01-01 | End: 2021-01-01

## 2021-01-01 RX ORDER — AMIODARONE HYDROCHLORIDE 200 MG/1
200 TABLET ORAL DAILY
Qty: 30 TAB | Refills: 0 | Status: SHIPPED | OUTPATIENT
Start: 2021-01-01 | End: 2021-01-01 | Stop reason: ALTCHOICE

## 2021-01-01 RX ORDER — METOPROLOL TARTRATE 25 MG/1
25 TABLET, FILM COATED ORAL EVERY 12 HOURS
Status: DISCONTINUED | OUTPATIENT
Start: 2021-01-01 | End: 2021-01-01 | Stop reason: HOSPADM

## 2021-01-01 RX ORDER — METOPROLOL TARTRATE 25 MG/1
12.5 TABLET, FILM COATED ORAL EVERY 12 HOURS
Status: DISCONTINUED | OUTPATIENT
Start: 2021-01-01 | End: 2021-01-01

## 2021-01-01 RX ORDER — CARVEDILOL 6.25 MG/1
6.25 TABLET ORAL 2 TIMES DAILY WITH MEALS
Qty: 60 TAB | Refills: 0 | Status: SHIPPED | OUTPATIENT
Start: 2021-01-01 | End: 2021-01-01

## 2021-01-01 RX ORDER — CEFUROXIME AXETIL 500 MG/1
500 TABLET ORAL 2 TIMES DAILY
Qty: 14 TAB | Refills: 0 | Status: SHIPPED | OUTPATIENT
Start: 2021-01-01 | End: 2021-01-01

## 2021-01-01 RX ORDER — AMIODARONE HYDROCHLORIDE 200 MG/1
200 TABLET ORAL 2 TIMES DAILY
Status: DISCONTINUED | OUTPATIENT
Start: 2021-01-01 | End: 2021-01-01 | Stop reason: HOSPADM

## 2021-01-01 RX ORDER — FUROSEMIDE 40 MG/1
40 TABLET ORAL DAILY
Qty: 30 TAB | Refills: 0 | Status: SHIPPED | OUTPATIENT
Start: 2021-01-01 | End: 2021-01-01

## 2021-01-01 RX ORDER — NOREPINEPHRINE BITARTRATE/D5W 8 MG/250ML
.5-3 PLASTIC BAG, INJECTION (ML) INTRAVENOUS
Status: DISCONTINUED | OUTPATIENT
Start: 2021-01-01 | End: 2021-01-01 | Stop reason: HOSPADM

## 2021-01-01 RX ORDER — AMOXICILLIN AND CLAVULANATE POTASSIUM 875; 125 MG/1; MG/1
1 TABLET, FILM COATED ORAL EVERY 12 HOURS
Status: DISCONTINUED | OUTPATIENT
Start: 2021-01-01 | End: 2021-01-01 | Stop reason: HOSPADM

## 2021-01-01 RX ORDER — FUROSEMIDE 10 MG/ML
40 INJECTION INTRAMUSCULAR; INTRAVENOUS DAILY
Status: DISCONTINUED | OUTPATIENT
Start: 2021-01-01 | End: 2021-01-01 | Stop reason: HOSPADM

## 2021-01-01 RX ORDER — CARVEDILOL 3.12 MG/1
6.25 TABLET ORAL 2 TIMES DAILY WITH MEALS
Status: DISCONTINUED | OUTPATIENT
Start: 2021-01-01 | End: 2021-01-01 | Stop reason: HOSPADM

## 2021-01-01 RX ORDER — CARVEDILOL 3.12 MG/1
3.12 TABLET ORAL 2 TIMES DAILY WITH MEALS
Status: DISCONTINUED | OUTPATIENT
Start: 2021-01-01 | End: 2021-01-01

## 2021-01-01 RX ORDER — DEXAMETHASONE 4 MG/1
4 TABLET ORAL DAILY
Status: DISCONTINUED | OUTPATIENT
Start: 2021-01-01 | End: 2021-01-01 | Stop reason: HOSPADM

## 2021-01-01 RX ORDER — METOPROLOL TARTRATE 25 MG/1
12.5 TABLET, FILM COATED ORAL EVERY 12 HOURS
Qty: 60 TAB | Refills: 0 | Status: SHIPPED | OUTPATIENT
Start: 2021-01-01

## 2021-01-01 RX ORDER — GUAIFENESIN 600 MG/1
1200 TABLET, EXTENDED RELEASE ORAL EVERY 12 HOURS
Status: DISCONTINUED | OUTPATIENT
Start: 2021-01-01 | End: 2021-01-01

## 2021-01-01 RX ORDER — DEXAMETHASONE 4 MG/1
4 TABLET ORAL
Qty: 10 TAB | Refills: 0 | Status: SHIPPED | OUTPATIENT
Start: 2021-01-01 | End: 2021-02-20

## 2021-01-01 RX ORDER — LOSARTAN POTASSIUM 50 MG/1
25 TABLET ORAL DAILY
Status: DISCONTINUED | OUTPATIENT
Start: 2021-01-01 | End: 2021-01-01

## 2021-01-01 RX ORDER — METOPROLOL SUCCINATE 50 MG/1
50 TABLET, EXTENDED RELEASE ORAL DAILY
Status: DISCONTINUED | OUTPATIENT
Start: 2021-01-01 | End: 2021-01-01

## 2021-01-01 RX ORDER — DIGOXIN 0.25 MG/ML
125 INJECTION INTRAMUSCULAR; INTRAVENOUS
Status: COMPLETED | OUTPATIENT
Start: 2021-01-01 | End: 2021-01-01

## 2021-01-01 RX ORDER — HEPARIN SODIUM 10000 [USP'U]/100ML
12-25 INJECTION, SOLUTION INTRAVENOUS
Status: DISCONTINUED | OUTPATIENT
Start: 2021-01-01 | End: 2021-01-01 | Stop reason: HOSPADM

## 2021-01-01 RX ORDER — DILTIAZEM HYDROCHLORIDE 5 MG/ML
5 INJECTION INTRAVENOUS
Status: COMPLETED | OUTPATIENT
Start: 2021-01-01 | End: 2021-01-01

## 2021-01-01 RX ORDER — ATORVASTATIN CALCIUM 40 MG/1
40 TABLET, FILM COATED ORAL
Status: DISCONTINUED | OUTPATIENT
Start: 2021-01-01 | End: 2021-01-01 | Stop reason: HOSPADM

## 2021-01-01 RX ORDER — PROPOFOL 10 MG/ML
0-50 VIAL (ML) INTRAVENOUS
Status: DISCONTINUED | OUTPATIENT
Start: 2021-01-01 | End: 2021-01-01 | Stop reason: HOSPADM

## 2021-01-01 RX ORDER — MIRTAZAPINE 15 MG/1
15 TABLET, ORALLY DISINTEGRATING ORAL
Status: DISCONTINUED | OUTPATIENT
Start: 2021-01-01 | End: 2021-01-01 | Stop reason: HOSPADM

## 2021-01-01 RX ORDER — NITROFURANTOIN 25; 75 MG/1; MG/1
100 CAPSULE ORAL
Status: COMPLETED | OUTPATIENT
Start: 2021-01-01 | End: 2021-01-01

## 2021-01-01 RX ORDER — DILTIAZEM HCL/D5W 125 MG/125
5 PLASTIC BAG, INJECTION (ML) INTRAVENOUS
Status: DISCONTINUED | OUTPATIENT
Start: 2021-01-01 | End: 2021-01-01

## 2021-01-01 RX ORDER — FUROSEMIDE 10 MG/ML
40 INJECTION INTRAMUSCULAR; INTRAVENOUS
Status: COMPLETED | OUTPATIENT
Start: 2021-01-01 | End: 2021-01-01

## 2021-01-01 RX ORDER — ESCITALOPRAM OXALATE 10 MG/1
5 TABLET ORAL DAILY
Status: DISCONTINUED | OUTPATIENT
Start: 2021-01-01 | End: 2021-01-01 | Stop reason: HOSPADM

## 2021-01-01 RX ORDER — DIGOXIN 0.25 MG/ML
250 INJECTION INTRAMUSCULAR; INTRAVENOUS
Status: COMPLETED | OUTPATIENT
Start: 2021-01-01 | End: 2021-01-01

## 2021-01-01 RX ORDER — ASPIRIN 81 MG/1
81 TABLET ORAL DAILY
Status: DISCONTINUED | OUTPATIENT
Start: 2021-01-01 | End: 2021-01-01 | Stop reason: HOSPADM

## 2021-01-01 RX ORDER — DILTIAZEM HYDROCHLORIDE 5 MG/ML
8 INJECTION INTRAVENOUS
Status: COMPLETED | OUTPATIENT
Start: 2021-01-01 | End: 2021-01-01

## 2021-01-01 RX ORDER — ATORVASTATIN CALCIUM 40 MG/1
40 TABLET, FILM COATED ORAL DAILY
Status: DISCONTINUED | OUTPATIENT
Start: 2021-01-01 | End: 2021-01-01 | Stop reason: HOSPADM

## 2021-01-01 RX ORDER — LANOLIN ALCOHOL/MO/W.PET/CERES
325 CREAM (GRAM) TOPICAL 2 TIMES DAILY
Status: DISCONTINUED | OUTPATIENT
Start: 2021-01-01 | End: 2021-01-01 | Stop reason: HOSPADM

## 2021-01-01 RX ORDER — FUROSEMIDE 10 MG/ML
40 INJECTION INTRAMUSCULAR; INTRAVENOUS ONCE
Status: COMPLETED | OUTPATIENT
Start: 2021-01-01 | End: 2021-01-01

## 2021-01-01 RX ORDER — IPRATROPIUM BROMIDE AND ALBUTEROL SULFATE 2.5; .5 MG/3ML; MG/3ML
3 SOLUTION RESPIRATORY (INHALATION)
Status: DISCONTINUED | OUTPATIENT
Start: 2021-01-01 | End: 2021-01-01 | Stop reason: HOSPADM

## 2021-01-01 RX ORDER — DILTIAZEM HYDROCHLORIDE 5 MG/ML
10 INJECTION INTRAVENOUS
Status: COMPLETED | OUTPATIENT
Start: 2021-01-01 | End: 2021-01-01

## 2021-01-01 RX ORDER — SPIRONOLACTONE 25 MG/1
25 TABLET ORAL DAILY
Status: DISCONTINUED | OUTPATIENT
Start: 2021-01-01 | End: 2021-01-01 | Stop reason: HOSPADM

## 2021-01-01 RX ORDER — DEXTROSE 50 % IN WATER (D50W) INTRAVENOUS SYRINGE
Status: DISCONTINUED | OUTPATIENT
Start: 2021-01-01 | End: 2021-01-01 | Stop reason: HOSPADM

## 2021-01-01 RX ORDER — METOPROLOL TARTRATE 25 MG/1
12.5 TABLET, FILM COATED ORAL ONCE
Status: COMPLETED | OUTPATIENT
Start: 2021-01-01 | End: 2021-01-01

## 2021-01-01 RX ORDER — AMIODARONE HYDROCHLORIDE 200 MG/1
200 TABLET ORAL DAILY
Status: DISCONTINUED | OUTPATIENT
Start: 2021-01-01 | End: 2021-01-01 | Stop reason: HOSPADM

## 2021-01-01 RX ORDER — LANOLIN ALCOHOL/MO/W.PET/CERES
325 CREAM (GRAM) TOPICAL DAILY
Status: DISCONTINUED | OUTPATIENT
Start: 2021-01-01 | End: 2021-01-01 | Stop reason: HOSPADM

## 2021-01-01 RX ADMIN — AMOXICILLIN AND CLAVULANATE POTASSIUM 1 TABLET: 875; 125 TABLET, FILM COATED ORAL at 11:03

## 2021-01-01 RX ADMIN — FUROSEMIDE 40 MG: 10 INJECTION, SOLUTION INTRAMUSCULAR; INTRAVENOUS at 11:10

## 2021-01-01 RX ADMIN — ACETAMINOPHEN 650 MG: 325 TABLET, FILM COATED ORAL at 18:32

## 2021-01-01 RX ADMIN — AMIODARONE HYDROCHLORIDE 200 MG: 200 TABLET ORAL at 20:06

## 2021-01-01 RX ADMIN — APIXABAN 2.5 MG: 2.5 TABLET, FILM COATED ORAL at 10:11

## 2021-01-01 RX ADMIN — APIXABAN 2.5 MG: 2.5 TABLET, FILM COATED ORAL at 22:17

## 2021-01-01 RX ADMIN — NITROGLYCERIN 1 INCH: 20 OINTMENT TOPICAL at 11:10

## 2021-01-01 RX ADMIN — ATORVASTATIN CALCIUM 40 MG: 40 TABLET, FILM COATED ORAL at 21:23

## 2021-01-01 RX ADMIN — FERROUS SULFATE TAB 325 MG (65 MG ELEMENTAL FE) 325 MG: 325 (65 FE) TAB at 23:11

## 2021-01-01 RX ADMIN — APIXABAN 2.5 MG: 2.5 TABLET, FILM COATED ORAL at 09:13

## 2021-01-01 RX ADMIN — AMOXICILLIN AND CLAVULANATE POTASSIUM 1 TABLET: 875; 125 TABLET, FILM COATED ORAL at 09:13

## 2021-01-01 RX ADMIN — AMIODARONE HYDROCHLORIDE 0.5 MG/MIN: 50 INJECTION, SOLUTION INTRAVENOUS at 00:02

## 2021-01-01 RX ADMIN — ESCITALOPRAM OXALATE 5 MG: 10 TABLET ORAL at 08:50

## 2021-01-01 RX ADMIN — AMOXICILLIN AND CLAVULANATE POTASSIUM 1 TABLET: 875; 125 TABLET, FILM COATED ORAL at 09:21

## 2021-01-01 RX ADMIN — AMOXICILLIN AND CLAVULANATE POTASSIUM 1 TABLET: 875; 125 TABLET, FILM COATED ORAL at 09:25

## 2021-01-01 RX ADMIN — FERROUS SULFATE TAB 325 MG (65 MG ELEMENTAL FE) 325 MG: 325 (65 FE) TAB at 10:11

## 2021-01-01 RX ADMIN — FERROUS SULFATE TAB 325 MG (65 MG ELEMENTAL FE) 325 MG: 325 (65 FE) TAB at 20:54

## 2021-01-01 RX ADMIN — ATORVASTATIN CALCIUM 40 MG: 40 TABLET, FILM COATED ORAL at 08:15

## 2021-01-01 RX ADMIN — METOPROLOL TARTRATE 12.5 MG: 25 TABLET, FILM COATED ORAL at 09:00

## 2021-01-01 RX ADMIN — LOSARTAN POTASSIUM 25 MG: 50 TABLET, FILM COATED ORAL at 08:51

## 2021-01-01 RX ADMIN — Medication 8000 MCG: at 23:47

## 2021-01-01 RX ADMIN — ASPIRIN 81 MG: 81 TABLET, COATED ORAL at 08:50

## 2021-01-01 RX ADMIN — GUAIFENESIN 1200 MG: 600 TABLET, EXTENDED RELEASE ORAL at 11:56

## 2021-01-01 RX ADMIN — MIRTAZAPINE 15 MG: 15 TABLET, ORALLY DISINTEGRATING ORAL at 00:11

## 2021-01-01 RX ADMIN — AMOXICILLIN AND CLAVULANATE POTASSIUM 1 TABLET: 875; 125 TABLET, FILM COATED ORAL at 21:55

## 2021-01-01 RX ADMIN — METOPROLOL TARTRATE 12.5 MG: 25 TABLET, FILM COATED ORAL at 10:56

## 2021-01-01 RX ADMIN — AMIODARONE HYDROCHLORIDE 200 MG: 200 TABLET ORAL at 14:05

## 2021-01-01 RX ADMIN — METOPROLOL TARTRATE 12.5 MG: 25 TABLET, FILM COATED ORAL at 21:29

## 2021-01-01 RX ADMIN — METOPROLOL TARTRATE 12.5 MG: 25 TABLET, FILM COATED ORAL at 22:06

## 2021-01-01 RX ADMIN — DILTIAZEM HYDROCHLORIDE 10 MG: 5 INJECTION INTRAVENOUS at 13:06

## 2021-01-01 RX ADMIN — CARVEDILOL 6.25 MG: 3.12 TABLET, FILM COATED ORAL at 11:04

## 2021-01-01 RX ADMIN — AMOXICILLIN AND CLAVULANATE POTASSIUM 1 TABLET: 875; 125 TABLET, FILM COATED ORAL at 21:29

## 2021-01-01 RX ADMIN — Medication 30 MCG/MIN: at 04:20

## 2021-01-01 RX ADMIN — APIXABAN 2.5 MG: 2.5 TABLET, FILM COATED ORAL at 09:21

## 2021-01-01 RX ADMIN — AMIODARONE HYDROCHLORIDE 200 MG: 200 TABLET ORAL at 10:27

## 2021-01-01 RX ADMIN — FERROUS SULFATE TAB 325 MG (65 MG ELEMENTAL FE) 325 MG: 325 (65 FE) TAB at 09:00

## 2021-01-01 RX ADMIN — FERROUS SULFATE TAB 325 MG (65 MG ELEMENTAL FE) 325 MG: 325 (65 FE) TAB at 11:04

## 2021-01-01 RX ADMIN — APIXABAN 2.5 MG: 2.5 TABLET, FILM COATED ORAL at 20:07

## 2021-01-01 RX ADMIN — ATORVASTATIN CALCIUM 40 MG: 40 TABLET, FILM COATED ORAL at 22:06

## 2021-01-01 RX ADMIN — METOPROLOL TARTRATE 12.5 MG: 25 TABLET, FILM COATED ORAL at 09:20

## 2021-01-01 RX ADMIN — APIXABAN 2.5 MG: 2.5 TABLET, FILM COATED ORAL at 10:27

## 2021-01-01 RX ADMIN — GUAIFENESIN 1200 MG: 600 TABLET, EXTENDED RELEASE ORAL at 21:50

## 2021-01-01 RX ADMIN — APIXABAN 2.5 MG: 2.5 TABLET, FILM COATED ORAL at 08:50

## 2021-01-01 RX ADMIN — CARVEDILOL 3.12 MG: 3.12 TABLET, FILM COATED ORAL at 08:51

## 2021-01-01 RX ADMIN — SPIRONOLACTONE 25 MG: 25 TABLET ORAL at 09:13

## 2021-01-01 RX ADMIN — FERROUS SULFATE TAB 325 MG (65 MG ELEMENTAL FE) 325 MG: 325 (65 FE) TAB at 08:51

## 2021-01-01 RX ADMIN — DEXAMETHASONE 4 MG: 4 TABLET ORAL at 11:45

## 2021-01-01 RX ADMIN — AMOXICILLIN AND CLAVULANATE POTASSIUM 1 TABLET: 875; 125 TABLET, FILM COATED ORAL at 20:06

## 2021-01-01 RX ADMIN — ASPIRIN 81 MG: 81 TABLET, COATED ORAL at 10:11

## 2021-01-01 RX ADMIN — ASPIRIN 81 MG: 81 TABLET, COATED ORAL at 08:14

## 2021-01-01 RX ADMIN — METOPROLOL TARTRATE 12.5 MG: 25 TABLET, FILM COATED ORAL at 21:50

## 2021-01-01 RX ADMIN — CARVEDILOL 6.25 MG: 3.12 TABLET, FILM COATED ORAL at 10:11

## 2021-01-01 RX ADMIN — AMOXICILLIN AND CLAVULANATE POTASSIUM 1 TABLET: 875; 125 TABLET, FILM COATED ORAL at 20:54

## 2021-01-01 RX ADMIN — Medication 10 MG/HR: at 05:32

## 2021-01-01 RX ADMIN — Medication 10 MG/HR: at 00:00

## 2021-01-01 RX ADMIN — METOPROLOL TARTRATE 12.5 MG: 25 TABLET, FILM COATED ORAL at 20:07

## 2021-01-01 RX ADMIN — ESCITALOPRAM OXALATE 5 MG: 10 TABLET ORAL at 11:04

## 2021-01-01 RX ADMIN — DEXAMETHASONE 4 MG: 4 TABLET ORAL at 10:56

## 2021-01-01 RX ADMIN — APIXABAN 2.5 MG: 2.5 TABLET, FILM COATED ORAL at 20:54

## 2021-01-01 RX ADMIN — AMOXICILLIN AND CLAVULANATE POTASSIUM 1 TABLET: 875; 125 TABLET, FILM COATED ORAL at 22:06

## 2021-01-01 RX ADMIN — AMOXICILLIN AND CLAVULANATE POTASSIUM 1 TABLET: 875; 125 TABLET, FILM COATED ORAL at 10:27

## 2021-01-01 RX ADMIN — APIXABAN 2.5 MG: 2.5 TABLET, FILM COATED ORAL at 22:06

## 2021-01-01 RX ADMIN — Medication 5 MG/HR: at 16:47

## 2021-01-01 RX ADMIN — APIXABAN 2.5 MG: 2.5 TABLET, FILM COATED ORAL at 09:19

## 2021-01-01 RX ADMIN — METOPROLOL TARTRATE 12.5 MG: 25 TABLET, FILM COATED ORAL at 09:13

## 2021-01-01 RX ADMIN — APIXABAN 2.5 MG: 2.5 TABLET, FILM COATED ORAL at 22:24

## 2021-01-01 RX ADMIN — PIPERACILLIN AND TAZOBACTAM 3.38 G: 3; .375 INJECTION, POWDER, LYOPHILIZED, FOR SOLUTION INTRAVENOUS at 17:35

## 2021-01-01 RX ADMIN — EPINEPHRINE 1 MG: 0.1 INJECTION, SOLUTION ENDOTRACHEAL; INTRACARDIAC; INTRAVENOUS at 20:49

## 2021-01-01 RX ADMIN — APIXABAN 2.5 MG: 2.5 TABLET, FILM COATED ORAL at 08:15

## 2021-01-01 RX ADMIN — APIXABAN 2.5 MG: 2.5 TABLET, FILM COATED ORAL at 08:48

## 2021-01-01 RX ADMIN — APIXABAN 2.5 MG: 2.5 TABLET, FILM COATED ORAL at 21:22

## 2021-01-01 RX ADMIN — SPIRONOLACTONE 25 MG: 25 TABLET ORAL at 10:12

## 2021-01-01 RX ADMIN — SODIUM BICARBONATE 50 MEQ: 84 INJECTION INTRAVENOUS at 20:50

## 2021-01-01 RX ADMIN — APIXABAN 2.5 MG: 2.5 TABLET, FILM COATED ORAL at 21:52

## 2021-01-01 RX ADMIN — ATORVASTATIN CALCIUM 40 MG: 40 TABLET, FILM COATED ORAL at 11:03

## 2021-01-01 RX ADMIN — METOPROLOL TARTRATE 12.5 MG: 25 TABLET, FILM COATED ORAL at 22:17

## 2021-01-01 RX ADMIN — APIXABAN 2.5 MG: 2.5 TABLET, FILM COATED ORAL at 21:17

## 2021-01-01 RX ADMIN — AMIODARONE HYDROCHLORIDE 200 MG: 200 TABLET ORAL at 10:55

## 2021-01-01 RX ADMIN — AMIODARONE HYDROCHLORIDE 200 MG: 200 TABLET ORAL at 22:06

## 2021-01-01 RX ADMIN — AMIODARONE HYDROCHLORIDE 200 MG: 200 TABLET ORAL at 21:22

## 2021-01-01 RX ADMIN — ASPIRIN 81 MG: 81 TABLET, COATED ORAL at 09:13

## 2021-01-01 RX ADMIN — Medication 5 MG/HR: at 22:11

## 2021-01-01 RX ADMIN — METOPROLOL TARTRATE 12.5 MG: 25 TABLET, FILM COATED ORAL at 21:24

## 2021-01-01 RX ADMIN — HEPARIN SODIUM AND DEXTROSE 12 UNITS/KG/HR: 10000; 5 INJECTION INTRAVENOUS at 17:38

## 2021-01-01 RX ADMIN — PIPERACILLIN AND TAZOBACTAM 3.38 G: 3; .375 INJECTION, POWDER, LYOPHILIZED, FOR SOLUTION INTRAVENOUS at 23:48

## 2021-01-01 RX ADMIN — DILTIAZEM HYDROCHLORIDE 5 MG: 5 INJECTION INTRAVENOUS at 11:10

## 2021-01-01 RX ADMIN — APIXABAN 2.5 MG: 2.5 TABLET, FILM COATED ORAL at 21:33

## 2021-01-01 RX ADMIN — DEXAMETHASONE 4 MG: 4 TABLET ORAL at 10:27

## 2021-01-01 RX ADMIN — ATORVASTATIN CALCIUM 40 MG: 40 TABLET, FILM COATED ORAL at 22:19

## 2021-01-01 RX ADMIN — AMIODARONE HYDROCHLORIDE 1 MG/MIN: 50 INJECTION, SOLUTION INTRAVENOUS at 17:37

## 2021-01-01 RX ADMIN — ASPIRIN 81 MG: 81 TABLET, COATED ORAL at 11:03

## 2021-01-01 RX ADMIN — FERROUS SULFATE TAB 325 MG (65 MG ELEMENTAL FE) 325 MG: 325 (65 FE) TAB at 21:17

## 2021-01-01 RX ADMIN — ATORVASTATIN CALCIUM 40 MG: 40 TABLET, FILM COATED ORAL at 20:08

## 2021-01-01 RX ADMIN — CARVEDILOL 6.25 MG: 3.12 TABLET, FILM COATED ORAL at 09:13

## 2021-01-01 RX ADMIN — ATORVASTATIN CALCIUM 40 MG: 40 TABLET, FILM COATED ORAL at 10:12

## 2021-01-01 RX ADMIN — Medication 7.5 MG/HR: at 18:18

## 2021-01-01 RX ADMIN — ESCITALOPRAM OXALATE 5 MG: 10 TABLET ORAL at 10:11

## 2021-01-01 RX ADMIN — CARVEDILOL 6.25 MG: 3.12 TABLET, FILM COATED ORAL at 08:15

## 2021-01-01 RX ADMIN — AMIODARONE HYDROCHLORIDE 200 MG: 200 TABLET ORAL at 21:55

## 2021-01-01 RX ADMIN — ESCITALOPRAM OXALATE 5 MG: 10 TABLET ORAL at 09:13

## 2021-01-01 RX ADMIN — FERROUS SULFATE TAB 325 MG (65 MG ELEMENTAL FE) 325 MG: 325 (65 FE) TAB at 09:13

## 2021-01-01 RX ADMIN — SPIRONOLACTONE 25 MG: 25 TABLET ORAL at 08:51

## 2021-01-01 RX ADMIN — CARVEDILOL 6.25 MG: 3.12 TABLET, FILM COATED ORAL at 17:54

## 2021-01-01 RX ADMIN — AMOXICILLIN AND CLAVULANATE POTASSIUM 1 TABLET: 875; 125 TABLET, FILM COATED ORAL at 10:11

## 2021-01-01 RX ADMIN — AMOXICILLIN AND CLAVULANATE POTASSIUM 1 TABLET: 875; 125 TABLET, FILM COATED ORAL at 21:22

## 2021-01-01 RX ADMIN — Medication 5 MG/HR: at 17:26

## 2021-01-01 RX ADMIN — AMIODARONE HYDROCHLORIDE 200 MG: 200 TABLET ORAL at 09:21

## 2021-01-01 RX ADMIN — FERROUS SULFATE TAB 325 MG (65 MG ELEMENTAL FE) 325 MG: 325 (65 FE) TAB at 22:11

## 2021-01-01 RX ADMIN — FERROUS SULFATE TAB 325 MG (65 MG ELEMENTAL FE) 325 MG: 325 (65 FE) TAB at 11:31

## 2021-01-01 RX ADMIN — AMOXICILLIN AND CLAVULANATE POTASSIUM 1 TABLET: 875; 125 TABLET, FILM COATED ORAL at 21:17

## 2021-01-01 RX ADMIN — APIXABAN 2.5 MG: 2.5 TABLET, FILM COATED ORAL at 22:11

## 2021-01-01 RX ADMIN — APIXABAN 2.5 MG: 2.5 TABLET, FILM COATED ORAL at 21:29

## 2021-01-01 RX ADMIN — ATORVASTATIN CALCIUM 40 MG: 40 TABLET, FILM COATED ORAL at 08:50

## 2021-01-01 RX ADMIN — DIGOXIN 125 MCG: 250 INJECTION, SOLUTION INTRAMUSCULAR; INTRAVENOUS; PARENTERAL at 19:08

## 2021-01-01 RX ADMIN — DILTIAZEM HYDROCHLORIDE 8 MG: 5 INJECTION INTRAVENOUS at 12:25

## 2021-01-01 RX ADMIN — AMIODARONE HYDROCHLORIDE 200 MG: 200 TABLET ORAL at 22:17

## 2021-01-01 RX ADMIN — FUROSEMIDE 40 MG: 10 INJECTION, SOLUTION INTRAMUSCULAR; INTRAVENOUS at 08:17

## 2021-01-01 RX ADMIN — DIGOXIN 250 MCG: 250 INJECTION, SOLUTION INTRAMUSCULAR; INTRAVENOUS; PARENTERAL at 17:37

## 2021-01-01 RX ADMIN — METOPROLOL TARTRATE 12.5 MG: 25 TABLET, FILM COATED ORAL at 01:59

## 2021-01-01 RX ADMIN — AMIODARONE HYDROCHLORIDE 200 MG: 200 TABLET ORAL at 09:19

## 2021-01-01 RX ADMIN — Medication 10 MCG/MIN: at 15:00

## 2021-01-01 RX ADMIN — ATORVASTATIN CALCIUM 40 MG: 40 TABLET, FILM COATED ORAL at 09:13

## 2021-01-01 RX ADMIN — AMOXICILLIN AND CLAVULANATE POTASSIUM 1 TABLET: 875; 125 TABLET, FILM COATED ORAL at 08:15

## 2021-01-01 RX ADMIN — AMIODARONE HYDROCHLORIDE 200 MG: 200 TABLET ORAL at 21:29

## 2021-01-01 RX ADMIN — FUROSEMIDE 40 MG: 10 INJECTION, SOLUTION INTRAMUSCULAR; INTRAVENOUS at 10:12

## 2021-01-01 RX ADMIN — FUROSEMIDE 40 MG: 10 INJECTION, SOLUTION INTRAMUSCULAR; INTRAVENOUS at 09:13

## 2021-01-01 RX ADMIN — AMOXICILLIN AND CLAVULANATE POTASSIUM 1 TABLET: 875; 125 TABLET, FILM COATED ORAL at 21:39

## 2021-01-01 RX ADMIN — CARVEDILOL 6.25 MG: 3.12 TABLET, FILM COATED ORAL at 16:43

## 2021-01-01 RX ADMIN — FERROUS SULFATE TAB 325 MG (65 MG ELEMENTAL FE) 325 MG: 325 (65 FE) TAB at 09:20

## 2021-01-01 RX ADMIN — AMOXICILLIN AND CLAVULANATE POTASSIUM 1 TABLET: 875; 125 TABLET, FILM COATED ORAL at 21:33

## 2021-01-01 RX ADMIN — ATORVASTATIN CALCIUM 40 MG: 40 TABLET, FILM COATED ORAL at 22:00

## 2021-01-01 RX ADMIN — CARVEDILOL 6.25 MG: 3.12 TABLET, FILM COATED ORAL at 17:11

## 2021-01-01 RX ADMIN — FUROSEMIDE 40 MG: 10 INJECTION, SOLUTION INTRAMUSCULAR; INTRAVENOUS at 23:11

## 2021-01-01 RX ADMIN — ESCITALOPRAM OXALATE 5 MG: 10 TABLET ORAL at 08:14

## 2021-01-01 RX ADMIN — METOPROLOL TARTRATE 12.5 MG: 25 TABLET, FILM COATED ORAL at 08:47

## 2021-01-01 RX ADMIN — AMIODARONE HYDROCHLORIDE 400 MG: 200 TABLET ORAL at 08:15

## 2021-01-01 RX ADMIN — DEXAMETHASONE 4 MG: 4 TABLET ORAL at 08:48

## 2021-01-01 RX ADMIN — FUROSEMIDE 40 MG: 10 INJECTION, SOLUTION INTRAMUSCULAR; INTRAVENOUS at 17:35

## 2021-01-01 RX ADMIN — NITROFURANTOIN (MONOHYDRATE/MACROCRYSTALS) 100 MG: 75; 25 CAPSULE ORAL at 16:31

## 2021-01-01 RX ADMIN — FERROUS SULFATE TAB 325 MG (65 MG ELEMENTAL FE) 325 MG: 325 (65 FE) TAB at 08:48

## 2021-01-01 RX ADMIN — METOPROLOL TARTRATE 12.5 MG: 25 TABLET, FILM COATED ORAL at 11:44

## 2021-01-01 RX ADMIN — APIXABAN 2.5 MG: 2.5 TABLET, FILM COATED ORAL at 21:39

## 2021-01-01 RX ADMIN — Medication 5 MG/HR: at 12:44

## 2021-01-01 RX ADMIN — AMOXICILLIN AND CLAVULANATE POTASSIUM 1 TABLET: 875; 125 TABLET, FILM COATED ORAL at 22:11

## 2021-01-01 RX ADMIN — MIRTAZAPINE 15 MG: 15 TABLET, ORALLY DISINTEGRATING ORAL at 01:09

## 2021-01-01 RX ADMIN — AMOXICILLIN AND CLAVULANATE POTASSIUM 1 TABLET: 875; 125 TABLET, FILM COATED ORAL at 22:17

## 2021-01-01 RX ADMIN — APIXABAN 2.5 MG: 2.5 TABLET, FILM COATED ORAL at 10:56

## 2021-01-01 RX ADMIN — PROPOFOL 10 MCG/KG/MIN: 10 INJECTION, EMULSION INTRAVENOUS at 17:38

## 2021-01-01 RX ADMIN — FERROUS SULFATE TAB 325 MG (65 MG ELEMENTAL FE) 325 MG: 325 (65 FE) TAB at 08:15

## 2021-01-01 RX ADMIN — CARVEDILOL 6.25 MG: 3.12 TABLET, FILM COATED ORAL at 18:10

## 2021-01-01 RX ADMIN — METOPROLOL TARTRATE 12.5 MG: 25 TABLET, FILM COATED ORAL at 10:11

## 2021-01-01 RX ADMIN — AMIODARONE HYDROCHLORIDE 200 MG: 200 TABLET ORAL at 08:48

## 2021-01-01 RX ADMIN — GUAIFENESIN 1200 MG: 600 TABLET, EXTENDED RELEASE ORAL at 10:11

## 2021-01-01 RX ADMIN — AMOXICILLIN AND CLAVULANATE POTASSIUM 1 TABLET: 875; 125 TABLET, FILM COATED ORAL at 08:49

## 2021-01-01 RX ADMIN — AMOXICILLIN AND CLAVULANATE POTASSIUM 1 TABLET: 875; 125 TABLET, FILM COATED ORAL at 22:24

## 2021-01-01 RX ADMIN — AMIODARONE HYDROCHLORIDE 400 MG: 200 TABLET ORAL at 17:55

## 2021-01-01 RX ADMIN — AMIODARONE HYDROCHLORIDE 200 MG: 200 TABLET ORAL at 11:04

## 2021-01-01 RX ADMIN — Medication 5 MG/HR: at 06:20

## 2021-01-01 RX ADMIN — FUROSEMIDE 40 MG: 10 INJECTION, SOLUTION INTRAMUSCULAR; INTRAVENOUS at 11:03

## 2021-01-01 RX ADMIN — DEXTROSE MONOHYDRATE 25 G: 25 INJECTION, SOLUTION INTRAVENOUS at 20:52

## 2021-01-01 RX ADMIN — SPIRONOLACTONE 25 MG: 25 TABLET ORAL at 11:04

## 2021-01-01 RX ADMIN — METOPROLOL TARTRATE 12.5 MG: 25 TABLET, FILM COATED ORAL at 10:27

## 2021-01-01 RX ADMIN — MIRTAZAPINE 15 MG: 15 TABLET, ORALLY DISINTEGRATING ORAL at 01:50

## 2021-01-01 RX ADMIN — AMOXICILLIN AND CLAVULANATE POTASSIUM 1 TABLET: 875; 125 TABLET, FILM COATED ORAL at 10:55

## 2021-01-01 RX ADMIN — AMOXICILLIN AND CLAVULANATE POTASSIUM 1 TABLET: 875; 125 TABLET, FILM COATED ORAL at 08:47

## 2021-01-01 RX ADMIN — FERROUS SULFATE TAB 325 MG (65 MG ELEMENTAL FE) 325 MG: 325 (65 FE) TAB at 21:33

## 2021-01-01 RX ADMIN — FERROUS SULFATE TAB 325 MG (65 MG ELEMENTAL FE) 325 MG: 325 (65 FE) TAB at 10:27

## 2021-01-01 RX ADMIN — AMIODARONE HYDROCHLORIDE 200 MG: 200 TABLET ORAL at 10:11

## 2021-01-01 RX ADMIN — SPIRONOLACTONE 25 MG: 25 TABLET ORAL at 08:15

## 2021-01-01 RX ADMIN — DIGOXIN 250 MCG: 250 INJECTION, SOLUTION INTRAMUSCULAR; INTRAVENOUS; PARENTERAL at 13:15

## 2021-01-01 RX ADMIN — APIXABAN 2.5 MG: 2.5 TABLET, FILM COATED ORAL at 11:03

## 2021-01-09 NOTE — ED PROVIDER NOTES
Hpi 
Chief complaint: Suprapubic discomfort. Abnormal urine appearance at [address] [facility] Onset when: today Insidious or sudden: gradual 
While doing what: urinating Duration: present Location: suprapubic region Radiation: none Quality: Suprapubic discomfort. Severity: moderate Increased by: urination Misc: No flank pain. No fever. No nausea / vomiting. Review of systems General: No fever CV: No chest pain Resp: No shortness of breath GI: No vomiting. (+) suprapubic abdominal pain : No flank pain. (-) dysuria. All other systems reviewed are negative except as documented in the HPI. Exam 
General:Well developed, well nourished elderly patient in NAD Skin/Derm: Skin is warm and dry. Pulm: No tachypnea or retractions. GI: (+) suprapubic tenderness. No flank tenderness. Neuro: Alert. Psych: Affect is normal.  Non-anxious. Past Medical History:  
Diagnosis Date  Anemia  Anxiety  Atrial fibrillation ([address] .  Chronic obstructive pulmonary disease ([address]  Dementia ([address]  Dementia ([address]  Depression  Heart failure ([address]  High cholesterol  Hypertension  Low blood potassium  Lung cancer ([address]  Pleural effusion  Respiratory failure ([address] Past Surgical History:  
Procedure Laterality Date  IR THORACENTESIS NDL PUNC ASP W IMAGE  11/6/2020 History reviewed. No pertinent family history. Social History Socioeconomic History  Marital status: SINGLE Spouse name: Not on file  Number of children: Not on file  Years of education: Not on file  Highest education level: Not on file Occupational History  Not on file Social Needs  Financial resource strain: Not hard at all  Food insecurity Worry: Patient refused Inability: Patient refused  Transportation needs Medical: No  
  Non-medical: No  
Tobacco Use  Smoking status: Never Smoker  Smokeless tobacco: Never Used Substance and Sexual Activity  Alcohol use: Not Currently  Drug use: Never  Sexual activity: Not on file Lifestyle  Physical activity Days per week: 2 days Minutes per session: 20 min  Stress: Not at all Relationships  Social connections Talks on phone: More than three times a week Gets together: Once a week Attends [employer] service: More than 4 times per year Active member of club or organization: No  
  Attends meetings of clubs or organizations: Never Relationship status: Never   Intimate partner violence Fear of current or ex partner: Patient refused Emotionally abused: Patient refused Physically abused: Patient refused Forced sexual activity: Patient refused Other Topics Concern   Service No  
 Blood Transfusions No  
 Caffeine Concern No  
 Occupational Exposure No  
 Hobby Hazards No  
 Sleep Concern No  
 Stress Concern No  
 Weight Concern No  
 Special Diet No  
 Back Care No  
 Exercise No  
 Bike Helmet No  
 Seat Belt No  
 Self-Exams No  
Social History Narrative  Not on file ALLERGIES: Patient has no known allergies. Vitals:  
 01/09/21 1417 01/09/21 1500 01/09/21 1501 BP: (!) 131/90 (!) 157/99 Pulse: (!) 117 (!) 119 Resp: 18 20 Temp: 98.8 °F (37.1 °C) 98.7 °F (37.1 °C) SpO2: 97% 96% 99% Weight: 63.5 kg (140 lb) Height: 5' 2\" (1.575 m)    
      
 
------------------------------ Nurses Notes  Reviewed 
------------------------------ Labs Recent Results (from the past 8 hour(s)) URINALYSIS W/MICROSCOPIC Collection Time: 01/09/21  2:38 PM  
Result Value Ref Range Color CIT Group Appearance Turbid (A) Clear Specific gravity 1.020 1.003 - 1.030    
 pH (UA) 8.0 5.0 - 8.0 Protein >300 (A) Negative mg/dL Glucose Negative Negative mg/dL Ketone Negative Negative mg/dL Bilirubin Negative Negative Blood Negative Negative Urobilinogen 2.0 (H) 0.1 - 1.0 EU/dL Nitrites Negative Negative Leukocyte Esterase Large (A) Negative WBC 20-50 0 - 4 /hpf  
 RBC >100 (H) 0 - 5 /hpf Bacteria 1+ (A) Negative /hpf Mucus Trace /lpf Triple Phosphate crystals 1+ CBC WITH AUTOMATED DIFF Collection Time: 01/09/21  2:38 PM  
Result Value Ref Range WBC 7.7 3.6 - 11.0 K/uL  
 RBC 4.70 3.80 - 5.20 M/uL  
 HGB 12.2 11.5 - 16.0 g/dL HCT 38.6 35.0 - 47.0 % MCV 82.1 80.0 - 99.0 FL  
 MCH 26.0 26.0 - 34.0 PG  
 MCHC 31.6 30.0 - 36.5 g/dL RDW 20.5 (H) 11.5 - 14.5 % PLATELET 286 924 - 571 K/uL MPV 10.1 8.9 - 12.9 FL  
 NEUTROPHILS 79 (H) 32 - 75 % LYMPHOCYTES 11 (L) 12 - 49 % MONOCYTES 8 5 - 13 % EOSINOPHILS 0 0 - 7 % BASOPHILS 0 0 - 1 % IMMATURE GRANULOCYTES 2 (H) 0.0 - 0.5 % ABS. NEUTROPHILS 6.2 1.8 - 8.0 K/UL  
 ABS. LYMPHOCYTES 0.9 0.8 - 3.5 K/UL  
 ABS. MONOCYTES 0.6 0.0 - 1.0 K/UL  
 ABS. EOSINOPHILS 0.0 0.0 - 0.4 K/UL  
 ABS. BASOPHILS 0.0 0.0 - 0.1 K/UL  
 ABS. IMM. GRANS. 0.1 (H) 0.00 - 0.04 K/UL  
 DF AUTOMATED METABOLIC PANEL, COMPREHENSIVE Collection Time: 01/09/21  2:38 PM  
Result Value Ref Range Sodium 141 136 - 145 mmol/L Potassium 4.2 3.5 - 5.1 mmol/L Chloride 110 (H) 97 - 108 mmol/L  
 CO2 26 21 - 32 mmol/L Anion gap 5 5 - 15 mmol/L Glucose 86 65 - 100 mg/dL BUN 24 (H) 6 - 20 mg/dL Creatinine 1.08 (H) 0.55 - 1.02 mg/dL BUN/Creatinine ratio 22 (H) 12 - 20 GFR est AA 59 (L) >60 ml/min/1.73m2 GFR est non-AA 49 (L) >60 ml/min/1.73m2 Calcium 10.3 (H) 8.5 - 10.1 mg/dL Bilirubin, total 0.4 0.2 - 1.0 mg/dL AST (SGOT) 38 (H) 15 - 37 U/L  
 ALT (SGPT) 63 12 - 78 U/L Alk. phosphatase 94 45 - 117 U/L Protein, total 6.5 6.4 - 8.2 g/dL Albumin 2.2 (L) 3.5 - 5.0 g/dL Globulin 4.3 (H) 2.0 - 4.0 g/dL  A-G Ratio 0.5 (L) 1.1 - 2.2    
 
 
------------------------------ 
------------------------------ 
 
------------------------------ 
 Diagnosis UTI 
------------------------------ Disposition Discharge 
------------------------------ Plan ABX 
------------------------------

## 2021-01-09 NOTE — ED NOTES
Patient discharged home following routine work up. Medications, fluids and treatments tolerated well by patient. Normal vital signs. Reviewed follow up instructions and medications with patient. Extensive conversation regarding return precautions discussed. Patient acknowledged understanding. All personal belongings taken by patient.

## 2021-01-24 PROBLEM — I49.9 CARDIAC ARRHYTHMIA: Status: ACTIVE | Noted: 2021-01-01

## 2021-01-24 NOTE — ED PROVIDER NOTES
EMERGENCY DEPARTMENT HISTORY AND PHYSICAL EXAM 
 
 
 
Date: 1/24/2021 Patient Name: Akua Chavira History of Presenting Illness Chief Complaint Patient presents with  Shortness of Breath  Back Pain 10:49 AM 
 
History Provided By: Patient HPI: Akua Chavira, 79 y.o. female with a history of 
-Dementia 
-CHF 
-HTN 
-Anemia 
-HLD 
-COPD 
-Lung cancer 
-Depression 
-Dementia 
-A-FIB Pt presents to the ED today c/o SOB that began 7 days ago. Pt was initially hesitant to visit the ED today second to the COVID-19 pandemic; however, her family found pt struggling to breath today and insisted that pt go to the ED. Pt states that her breathing is worsened when laying down flat. She notes that her LE edema is currently at baseline status. Pt is on a water pill and endorses medicine compliance. She has been hospitalized for CHF exacerbation and Afib in the past; however, pt states that her current symptoms are not similar to previous CHF exacerbation. Pt reports that her relatives visit her on a regular basis without wearing a face mask. Pt is does not have a cardiologist.  
 
Pt is a former smoker, she smoked for over 30 years. Recently stopped due to a recent breast cancer diagnosis. 
---------------- Family Presented to the ED after initial history and physical andgives a history of abnormal mass characterized on past CT scan concerned for cancer; they would like to have this evaluated during her hospital admission. Pt also has a history of dementia PCP is Dr Tony Brown. PCP: Samuel Cruz MD 
 
Current Facility-Administered Medications Medication Dose Route Frequency Provider Last Rate Last Admin  carvediloL (COREG) tablet 6.25 mg  6.25 mg Oral BID WITH MEALS Adrian Zhou MD   6.25 mg at 01/27/21 1011  dilTIAZem (CARDIZEM) 125 mg/125 mL (1 mg/mL) dextrose 5% infusion  5 mg/hr IntraVENous TITRATE Rosalia MACIAS MD 5 mL/hr at 01/27/21 0620 5 mg/hr at 01/27/21 0620  
 albuterol-ipratropium (DUO-NEB) 2.5 MG-0.5 MG/3 ML  3 mL Nebulization Q6H PRN Rosalia MACIAS MD      
 amoxicillin-clavulanate (AUGMENTIN) 875-125 mg per tablet 1 Tab  1 Tab Oral Q12H Thuan Chen MD   1 Tab at 01/27/21 1011  apixaban (ELIQUIS) tablet 2.5 mg  2.5 mg Oral BID Rosalia MACIAS MD   2.5 mg at 01/27/21 1011  
 atorvastatin (LIPITOR) tablet 40 mg  40 mg Oral DAILY Rosalia MACIAS MD   40 mg at 01/27/21 1012  escitalopram oxalate (LEXAPRO) tablet 5 mg  5 mg Oral DAILY Rosalia MACIAS MD   5 mg at 01/27/21 1011  ferrous sulfate tablet 325 mg  325 mg Oral BID Rosalia MACIAS MD   325 mg at 01/27/21 1011  spironolactone (ALDACTONE) tablet 25 mg  25 mg Oral DAILY Rosalia MACIAS MD   25 mg at 01/27/21 1012  furosemide (LASIX) injection 40 mg  40 mg IntraVENous DAILY Rosalia MACIAS MD   40 mg at 01/27/21 1012  aspirin delayed-release tablet 81 mg  81 mg Oral DAILY Rosalia MACIAS MD   81 mg at 01/27/21 1011 Past History Past Medical History: 
Past Medical History:  
Diagnosis Date  Anemia  Anxiety  Atrial fibrillation ([address] .  Chronic obstructive pulmonary disease [facility]  Dementia ([address]  Dementia ([address]  Depression  Heart failure ([facility]  High cholesterol  Hypertension  Low blood potassium  Lung cancer ([address]  Pleural effusion  Respiratory failure ([address] Past Surgical History: 
Past Surgical History:  
Procedure Laterality Date  IR THORACENTESIS NDL PUNC ASP W IMAGE  11/6/2020 Family History: No family history on file. Social History: 
Social History Tobacco Use  Smoking status: Never Smoker  Smokeless tobacco: Never Used Substance Use Topics  Alcohol use: Not Currently  Drug use: Never Allergies: No Known Allergies Review of Systems Review of Systems Constitutional: Negative for chills, diaphoresis and fever. HENT: Negative for congestion, sore throat and trouble swallowing. Eyes: Negative for visual disturbance. Respiratory: Negative for cough and shortness of breath. Cardiovascular: Negative for chest pain and palpitations. Gastrointestinal: Negative for abdominal pain, diarrhea, nausea and vomiting. Endocrine: Negative for polydipsia, polyphagia and polyuria. Genitourinary: Negative for dysuria, frequency, hematuria and urgency. Musculoskeletal: Negative for gait problem and neck pain. Skin: Negative for rash. Neurological: Negative for dizziness, syncope and headaches. Physical Exam  
Physical Exam 
Vitals signs and nursing note reviewed. Constitutional:   
   General: She is in acute distress. Appearance: She is well-developed. She is ill-appearing. She is not toxic-appearing. Comments: Frail elderly female no respiratory distress talking in short sentences obviously dyspneic and tachypnea on 4 L nasal cannula. HENT:  
   Head: Normocephalic and atraumatic. Nose: Nose normal.  
   Mouth/Throat:  
   Pharynx: No posterior oropharyngeal erythema. Comments: Oral mucosa very dry as are patient's lips which are also cracked Eyes:  
   General: Vision grossly intact. Extraocular Movements: Extraocular movements intact. Conjunctiva/sclera: Conjunctivae normal.  
   Pupils: Pupils are equal, round, and reactive to light. Neck: Musculoskeletal: Neck supple. Vascular: JVD present. Trachea: No tracheal deviation. Comments: Patient has JVD to the jawline and very prominent also pulsatile rt Cardiovascular:  
   Rate and Rhythm: Tachycardia present. Rhythm irregular. Pulses:     
     Carotid pulses are 2+ on the right side and 2+ on the left side. Radial pulses are 2+ on the right side and 2+ on the left side. Femoral pulses are 2+ on the right side and 2+ on the left side. Popliteal pulses are 2+ on the right side and 2+ on the left side. Dorsalis pedis pulses are 2+ on the right side and 2+ on the left side. Posterior tibial pulses are 2+ on the right side and 2+ on the left side. Comments: Heart sounds quiet with PMI deviated toward left axillary line. No palpable heaves or thrills Pulmonary:  
   Effort: Bradypnea, accessory muscle usage and respiratory distress present. Breath sounds: Normal air entry. Examination of the right-lower field reveals rales. Examination of the left-lower field reveals rales. Rales present. No wheezing or rhonchi. Chest:  
   Chest wall: No deformity. Abdominal:  
   General: Abdomen is protuberant. Bowel sounds are normal.  
   Palpations: Abdomen is soft. Tenderness: There is no abdominal tenderness. There is no guarding or rebound. Musculoskeletal:     
   General: No swelling or deformity. Right shoulder: She exhibits normal range of motion and no swelling. Right lower leg: Edema present. Left lower leg: Edema present. Comments: 2-3+ edema pretibial  
Skin: 
   General: Skin is warm and dry. Capillary Refill: Capillary refill takes less than 2 seconds. Findings: No signs of injury or rash. Neurological:  
   General: No focal deficit present. Mental Status: She is alert. She is disoriented. Cranial Nerves: No cranial nerve deficit. Comments: Normal Speech Psychiatric:     
   Attention and Perception: Attention normal.     
   Mood and Affect: Affect normal.     
   Speech: Speech normal.     
   Behavior: Behavior is cooperative. Comments: Somewhat evasive during history taking but cooperative Diagnostic Study Results Labs - Recent Results (from the past 48 hour(s)) METABOLIC PANEL, COMPREHENSIVE Collection Time: 01/24/21 11:04 AM  
Result Value Ref Range Sodium 138 136 - 145 mmol/L Potassium 4.8 3.5 - 5.1 mmol/L Chloride 106 97 - 108 mmol/L  
 CO2 28 21 - 32 mmol/L Anion gap 4 (L) 5 - 15 mmol/L Glucose 86 65 - 100 mg/dL BUN 8 6 - 20 mg/dL Creatinine 0.82 0.55 - 1.02 mg/dL BUN/Creatinine ratio 10 (L) 12 - 20 GFR est AA >60 >60 ml/min/1.73m2 GFR est non-AA >60 >60 ml/min/1.73m2 Calcium 9.8 8.5 - 10.1 mg/dL Bilirubin, total 0.7 0.2 - 1.0 mg/dL AST (SGOT) 56 (H) 15 - 37 U/L  
 ALT (SGPT) 94 (H) 12 - 78 U/L Alk. phosphatase 146 (H) 45 - 117 U/L Protein, total 7.8 6.4 - 8.2 g/dL Albumin 2.7 (L) 3.5 - 5.0 g/dL Globulin 5.1 (H) 2.0 - 4.0 g/dL A-G Ratio 0.5 (L) 1.1 - 2.2    
TROPONIN I Collection Time: 01/24/21 11:04 AM  
Result Value Ref Range Troponin-I, Qt. <0.05 <0.05 ng/mL BNP Collection Time: 01/24/21 11:04 AM  
Result Value Ref Range NT pro-BNP 2,016 (H) <450 pg/mL URINALYSIS W/ RFLX MICROSCOPIC Collection Time: 01/24/21 11:32 AM  
Result Value Ref Range Color Yellow/Straw Appearance Turbid (A) Clear Specific gravity 1.008 1.003 - 1.030    
 pH (UA) 6.0 5.0 - 8.0 Protein Negative Negative mg/dL Glucose Negative Negative mg/dL Ketone Negative Negative mg/dL Bilirubin Negative Negative Blood Small (A) Negative Urobilinogen 0.1 0.1 - 1.0 EU/dL Nitrites Negative Negative Leukocyte Esterase Trace (A) Negative WBC 0-4 0 - 4 /hpf  
 RBC 0-5 0 - 5 /hpf Bacteria 3+ (A) Negative /hpf URINE MICROSCOPIC Collection Time: 01/24/21 11:32 AM  
Result Value Ref Range WBC 0-4 0 - 4 /hpf  
 RBC 0-5 0 - 5 /hpf Bacteria 3+ (A) Negative /hpf  
CBC WITH AUTOMATED DIFF Collection Time: 01/24/21  1:45 PM  
Result Value Ref Range WBC 8.5 3.6 - 11.0 K/uL  
 RBC 4.89 3.80 - 5.20 M/uL  
 HGB 13.0 11.5 - 16.0 g/dL HCT 41.2 35.0 - 47.0 % MCV 84.3 80.0 - 99.0 FL  
 MCH 26.6 26.0 - 34.0 PG  
 MCHC 31.6 30.0 - 36.5 g/dL RDW 20.6 (H) 11.5 - 14.5 % PLATELET 016 437 - 520 K/uL MPV 11.5 8.9 - 12.9 FL  
 NEUTROPHILS 72 32 - 75 % LYMPHOCYTES 17 12 - 49 % MONOCYTES 9 5 - 13 % EOSINOPHILS 1 0 - 7 % BASOPHILS 0 0 - 1 % IMMATURE GRANULOCYTES 1 (H) 0.0 - 0.5 % ABS. NEUTROPHILS 6.2 1.8 - 8.0 K/UL  
 ABS. LYMPHOCYTES 1.5 0.8 - 3.5 K/UL  
 ABS. MONOCYTES 0.8 0.0 - 1.0 K/UL  
 ABS. EOSINOPHILS 0.1 0.0 - 0.4 K/UL  
 ABS. BASOPHILS 0.0 0.0 - 0.1 K/UL  
 ABS. IMM. GRANS. 0.1 (H) 0.00 - 0.04 K/UL  
 DF AUTOMATED Radiologic Studies -  
XR CHEST PORT Final Result Stable exam  
  
 
CT Results  (Last 48 hours) None CXR Results  (Last 48 hours) 01/24/21 1050  XR CHEST PORT Final result Impression:  Stable exam  
  
 Narrative:  A single upright portable view is compared with a prior exam from 11/25/2020. The right internal jugular Port-A-Cath is unchanged. The aorta is tortuous, as  
before. The left lung is moderately well expanded and clear. The right lung is  
significantly smaller in terms of volume, with right basilar airspace disease  
and right pleural fluid/pleural thickening and this appearance is unchanged from  
the prior exam. No new or superimposed acute findings noted Medical Decision Making and ED Course I have also reviewed the vital signs, available nursing notes, past medical history, past surgical history, family history and social history. Vital Signs - Reviewed the patient's vital signs. Patient Vitals for the past 12 hrs: 
 Temp Pulse Resp BP SpO2  
01/24/21 1408  94  (!) 133/98 94 % 01/24/21 1225  (!) 115  (!) 144/106   
01/24/21 1117     94 % 01/24/21 1110  (!) 132  (!) 144/114   
01/24/21 1031 97.7 °F (36.5 °C) (!) 124 (!) 35 127/84 96 % EKG interpretation: (Preliminary): Performed at , and read at 4519 Rhythm: atrial fib; and RVR. Rate (approx.): 126; Axis: normal; Other findings: abnormal ekg. Records Reviewed: Nursing Notes and Old medical records as available. Medical Decision Making/Diff Dx: 
Acute on chronic CHF, new onset A. fib, volume overload, renal insufficiency, azotemia, medication noncompliance, electrolyte abnormalities, right-sided heart failure MI NSTEMI UTI pneumonia COVID-23
 
81-year-old -American female with some degree of dementia presents in acute respiratory distress secondary to what appears to be fluid overload likely from her baseline congestive heart failure. Quick review of records show no evidence of A. fib which is likely contributing and making symptoms worse. We will treat aggressively with IV Lasix, nitroglycerin patch, oxygen therapy, screening labs, chest x-ray and plan admission we will use diltiazem in attempt to slow patient's rate however blood pressure has been a bit soft upon presentation we will monitor closely to avoid iatrogenic hypotension. ED course/Re-evaluation/Consultations/Other Patient with some improvement in her symptoms after IV Lasix, nitroglycerin paste, IV diltiazem. Appears to be resting more comfortably will contact Dr. Wing Velarde who is covering for patient's primary to facilitate admission ED Course as of Jan 27 1311 Vesta Godinez Jan 24, 2021  
1308 Paged Dr Ro Giordano [RD] [phone] Paged Dr Ro Giordano, again [RD] [id number] Paged Dr Ro Giordano for a third time [RD] [id number] Paged Dr Ro Giordano for a fourth time. [RD] [id number] Paged Dr Ro Giordano for a 5th time [RD] [address] a direct phone number for Dr Ro Giordano. Discussed case. Will admit pt.  
 [RD] ED Course User Index [RD] Rexanne Dec Procedures PROCEDURES: 
Procedures Abril Haney MD 
 
CRITICAL CARE NOTE:  
2:05 PM 
Amount of critical care time: 50 minutes Impending deterioration: Cardiovascular and Metabolic Associated risk factors: Hypotension, Shock, Dysrhythmia, Metabolic changes and Dehydration Management: Bedside Assessment and Supervision of Care Interpretation: Xrays, CT Scan, ECG and Blood Pressure Interventions: hemodynamic mngmt and vent mngmt Case review: Nursing Treatment response: Stable Performed by: Self Notes: I have spent critical care time involved in lab review, decision making, bedside attention, and documentation. This time excludes time spent in any separate billed procedures. During this entire length of time I was immediately available to the patient. Quang Askew MD 
 
Disposition Admitted Diagnosis Clinical impression: 1. Respiratory distress 2. Acute on chronic combined systolic and diastolic CHF (congestive heart failure) ([address] 3. New onset a-fib ([address] 4. Other hypervolemia 5. Acute exacerbation of chronic obstructive pulmonary disease (COPD) ([address] 6. Dementia associated with other underlying disease without behavioral disturbance ([address] Attestation: 
Duglas Ortiz] am scribing for, and in the presence of Dr. Mary Escudero MD] I, Dr Mary Escudero MD], have reviewed any and all documentation by a scribe and authenticated its accuracy.

## 2021-01-24 NOTE — ED TRIAGE NOTES
Pt reports SOB since this morning, had slight back pain last night, pt breathing very fast in triage and very lethargic

## 2021-01-24 NOTE — ED NOTES
Paged admitting MD regarding pt still being in atrial fibrillation and HR ranging from  bpm. MD ordered for pt to be on cardizem gtts starting at 5 mg/hr to titration for HR less than 100 bpm.

## 2021-01-25 NOTE — CONSULTS
Consult 
 
NAME: Jackie Connor  
:  1940  
MRN:  [id number]  
 
Date/Time:  2021 10:47 AM 
 
Patient PCP: Milton Mckeon MD 
________________________________________________________________________ 
 
 Assessment:  
 
1. Right pleural effusion, large, status post recent aspiration 
2. Paroxysmal atrial fibrillation with a rapid ventricular response 
3. Heart failure with diastolic left ventricular dysfunction 
4. Hypertension, 
5. Hypercholesterolemia 
6. History of lung cancer by chart, COPD 
7. Dementia 
  
 
 Plan:  
 
1. Patient has atrial fibrillation with a rapid ventricular response with.perods of sinus rhythm.  She is already on carvedilol and IV Cardizem.  Large pleural effusion with consolidation of the right lung might have precipitated the rapid rate.  If heart rate does not improve will add amiodarone on a short-term basis.  Patient gives history of COPD and mildly elevated liver enzymes.  So will start amiodarone orally if the heart rate cannot be controlled. 
2. Left ventricular ejection fraction was normal last year.  Will review recent echocardiogram to assess the LV function. 
3. Patient has dementia and did not mention about lung cancer.  Need to exclude malignant pleural effusion. 
4. Left ventricular function is normal will hold her losartan and increase carvedilol to control the heart rate.  
 
 []        High complexity decision making was performed 
 
 
 
Subjective:  
CHIEF COMPLAINT:  
 
HISTORY OF PRESENT ILLNESS:    
 [facility] in [place] is a 79 y.o.   female who is known to have paroxysmal atrial fibrillation, heart failure with preserved left ventricular ejection fraction, hypertension was brought to the emergency room as she was feeling very weak fatigue with dyspnea. She was noted to have large right-sided pleural effusion which was tapped and 1.3 L of serosanguineous fluid was removed. Now she feels better. Denies any chest pain or previous history of coronary artery disease. She denies any fever chills or cough. History is obtained from the patient. According to the chart she had a lung cancer. There is history of hypertension and is controlled. Is history of anemia and dementia. Has hypercholesterolemia. We were asked to consult for work up and evaluation of the above problems. Past Medical History:  
Diagnosis Date  Anemia  Anxiety  Atrial fibrillation ([address] .  Chronic obstructive pulmonary disease ([address]  Dementia ([address]  Dementia ([address]  Depression  Heart failure ([address]  High cholesterol  Hypertension  Low blood potassium  Lung cancer ([address]  Pleural effusion  Respiratory failure ([address] Past Surgical History:  
Procedure Laterality Date  IR THORACENTESIS NDL PUNC ASP W IMAGE  11/6/2020 No Known Allergies Meds:  See below Social History Tobacco Use  Smoking status: Never Smoker  Smokeless tobacco: Never Used Substance Use Topics  Alcohol use: Not Currently No family history on file. REVIEW OF SYSTEMS:   
 []         Unable to obtain  ROS due to --- 
 [x]         Total of 12 systems reviewed as follows: 
 
Constitutional: negative fever, negative chills, negative weight loss Eyes:   negative visual changes ENT:   negative sore throat, tongue or lip swelling Respiratory:  Has dyspnea. Cards:  negative for chest pain, lower extremity edema. She complains of dyspnea. GI:   negative for nausea, vomiting, diarrhea, and abdominal pain Genitourinary: negative for frequency, dysuria Integument:  negative for rash Hematologic:  negative for easy bruising and gum/nose bleeding Musculoskel: negative for myalgias,  back pain Neurological:  negative for headaches, dizziness, vertigo, weakness Behavl/Psych: negative for feelings of anxiety, depression Pertinent Positives include : 
 
Objective:  
  
Physical Exam: 
 
Last 24hrs VS reviewed since prior progress note. Most recent are: 
 
Visit Vitals BP (!) 132/103 (BP 1 Location: Right arm, BP Patient Position: At rest) Pulse (!) 56 Temp 98 °F (36.7 °C) Resp 20 Ht 5' 2\" (1.575 m) Wt 47.6 kg (105 lb) SpO2 96% BMI 19.20 kg/m² No intake or output data in the 24 hours ending 01/25/21 1047 General Appearance: Well developed, well nourished, alert & oriented x 3,  
 no acute distress. Ears/Nose/Mouth/Throat: Pupils equal and round, Hearing grossly normal. 
Neck: Supple. JVP within normal limits. Carotids good upstrokes, with no bruit. Chest: Lungs clear to auscultation bilaterally. Cardiovascular: Irregularly irregular rhythm is noted. Tachycardia is present. S1S2 normal, no murmur, rubs, gallops. Abdomen: Soft, non-tender, bowel sounds are active. No organomegaly. Extremities: No edema bilaterally. Neuro: No localizing neurological deficit is noted[]         Post-cath site without hematoma, bruit, tenderness, or thrill. Distal pulses intact. Data:  
 
 I have reviewed vital signs,labs and ekg independently Telemetry: 
 
EKG: Atrial fibrillation with rapid ventricular response. EKG RESULTS Procedure Component Value Units Date/Time EKG, 12 LEAD, INITIAL [[id number]] Order Status: Completed XR CHEST PORT Final Result Stable exam  
  
 
Echo Results  (Last 48 hours) None Cardiolite (Tc-99m Sestamibi) stress test 
 
XR Results (most recent): 
  
 Results from [facility] Encounter encounter on 01/24/21 XR CHEST PORT Narrative A single upright portable view is compared with a prior exam from 11/25/2020. The right internal jugular Port-A-Cath is unchanged. The aorta is tortuous, as 
before. The left lung is moderately well expanded and clear. The right lung is 
significantly smaller in terms of volume, with right basilar airspace disease 
and right pleural fluid/pleural thickening and this appearance is unchanged from 
the prior exam. No new or superimposed acute findings noted Impression Stable exam  
  
 
Prior to Admission medications Medication Sig Start Date End Date Taking? Authorizing Provider  
apixaban (ELIQUIS) 2.5 mg tablet Take 1 Tab by mouth two (2) times a day. Indications: treatment to prevent blood clots in chronic atrial fibrillation, treatment to prevent a blood clot in the lung 12/1/20   Raquel Maldonado MD  
escitalopram oxalate (LEXAPRO) 5 mg tablet Take 1 Tab by mouth daily. Indications: anxiousness associated with depression 12/2/20   Raquel Maldonado MD  
mirtazapine (REMERON SOL-TAB) 15 mg disintegrating tablet Take 1 Tab by mouth nightly. 12/1/20   Raquel Maldonado MD  
mometasone-formoterol [facility]) 200-5 mcg/actuation HFA inhaler Take 2 Puffs by inhalation two (2) times a day. 12/1/20   Raquel Maldonado MD  
predniSONE (DELTASONE) 10 mg tablet Take 10 mg by mouth daily (with breakfast). 12/2/20   Raquel Maldondao MD  
albuterol-ipratropium (DUO-NEB) 2.5 mg-0.5 mg/3 ml nebu 3 mL by Nebulization route every six (6) hours as needed for Wheezing. 11/13/20   Raquel Maldonado MD  
albuterol-ipratropium (DUO-NEB) 2.5 mg-0.5 mg/3 ml nebu 3 mL by Nebulization route every six (6) hours as needed for Wheezing. 11/9/20   Raquel Maldonado MD  
amoxicillin-clavulanate (Augmentin) 875-125 mg per tablet Take 1 Tab by mouth two (2) times a day.  11/9/20   Grisel Wolf MD  
 ferrous sulfate (IRON) 325 mg (65 mg iron) EC tablet Take 325 mg by mouth two (2) times a day. Liliana Massey MD  
spironolactone (Aldactone) 25 mg tablet Take 25 mg by mouth daily. Take 1/2 daily    Liliana Massey MD  
atorvastatin (Lipitor) 40 mg tablet Take 40 mg by mouth daily. Liliana Massey MD  
 
 
Recent Results (from the past 24 hour(s)) METABOLIC PANEL, COMPREHENSIVE Collection Time: 01/24/21 11:04 AM  
Result Value Ref Range Sodium 138 136 - 145 mmol/L Potassium 4.8 3.5 - 5.1 mmol/L Chloride 106 97 - 108 mmol/L  
 CO2 28 21 - 32 mmol/L Anion gap 4 (L) 5 - 15 mmol/L Glucose 86 65 - 100 mg/dL BUN 8 6 - 20 mg/dL Creatinine 0.82 0.55 - 1.02 mg/dL BUN/Creatinine ratio 10 (L) 12 - 20 GFR est AA >60 >60 ml/min/1.73m2 GFR est non-AA >60 >60 ml/min/1.73m2 Calcium 9.8 8.5 - 10.1 mg/dL Bilirubin, total 0.7 0.2 - 1.0 mg/dL AST (SGOT) 56 (H) 15 - 37 U/L  
 ALT (SGPT) 94 (H) 12 - 78 U/L Alk. phosphatase 146 (H) 45 - 117 U/L Protein, total 7.8 6.4 - 8.2 g/dL Albumin 2.7 (L) 3.5 - 5.0 g/dL Globulin 5.1 (H) 2.0 - 4.0 g/dL A-G Ratio 0.5 (L) 1.1 - 2.2    
TROPONIN I Collection Time: 01/24/21 11:04 AM  
Result Value Ref Range Troponin-I, Qt. <0.05 <0.05 ng/mL BNP Collection Time: 01/24/21 11:04 AM  
Result Value Ref Range NT pro-BNP 2,016 (H) <450 pg/mL URINALYSIS W/ RFLX MICROSCOPIC Collection Time: 01/24/21 11:32 AM  
Result Value Ref Range Color Yellow/Straw Appearance Turbid (A) Clear Specific gravity 1.008 1.003 - 1.030    
 pH (UA) 6.0 5.0 - 8.0 Protein Negative Negative mg/dL Glucose Negative Negative mg/dL Ketone Negative Negative mg/dL Bilirubin Negative Negative Blood Small (A) Negative Urobilinogen 0.1 0.1 - 1.0 EU/dL Nitrites Negative Negative Leukocyte Esterase Trace (A) Negative WBC 0-4 0 - 4 /hpf  
 RBC 0-5 0 - 5 /hpf Bacteria 3+ (A) Negative /hpf URINE MICROSCOPIC  
 Collection Time: 01/24/21 11:32 AM  
Result Value Ref Range WBC 0-4 0 - 4 /hpf  
 RBC 0-5 0 - 5 /hpf Bacteria 3+ (A) Negative /hpf  
CBC WITH AUTOMATED DIFF Collection Time: 01/24/21  1:45 PM  
Result Value Ref Range WBC 8.5 3.6 - 11.0 K/uL  
 RBC 4.89 3.80 - 5.20 M/uL  
 HGB 13.0 11.5 - 16.0 g/dL HCT 41.2 35.0 - 47.0 % MCV 84.3 80.0 - 99.0 FL  
 MCH 26.6 26.0 - 34.0 PG  
 MCHC 31.6 30.0 - 36.5 g/dL RDW 20.6 (H) 11.5 - 14.5 % PLATELET 668 636 - 743 K/uL MPV 11.5 8.9 - 12.9 FL  
 NEUTROPHILS 72 32 - 75 % LYMPHOCYTES 17 12 - 49 % MONOCYTES 9 5 - 13 % EOSINOPHILS 1 0 - 7 % BASOPHILS 0 0 - 1 % IMMATURE GRANULOCYTES 1 (H) 0.0 - 0.5 % ABS. NEUTROPHILS 6.2 1.8 - 8.0 K/UL  
 ABS. LYMPHOCYTES 1.5 0.8 - 3.5 K/UL  
 ABS. MONOCYTES 0.8 0.0 - 1.0 K/UL  
 ABS. EOSINOPHILS 0.1 0.0 - 0.4 K/UL  
 ABS. BASOPHILS 0.0 0.0 - 0.1 K/UL  
 ABS. IMM. GRANS. 0.1 (H) 0.00 - 0.04 K/UL  
 DF AUTOMATED    
LACTIC ACID Collection Time: 01/24/21  2:04 PM  
Result Value Ref Range Lactic acid 1.8 0.4 - 2.0 mmol/L  
TROPONIN I Collection Time: 01/24/21  7:49 PM  
Result Value Ref Range Troponin-I, Qt. <0.05 <0.05 ng/mL BNP Collection Time: 01/24/21  7:49 PM  
Result Value Ref Range NT pro-BNP 1,633 (H) <450 pg/mL ECHO ADULT COMPLETE Collection Time: 01/24/21  9:45 PM  
Result Value Ref Range LV ED Vol A4C 59.00 cm3 LV ED Vol A2C 48.20 cm3 LV ES Vol A4C 24.00 cm3 LV ES Vol A2C 11.10 cm3 IVSd 1.01 (A) 0.6 - 0.9 cm LVIDd 3.64 (A) 3.9 - 5.3 cm LVIDs 2.23 cm Pulmonic Regurgitant End Max Velocity 100.00 cm/s LVOT Peak Gradient 4.00 mmHg LVPWd 1.13 (A) 0.6 - 0.9 cm  
 BP EF 77.0 55 - 100 % LV Ejection Fraction MOD 2C 39 % Pulmonic Regurgitant End Max Velocity 147.00 cm/s AoV PG 9.00 mmHg Pulmonic Regurgitant End Max Velocity 107.00 cm/s Pulmonic Valve Systolic Peak Instantaneous Gradient 5.00 mmHg P Vein A Dur 148.00 ms Pulmonary Vein \"A\" Wave Velocity 42.80 cm/s  
 Est. RA Pressure 8.00 mmHg RVIDd 3.81 cm  
 RVSP 55.00 mmHg Tricuspid Valve Max Velocity 342.00 cm/s Triscuspid Valve Regurgitation Peak Gradient 47.00 mmHg Right Atrial Area 4C 11.65 cm2 LA Area 4C 21.40 cm2  
TROPONIN I Collection Time: 01/25/21  2:11 AM  
Result Value Ref Range Troponin-I, Qt. <0.05 <0.05 ng/mL Patient status is discussed with the nursing staff Patient PCP: MD Demetrice Cortes MD

## 2021-01-25 NOTE — ED NOTES
Bedside shift change report given to Dottie Menendez RN (oncoming nurse) by Holley Sandhoff, RN (offgoing nurse). Report included the following information SBAR, Kardex, MAR and Recent Results.

## 2021-01-25 NOTE — H&P
History and Physical 
 
Patient: Nam Florentino MRN: [id number]  SSN: [id number] YOB: 1940  Age: 79 y.o. Sex: female Subjective: Nam Florentino is a 79 y.o. female who has a history of anemia, atrial fibrillation, COPD, dementia, depression, lung cancer respiratory failure brought to the hospital hospital because of shortness of breath. Patient is a poor historian on in the hospital patient was found to have atrial fibrillation rapid ventricular rate with a BNP is elevated above 2400. She denies any fever or chills Past Medical History:  
Diagnosis Date  Anemia  Anxiety  Atrial fibrillation ([address] .  Chronic obstructive pulmonary disease ([address]  Dementia ([address]  Dementia ([address]  Depression  Heart failure ([address]  High cholesterol  Hypertension  Low blood potassium  Lung cancer ([address]  Pleural effusion  Respiratory failure ([address] Past Surgical History:  
Procedure Laterality Date  IR THORACENTESIS NDL PUNC ASP W IMAGE  11/6/2020 No family history on file. Social History Tobacco Use  Smoking status: Never Smoker  Smokeless tobacco: Never Used Substance Use Topics  Alcohol use: Not Currently Prior to Admission medications Medication Sig Start Date End Date Taking? Authorizing Provider  
apixaban (ELIQUIS) 2.5 mg tablet Take 1 Tab by mouth two (2) times a day. Indications: treatment to prevent blood clots in chronic atrial fibrillation, treatment to prevent a blood clot in the lung 12/1/20   Scott Maldonado MD  
escitalopram oxalate (LEXAPRO) 5 mg tablet Take 1 Tab by mouth daily. Indications: anxiousness associated with depression 12/2/20   Scott Maldonado MD  
mirtazapine (REMERON SOL-TAB) 15 mg disintegrating tablet Take 1 Tab by mouth nightly.  12/1/20   Valencia Quiles MD  
 mometasone-formoterol (DULERA) 200-5 mcg/actuation HFA inhaler Take 2 Puffs by inhalation two (2) times a day. 12/1/20   Yenny Maldonado MD  
predniSONE (DELTASONE) 10 mg tablet Take 10 mg by mouth daily (with breakfast). 12/2/20   Yenny Maldonado MD  
albuterol-ipratropium (DUO-NEB) 2.5 mg-0.5 mg/3 ml nebu 3 mL by Nebulization route every six (6) hours as needed for Wheezing. 11/13/20   Yenny Maldonado MD  
albuterol-ipratropium (DUO-NEB) 2.5 mg-0.5 mg/3 ml nebu 3 mL by Nebulization route every six (6) hours as needed for Wheezing. 11/9/20   Yenny Maldonado MD  
amoxicillin-clavulanate (Augmentin) 875-125 mg per tablet Take 1 Tab by mouth two (2) times a day. 11/9/20   Yenny Maldonado MD  
ferrous sulfate (IRON) 325 mg (65 mg iron) EC tablet Take 325 mg by mouth two (2) times a day. Other, MD Liliana  
spironolactone (Aldactone) 25 mg tablet Take 25 mg by mouth daily. Take 1/2 daily    OtherLiliana MD  
atorvastatin (Lipitor) 40 mg tablet Take 40 mg by mouth daily. Other, MD Liliana  
  
 
No Known Allergies Review of Systems: A comprehensive review of systems was negative except for that written in the History of Present Illness. Objective:  
 
Vitals:  
 01/24/21 1651 01/24/21 1729 01/24/21 1819 01/24/21 1930 BP: (!) 138/90 (!) 139/98 (!) 141/102 (!) 140/112 Pulse: (!) 113 (!) 143 (!) 119 (!) 115 Resp:  24 25 23 Temp:  98 °F (36.7 °C) SpO2: 94% 95% 96% 97% Weight:      
Height:      
  
 
Physical Exam: 
General:  Alert, cooperative, no distress, appears stated age. Eyes:  Conjunctivae/corneas clear. PERRL, EOMs intact. Fundi benign Ears:  Normal TMs and external ear canals both ears. Nose: Nares normal. Septum midline. Mucosa normal. No drainage or sinus tenderness. Mouth/Throat: Lips, mucosa, and tongue normal. Teeth and gums normal.  
Neck: Supple, symmetrical, trachea midline, no adenopathy, thyroid: no enlargment/tenderness/nodules, no carotid bruit and no JVD. Back:   Symmetric, no curvature. ROM normal. No CVA tenderness. Lungs:    Scattered rales and rhonchi to auscultation bilaterally. Heart:  Regular rate and rhythm, S1, S2 normal, no murmur, click, rub or gallop. Abdomen:   Soft, non-tender. Bowel sounds normal. No masses,  No organomegaly. Extremities: Extremities normal, atraumatic, no cyanosis or edema. Pulses: 2+ and symmetric all extremities. Skin: Skin color, texture, turgor normal. No rashes or lesions Lymph nodes: Cervical, supraclavicular, and axillary nodes normal.  
Neurologic: CNII-XII intact. Normal strength, sensation and reflexes throughout. Recent Results (from the past 24 hour(s)) METABOLIC PANEL, COMPREHENSIVE Collection Time: 01/24/21 11:04 AM  
Result Value Ref Range Sodium 138 136 - 145 mmol/L Potassium 4.8 3.5 - 5.1 mmol/L Chloride 106 97 - 108 mmol/L  
 CO2 28 21 - 32 mmol/L Anion gap 4 (L) 5 - 15 mmol/L Glucose 86 65 - 100 mg/dL BUN 8 6 - 20 mg/dL Creatinine 0.82 0.55 - 1.02 mg/dL BUN/Creatinine ratio 10 (L) 12 - 20 GFR est AA >60 >60 ml/min/1.73m2 GFR est non-AA >60 >60 ml/min/1.73m2 Calcium 9.8 8.5 - 10.1 mg/dL Bilirubin, total 0.7 0.2 - 1.0 mg/dL AST (SGOT) 56 (H) 15 - 37 U/L  
 ALT (SGPT) 94 (H) 12 - 78 U/L Alk. phosphatase 146 (H) 45 - 117 U/L Protein, total 7.8 6.4 - 8.2 g/dL Albumin 2.7 (L) 3.5 - 5.0 g/dL Globulin 5.1 (H) 2.0 - 4.0 g/dL A-G Ratio 0.5 (L) 1.1 - 2.2    
TROPONIN I Collection Time: 01/24/21 11:04 AM  
Result Value Ref Range Troponin-I, Qt. <0.05 <0.05 ng/mL BNP Collection Time: 01/24/21 11:04 AM  
Result Value Ref Range NT pro-BNP 2,016 (H) <450 pg/mL URINALYSIS W/ RFLX MICROSCOPIC Collection Time: 01/24/21 11:32 AM  
Result Value Ref Range Color Yellow/Straw Appearance Turbid (A) Clear Specific gravity 1.008 1.003 - 1.030    
 pH (UA) 6.0 5.0 - 8.0 Protein Negative Negative mg/dL Glucose Negative Negative mg/dL Ketone Negative Negative mg/dL Bilirubin Negative Negative Blood Small (A) Negative Urobilinogen 0.1 0.1 - 1.0 EU/dL Nitrites Negative Negative Leukocyte Esterase Trace (A) Negative WBC 0-4 0 - 4 /hpf  
 RBC 0-5 0 - 5 /hpf Bacteria 3+ (A) Negative /hpf URINE MICROSCOPIC Collection Time: 01/24/21 11:32 AM  
Result Value Ref Range WBC 0-4 0 - 4 /hpf  
 RBC 0-5 0 - 5 /hpf Bacteria 3+ (A) Negative /hpf  
CBC WITH AUTOMATED DIFF Collection Time: 01/24/21  1:45 PM  
Result Value Ref Range WBC 8.5 3.6 - 11.0 K/uL  
 RBC 4.89 3.80 - 5.20 M/uL  
 HGB 13.0 11.5 - 16.0 g/dL HCT 41.2 35.0 - 47.0 % MCV 84.3 80.0 - 99.0 FL  
 MCH 26.6 26.0 - 34.0 PG  
 MCHC 31.6 30.0 - 36.5 g/dL RDW 20.6 (H) 11.5 - 14.5 % PLATELET 936 946 - 449 K/uL MPV 11.5 8.9 - 12.9 FL  
 NEUTROPHILS 72 32 - 75 % LYMPHOCYTES 17 12 - 49 % MONOCYTES 9 5 - 13 % EOSINOPHILS 1 0 - 7 % BASOPHILS 0 0 - 1 % IMMATURE GRANULOCYTES 1 (H) 0.0 - 0.5 % ABS. NEUTROPHILS 6.2 1.8 - 8.0 K/UL  
 ABS. LYMPHOCYTES 1.5 0.8 - 3.5 K/UL  
 ABS. MONOCYTES 0.8 0.0 - 1.0 K/UL  
 ABS. EOSINOPHILS 0.1 0.0 - 0.4 K/UL  
 ABS. BASOPHILS 0.0 0.0 - 0.1 K/UL  
 ABS. IMM. GRANS. 0.1 (H) 0.00 - 0.04 K/UL  
 DF AUTOMATED    
LACTIC ACID Collection Time: 01/24/21  2:04 PM  
Result Value Ref Range Lactic acid 1.8 0.4 - 2.0 mmol/L  
TROPONIN I Collection Time: 01/24/21  7:49 PM  
Result Value Ref Range Troponin-I, Qt. <0.05 <0.05 ng/mL BNP Collection Time: 01/24/21  7:49 PM  
Result Value Ref Range NT pro-BNP 1,633 (H) <450 pg/mL XR Results (maximum last 3): Results from [facility] [place] Encounter encounter on 01/24/21 XR CHEST PORT Narrative A single upright portable view is compared with a prior exam from 11/25/2020. The right internal jugular Port-A-Cath is unchanged.  The aorta is tortuous, as 
 before. The left lung is moderately well expanded and clear. The right lung is 
significantly smaller in terms of volume, with right basilar airspace disease 
and right pleural fluid/pleural thickening and this appearance is unchanged from 
the prior exam. No new or superimposed acute findings noted Impression Stable exam  
Results from Hospital Encounter encounter on 11/03/20 XR CHEST PORT Narrative Chest single view. Comparison single view chest November 9, 2020 Stable right-sided Port-A-Cath. Similar moderate volume right pleural effusion. Associated atelectasis same 
distribution right lung. Left lung aerated. Cardiac and mediastinal structures unchanged. No 
pneumothorax. XR CHEST PA LAT Narrative Chest, 2 views, 11/9/2020 History: Atelectasis. Comparison: Including chest 11/7/2020. Findings: Right-sided Port-A-Cath tip is seen to the mid to distal SVC than 
obscured. The cardiac silhouette is partially obscured and grossly stable. The 
thoracic aorta is tortuous. There is pulmonary vascular congestion and 
hydrostatic edema. The left lung is well-expanded. There is mild atelectasis 
at the left base. Small left pleural effusion is possible. The right lung has 
opacity extending from the base to the level of the hilum reflecting large 
pleural effusion associated compression/consolidation of the right lung. This 
is increased as compared to the chest 11/7/2020. No pneumothorax is identified. Degenerative changes are present in the shoulders and spine. Impression Impression: Large right-sided pleural effusion and associated 
compression/consolidation of the right lung, increased. Mild atelectasis at the 
left base. Possible small left pleural effusion. CT Results (maximum last 3): Results from Hospital Encounter encounter on 11/03/20 CT CHEST WO CONT Narrative CT dose reduction was achieved through use of a standardized protocol tailored for this examination and automatic exposure control for dose modulation. 
 
Noncontrast study shows large right pleural effusion. Right middle and lower 
lobe are completely collapsed, with very few air bronchograms. The upper lobe is 
almost completely collapsed, sparingly anterior segment only. Mainstem bronchus 
is open. Proximal lobar bronchi are open. Left lung is clear. Multiple calcified 
mediastinal hilar lymph nodes. Small left pleural effusion. Small pericardial 
effusion. Normal caliber aorta. No significant upper abdominal or chest wall 
finding. Pronounced soft tissue edema in the visualized portion of the left arm 
  
 Impression IMPRESSION: Near complete collapse of the right lung secondary to large pleural 
effusion  
Results from Hospital Encounter encounter on 10/26/20  
CT HEAD WO CONT  
 Narrative Dose Reduction:  
 
All CT scans at this facility are performed using dose reduction optimization 
techniques as appropriate to a performed exam including the following: Automated 
exposure control, adjustments of the mA and/or kV according to patient size, or 
use of iterative reconstruction technique. 
 
CT of the head without contrast. Axial images of the head were obtained and 
enhanced in sagittal and coronal reconstructions are created. No prior films for 
comparison.  Ventricles are normal in size shape and position. No shift of the 
midline or mass is seen. No pathologic extra-axial fluid collection is 
identified. There is no evidence of acute hemorrhage or infarction. No bony 
abnormality is seen. An empty sella is incidentally noted. The exam is otherwise 
unremarkable. 
  
 Impression Impression: No acute abnormality demonstrated.  
 
 
MRI Results (maximum last 3): 
No results found for this or any previous visit. 
 
Nuclear Medicine Results (maximum last 3): 
No results found for this or any previous visit. 
 
US Results (maximum last 3): 
No results found for this or any previous visit. 
 DEXA Results (maximum last 3): No results found for this or any previous visit. GHISLAINE Results (maximum last 3): No results found for this or any previous visit. IR Results (maximum last 3): Results from Hospital Encounter encounter on 11/03/20 [address] Narrative PROCEDURE: Ultrasound guided diagnostic and therapeutic right thoracentesis INDICATION: Large pleural effusion OPERATING PHYSICIAN: Shania Brown MD PhD. 
 
MEDICATION: 1% lidocaine subcutaneous SPECIMEN: 60 ml serous pleural effusion sent for lab analysis ESTIMATED BLOOD LOSS: Minimal 
 
FLOURO TIME: 0 mGy COMPLICATIONS: None immediate. PROCEDURE AND FINDINGS:  
Informed consent was obtained from the patient. Preliminary ultrasound imaging 
of the right pleural space demonstrated large pleural effusion. Images were 
stored in PACS. The patient's back was prepped and draped in maximal sterile 
barrier technique. 1% lidocaine was injected locally. A centesis needle was advanced into the right pleural effusion under ultrasound 
guidance. Serosanguineous pleural fluid was drawn back immediately. The centesis 
sheath was advanced over the needle into the pleural effusion. Sterile tubing 
was then connected to the needle and then to the vacuum collection bottle. Approximately 1.2 L pleural fluid was removed. 60 ml fluid sample was sent for 
lab analysis. Thoracentesis sheath was then removed. Sterile dressing was applied. The patient tolerated the procedure well. There were no immediate complications. Impression IMPRESSION:  
Successful ultrasound-guided thoracentesis of right large pleural effusion, with 
approximately 1.3 L serous pleural fluid removed. Pleural fluid sample was also 
sent for lab analysis. VAS/US Results (maximum last 3): No results found for this or any previous visit. PET Results (maximum last 3): No results found for this or any previous visit. Assessment:  
 
Hospital Problems  Never Reviewed Codes Class Noted POA Cardiac arrhythmia ICD-10-CM: I49.9 ICD-9-CM: 427.9  1/24/2021 Unknown Acute congestive heart failure Acute hypoxic respiratory failure Atrial fibrillation rapid ventricular rate Plan:  
IV diuretics, IV Cardizem consult cardiology obtain echocardiogram serial cardiac enzymes. Critical care time of 65 minutes Signed By: Ramya Gale MD   
 January 24, 2021

## 2021-01-25 NOTE — ED NOTES
Alyssa Soto TRANSFER - OUT REPORT: 
 
Verbal report given to aZheer Mancia RN(name) on Peter Kaiser  being transferred to [facility](unit) for routine progression of care Report consisted of patients Situation, Background, Assessment and  
Recommendations(SBAR). Information from the following report(s) SBAR was reviewed with the receiving nurse. Lines:  
Peripheral IV 01/24/21 Left;Posterior Forearm (Active) Site Assessment Clean, dry, & intact 01/24/21 1105 Phlebitis Assessment 0 01/24/21 1105 Infiltration Assessment 0 01/24/21 1105 Dressing Status Clean, dry, & intact 01/24/21 1105 Dressing Type Tape;Transparent 01/24/21 1105 Hub Color/Line Status Pink;Flushed 01/24/21 1105 Action Taken Blood drawn 01/24/21 1105 Opportunity for questions and clarification was provided. Patient transported with: 
 Monitor Tech

## 2021-01-25 NOTE — PROGRESS NOTES
Reason for Admission:   Cardiac Arrhythima RUR Score:   19% PCP: First and Last name:  Marty Kasper Name of Practice:  
 Are you a current patient: Yes/No: No 
 Approximate date of last visit:  Over a year. Can you participate in a virtual visit if needed: No d/t Dx. Do you (patient/family) have any concerns for transition/discharge? Family requesting SNF d/t decreased mobility- for rehab. Plan for utilizing home health:   No wants SNF. Current Advanced Directive/Advance Care Plan: Yes Transition of Care Plan: Pt lives alone with 24hr sitter. Family lives close by. Per sister ( Dayanara Perdomo @ [phone]) pt unable to stand & care for self. Sister would like rehab placement @ [facility]. Choice form agreed verbally. Referral sent via [employer].

## 2021-01-25 NOTE — PROGRESS NOTES
1/25/21. CM spoke with Teneli Claire ). Pt lives alone - has 24 hr sitter. Family lives close by. Pt cannot not stand/walk/decreased mobility. Sister requesting rehab @ [facility]. Choice form agreed verbally. Referral sent via [facility], Pt also had [facility] in home. Sister agreed to home health if pt not accepted in center. Will accept another facility, but does not want pt too far from area they live.

## 2021-01-26 NOTE — PROGRESS NOTES
H&P and Progress notes have been faxed to [facility] and [facility] via [email or web address]. At this time [address] [place] [address] does not have any available beds however [facility] is considering patient. CM requested PT/OT orders, orders have been entered, CM awaiting PT/OT notes in order to send the SNF facilities. CM continues to follow.

## 2021-01-26 NOTE — PROGRESS NOTES
Problem: Falls - Risk of 
Goal: *Absence of Falls Description: Document Raven Gotti Fall Risk and appropriate interventions in the flowsheet. Outcome: Progressing Towards Goal 
Note: Fall Risk Interventions: 
Mobility Interventions: Assess mobility with egress test, Patient to call before getting OOB Medication Interventions: Bed/chair exit alarm Elimination Interventions: Bed/chair exit alarm, Call light in reach History of Falls Interventions: Bed/chair exit alarm

## 2021-01-26 NOTE — PROGRESS NOTES
Comprehensive Nutrition Assessment 
 
Type and Reason for Visit: Initial(Low geriatric BMI) 
 
Nutrition Recommendations/Plan:  
Continue regular cardiac diet 
 Adding Ensure Enlive BID (700kcals, 40g pro) 
 Adding Magic Cup daily (290kcals, 9g pro)  
Allow snacks as desired 
 
Please document % of all meal/snack consumed 
 
Nutrition Assessment:  Admitted for Arrhythmia. RD interviewed at bedside, observed 40-50% of B and 65% of L consumed at this time (per % of L consumed), pt endorsed good appetite and asked for Ensure, RD to order. Pt endorsed she could drink 2/day if provided. RD will also add magic cup daily. Noted pt with very poor PO intakes at prior admit, will follow closely. Labs: , LFT elevated.  Meds: Augmentin, statin, coreg diltiazem, fesu, lasix, spironolactone.  
 
Malnutrition Assessment: 
Malnutrition Status:  Moderate malnutrition   
Context:  Chronic illness    
Findings of the 6 clinical characteristics of malnutrition:  
Energy Intake:  Unable to assess 
Weight Loss:  7.0 - Greater than 7.5% over 3 months(11.8kg in 2 months (23%))    
Body Fat Loss:  No significant body fat loss,    
Muscle Mass Loss:  1 - Mild muscle mass loss, Calf (gastrocnemius), Thigh (quadraceps) 
Fluid Accumulation:  No significant fluid accumulation,   
 
Estimated Daily Nutrient Needs: 
Energy (kcal): 1515kcals (30kcals/kg); Weight Used for Energy Requirements: Current 
Protein (g): 66g (1.3g/kg); Weight Used for Protein Requirements: Current 
Fluid (ml/day): 1515ml; Method Used for Fluid Requirements: 1 ml/kcal 
 
Nutrition Related Findings:  NFPE finding Moderate muscle wasting. +1 non-pitting BLLE edema. Pt denies N/V/D/C, last BM 1/25 per pt.Noted edentulism, pt stated dentures at home however no issues with c/s.   
 
Wounds:   
None    
 
Current Nutrition Therapies: 
DIET NUTRITIONAL SUPPLEMENTS Lunch, Breakfast; Ensure Enlive 
DIET NUTRITIONAL SUPPLEMENTS Dinner; Magic Cup (Vanilla) 
 DIET CARDIAC Regular Anthropometric Measures: 
· Height:  5' 1.81\" (157 cm) · Current Body Wt:  50.5 kg (111 lb 5.3 oz)(1/26, RD obtained) · Admission Body Wt:  104 lb 15 oz(1/25) · Usual Body Wt:  63.5 kg (140 lb)(per pt, unreliable historian) · Ideal Body Wt:  109 lbs:  102.1 % · BMI Category:  Underweight (BMI less than 22) age over 72 Wt hx: 60.9kg (11/23), 62.3kg (11/9), 59.9kg (11/5), 63.6kg (11/3), 63.5kg 910/23) Pt endorses wt stability, however severe wt loss noted per EMR wts, 11.8kg x2 months (23%) Wt Readings from Last 5 Encounters:  
01/25/21 47.6 kg (104 lb 15 oz) 01/09/21 63.5 kg (140 lb) 11/03/20 63.5 kg (140 lb) 10/26/20 63.5 kg (140 lb) Nutrition Diagnosis:  
· Inadequate oral intake, Inadequate protein-energy intake related to catabolic illness(lung cancer) as evidenced by intake 26-50% Nutrition Interventions:  
Food and/or Nutrient Delivery: Continue current diet, Start oral nutrition supplement(Ensure enlive BID, Magic cup daily) Nutrition Education and Counseling: No recommendations at this time Coordination of Nutrition Care: Continue to monitor while inpatient Goals: 
Intakes >/=50% of EENs Wt gain of 0.5kg in 7 days Nutrition Monitoring and Evaluation:  
Behavioral-Environmental Outcomes: None identified Food/Nutrient Intake Outcomes: Food and nutrient intake, Supplement intake Physical Signs/Symptoms Outcomes: Fluid status or edema, Nutrition focused physical findings, Skin, Weight Discharge Planning: Too soon to determine Electronically signed by Fabian Park RD on 1/26/2021 at 4:33 PM 
 
Contact: Ext [phone]

## 2021-01-26 NOTE — ROUTINE PROCESS
Four eye skin assessment completed by Kinza Olivares RN and JUANPABLO López RN. Skin warm, dry and intact. Patient positioned on right side, purewick in place.

## 2021-01-26 NOTE — PROGRESS NOTES
Progress Note Patient: Yolanda Wheeler MRN: [id number]  SSN: [id number] YOB: 1940  Age: 79 y.o. Sex: female Admit Date: 1/24/2021 LOS: 2 days Subjective:  
 
79year-old patient admitted fall A. fib with variable rate, congestive heart failure, acute hypoxic respiratory failure Objective:  
 
Vitals:  
 01/26/21 1200 01/26/21 1437 01/26/21 1600 01/26/21 1634 BP:  122/74 Pulse: 62 67 67 Resp:  20 Temp:  98.4 °F (36.9 °C) SpO2:  96% Weight:      
Height:    5' 1.81\" (1.57 m) Intake and Output: 
Current Shift: 01/26 0701 - 01/26 1900 In: 856 [P.O.:856] Out: - Last three shifts: No intake/output data recorded. Physical Exam:  
General:  Alert, cooperative, no distress, appears stated age. Eyes:  Conjunctivae/corneas clear. PERRL, EOMs intact. Fundi benign Ears:  Normal TMs and external ear canals both ears. Nose: Nares normal. Septum midline. Mucosa normal. No drainage or sinus tenderness. Mouth/Throat: Lips, mucosa, and tongue normal. Teeth and gums normal.  
Neck: Supple, symmetrical, trachea midline, no adenopathy, thyroid: no enlargment/tenderness/nodules, no carotid bruit and no JVD. Back:   Symmetric, no curvature. ROM normal. No CVA tenderness. Lungs:   Clear to auscultation bilaterally. Heart:  Regular rate and rhythm, S1, S2 normal, no murmur, click, rub or gallop. Abdomen:   Soft, non-tender. Bowel sounds normal. No masses,  No organomegaly. Extremities: Extremities normal, atraumatic, no cyanosis or edema. Pulses: 2+ and symmetric all extremities. Skin: Skin color, texture, turgor normal. No rashes or lesions Lymph nodes: Cervical, supraclavicular, and axillary nodes normal.  
Neurologic: CNII-XII intact. Normal strength, sensation and reflexes throughout. Lab/Data Review: All lab results for the last 24 hours reviewed. Recent Results (from the past 24 hour(s)) GLUCOSE, POC  
 Collection Time: 01/26/21 11:47 AM  
Result Value Ref Range Glucose (POC) 125 (H) 65 - 100 mg/dL Performed by Daniel Reaves A single upright portable view is compared with a prior exam from 11/25/2020. The right internal jugular Port-A-Cath is unchanged. The aorta is tortuous, as 
before. The left lung is moderately well expanded and clear. The right lung is 
significantly smaller in terms of volume, with right basilar airspace disease 
and right pleural fluid/pleural thickening and this appearance is unchanged from 
the prior exam. No new or superimposed acute findings noted Assessment:  
 
Active Problems: 
  Cardiac arrhythmia (1/24/2021) Acute congestive heart failure cardiology to evaluate Atrial fibrillation variable rate Dementia Acute hypoxic respiratory failure Pneumonia Plan:  
 
Continue present management. Cardiology notes reviewed Signed By: Jhony Jj MD   
 January 26, 2021

## 2021-01-26 NOTE — PROGRESS NOTES
Progress Note Patient: Kiara Wells MRN: [id number]  SSN: [id number] YOB: 1940  Age: 79 y.o. Sex: female Admit Date: 1/24/2021 LOS: 1 day Subjective:  
 
79year-old patient admitted fall A. fib with variable rate, congestive heart failure, acute hypoxic respiratory failure Objective:  
 
Vitals:  
 01/25/21 1152 01/25/21 1342 01/25/21 1504 01/25/21 1810 BP: (!) 137/95 105/71 (!) 110/59 133/84 Pulse: 62 (!) 116 (!) 127 Resp: 22 20 20 Temp:      
SpO2:   95% Weight:      
Height:      
  
 
Intake and Output: 
Current Shift: No intake/output data recorded. Last three shifts: No intake/output data recorded. Physical Exam:  
General:  Alert, cooperative, no distress, appears stated age. Eyes:  Conjunctivae/corneas clear. PERRL, EOMs intact. Fundi benign Ears:  Normal TMs and external ear canals both ears. Nose: Nares normal. Septum midline. Mucosa normal. No drainage or sinus tenderness. Mouth/Throat: Lips, mucosa, and tongue normal. Teeth and gums normal.  
Neck: Supple, symmetrical, trachea midline, no adenopathy, thyroid: no enlargment/tenderness/nodules, no carotid bruit and no JVD. Back:   Symmetric, no curvature. ROM normal. No CVA tenderness. Lungs:   Clear to auscultation bilaterally. Heart:  Regular rate and rhythm, S1, S2 normal, no murmur, click, rub or gallop. Abdomen:   Soft, non-tender. Bowel sounds normal. No masses,  No organomegaly. Extremities: Extremities normal, atraumatic, no cyanosis or edema. Pulses: 2+ and symmetric all extremities. Skin: Skin color, texture, turgor normal. No rashes or lesions Lymph nodes: Cervical, supraclavicular, and axillary nodes normal.  
Neurologic: CNII-XII intact. Normal strength, sensation and reflexes throughout. Lab/Data Review: All lab results for the last 24 hours reviewed. Recent Results (from the past 24 hour(s)) TROPONIN I  Collection Time: 01/24/21  7:49 PM  
 Result Value Ref Range Troponin-I, Qt. <0.05 <0.05 ng/mL BNP Collection Time: 01/24/21  7:49 PM  
Result Value Ref Range NT pro-BNP 1,633 (H) <450 pg/mL TROPONIN I Collection Time: 01/25/21  2:11 AM  
Result Value Ref Range Troponin-I, Qt. <0.05 <0.05 ng/mL ECHO ADULT COMPLETE Collection Time: 01/25/21 10:30 AM  
Result Value Ref Range LV ED Vol A4C 59.00 cm3 LV ED Vol A2C 48.20 cm3 LV ES Vol A4C 24.00 cm3 LV ES Vol A2C 11.10 cm3 IVSd 1.01 (A) 0.6 - 0.9 cm LVIDd 3.64 (A) 3.9 - 5.3 cm LVIDs 2.23 cm Pulmonic Regurgitant End Max Velocity 100.00 cm/s LVOT Peak Gradient 4.00 mmHg LVPWd 1.13 (A) 0.6 - 0.9 cm  
 BP EF 69.9 55 - 100 % Pulmonic Regurgitant End Max Velocity 147.00 cm/s AoV PG 9.00 mmHg Pulmonic Regurgitant End Max Velocity 107.00 cm/s Pulmonic Valve Systolic Peak Instantaneous Gradient 5.00 mmHg P Vein A Dur 148.00 ms Pulmonary Vein \"A\" Wave Velocity 42.80 cm/s  
 Est. RA Pressure 8.00 mmHg RVIDd 3.81 cm  
 RVSP 55.00 mmHg Tricuspid Valve Max Velocity 342.00 cm/s Triscuspid Valve Regurgitation Peak Gradient 47.00 mmHg Right Atrial Area 4C 11.65 cm2 LA Area 4C 21.40 cm2 LV Mass .1 67 - 162 g  
 LV Mass AL Index 83.5 43 - 95 g/m2 Left Atrium Minor Axis 2.21 cm Left Atrium Major Axis 3.20 cm Ao Root D 3.20 cm TROPONIN I Collection Time: 01/25/21 11:12 AM  
Result Value Ref Range Troponin-I, Qt. 0.06 (H) <0.05 ng/mL Assessment:  
 
Active Problems: 
  Cardiac arrhythmia (1/24/2021) Acute congestive heart failure cardiology to evaluate Atrial fibrillation variable rate Dementia Acute hypoxic respiratory failure Plan:  
 
Continue present management Signed By: Don Odom MD   
 January 25, 2021

## 2021-01-27 NOTE — PROGRESS NOTES
PT/OT notes have been faxed to [facility]. Patient's DCP is to go to SNF once medically stable, pending acceptance and bed availability. CM continues to follow.

## 2021-01-27 NOTE — PROGRESS NOTES
Progress Note 1/27/2021 3:41 PM 
NAME: Andie Francois MRN:  [id number] Admit Diagnosis: Cardiac arrhythmia [I49.9] Problem List: 1. Atrial fibrillation with a rapid ventricular response 2. Heart failure with diastolic left ventricular dysfunction 3. Hypertension Assessment/Plan: 1. Bradycardia still fast will add amiodarone with caution 2. She is on anticoagulation. []       High complexity decision making was performed in this patient at high risk for decompensation with multiple organ involvement. Subjective: Andie Francois denies chest pain, dyspnea. Discussed with RN events overnight. Review of Systems: 
 
Symptom Y/N Comments  Symptom Y/N Comments Fever/Chills N   Chest Pain N Poor Appetite N   Edema N   
Cough N   Abdominal Pain N Sputum N   Joint Pain N   
SOB/GALINDO N   Pruritis/Rash N   
Nausea/vomit N   Tolerating PT/OT Y Diarrhea N   Tolerating Diet Y Constipation N   Other Could NOT obtain due to:   
 
Objective:  
  
Physical Exam: 
 
Last 24hrs VS reviewed since prior progress note. Most recent are: 
 
Visit Vitals /74 (BP 1 Location: Right arm, BP Patient Position: At rest;Lying left side) Pulse (!) 110 Temp 98.6 °F (37 °C) Resp (!) 37 Ht 5' 1.81\" (1.57 m) Wt 47.6 kg (104 lb 15 oz) SpO2 94% Breastfeeding No  
BMI 19.31 kg/m² Intake/Output Summary (Last 24 hours) at 1/27/2021 1541 Last data filed at 1/27/2021 1451 Gross per 24 hour Intake 318 ml Output 160 ml Net 158 ml General Appearance: Well developed, well nourished, alert & oriented x 3,  
 no acute distress. Ears/Nose/Mouth/Throat: Hearing grossly normal. 
Neck: Supple. Chest: Lungs clear to auscultation bilaterally. Cardiovascular: Regular rate and rhythm, S1S2 normal, no murmur. Abdomen: Soft, non-tender, bowel sounds are active. Extremities: No edema bilaterally. Skin: Warm and dry. []         Post-cath site without hematoma, bruit, tenderness, or thrill. Distal pulses intact. PMH/ reviewed - no change compared to H&P Data Review Telemetry: normal sinus rhythm EKG:  
[]  No new EKG for review Lab Data Personally Reviewed: 
 
No results for input(s): WBC, HGB, HCT, PLT, HGBEXT, HCTEXT, PLTEXT in the last 72 hours. No results for input(s): INR, PTP, APTT, INREXT in the last 72 hours. No results for input(s): NA, K, CL, CO2, BUN, CREA, GLU, CA, MG in the last 72 hours. Recent Labs  
  01/25/21 
1112 01/25/21 
0211 01/24/21 
1949 TROIQ 0.06* <0.05 <0.05 No results found for: CHOL, CHOLX, CHLST, CHOLV, HDL, HDLP, LDL, LDLC, DLDLP, TGLX, TRIGL, TRIGP, CHHD, CHHDX No results for input(s): AP, TBIL, TP, ALB, GLOB, GGT, AML, LPSE in the last 72 hours. No lab exists for component: SGOT, GPT, AMYP, HLPSE No results for input(s): PH, PCO2, PO2 in the last 72 hours. Medications Personally Reviewed: 
 
Current Facility-Administered Medications Medication Dose Route Frequency  amiodarone (CORDARONE) tablet 400 mg  400 mg Oral DAILY  carvediloL (COREG) tablet 6.25 mg  6.25 mg Oral BID WITH MEALS  dilTIAZem (CARDIZEM) 125 mg/125 mL (1 mg/mL) dextrose 5% infusion  5 mg/hr IntraVENous TITRATE  albuterol-ipratropium (DUO-NEB) 2.5 MG-0.5 MG/3 ML  3 mL Nebulization Q6H PRN  
 amoxicillin-clavulanate (AUGMENTIN) 875-125 mg per tablet 1 Tab  1 Tab Oral Q12H  
 apixaban (ELIQUIS) tablet 2.5 mg  2.5 mg Oral BID  atorvastatin (LIPITOR) tablet 40 mg  40 mg Oral DAILY  escitalopram oxalate (LEXAPRO) tablet 5 mg  5 mg Oral DAILY  ferrous sulfate tablet 325 mg  325 mg Oral BID  spironolactone (ALDACTONE) tablet 25 mg  25 mg Oral DAILY  furosemide (LASIX) injection 40 mg  40 mg IntraVENous DAILY  aspirin delayed-release tablet 81 mg  81 mg Oral DAILY Pao Luong MD

## 2021-01-27 NOTE — PROGRESS NOTES
Problem: Mobility Impaired (Adult and Pediatric) Goal: *Acute Goals and Plan of Care (Insert Text) 
1/27/2021 1310 by Chas Gamez Note: Patient will move from supine to sit and sit to supine , scoot up and down, and roll side to side in bed with independence within 7 day(s). Patient will transfer from bed to chair and chair to bed with independence using the least restrictive device within 7 day(s). Patient will improve static standing balance to independence within 1 week(s). Patient will ambulate 100 feet with independence with least restrictive device within 1 weeks. PHYSICAL THERAPY EVALUATION Patient: Jackie Walker (79 y.o. female) Date: 1/27/2021 Primary Diagnosis: Cardiac arrhythmia [I49.9] Precautions: fall,Ax2. ASSESSMENT Based on the objective data described below, Jackie Walker is a 79 y.o. female who has a history of anemia, atrial fibrillation, COPD, dementia, depression, lung cancer respiratory failure brought to the hospital hospital because of shortness of breath. Patient is a poor historian on in the hospital patient was found to have atrial fibrillation rapid ventricular rate with a BNP is elevated above 2400. She denies any fever or chills. Patient reports she lives alone and indep at home. still drives. Patient appears very weak. she was able to supine to sit with min assist and sit to stand with mod assist and RW but unable to sustain standing more than a few seconds. x 2. patient was assisted back in bed and food tray placed in front of her at reach. Patient c/o pain in rt side lower ribs. RN Lona Irwin was informed. Current Level of Function Impacting Discharge (mobility/balance): decreased gen strength and endurance to activities. Other factors to consider for discharge: patient will need assist and can;t be indep at home at this time. Patient will benefit from skilled therapy intervention to address the above noted impairments.     
 
PLAN : 
 Recommendations and Planned Interventions: bed mobility training, transfer training, gait training, therapeutic exercises, patient and family training/education, and therapeutic activities Frequency/Duration: Patient will be followed by physical therapy:  5 times a week to address goals. Recommendation for discharge: (in order for the patient to meet his/her long term goals) Therapy up to 5 days/week in SNF setting This discharge recommendation: 
Has been made in collaboration with the attending provider and/or case management IF patient discharges home will need the following DME: wheelchair SUBJECTIVE:  
Patient stated I am tired. Marcel Muhammad OBJECTIVE DATA SUMMARY:  
HISTORY:   
Past Medical History:  
Diagnosis Date  Anemia  Anxiety  Atrial fibrillation ([address] .  Chronic obstructive pulmonary disease ([address]  Dementia ([address]  Dementia ([address]  Depression  Heart failure ([address]  High cholesterol  Hypertension  Low blood potassium  Lung cancer ([address]  Pleural effusion  Respiratory failure ([address] Past Surgical History:  
Procedure Laterality Date  IR THORACENTESIS NDL PUNC ASP W IMAGE  11/6/2020 Personal factors and/or comorbidities impacting plan of care:  
Home Situation Home Environment: Patient room One/Two Story Residence: One story Living Alone: No 
Support Systems: Family member(s) Patient Expects to be Discharged to[de-identified] Assisted living Current DME Used/Available at Home: None EXAMINATION/PRESENTATION/DECISION MAKING:  
Critical Behavior: 
Neurologic State: Anesthetized, Confused Orientation Level: Disoriented to situation, Oriented to person Cognition: Follows commands Hearing: Auditory Auditory Impairment: None Skin: intact where visible Edema: none Range Of Motion: 
  
  
  
  
  
  
  
  
Strength: Tone & Sensation:  
  
  
  
  
  
  
  
  
  
   
  
Functional Mobility: 
Bed Mobility: Rolling: Maximum assistance; Moderate assistance;Assist x1 Supine to Sit: Moderate assistance;Maximum assistance;Assist x1 Sit to Supine: Moderate assistance;Maximum assistance;Assist x1 Scooting: Moderate assistance;Maximum assistance;Assist x1 Transfers: 
Sit to Stand: Moderate assistance;Maximum assistance Stand to Sit: Moderate assistance;Maximum assistance Balance:  
Sitting: Impaired; With support Sitting - Static: Fair (occasional) Sitting - Dynamic: Poor (constant support) Standing: Impaired;Pull to stand; With support Standing - Static: Fair;Constant support Standing - Dynamic : Poor;Constant support Ambulation/Gait Training: 
  
  
  
  
  
  
  
  
  
  
  
  
  
  
  
  
  
 Functional Measure: 
[address] [place] [address] AM-PAC 6 Clicks Basic Mobility Inpatient Short Form How much difficulty does the patient currently have. .. Unable A Lot A Little None 1. Turning over in bed (including adjusting bedclothes, sheets and blankets)? [] 1   [x] 2   [] 3   [] 4  
2. Sitting down on and standing up from a chair with arms ( e.g., wheelchair, bedside commode, etc.)   [] 1   [x] 2   [] 3   [] 4  
3. Moving from lying on back to sitting on the side of the bed? [] 1   [x] 2   [] 3   [] 4 How much help from another person does the patient currently need. .. Total A Lot A Little None 4. Moving to and from a bed to a chair (including a wheelchair)? [x] 1   [] 2   [] 3   [] 4  
5. Need to walk in hospital room? [x] 1   [] 2   [] 3   [] 4  
6. Climbing 3-5 steps with a railing? [x] 1   [] 2   [] 3   [] 4  
© 2007, Trustees of [address] [place] [id number], under license to [employer]. All rights reserved Score:  Initial: 9/24 Most Recent: X (Date: 01/27/2021 ) Interpretation of Tool:  Represents activities that are increasingly more difficult (i.e. Bed mobility, Transfers, Gait). Score 24 23 22-20 19-15 14-10 9-7 6 Modifier CH CI CJ CK CL CM CN  
 
 
 
  
 Physical Therapy Evaluation Charge Determination History Examination Presentation Decision-Making MEDIUM  Complexity : 1-2 comorbidities / personal factors will impact the outcome/ POC  MEDIUM Complexity : 3 Standardized tests and measures addressing body structure, function, activity limitation and / or participation in recreation  MEDIUM Complexity : Evolving with changing characteristics  MEDIUM Complexity : FOTO score of 26-74 Based on the above components, the patient evaluation is determined to be of the following complexity level: MEDIUM Pain Ratin/10 Activity Tolerance:  
Fair Please refer to the flowsheet for vital signs taken during this treatment. After treatment patient left in no apparent distress:  
Supine in bed, Call bell within reach and Bed / chair alarm activated COMMUNICATION/EDUCATION:  
The patients plan of care was discussed with: Registered nurse. Fall prevention education was provided and the patient/caregiver indicated understanding., Patient/family have participated as able in goal setting and plan of care. and Patient/family agree to work toward stated goals and plan of care. Thank you for this referral. 
Alejandra Mera  PT Time Calculation: 33 mins

## 2021-01-27 NOTE — PROGRESS NOTES
Four eye skin assessment completed with 2 RN's, Mike Iraheta and writer to find no open areas on the skin. Skin is warm and dry and intact.

## 2021-01-27 NOTE — PROGRESS NOTES
Problem: Self Care Deficits Care Plan (Adult) Goal: *Acute Goals and Plan of Care (Insert Text) Description: 1. Patient will be min A with eating tasks 2. Patient will transfer to [facility] with mod A, LRAD 3. Patient will comb hair, SBA 4. Patient will perform oral care , seated EOB,  set up/SBA/vc's as needed 5. Patient will actively participate in UE TE to promote > mobility/ADL  independence Outcome: Not Met OCCUPATIONAL THERAPY EVALUATION Patient: Rosmery Garibay (79 y.o. female) Date: 1/27/2021 Primary Diagnosis: Cardiac arrhythmia [I49.9] Precautions: fall, decreased orientation ASSESSMENT Based on the objective data described below, the patient presents with limitations in self care and mobility tasks. Patient presents with the following limitations;  generalized weakness, decreased balance, decreased activity tolerance, decreased orientation, fall risk. Unclear re;  patient's baseline re; ADLs. No visitor present to obtain info on patient re; PtA abilities. Patient denied SoB during tx session, did appear to breath heavy X 1 while sitting at EOB. Patient confirmed this but denied feeling \"bad\"/dizzy otherwise. Patient admitted to [facility] due to cardiac arrhythmia/SOB. PMHx: anemia, a-fib, CoPd, Dementia, lung CA, respiratory failure,depression. Patient resides alone with a 24 hour sitter per chart review. Current Level of Function Impacting Discharge (ADLs/self-care): dependent with most ADL tasks; SBA/supervision with grooming tasks at bed level; min A with bed mobility; min to mod A with supine<>sit EOB  transfers Functional Outcome Measure: The patient scored 8/24 on the AM-PAC outcome measure which is indicative of patient benefiting from skilled occupational therapy services to address functional limitations in self care and mobility tasks. Other factors to consider for discharge: assist level available to patient Patient will benefit from skilled therapy intervention to address the above noted impairments. PLAN : 
Recommendations and Planned Interventions: self care training, functional mobility training, therapeutic exercise, therapeutic activities, patient education, and family training/education Frequency/Duration: Patient will be followed by occupational therapy 5 times a week to address goals. Recommendation for discharge: (in order for the patient to meet his/her long term goals) SNF; otherwise, [facility] OT. This discharge recommendation: 
Has been made in collaboration with the attending provider and/or case management IF patient discharges home will need the following DME: to be determined SUBJECTIVE:  
Patient stated I did all that ! \" exclaims patient re; ADLs completed during OT eval.  
 
OBJECTIVE DATA SUMMARY:  
HISTORY:  
Past Medical History:  
Diagnosis Date Anemia Anxiety Atrial fibrillation ([address] . Chronic obstructive pulmonary disease (HCC) Dementia ([address] Dementia ([facility] Depression Heart failure ([address] High cholesterol Hypertension Low blood potassium Lung cancer ([address] Pleural effusion Respiratory failure ([facility] Past Surgical History:  
Procedure Laterality Date [facility]  [facility] Expanded or extensive additional review of patient history:  
 
Home Situation Home Environment: Patient room One/Two Story Residence: One story Living Alone: No 
Support Systems: Family member(s), Home care staff Patient Expects to be Discharged to[de-identified] Assisted living Current DME Used/Available at Home: None PLOF: Pt received assistance (amount unknown)  for ADLS/IADLS, limited  with mobility prior to admission per case management noted Hand dominance: Right EXAMINATION OF PERFORMANCE DEFICITS: 
Cognitive/Behavioral Status: 
Neurologic State: Alert;Confused Orientation Level: Oriented to person Cognition: Decreased attention/concentration; Follows commands Skin: appeared intact Edema: none noted/not formally assessed Hearing: Auditory Auditory Impairment: None Vision/Perceptual:   
 Not formally assessed Patient wearing prescription eyeglasses Range of Motion: ANT UE 
AROM: Generally decreased, functional 
  
  
 
Strength: Ant UE: 
Strength: Generally decreased, functional 
  
 Coordination: 
  
Fine Motor Skills-Upper: Left Intact; Right Intact Gross Motor Skills-Upper: Left Intact; Right Intact Tone & Sensation: ANT UE:  
  
Sensation: Intact Tone;  normal 
 
 Balance: 
 
Sitting; impaired, with support Static;  fair Dynamic; poor (constant support) Functional Mobility and Transfers for ADLs: 
Bed Mobility: 
Rolling: Minimum assistance Supine to Sit: Minimum assistance Sit to Supine: Moderate assistance Scooting: Minimum assistance ADL Assessment: 
Feeding: Total assistance Oral Facial Hygiene/Grooming: Supervision;Stand-by assistance Upper Body Dressing: Maximum assistance Lower Body Dressing: Total assistance Toileting: Total assistance ADL Intervention and task modifications: 
Feeding Feeding Assistance: Total assistance (dependent) Food to Mouth: Total assistance (dependent) Drink to Mouth: Set-up; Supervision Grooming Position Performed: Long sitting on bed Washing Face: Supervision;Stand-by assistance Washing Hands: Supervision;Stand-by assistance Toileting Toileting Assistance: Total assistance(dependent) Bladder Hygiene: Total assistance (dependent)(adult briefs) Functional Measure: 
 
 
[address] [facility] [address] [address] AM-PACTM \"6 Clicks\" Daily Activity Inpatient Short Form How much help from another person does the patient currently need. .. Total; A Lot A Little None 1. Putting on and taking off regular lower body clothing?  [x]  1 []  2 []  3 []  4  
 2.  Bathing (including washing, rinsing, drying)? [x]  1 []  2 []  3 []  4  
3. Toileting, which includes using toilet, bedpan or urinal? [x] 1 []  2 []  3 []  4  
4. Putting on and taking off regular upper body clothing? []  1 [x]  2 []  3 []  4  
5. Taking care of personal grooming such as brushing teeth? []  1 [x]  2 []  3 []  4  
6. Eating meals? [x]  1 []  2 []  3 []  4  
© , [facility] of [address] [place] [address] [address] [address], under license to [employer]. All rights reserved Score:  Interpretation of Tool:  Represents clinically-significant functional categories (i.e. Activities of daily living). Percentage of Impairment CH 
 
0% 
 CI 
 
1-19% CJ 
 
20-39% CK 
 
40-59% CL 
 
60-79% CM 
 
80-99% CN  
 
100% [facility] Score 6-24 24 23 20-22 15-19 10-14 7-9 6 Occupational Therapy Evaluation Charge Determination History Examination Decision-Making MEDIUM Complexity : Expanded review of history including physical, cognitive and psychosocial  history  MEDIUM Complexity : 3-5 performance deficits relating to physical, cognitive , or psychosocial skils that result in activity limitations and / or participation restrictions MEDIUM Complexity : Patient may present with comorbidities that affect occupational performnce. Miniml to moderate modification of tasks or assistance (eg, physical or verbal ) with assesment(s) is necessary to enable patient to complete evaluation Based on the above components, the patient evaluation is determined to be of the following complexity level: MEDIUM Pain Ratin/10 Activity Tolerance:  
Fair Please refer to the flowsheet for vital signs taken during this treatment. After treatment patient left in no apparent distress:   
Patient positioned in left sidelying for pressure relief, Call bell within reach, Bed / chair alarm activated, and Side rails x 3 
 
COMMUNICATION/EDUCATION:  
The patients plan of care was discussed with: Case management. Patient is unable to participate in goal setting and plan of care. This patients plan of care is appropriate for delegation to BG. Thank you for this referral. 
Amado Doe,OTR/L Time Calculation: 33 mins

## 2021-01-27 NOTE — PROGRESS NOTES
Progress Note Patient: Adam Patel MRN: [id number]  SSN: [id number] YOB: 1940  Age: 79 y.o. Sex: female Admit Date: 1/24/2021 LOS: 3 days Subjective:  
 
79year-old patient admitted fall A. fib with variable rate, congestive heart failure, acute hypoxic respiratory failure Objective:  
 
Vitals:  
 01/27/21 1200 01/27/21 1202 01/27/21 1442 01/27/21 1600 BP:  (!) 138/58 111/74 Pulse: (!) 110 (!) 110 (!) 110 (!) 110 Resp:  20 (!) 37 Temp:  97.8 °F (36.6 °C) 98.6 °F (37 °C) SpO2:  95% 94% Weight:      
Height:      
  
 
Intake and Output: 
Current Shift: 01/27 0701 - 01/27 1900 In: 118 [P.O.:118] Out: - Last three shifts: 01/25 1901 - 01/27 0700 In: 1056 [P.O.:1056] Out: 160 [Urine:160] Physical Exam:  
General:  Alert, cooperative, no distress, appears stated age. Eyes:  Conjunctivae/corneas clear. PERRL, EOMs intact. Fundi benign Ears:  Normal TMs and external ear canals both ears. Nose: Nares normal. Septum midline. Mucosa normal. No drainage or sinus tenderness. Mouth/Throat: Lips, mucosa, and tongue normal. Teeth and gums normal.  
Neck: Supple, symmetrical, trachea midline, no adenopathy, thyroid: no enlargment/tenderness/nodules, no carotid bruit and no JVD. Back:   Symmetric, no curvature. ROM normal. No CVA tenderness. Lungs:   Clear to auscultation bilaterally. Heart:  Regular rate and rhythm, S1, S2 normal, no murmur, click, rub or gallop. Abdomen:   Soft, non-tender. Bowel sounds normal. No masses,  No organomegaly. Extremities: Extremities normal, atraumatic, no cyanosis or edema. Pulses: 2+ and symmetric all extremities. Skin: Skin color, texture, turgor normal. No rashes or lesions Lymph nodes: Cervical, supraclavicular, and axillary nodes normal.  
Neurologic: CNII-XII intact. Normal strength, sensation and reflexes throughout. Lab/Data Review: All lab results for the last 24 hours reviewed. No results found for this or any previous visit (from the past 24 hour(s)). A single upright portable view is compared with a prior exam from 11/25/2020. The right internal jugular Port-A-Cath is unchanged. The aorta is tortuous, as 
before. The left lung is moderately well expanded and clear. The right lung is 
significantly smaller in terms of volume, with right basilar airspace disease 
and right pleural fluid/pleural thickening and this appearance is unchanged from 
the prior exam. No new or superimposed acute findings noted Assessment:  
 
Active Problems: 
  Cardiac arrhythmia (1/24/2021) Acute congestive heart failure cardiology to evaluate Atrial fibrillation variable rate Dementia Acute hypoxic respiratory failure Pneumonia Plan:  
 
Continue present management. Cardiology notes reviewed Amiodarone was added for better control of heart rate Signed By: Vaishali Almazan MD   
 January 27, 2021

## 2021-01-28 NOTE — PROGRESS NOTES
PT treatment attempted at [phone] however, pt declining participation due to 8/10 back pain. Assisted patient with repositioning in bed. Will continue to follow pt and will attempt treatment at a later time. Thank you

## 2021-01-28 NOTE — PROGRESS NOTES
Bedside and Verbal shift change report given to Fab Ablert (oncoming nurse) by Melinda Holguin RN (offgoing nurse). Report included the following information SBAR, Procedure Summary, Intake/Output, MAR and Recent Results.

## 2021-01-28 NOTE — PROGRESS NOTES
Comprehensive Nutrition Assessment Type and Reason for Visit: Reassess(interim) Nutrition Recommendations/Plan:  
Continue regular cardiac diet Adjusting Ensure Enlive to Daily (350kcals, 20g pro) Adding Ensure Clear BID (480kcals, 16g pro) Adding Magic Cup daily (290kcals, 9g pro) Allow snacks as desired 
  
Please document % of all meal/snack consumed Nutrition Assessment:  Admitted for Arrhythmia, remains inpatient for rate control. At initial interview, RD observed intakes of 40-65% x2 meals, added Ensure Enlive BID. Today, pt appeared weak/lethargic, poor PO intakes of 0% L per RD observation, 0% ensure consumed but 100% x1 cranberry juice, RD discussed adjusting Ensure Enlive to clear, pt agreeable, asked for [employer] 1x/day and Clear 2x/d, RD to order. Unsure of Magic Cup intakes? No new labs to assess. Meds: Augmentin, statin, coreg, fesu, lasix, spironolactone. Malnutrition Assessment: 
Malnutrition Status: Moderate malnutrition Context:  Chronic illness Estimated Daily Nutrient Needs: 
Energy (kcal): 1515kcals (30kcals/kg); Weight Used for Energy Requirements: Current Protein (g): 66g (1.3g/kg); Weight Used for Protein Requirements: Current Fluid (ml/day): 1515ml; Method Used for Fluid Requirements: 1 ml/kcal 
 
Nutrition Related Findings:  NFPE finding Moderate muscle wasting. +1 BLLE edema. Pt denies N/V/D/C, last BM 1/25 per pt, no new documented. +Edentulism, pt stated dentures at home however no issues with c/s. Wounds:   
None Current Nutrition Therapies: DIET NUTRITIONAL SUPPLEMENTS Dinner; Magic Cup ([place]) DIET CARDIAC Regular DIET NUTRITIONAL SUPPLEMENTS Breakfast, Dinner; Ensure Clear DIET NUTRITIONAL SUPPLEMENTS Lunch; Ensure [employer] Anthropometric Measures: 
· Height:  5' 1.81\" (157 cm) · Current Body Wt:  50.5 kg (111 lb 5.3 oz)(1/26, RD obtained) · Admission Body Wt:  104 lb 15 oz(1/25) · Usual Body Wt:  63.5 kg (140 lb)(per pt, unreliable historian) · Ideal Body Wt:  109 lbs:  102.1 % · BMI Category:  Underweight (BMI less than 22) age over 72 Wt hx: 60.9kg (11/23), 62.3kg (11/9), 59.9kg (11/5), 63.6kg (11/3), 63.5kg 910/23) Pt endorses wt stability, however severe wt loss noted per EMR wts, 11.8kg x2 months (23%). Nutrition Diagnosis:  
· Inadequate oral intake, Inadequate protein-energy intake related to catabolic illness(lung cancer) as evidenced by intake 26-50% Nutrition Interventions:  
Food and/or Nutrient Delivery: Continue current diet, Modify oral nutrition supplement(d/c ensure enlive, adding ensure clear) Nutrition Education and Counseling: No recommendations at this time Coordination of Nutrition Care: Continue to monitor while inpatient Goals: 
Intakes >/=50% of EENs (not progressing) Wt gain of 0.5kg in 7 days      (progressing) Nutrition Monitoring and Evaluation:  
Behavioral-Environmental Outcomes: None identified Food/Nutrient Intake Outcomes: Food and nutrient intake, Supplement intake Physical Signs/Symptoms Outcomes: Fluid status or edema, Nutrition focused physical findings, Skin, Weight Discharge Planning: Too soon to determine Electronically signed by Eula Leung RD on 1/28/2021 at 5:21 PM 
 
Contact: Ext [phone]

## 2021-01-28 NOTE — PROGRESS NOTES
Progress Note 1/28/2021 4:10 PM 
NAME: Gianna France MRN:  [id number] Admit Diagnosis: Cardiac arrhythmia [I49.9] Problem List: 1. Atrial  fibrillation with a rapid ventricular response 2. Heart failure with diastolic function 3. Hyper tension 4. COPD Assessment/Plan: 1. She is on carvedilol amiodarone Eliquis for atrial fibrillation. Dose of amiodarone is decreased as her pulse rate has decreased to 50/min. IV diltiazem is discontinued. 2. Vascular left ventricular function is in normal we will continue beta-blocker and will hold the losartan. 3. CMP is ordered to see whether there is any change in the liver function []       High complexity decision making was performed in this patient at high risk for decompensation with multiple organ involvement. Subjective: Gianna France denies chest pain, dyspnea. Discussed with RN events overnight. Review of Systems: 
 
Symptom Y/N Comments  Symptom Y/N Comments Fever/Chills N   Chest Pain N Poor Appetite N   Edema N   
Cough N   Abdominal Pain N Sputum N   Joint Pain N   
SOB/GALINDO N   Pruritis/Rash N   
Nausea/vomit N   Tolerating PT/OT Y Diarrhea N   Tolerating Diet Y Constipation N   Other Could NOT obtain due to:   
 
Objective:  
  
Physical Exam: 
 
Last 24hrs VS reviewed since prior progress note. Most recent are: 
 
Visit Vitals /76 (BP 1 Location: Left upper arm, BP Patient Position: Supine) Pulse (!) 49 Temp 98.5 °F (36.9 °C) Resp 18 Ht 5' 1.81\" (1.57 m) Wt 47.6 kg (104 lb 15 oz) SpO2 96% Breastfeeding No  
BMI 19.31 kg/m² Intake/Output Summary (Last 24 hours) at 1/28/2021 1610 Last data filed at 1/28/2021 5717 Gross per 24 hour Intake  Output 750 ml Net -750 ml General Appearance: Well developed, well nourished, alert & oriented x 3,  
 no acute distress. Ears/Nose/Mouth/Throat: Hearing grossly normal. 
Neck: Supple. Chest: Lungs clear to auscultation bilaterally. Cardiovascular: Regular rate and regular rhythm, S1S2 normal, no murmur. Abdomen: Soft, non-tender, bowel sounds are active. Extremities: No edema bilaterally. Skin: Warm and dry. []         Post-cath site without hematoma, bruit, tenderness, or thrill. Distal pulses intact. PMH/SH reviewed - no change compared to H&P Data Review Telemetry: normal sinus rhythm EKG:  
[]  No new EKG for review Lab Data Personally Reviewed: 
 
No results for input(s): WBC, HGB, HCT, PLT, HGBEXT, HCTEXT, PLTEXT in the last 72 hours. No results for input(s): INR, PTP, APTT, INREXT in the last 72 hours. No results for input(s): NA, K, CL, CO2, BUN, CREA, GLU, CA, MG in the last 72 hours. No results for input(s): CPK, CKNDX, TROIQ in the last 72 hours. No lab exists for component: CPKMB No results found for: CHOL, CHOLX, CHLST, CHOLV, HDL, HDLP, LDL, LDLC, DLDLP, TGLX, TRIGL, TRIGP, CHHD, CHHDX No results for input(s): AP, TBIL, TP, ALB, GLOB, GGT, AML, LPSE in the last 72 hours. No lab exists for component: SGOT, GPT, AMYP, HLPSE No results for input(s): PH, PCO2, PO2 in the last 72 hours. Medications Personally Reviewed: 
 
Current Facility-Administered Medications Medication Dose Route Frequency  acetaminophen (TYLENOL) tablet 650 mg  650 mg Oral Q6H PRN  
 [START ON 1/29/2021] amiodarone (CORDARONE) tablet 200 mg  200 mg Oral DAILY  carvediloL (COREG) tablet 6.25 mg  6.25 mg Oral BID WITH MEALS  
 albuterol-ipratropium (DUO-NEB) 2.5 MG-0.5 MG/3 ML  3 mL Nebulization Q6H PRN  
 amoxicillin-clavulanate (AUGMENTIN) 875-125 mg per tablet 1 Tab  1 Tab Oral Q12H  
 apixaban (ELIQUIS) tablet 2.5 mg  2.5 mg Oral BID  atorvastatin (LIPITOR) tablet 40 mg  40 mg Oral DAILY  escitalopram oxalate (LEXAPRO) tablet 5 mg  5 mg Oral DAILY  ferrous sulfate tablet 325 mg  325 mg Oral BID  
  spironolactone (ALDACTONE) tablet 25 mg  25 mg Oral DAILY  furosemide (LASIX) injection 40 mg  40 mg IntraVENous DAILY  aspirin delayed-release tablet 81 mg  81 mg Oral DAILY Frederic Thao MD

## 2021-01-28 NOTE — PROGRESS NOTES
CM contacted [facility] [address] [facility] [phone] and left a message for admissions to review patient and see if they can accept. Awaiting response.

## 2021-01-29 NOTE — DISCHARGE SUMMARY
Discharge Summary Patient: Peter Kaiser MRN: [id number]  SSN: [id number] YOB: 1940  Age: 79 y.o. Sex: female Admit Date: 1/24/2021 Discharge Date: 1/29/2021 Admission Diagnoses: Cardiac arrhythmia [I49.9] Discharge Diagnoses:  
Problem List as of 1/29/2021 Never Reviewed Codes Class Noted - Resolved Cardiac arrhythmia ICD-10-CM: I49.9 ICD-9-CM: 427.9  1/24/2021 - Present Hyponatremia ICD-10-CM: E87.1 ICD-9-CM: 276.1  11/3/2020 - Present Discharge Condition: Good Hospital Course 79years old history of anemia atrial fibrillation COPD dementia depression brought to the hospital for shortness of breath. In the hospital patient was found to be in atrial fibrillation rapid ventricular elevated BNP 2400 she was admitted for atrial fibrillation rapid ventricular rate, congestive heart failure, anxiety. Patient had IR thoracentesis, for fluid removal was seen by the cardiologist.  She was placed on multiple rhythm rate management drugs including amiodarone Cardizem. She was diagnosed with cardiac failure with left ventricular dysfunction acute on chronic Consults: Cardiology Significant Diagnostic Studies: labs: No results found for this or any previous visit (from the past 24 hour(s)). XR CHEST PORT Final Result Stable exam  
  
 
 
Disposition: SNF Discharge Medications:  
Current Discharge Medication List  
  
START taking these medications Details  
amiodarone (CORDARONE) 200 mg tablet Take 1 Tab by mouth daily. Qty: 30 Tab, Refills: 0  
  
aspirin delayed-release 81 mg tablet Take 1 Tab by mouth daily. Qty: 30 Tab, Refills: 0  
  
furosemide (Lasix) 40 mg tablet Take 1 Tab by mouth daily. Qty: 30 Tab, Refills: 0 CONTINUE these medications which have CHANGED Details  
carvediloL (COREG) 6.25 mg tablet Take 1 Tab by mouth two (2) times daily (with meals). Qty: 60 Tab, Refills: 0 CONTINUE these medications which have NOT CHANGED Details  
apixaban (ELIQUIS) 2.5 mg tablet Take 1 Tab by mouth two (2) times a day. Indications: treatment to prevent blood clots in chronic atrial fibrillation, treatment to prevent a blood clot in the lung 
Qty: 30 Tab, Refills: 1  
  
escitalopram oxalate (LEXAPRO) 5 mg tablet Take 1 Tab by mouth daily. Indications: anxiousness associated with depression 
Qty: 30 Tab, Refills: 1  
  
mirtazapine (REMERON SOL-TAB) 15 mg disintegrating tablet Take 1 Tab by mouth nightly. Qty: 30 Tab, Refills: 0  
  
mometasone-formoterol (DULERA) 200-5 mcg/actuation HFA inhaler Take 2 Puffs by inhalation two (2) times a day. Qty: 1 Each, Refills: 1  
  
albuterol-ipratropium (DUO-NEB) 2.5 mg-0.5 mg/3 ml nebu 3 mL by Nebulization route every six (6) hours as needed for Wheezing. Qty: 30 Nebule, Refills: 1  
  
amoxicillin-clavulanate (Augmentin) 875-125 mg per tablet Take 1 Tab by mouth two (2) times a day. Qty: 20 Tab, Refills: 0  
  
ferrous sulfate (IRON) 325 mg (65 mg iron) EC tablet Take 325 mg by mouth two (2) times a day. spironolactone (Aldactone) 25 mg tablet Take 25 mg by mouth daily. Take 1/2 daily  
  
atorvastatin (Lipitor) 40 mg tablet Take 40 mg by mouth daily. STOP taking these medications  
  
 predniSONE (DELTASONE) 10 mg tablet Comments:  
Reason for Stopping:   
   
 dilTIAZem ER (CARDIZEM SR) 120 mg capsule Comments:  
Reason for Stopping:   
   
 megestroL (MEGACE) 400 mg/10 mL (10 mL) suspension Comments:  
Reason for Stopping:   
   
 metoprolol tartrate (LOPRESSOR) 50 mg tablet Comments:  
Reason for Stopping:   
   
  
 
 
Activity: Activity as tolerated Diet: Cardiac Diet Wound Care: As directed Follow-up Appointments Procedures  FOLLOW UP VISIT Appointment in: 3 - 5 Days When bed available When bed available Standing Status:   Standing Number of Occurrences:   1   Order Specific Question:   Appointment in  
 Answer:   3 - 5 Days 45 minutes discharge time Signed By: Lara Roque MD   
 January 29, 2021

## 2021-01-29 NOTE — PROGRESS NOTES
Discharge plan of care/case management plan validated with provider discharge order. 
 
I have reviewed discharge instructions with Randall at [facility].  Randall verbalized understanding. 
 
IV removed with tip intact. 
 
Telemetry removed.

## 2021-01-29 NOTE — MANAGEMENT PLAN
CM spoke with nurse, Ky Beckwith, and patient is alert and oriented. CM notified patient that the only accepting bed is at [facility]. She is agreeable to go. CM delivered IMM to patient and she asks \"Do I really have to discharge today? \" CM explained that IMM was for if she felt she was being discharged too soon but it was her right to an appeal. CM will precede with discharge.

## 2021-01-29 NOTE — PROGRESS NOTES
Progress Note Patient: Kiara Wells MRN: [id number]  SSN: [id number] YOB: 1940  Age: 79 y.o. Sex: female Admit Date: 1/24/2021 LOS: 4 days Subjective:  
 
79year-old patient admitted fall A. fib with variable rate, congestive heart failure, acute hypoxic respiratory failure. Adjustments were made to patient's medications for bradycardia Objective:  
 
Vitals:  
 01/28/21 0130 01/28/21 0719 01/28/21 1115 01/28/21 1542 BP: 132/78 (!) 141/87 109/66 130/76 Pulse: 88 (!) 104 86 (!) 49 Resp: 20 23 20 18 Temp: 98.1 °F (36.7 °C) 98.8 °F (37.1 °C) 97.9 °F (36.6 °C) 98.5 °F (36.9 °C) SpO2: 96%  96% 96% Weight:      
Height:      
  
 
Intake and Output: 
Current Shift: No intake/output data recorded. Last three shifts: 01/27 0701 - 01/28 1900 In: 118 [P.O.:118] Out: 750 [Urine:750] Physical Exam:  
General:  Alert, cooperative, no distress, appears stated age. Eyes:  Conjunctivae/corneas clear. PERRL, EOMs intact. Fundi benign Ears:  Normal TMs and external ear canals both ears. Nose: Nares normal. Septum midline. Mucosa normal. No drainage or sinus tenderness. Mouth/Throat: Lips, mucosa, and tongue normal. Teeth and gums normal.  
Neck: Supple, symmetrical, trachea midline, no adenopathy, thyroid: no enlargment/tenderness/nodules, no carotid bruit and no JVD. Back:   Symmetric, no curvature. ROM normal. No CVA tenderness. Lungs:   Clear to auscultation bilaterally. Heart:  Regular rate and rhythm, S1, S2 normal, no murmur, click, rub or gallop. Abdomen:   Soft, non-tender. Bowel sounds normal. No masses,  No organomegaly. Extremities: Extremities normal, atraumatic, no cyanosis or edema. Pulses: 2+ and symmetric all extremities. Skin: Skin color, texture, turgor normal. No rashes or lesions Lymph nodes: Cervical, supraclavicular, and axillary nodes normal.  
 Neurologic: CNII-XII intact. Normal strength, sensation and reflexes throughout. Lab/Data Review: All lab results for the last 24 hours reviewed. No results found for this or any previous visit (from the past 24 hour(s)). A single upright portable view is compared with a prior exam from 11/25/2020. The right internal jugular Port-A-Cath is unchanged. The aorta is tortuous, as 
before. The left lung is moderately well expanded and clear. The right lung is 
significantly smaller in terms of volume, with right basilar airspace disease 
and right pleural fluid/pleural thickening and this appearance is unchanged from 
the prior exam. No new or superimposed acute findings noted Assessment:  
 
Active Problems: 
  Cardiac arrhythmia (1/24/2021) Acute congestive heart failure cardiology to evaluate Atrial fibrillation variable rate Dementia Acute hypoxic respiratory failure Pneumonia Plan:  
Monitor response to adjustments made. PT and OT for possible discharge in a.m. Signed By: Ellie Canales MD   
 January 28, 2021

## 2021-01-29 NOTE — PROGRESS NOTES
OT treatment at 11:08 AM however, spoke with RN prior to therapy session and RN asked to hold therapy session at this time d/t patients HR high. Will continue to follow pt and attempt therapy session at a later time.

## 2021-01-29 NOTE — DISCHARGE INSTRUCTIONS

## 2021-01-29 NOTE — MANAGEMENT PLAN
[facility] does not have a bed for patient and Stephanie nor [facility] [address] [address] have accepted. Patient has been accepted [place]. CM has tried to call sister, Red Miranda, [phone], but got no answer. CM will continue to follow for discharge.

## 2021-01-29 NOTE — MANAGEMENT PLAN
called Dr. Glynn Biswas and notified him of bed available at [facility]. Dr. Glynn Biswas states okay to change discharge order to discharge to SNF.

## 2021-01-29 NOTE — PROGRESS NOTES
Bedside and Verbal shift change report given to Severa Johann, RN (oncoming nurse) by Catalino Tavares RN (offgoing nurse). Report included the following information SBAR, Procedure Summary, Intake/Output, MAR and Recent Results.

## 2021-01-29 NOTE — MANAGEMENT PLAN
Spoke with Clau at [facility] and they may have bed today once POA comes to fill out some paperwork around 3pm. She requestes MARS be uploaded to be able to price meds. ALVARADO has uploaded MARS via [employer]. ALVRAADO will continue to follow.

## 2021-01-29 NOTE — MANAGEMENT PLAN
Patient is cleared to discharge to [address] [place] [address]. Call report to [phone]. Discharge plan of care/case management plan validated with provider discharge order. ALVARADO has completed discharge checklist with JACKY Proctor, and transport will be arranged for 5pm. CM has notified patient and she states she will keep trying to call her sister so she knows where she will be going. CM attempted to call sister again but got no answer. ADDENDUM: 
Transportation scheduled for 5pm via [employer] with Jose Plan.

## 2021-01-29 NOTE — MANAGEMENT PLAN
ALVARADO called and spoke with Enrique Zuniga at [facility]. She states there is paperwork that was not completed on patient's last admission there that my probit admission this go around. ALVARADO called [address] [facility] and was told to call Liaison, Kell Levine, at [phone], ALVARADO has called but gotten no answer. CM left voicemail. ALVARADO then called Mrs. Benny Hernadez sister/MARIO ALBERTO Luis and updated her on findings and that more referrals need to be sent out if SNF is still plan for patient. She was agreeable with no preference. More referrals were sent via [employer]. Gabe Alvarenga asked if she and her daughter could come up to visit and she was instructed on visitor policy of one person per patient per day. She verbalized understanding. ALVARADO will continue to follow for discharge planning.

## 2021-01-31 NOTE — PROGRESS NOTES
Physician Progress Note Jeronimo Morales 
CSN #:                  [id number] :                       1940 ADMIT DATE:       2021 10:37 AM 
DISCH DATE:        2021 6:55 PM 
RESPONDING 
PROVIDER #:        Chastity Pelletier MD 
 
 
 
 
QUERY TEXT: 
 
Dr. Pearl Kaur Patient admitted with BMI of 19.3. If possible, please document in progress notes and discharge summary if you are evaluating and /or treating any of the following: The medical record reflects the following: 
Risk Factors: COPD, CHF, atrial fibrillation, dementia Clinical Indicators: Albumin 2.7; Nutrition Consult: Moderate malnutrition , Mild muscle mass loss, Calf (gastrocnemius), Thigh (quadraceps) Treatment: Adding Ensure Enlive BID (700kcals, 40g pro) Adding Magic Cup daily (290kcals, 9g pro Thank you, Edward Francis RN, CCDS, [address] Options provided: -- Protein calorie malnutrition mild -- Protein calorie malnutrition moderate -- Protein calorie malnutrition severe 
-- Underweight with BMI 19.3 
-- Other - I will add my own diagnosis -- Disagree - Not applicable / Not valid -- Disagree - Clinically unable to determine / Unknown 
-- Refer to Clinical Documentation Reviewer PROVIDER RESPONSE TEXT: 
 
This patient has moderate protein calorie malnutrition.  
 
Query created by: Lionel Reyes on 2021 1:16 PM 
 
 
Electronically signed by:  Chastity Pelletier MD 2021 6:27 AM

## 2021-02-07 PROBLEM — I48.91 ATRIAL FIBRILLATION (HCC): Status: ACTIVE | Noted: 2021-01-01

## 2021-02-07 NOTE — ED PROVIDER NOTES
EMERGENCY DEPARTMENT HISTORY AND PHYSICAL EXAM 
 
 
Date: 2/7/2021 Patient Name: Asia Aguillon History of Presenting Illness Chief Complaint Patient presents with  Shortness of Breath History Provided By: Patient HPI: Asia Aguillon, 79 y.o. female with a past medical history significant  COPD,A-FIB,CAD,HYPERLIPIDEMIA,CHF,DEPRESSION who presents to the ED with cc of acute dyspnea since this am. 
 
There are no other complaints, changes, or physical findings at this time. PCP: Donya Ayala MD 
 
No current facility-administered medications on file prior to encounter. Current Outpatient Medications on File Prior to Encounter Medication Sig Dispense Refill  carvediloL (COREG) 6.25 mg tablet Take 1 Tab by mouth two (2) times daily (with meals). 60 Tab 0  
 amiodarone (CORDARONE) 200 mg tablet Take 1 Tab by mouth daily. 30 Tab 0  
 aspirin delayed-release 81 mg tablet Take 1 Tab by mouth daily. 30 Tab 0  
 furosemide (Lasix) 40 mg tablet Take 1 Tab by mouth daily. 30 Tab 0  
 apixaban (ELIQUIS) 2.5 mg tablet Take 1 Tab by mouth two (2) times a day. Indications: treatment to prevent blood clots in chronic atrial fibrillation, treatment to prevent a blood clot in the lung 30 Tab 1  
 escitalopram oxalate (LEXAPRO) 5 mg tablet Take 1 Tab by mouth daily. Indications: anxiousness associated with depression 30 Tab 1  
 mirtazapine (REMERON SOL-TAB) 15 mg disintegrating tablet Take 1 Tab by mouth nightly. 30 Tab 0  
 mometasone-formoterol (DULERA) 200-5 mcg/actuation HFA inhaler Take 2 Puffs by inhalation two (2) times a day. 1 Each 1  
 albuterol-ipratropium (DUO-NEB) 2.5 mg-0.5 mg/3 ml nebu 3 mL by Nebulization route every six (6) hours as needed for Wheezing. 30 Nebule 1  
 amoxicillin-clavulanate (Augmentin) 875-125 mg per tablet Take 1 Tab by mouth two (2) times a day.  20 Tab 0  
  ferrous sulfate (IRON) 325 mg (65 mg iron) EC tablet Take 325 mg by mouth two (2) times a day.  spironolactone (Aldactone) 25 mg tablet Take 25 mg by mouth daily. Take 1/2 daily  atorvastatin (Lipitor) 40 mg tablet Take 40 mg by mouth daily. Past History Past Medical History: 
Past Medical History:  
Diagnosis Date  Anemia  Anxiety  Atrial fibrillation ([address] .  Chronic obstructive pulmonary disease ([facility]  Dementia ([facility] [facility]  Dementia ([facility] [address]  Depression  Heart failure ([facility] [address]  High cholesterol  Hypertension  Low blood potassium  Lung cancer ([facility] [address]  Pleural effusion  Respiratory failure ([facility] [address] Past Surgical History: 
Past Surgical History:  
Procedure Laterality Date  IR THORACENTESIS NDL PUNC ASP W IMAGE  11/6/2020 Family History: No family history on file. Social History: 
Social History Tobacco Use  Smoking status: Never Smoker  Smokeless tobacco: Never Used Substance Use Topics  Alcohol use: Not Currently  Drug use: Never Allergies: 
No Known Allergies Review of Systems Review of Systems Constitutional: Negative. HENT: Negative. Eyes: Negative. Respiratory: Positive for shortness of breath. Cardiovascular: Negative. Gastrointestinal: Negative. Endocrine: Negative. Genitourinary: Negative. Musculoskeletal: Negative. Skin: Negative. Allergic/Immunologic: Negative. Neurological: Negative. Hematological: Negative. Psychiatric/Behavioral: Negative. Physical Exam  
 
Physical Exam 
Vitals signs and nursing note reviewed. Constitutional:   
   Appearance: She is well-developed and normal weight. Neck: Musculoskeletal: Normal range of motion and neck supple. Cardiovascular:  
   Rate and Rhythm: Tachycardia present. Pulmonary:  
   Breath sounds: Normal breath sounds.   
Abdominal:  
   General: Bowel sounds are normal.  
 Palpations: Abdomen is soft. Skin: 
   General: Skin is dry. Neurological:  
   Mental Status: She is alert and oriented to person, place, and time. Psychiatric:     
   Mood and Affect: Mood normal.     
   Behavior: Behavior normal.  
 
 
 
Lab and Diagnostic Study Results Labs - Recent Results (from the past 12 hour(s)) CBC WITH AUTOMATED DIFF Collection Time: 02/07/21 12:35 PM  
Result Value Ref Range WBC 12.6 (H) 3.6 - 11.0 K/uL  
 RBC 5.41 (H) 3.80 - 5.20 M/uL  
 HGB 14.1 11.5 - 16.0 g/dL HCT 46.0 35.0 - 47.0 % MCV 85.0 80.0 - 99.0 FL  
 MCH 26.1 26.0 - 34.0 PG  
 MCHC 30.7 30.0 - 36.5 g/dL  
 RDW 19.0 (H) 11.5 - 14.5 % PLATELET 097 (H) 197 - 400 K/uL MPV 12.3 8.9 - 12.9 FL  
 NRBC 0.0 0  WBC ABSOLUTE NRBC 0.00 0.00 - 0.01 K/uL NEUTROPHILS 74 32 - 75 % LYMPHOCYTES 16 12 - 49 % MONOCYTES 9 5 - 13 % EOSINOPHILS 0 0 - 7 % BASOPHILS 0 0 - 1 % IMMATURE GRANULOCYTES 1 (H) 0.0 - 0.5 % ABS. NEUTROPHILS 9.4 (H) 1.8 - 8.0 K/UL  
 ABS. LYMPHOCYTES 2.1 0.8 - 3.5 K/UL  
 ABS. MONOCYTES 1.1 (H) 0.0 - 1.0 K/UL  
 ABS. EOSINOPHILS 0.0 0.0 - 0.4 K/UL  
 ABS. BASOPHILS 0.0 0.0 - 0.1 K/UL  
 ABS. IMM. GRANS. 0.1 (H) 0.00 - 0.04 K/UL  
 DF AUTOMATED METABOLIC PANEL, COMPREHENSIVE Collection Time: 02/07/21 12:35 PM  
Result Value Ref Range Sodium 142 136 - 145 mmol/L Potassium 4.3 3.5 - 5.1 mmol/L Chloride 108 97 - 108 mmol/L  
 CO2 25 21 - 32 mmol/L Anion gap 9 5 - 15 mmol/L Glucose 78 65 - 100 mg/dL BUN 30 (H) 6 - 20 mg/dL Creatinine 1.10 (H) 0.55 - 1.02 mg/dL BUN/Creatinine ratio 27 (H) 12 - 20 GFR est AA 58 (L) >60 ml/min/1.73m2 GFR est non-AA 48 (L) >60 ml/min/1.73m2 Calcium 8.9 8.5 - 10.1 mg/dL Bilirubin, total 0.4 0.2 - 1.0 mg/dL AST (SGOT) 35 15 - 37 U/L  
 ALT (SGPT) 25 12 - 78 U/L Alk. phosphatase 176 (H) 45 - 117 U/L Protein, total 5.9 (L) 6.4 - 8.2 g/dL Albumin 1.4 (L) 3.5 - 5.0 g/dL Globulin 4.5 (H) 2.0 - 4.0 g/dL A-G Ratio 0.3 (L) 1.1 - 2.2 CK W/ REFLX CKMB Collection Time: 02/07/21 12:35 PM  
Result Value Ref Range CK 99.0 26 - 192 ng/mL TROPONIN I Collection Time: 02/07/21 12:35 PM  
Result Value Ref Range Troponin-I, Qt. <0.05 <0.05 ng/mL BNP Collection Time: 02/07/21 12:35 PM  
Result Value Ref Range NT pro-BNP 9,941 (H) <450 pg/mL Radiologic Studies -  
@lastxrresult@ CT Results  (Last 48 hours) None CXR Results  (Last 48 hours) 02/07/21 1124  XR CHEST PORT Final result Impression:  Stable appearance of the chest compared to 24 January 2021 Narrative:  History: Chest pain Single view of the chest was provided. Right-sided Port-A-Cath is present. There  
is volume loss of the right lung heart size appears enlarged. Left lung is  
clear. Old bilateral fractures are present. No pneumothorax. Medical Decision Making - I am the first provider for this patient. - I reviewed the vital signs, available nursing notes, past medical history, past surgical history, family history and social history. - Initial assessment performed. The patients presenting problems have been discussed, and they are in agreement with the care plan formulated and outlined with them. I have encouraged them to ask questions as they arise throughout their visit. Vital Signs-Reviewed the patient's vital signs. Patient Vitals for the past 12 hrs: 
 Temp Pulse Resp BP SpO2  
02/07/21 1306  (!) 145  (!) 102/91   
02/07/21 1053 97.7 °F (36.5 °C) (!) 148 16 101/75 96 % Records Reviewed: Old Medical Records The patient presents with  with a differential diagnosis of a-fib with rvr,chf exacerbation ED Course:  
 
  
 
Provider Notes (Medical Decision Making):  
R/o a-fib with rvr,mi,chf exacerbation MDM Procedures Medical Decision Makingedical Decision Making Performed by: HUSEYIN Steinberg 
 PROCEDURES: 
Procedures Central Line:  
Consent: Written, verbal 
Indication: Arrhythmia, amiodarone high concentration infusion Performed by: Chao Peralta D.O. Description: Sterile field established. Triple lumen catheter attempted in right IJ and right femoral vein with ultrasound guidance without success. The wire would not pass in the IJ x2 attempts and blood would not return in the femoral vein. I used Selinger technique. No significant blood loss. No successful central line was placed. Complications: None. X-ray showed area that could be pneumothorax, however this was check in the AM and shows no pneumothorax. Chao Peralta D.O. Disposition Disposition: Admitted to Floor Stepdown Unit the case was discussed with the admitting physician Cade Knapp DISCHARGE PLAN: 
1. Current Discharge Medication List  
  
CONTINUE these medications which have NOT CHANGED Details  
carvediloL (COREG) 6.25 mg tablet Take 1 Tab by mouth two (2) times daily (with meals). Qty: 60 Tab, Refills: 0  
  
amiodarone (CORDARONE) 200 mg tablet Take 1 Tab by mouth daily. Qty: 30 Tab, Refills: 0  
  
aspirin delayed-release 81 mg tablet Take 1 Tab by mouth daily. Qty: 30 Tab, Refills: 0  
  
furosemide (Lasix) 40 mg tablet Take 1 Tab by mouth daily. Qty: 30 Tab, Refills: 0  
  
apixaban (ELIQUIS) 2.5 mg tablet Take 1 Tab by mouth two (2) times a day. Indications: treatment to prevent blood clots in chronic atrial fibrillation, treatment to prevent a blood clot in the lung 
Qty: 30 Tab, Refills: 1  
  
escitalopram oxalate (LEXAPRO) 5 mg tablet Take 1 Tab by mouth daily. Indications: anxiousness associated with depression 
Qty: 30 Tab, Refills: 1  
  
mirtazapine (REMERON SOL-TAB) 15 mg disintegrating tablet Take 1 Tab by mouth nightly. Qty: 30 Tab, Refills: 0  
  
mometasone-formoterol (DULERA) 200-5 mcg/actuation HFA inhaler Take 2 Puffs by inhalation two (2) times a day. Qty: 1 Each, Refills: 1 albuterol-ipratropium (DUO-NEB) 2.5 mg-0.5 mg/3 ml nebu 3 mL by Nebulization route every six (6) hours as needed for Wheezing. Qty: 30 Nebule, Refills: 1  
  
amoxicillin-clavulanate (Augmentin) 875-125 mg per tablet Take 1 Tab by mouth two (2) times a day. Qty: 20 Tab, Refills: 0  
  
ferrous sulfate (IRON) 325 mg (65 mg iron) EC tablet Take 325 mg by mouth two (2) times a day. spironolactone (Aldactone) 25 mg tablet Take 25 mg by mouth daily. Take 1/2 daily  
  
atorvastatin (Lipitor) 40 mg tablet Take 40 mg by mouth daily. 2.  
Follow-up Information None 3. Return to ED if worse 4. Current Discharge Medication List  
  
 
 
 
Diagnosis Clinical Impression: 1. Longstanding persistent atrial fibrillation ([facility] Attestations: 
 
HUSEYIN Huffman Please note that this dictation was completed with [employer], the computer voice recognition software. Quite often unanticipated grammatical, syntax, homophones, and other interpretive errors are inadvertently transcribed by the computer software. Please disregard these errors. Please excuse any errors that have escaped final proofreading. Thank you.

## 2021-02-07 NOTE — ED TRIAGE NOTES
[facility] pt is [facility] where she had been having difficulty breathing for the last few days; upon EMS arrival pt's O2 sats 91 % on RA ws placed on 3l/min nc and o2 sats went up to 93%; Hx afib and COPD; facility did rapid COVID and was negative a few days ago

## 2021-02-08 NOTE — PROGRESS NOTES
2/8/21. PCP is Dr. Branden Jackson - seen monthly @ the [facility]). Pt is total care & w/c bound - per staff @ the center. Unable to reach sister Lisette Apple @ [phone]) - phone just rung - no vm set up. Referral sent via [facility] for return to [facility] upon discharge.

## 2021-02-08 NOTE — PROGRESS NOTES
Progress Note Patient: Otis Georges MRN: [id number]  SSN: [id number] YOB: 1940  Age: 79 y.o. Sex: female Admit Date: 2/7/2021 LOS: 1 day Subjective:  
 
Patient is a poor historian. Admitted for recurrent atrial fibrillation with rapid ventricular rate with failure with multiple medications in the nursing home. Patient is on IV amiodarone and beta-blocker cardiology notes is reviewed and appreciated Objective:  
 
Vitals:  
 02/08/21 0913 02/08/21 0919 02/08/21 1137 02/08/21 1401 BP: 129/87  102/75 (!) 149/106 Pulse: (!) 112  95 (!) 103 Resp:   19 25 Temp: 97.9 °F (36.6 °C) SpO2: 99% 99% 98% 100% Weight:      
Height:      
  
 
Intake and Output: 
Current Shift: No intake/output data recorded. Last three shifts: 02/06 1901 - 02/08 0700 In: 250 [I.V.:250] Out: - Physical Exam:  
General:  Alert, cooperative, no distress, appears stated age. Eyes:  Conjunctivae/corneas clear. PERRL, EOMs intact. Fundi benign Ears:  Normal TMs and external ear canals both ears. Nose: Nares normal. Septum midline. Mucosa normal. No drainage or sinus tenderness. Mouth/Throat: Lips, mucosa, and tongue normal. Teeth and gums normal.  
Neck: Supple, symmetrical, trachea midline, no adenopathy, thyroid: no enlargment/tenderness/nodules, no carotid bruit and no JVD. Back:   Symmetric, no curvature. ROM normal. No CVA tenderness. Lungs:   Clear to auscultation bilaterally. Heart:  Regular rate and rhythm, S1, S2 normal, no murmur, click, rub or gallop. Abdomen:   Soft, non-tender. Bowel sounds normal. No masses,  No organomegaly. Extremities: Extremities normal, atraumatic, no cyanosis or edema. Pulses: 2+ and symmetric all extremities. Skin: Skin color, texture, turgor normal. No rashes or lesions Lymph nodes: Cervical, supraclavicular, and axillary nodes normal.  
Neurologic: CNII-XII intact. Normal strength, sensation and reflexes throughout. Lab/Data Review: All lab results for the last 24 hours reviewed. Recent Results (from the past 24 hour(s)) SARS-COV-2 Collection Time: 02/08/21  1:58 AM  
Result Value Ref Range SARS-CoV-2 Please find results under separate order COVID-19 RAPID TEST Collection Time: 02/08/21  1:58 AM  
Result Value Ref Range Specimen source Nasopharyngeal    
 COVID-19 rapid test DETECTED (A) Not Detected DIGOXIN Collection Time: 02/08/21  9:45 AM  
Result Value Ref Range Digoxin level 2.7 (HH) 0.90 - 2.0 ng/mL Reported dose date Not provided Reported dose: Not provided Units MRSA SCREEN - PCR (NASAL) Collection Time: 02/08/21 10:00 AM  
Result Value Ref Range MRSA by PCR, Nasal Not Detected Not Detected Assessment:  
 
Active Problems: 
  Atrial fibrillation ([address] (2/7/2021) Atrial fibrillation rapid ventricular rate recurrent continue on IV amiodarone beta-blocker Patient may need EP studies with ablation will defer to cardiology Dementia Plan:  
 
Continue with treatment patient waiting for bed on the floor with telemetry Signed By: Ghanshyam Scott MD   
 February 8, 2021

## 2021-02-08 NOTE — CONSULTS
Consult NAME: Shera Siemens :  1940 MRN:  [id number] Date/Time:  2021 11:22 AM 
 
Patient PCP: Slime Granados MD 
________________________________________________________________________ This is an inpatient cardiology consultation requested by Dr. David Kelly for SVT. Assessment:  
 
1. This is a 29-year-old female with a history of atrial fibrillation/flutter, COPD, congestive heart failure, lung cancer and dementia who now is also positive for Covid infection by rapid antigen test.  Patient is SVT which is atrial fibrillation/flutter with uncontrolled response is flared by hyperadrenergic state related to Covid infection. Plan: 1. I will continue amiodarone with beta-blocker for rate control. Patient's IV amiodarone now can be changed to oral route. Continue Eliquis for anticoagulation. 2. I will not rebolus patient with digoxin due to digoxin being above therapeutic range. 3. Patient during the course of hospitalization has ruled out for myocardial infarction and her recent echocardiogram 2 weeks ago showed well-preserved LV systolic function without any significant valvular heart disease. []        High complexity decision making was performed Subjective: CHIEF COMPLAINT: Tachycardia HISTORY OF PRESENT ILLNESS:    
 Patient is a 59-year-old female with a history of atrial fibrillation, COPD, congestive heart failure, lung cancer and dementia who was transferred from nursing home for assessment and management of patient's persistent tachycardia. Patient emergency room was noted for atrial flutter with variable block with heart rate in 140s to 150s range. Patient being poor historian with dementia is unable to give any meaningful history. Patient in the emergency room was given Cardizem bolus 10 mg and IV digoxin 0.125 mg followed by 0.25 mg without significant improvement in her heart rate. Patient finally was started on IV amiodarone. Patient heart rate now on combination of amiodarone and beta-blocker is in 90s. Patient visibly does not appear in any distress from chest pain or dyspnea. Of note patient's rapid antigen test for Covid infection has been positive. Past Medical History:  
Diagnosis Date  Anemia  Anxiety  Atrial fibrillation ([address] .  Chronic obstructive pulmonary disease ([address]  Dementia ([address]  Dementia ([address]  Depression  Heart failure ([address]  High cholesterol  Hypertension  Low blood potassium  Lung cancer ([address]  Pleural effusion  Respiratory failure ([address] Past Surgical History:  
Procedure Laterality Date  IR THORACENTESIS NDL PUNC ASP W IMAGE  11/6/2020 No Known Allergies Meds:  See below Social History Tobacco Use  Smoking status: Never Smoker  Smokeless tobacco: Never Used Substance Use Topics  Alcohol use: Not Currently No family history on file. REVIEW OF SYSTEMS:   
 [x]         Unable to obtain  ROS due to dementia 
 []         Total of 12 systems reviewed as follows: 
 
Constitutional: negative fever, negative chills, negative weight loss Eyes:   negative visual changes ENT:   negative sore throat, tongue or lip swelling Respiratory:  negative cough, negative dyspnea Cards:  negative for chest pain, palpitations, lower extremity edema GI:   negative for nausea, vomiting, diarrhea, and abdominal pain Genitourinary: negative for frequency, dysuria Integument:  negative for rash Hematologic:  negative for easy bruising and gum/nose bleeding Musculoskel: negative for myalgias,  back pain Neurological:  negative for headaches, dizziness, vertigo, weakness Behavl/Psych: negative for feelings of anxiety, depression Pertinent Positives include : 
 
Objective:  
  
Physical Exam: 
 
Last 24hrs VS reviewed since prior progress note. Most recent are: 
 
Visit Vitals /87 (BP 1 Location: Left upper arm, BP Patient Position: At rest) Pulse (!) 112 Temp 97.9 °F (36.6 °C) Resp 21 Ht 5' 3\" (1.6 m) Wt 59 kg (130 lb) SpO2 99% BMI 23.03 kg/m² Intake/Output Summary (Last 24 hours) at 2/8/2021 1122 Last data filed at 2/8/2021 0002 Gross per 24 hour Intake 250 ml Output  Net 250 ml General Appearance: Well developed elderly female who was confused but appeared in no acute distress Ears/Nose/Mouth/Throat: Pupils equal and round, Hearing grossly normal. 
Neck: Supple. JVP within normal limits. Carotids good upstrokes, with no bruit. Chest: Lungs scattered rhonchi bilaterally. Cardiovascular: Irregular rhythm, S1S2 normal, no murmur, rubs, gallops. Abdomen: Soft, non-tender, bowel sounds are active. No organomegaly. Extremities: No edema bilaterally. Skin: Warm and dry. []         Post-cath site without hematoma, bruit, tenderness, or thrill. Distal pulses intact. Data:  
  
Telemetry: 
 
EKG: 
Atrial flutter with variable conduction block. Heart rate in 140s XR CHEST SNGL V Final Result FINDINGS: IMPRESSION: Frontal single view chest.  
  
Right port distal tip SVC/RA region. Unchanged cardiomediastinal silhouette. No vascular congestion or pulmonary  
edema. Unchanged right lung volume loss, airspace disease, pleural thickening. No  
evident pneumothorax. Left lung clear. No free air under the diaphragm. XR CHEST SNGL V Final Result There is a questionable lucency in the right lung apex and in the  
proper clinical setting could indicate a small pneumothorax. Correlate with side  
of attempted line placement and/or further evaluate with CT if clinically  
appropriate. . Otherwise stable appearance with persistent interstitial and  
airspace disease throughout the near entirety of the right lung with only  
aeration remaining in the right upper lobe and presumed sizable right pleural  
effusion. Cardiac silhouette is enlarged. Left lung remains clear. Allan Leyva Results called to Dr. Janine Lane on 2/8/2021 3:23 AM. XR CHEST PORT Final Result Stable appearance of the chest compared to 24 January 2021 Prior to Admission medications Medication Sig Start Date End Date Taking? Authorizing Provider  
carvediloL (COREG) 6.25 mg tablet Take 1 Tab by mouth two (2) times daily (with meals). 1/29/21   Carly Forbes MD  
aspirin delayed-release 81 mg tablet Take 1 Tab by mouth daily. 1/30/21   Carly Forbes MD  
furosemide (Lasix) 40 mg tablet Take 1 Tab by mouth daily. 1/29/21   Jeb MACIAS MD  
apixaban (ELIQUIS) 2.5 mg tablet Take 1 Tab by mouth two (2) times a day. Indications: treatment to prevent blood clots in chronic atrial fibrillation, treatment to prevent a blood clot in the lung 12/1/20   Krishna Maldonado MD  
mirtazapine (REMERON SOL-TAB) 15 mg disintegrating tablet Take 1 Tab by mouth nightly. 12/1/20   Krishna Maldonado MD  
mometasone-formoterol [facility]) 200-5 mcg/actuation HFA inhaler Take 2 Puffs by inhalation two (2) times a day. 12/1/20   Krishna Maldonado MD  
amoxicillin-clavulanate (Augmentin) 875-125 mg per tablet Take 1 Tab by mouth two (2) times a day.  11/9/20   Cody Patiño MD  
 ferrous sulfate (IRON) 325 mg (65 mg iron) EC tablet Take 325 mg by mouth two (2) times a day. Liliana Massey MD  
spironolactone (Aldactone) 25 mg tablet Take 25 mg by mouth daily. Take 1/2 daily    Liliana Massey MD  
atorvastatin (Lipitor) 40 mg tablet Take 40 mg by mouth daily. Liliana Massey MD  
 
 
Recent Results (from the past 24 hour(s)) EKG, 12 LEAD, INITIAL Collection Time: 02/07/21 12:01 PM  
Result Value Ref Range Ventricular Rate 147 BPM  
 Atrial Rate 294 BPM  
 QRS Duration 66 ms  
 Q-T Interval 308 ms QTC Calculation (Bezet) 482 ms Calculated P Axis 72 degrees Calculated R Axis 61 degrees Calculated T Axis 69 degrees Diagnosis Atrial flutter with 2:1 A-V conduction Early repolarization Nonspecific ST abnormality Abnormal ECG When compared with ECG of 24-JAN-2021 10:33, Atrial flutter has replaced Atrial fibrillation ST elevation now present in Inferior leads ST elevation has replaced ST depression in Lateral leads Nonspecific T wave abnormality no longer evident in Inferior leads Confirmed by Wan Posadas ([id number]) on 2/8/2021 9:58:32 AM 
  
CBC WITH AUTOMATED DIFF Collection Time: 02/07/21 12:35 PM  
Result Value Ref Range WBC 12.6 (H) 3.6 - 11.0 K/uL  
 RBC 5.41 (H) 3.80 - 5.20 M/uL  
 HGB 14.1 11.5 - 16.0 g/dL HCT 46.0 35.0 - 47.0 % MCV 85.0 80.0 - 99.0 FL  
 MCH 26.1 26.0 - 34.0 PG  
 MCHC 30.7 30.0 - 36.5 g/dL  
 RDW 19.0 (H) 11.5 - 14.5 % PLATELET 220 (H) 963 - 400 K/uL MPV 12.3 8.9 - 12.9 FL  
 NRBC 0.0 0  WBC ABSOLUTE NRBC 0.00 0.00 - 0.01 K/uL NEUTROPHILS 74 32 - 75 % LYMPHOCYTES 16 12 - 49 % MONOCYTES 9 5 - 13 % EOSINOPHILS 0 0 - 7 % BASOPHILS 0 0 - 1 % IMMATURE GRANULOCYTES 1 (H) 0.0 - 0.5 % ABS. NEUTROPHILS 9.4 (H) 1.8 - 8.0 K/UL  
 ABS. LYMPHOCYTES 2.1 0.8 - 3.5 K/UL  
 ABS. MONOCYTES 1.1 (H) 0.0 - 1.0 K/UL  
 ABS. EOSINOPHILS 0.0 0.0 - 0.4 K/UL  
 ABS. BASOPHILS 0.0 0.0 - 0.1 K/UL ABS. IMM. GRANS. 0.1 (H) 0.00 - 0.04 K/UL  
 DF AUTOMATED METABOLIC PANEL, COMPREHENSIVE Collection Time: 02/07/21 12:35 PM  
Result Value Ref Range Sodium 142 136 - 145 mmol/L Potassium 4.3 3.5 - 5.1 mmol/L Chloride 108 97 - 108 mmol/L  
 CO2 25 21 - 32 mmol/L Anion gap 9 5 - 15 mmol/L Glucose 78 65 - 100 mg/dL BUN 30 (H) 6 - 20 mg/dL Creatinine 1.10 (H) 0.55 - 1.02 mg/dL BUN/Creatinine ratio 27 (H) 12 - 20 GFR est AA 58 (L) >60 ml/min/1.73m2 GFR est non-AA 48 (L) >60 ml/min/1.73m2 Calcium 8.9 8.5 - 10.1 mg/dL Bilirubin, total 0.4 0.2 - 1.0 mg/dL AST (SGOT) 35 15 - 37 U/L  
 ALT (SGPT) 25 12 - 78 U/L Alk. phosphatase 176 (H) 45 - 117 U/L Protein, total 5.9 (L) 6.4 - 8.2 g/dL Albumin 1.4 (L) 3.5 - 5.0 g/dL Globulin 4.5 (H) 2.0 - 4.0 g/dL A-G Ratio 0.3 (L) 1.1 - 2.2 CK W/ REFLX CKMB Collection Time: 02/07/21 12:35 PM  
Result Value Ref Range CK 99.0 26 - 192 ng/mL TROPONIN I Collection Time: 02/07/21 12:35 PM  
Result Value Ref Range Troponin-I, Qt. <0.05 <0.05 ng/mL BNP Collection Time: 02/07/21 12:35 PM  
Result Value Ref Range NT pro-BNP 9,941 (H) <450 pg/mL SARS-COV-2 Collection Time: 02/08/21  1:58 AM  
Result Value Ref Range SARS-CoV-2 Please find results under separate order COVID-19 RAPID TEST Collection Time: 02/08/21  1:58 AM  
Result Value Ref Range Specimen source Nasopharyngeal    
 COVID-19 rapid test DETECTED (A) Not Detected DIGOXIN Collection Time: 02/08/21  9:45 AM  
Result Value Ref Range Digoxin level 2.7 (HH) 0.90 - 2.0 ng/mL Reported dose date Not provided Reported dose: Not provided Units Echocardiogram, 1/25/2021 · LV: Calculated LVEF is 69%. Visually measured ejection fraction. Normal cavity size and systolic function (ejection fraction normal). Mild concentric hypertrophy. Wall motion: normal. 
· Image quality for this study was technically difficult. · RV: Mildly dilated right ventricle. · IAS: No atrial septal defect present. · TV: Mild to moderate tricuspid valve regurgitation is present. Patient's  EKG and laboratory data were individually reviewed by me. Please send a copy of this dictation to above referring physician. Suzie Medeiros MD 
 
This dictation was done by Dragon computer voice recognition software. Occasionally grammatical, syntax and other interpretive errors escape final proof reading. Please feel free to call me for any clarification.

## 2021-02-08 NOTE — PROGRESS NOTES
Reason for Readmission:     A FIB 
      
RUR Score/Risk Level:  20% PCP: First and Last name:  Jeb Longoria Name of Practice:  
 Are you a current patient: Yes/No: Yes Approximate date of last visit:  Monthly @ SNF Can you participate in a virtual visit with your PCP: Yes with hillary Is a Care Conference indicated: No 
 
 
Did you attend your follow up appointment (s): If not, why not: No, seen @ SNF Resources/supports as identified by patient/family:   Sister Isadora Moser @ [phone]. Top Challenges facing patient (as identified by patient/family and CM): Finances/Medication cost?   No   
Transportation      No 
Support system or lack thereof? No     
Living arrangements? No         
Self-care/ADLs/Cognition? No   
    
Current Advanced Directive/Advance Care Plan:  None Plan for utilizing home health:   No. In a SNF. Transition of Care Plan:    Based on readmission, the patient's previous Plan of Care 
 has been evaluated and/or modified. The current Transition of Care Plan is: D/C Plan is to [facility]. Unable to contact sister ( Angélica Baig @ [phone]) unable to leave message. Referral sent via [employer].

## 2021-02-08 NOTE — H&P
History and Physical 
 
Patient: Olge Brewer MRN: [id number]  SSN: [id number] YOB: 1940  Age: 79 y.o. Sex: female Subjective: Oleg Brewer is a 79 y.o. female who with past medical history of atrial fibrillation, COPD, CHF, lung cancer, was transferred from nursing home for persistent tachycardia with atrial fibrillation outpatient medications . Patient was recently treated for similar complaints with multiple medications. She is a poor historian patient was given IV Cardizem x1 with subsequent hypotension Past Medical History:  
Diagnosis Date  Anemia  Anxiety  Atrial fibrillation ([address] .  Chronic obstructive pulmonary disease ([address]  Dementia ([address]  Dementia ([address]  Depression  Heart failure ([address]  High cholesterol  Hypertension  Low blood potassium  Lung cancer ([address]  Pleural effusion  Respiratory failure ([address] Past Surgical History:  
Procedure Laterality Date  IR THORACENTESIS NDL PUNC ASP W IMAGE  11/6/2020 No family history on file. Social History Tobacco Use  Smoking status: Never Smoker  Smokeless tobacco: Never Used Substance Use Topics  Alcohol use: Not Currently Prior to Admission medications Medication Sig Start Date End Date Taking? Authorizing Provider  
carvediloL (COREG) 6.25 mg tablet Take 1 Tab by mouth two (2) times daily (with meals). 1/29/21   Slime Roy MD  
aspirin delayed-release 81 mg tablet Take 1 Tab by mouth daily. 1/30/21   Slime Roy MD  
furosemide (Lasix) 40 mg tablet Take 1 Tab by mouth daily. 1/29/21   Jazzmine MACIAS MD  
apixaban (ELIQUIS) 2.5 mg tablet Take 1 Tab by mouth two (2) times a day.  Indications: treatment to prevent blood clots in chronic atrial fibrillation, treatment to prevent a blood clot in the lung 12/1/20   Anne Marie Mcdermott MD  
 mirtazapine (REMERON SOL-TAB) 15 mg disintegrating tablet Take 1 Tab by mouth nightly. 12/1/20   Mohiuddin, Romana Dallas, MD  
mometasone-formoterol [facility]) 200-5 mcg/actuation HFA inhaler Take 2 Puffs by inhalation two (2) times a day. 12/1/20   Mohiuddin, Romana Dallas, MD  
amoxicillin-clavulanate (Augmentin) 875-125 mg per tablet Take 1 Tab by mouth two (2) times a day. 11/9/20   Mohiuddin, Romana Dallas, MD  
ferrous sulfate (IRON) 325 mg (65 mg iron) EC tablet Take 325 mg by mouth two (2) times a day. Bryson, MD Liliana  
spironolactone (Aldactone) 25 mg tablet Take 25 mg by mouth daily. Take 1/2 daily    OtherLiliana MD  
atorvastatin (Lipitor) 40 mg tablet Take 40 mg by mouth daily. OtherLiliana MD  
  
 
No Known Allergies Review of Systems: 
Review of systems not obtained due to patient factors. Objective:  
 
Vitals:  
 02/07/21 1600 02/07/21 1700 02/07/21 1755 02/07/21 1910 BP: (!) 110/95 129/75 (!) 131/93 (!) 118/95 Pulse: (!) 145 (!) 145 (!) 145 (!) 140 Resp: 16 16 16 15 Temp:      
SpO2: 97% 97% 97% 98% Weight:      
Height:      
  
 
Physical Exam: 
General:  Alert, cooperative, no distress, appears stated age. Patient appears to have cognitive deficitsirr Eyes:  Conjunctivae/corneas clear. PERRL, EOMs intact. Fundi benign Ears:  Normal TMs and external ear canals both ears. Nose: Nares normal. Septum midline. Mucosa normal. No drainage or sinus tenderness. Mouth/Throat: Lips, mucosa, and tongue normal. Teeth and gums normal.  
Neck: Supple, symmetrical, trachea midline, no adenopathy, thyroid: no enlargment/tenderness/nodules, no carotid bruit and no JVD. Back:   Symmetric, no curvature. ROM normal. No CVA tenderness. Lungs:   Clear to auscultation bilaterally. Heart:  irregular rate and rhythm, S1, S2 normal, no murmur, click, rub or gallop. Abdomen:   Soft, non-tender. Bowel sounds normal. No masses,  No organomegaly. Extremities: Extremities normal, atraumatic, no cyanosis or edema. Pulses: 2+ and symmetric all extremities. Skin: Skin color, texture, turgor normal. No rashes or lesions Lymph nodes: Cervical, supraclavicular, and axillary nodes normal.  
Neurologic: CNII-XII intact. Normal strength, sensation and reflexes throughout. Recent Results (from the past 24 hour(s)) CBC WITH AUTOMATED DIFF Collection Time: 02/07/21 12:35 PM  
Result Value Ref Range WBC 12.6 (H) 3.6 - 11.0 K/uL  
 RBC 5.41 (H) 3.80 - 5.20 M/uL  
 HGB 14.1 11.5 - 16.0 g/dL HCT 46.0 35.0 - 47.0 % MCV 85.0 80.0 - 99.0 FL  
 MCH 26.1 26.0 - 34.0 PG  
 MCHC 30.7 30.0 - 36.5 g/dL  
 RDW 19.0 (H) 11.5 - 14.5 % PLATELET 207 (H) 825 - 400 K/uL MPV 12.3 8.9 - 12.9 FL  
 NRBC 0.0 0  WBC ABSOLUTE NRBC 0.00 0.00 - 0.01 K/uL NEUTROPHILS 74 32 - 75 % LYMPHOCYTES 16 12 - 49 % MONOCYTES 9 5 - 13 % EOSINOPHILS 0 0 - 7 % BASOPHILS 0 0 - 1 % IMMATURE GRANULOCYTES 1 (H) 0.0 - 0.5 % ABS. NEUTROPHILS 9.4 (H) 1.8 - 8.0 K/UL  
 ABS. LYMPHOCYTES 2.1 0.8 - 3.5 K/UL  
 ABS. MONOCYTES 1.1 (H) 0.0 - 1.0 K/UL  
 ABS. EOSINOPHILS 0.0 0.0 - 0.4 K/UL  
 ABS. BASOPHILS 0.0 0.0 - 0.1 K/UL  
 ABS. IMM. GRANS. 0.1 (H) 0.00 - 0.04 K/UL  
 DF AUTOMATED METABOLIC PANEL, COMPREHENSIVE Collection Time: 02/07/21 12:35 PM  
Result Value Ref Range Sodium 142 136 - 145 mmol/L Potassium 4.3 3.5 - 5.1 mmol/L Chloride 108 97 - 108 mmol/L  
 CO2 25 21 - 32 mmol/L Anion gap 9 5 - 15 mmol/L Glucose 78 65 - 100 mg/dL BUN 30 (H) 6 - 20 mg/dL Creatinine 1.10 (H) 0.55 - 1.02 mg/dL BUN/Creatinine ratio 27 (H) 12 - 20 GFR est AA 58 (L) >60 ml/min/1.73m2 GFR est non-AA 48 (L) >60 ml/min/1.73m2 Calcium 8.9 8.5 - 10.1 mg/dL Bilirubin, total 0.4 0.2 - 1.0 mg/dL AST (SGOT) 35 15 - 37 U/L  
 ALT (SGPT) 25 12 - 78 U/L Alk. phosphatase 176 (H) 45 - 117 U/L Protein, total 5.9 (L) 6.4 - 8.2 g/dL Albumin 1.4 (L) 3.5 - 5.0 g/dL Globulin 4.5 (H) 2.0 - 4.0 g/dL A-G Ratio 0.3 (L) 1.1 - 2.2 CK W/ REFLX CKMB Collection Time: 02/07/21 12:35 PM  
Result Value Ref Range CK 99.0 26 - 192 ng/mL TROPONIN I Collection Time: 02/07/21 12:35 PM  
Result Value Ref Range Troponin-I, Qt. <0.05 <0.05 ng/mL BNP Collection Time: 02/07/21 12:35 PM  
Result Value Ref Range NT pro-BNP 9,941 (H) <450 pg/mL Assessment:  
 
Persistent atrial fibrillation rapid ventricular rate Transient hypotension CHF Dementia Plan:  
 
Start patient on IV amiodarone Consult cardiology Critical care time of 65 minutes Signed By: Jefm Alpers, MD   
 February 7, 2021

## 2021-02-09 NOTE — INTERDISCIPLINARY ROUNDS
Two person skin assessment done by Millie Aguilar RN and Sharon Mejia RN. Patients skin is clean, dry and intact. Patient has redness of the sacrum.

## 2021-02-09 NOTE — PROGRESS NOTES
Progress Note Patient: Oleg Brewer MRN: [id number]  SSN: [id number] YOB: 1940  Age: 79 y.o. Sex: female Admit Date: 2/7/2021 LOS: 2 days Subjective:  
 
Patient is a poor historian. Admitted for recurrent atrial fibrillation with rapid ventricular rate with failure with multiple medications in the nursing home. Patient is on IV amiodarone and beta-blocker cardiology notes is reviewed and appreciated Objective:  
 
Vitals:  
 02/09/21 0928 02/09/21 0930 02/09/21 1124 02/09/21 1659 BP: 128/77  112/87 123/82 Pulse: 94  83 94 Resp: 20 22 28 Temp: 98.2 °F (36.8 °C)  98.2 °F (36.8 °C) 98 °F (36.7 °C) SpO2: 100% 100% 95% 100% Weight:      
Height:      
  
 
Intake and Output: 
Current Shift: No intake/output data recorded. Last three shifts: 02/07 1901 - 02/09 0700 In: 250 [I.V.:250] Out: - Physical Exam:  
General:  Alert, cooperative, no distress, appears stated age. Eyes:  Conjunctivae/corneas clear. PERRL, EOMs intact. Fundi benign Ears:  Normal TMs and external ear canals both ears. Nose: Nares normal. Septum midline. Mucosa normal. No drainage or sinus tenderness. Mouth/Throat: Lips, mucosa, and tongue normal. Teeth and gums normal.  
Neck: Supple, symmetrical, trachea midline, no adenopathy, thyroid: no enlargment/tenderness/nodules, no carotid bruit and no JVD. Back:   Symmetric, no curvature. ROM normal. No CVA tenderness. Lungs:   Clear to auscultation bilaterally. Heart:  Regular rate and rhythm, S1, S2 normal, no murmur, click, rub or gallop. Abdomen:   Soft, non-tender. Bowel sounds normal. No masses,  No organomegaly. Extremities: Extremities normal, atraumatic, no cyanosis or edema. Pulses: 2+ and symmetric all extremities. Skin: Skin color, texture, turgor normal. No rashes or lesions Lymph nodes: Cervical, supraclavicular, and axillary nodes normal.  
 Neurologic: CNII-XII intact. Normal strength, sensation and reflexes throughout. Lab/Data Review: All lab results for the last 24 hours reviewed. No results found for this or any previous visit (from the past 24 hour(s)). Assessment:  
 
Active Problems: 
  Atrial fibrillation ([address] (2/7/2021) Atrial fibrillation controlledeventricular rate recurrent continue on amiodarone beta-blocker Patient may need EP studies with ablation will defer to cardiology Dementia Plan:  
 
Continue with management Discharge when cleared by cardiology Signed By: Dash Montgomery MD   
 February 9, 2021

## 2021-02-09 NOTE — PROGRESS NOTES
Progress Note 
 
 
2/9/2021 11:33 AM 
NAME: Vaughn Brewster MRN:  [id number] Admit Diagnosis: Atrial fibrillation ([facility] [address] [I48.91] Assessment/Plan:  
 
Atrial fibrillation , now controlled ventricular response, anticoagulated with Eliquis, continue amiodarone and metoprolol. Normal liver functions Covid + Dementia History of heart failure with preserved left ventricular function History of lung cancer and COPD []       High complexity decision making was performed in this patient at high risk for decompensation with multiple organ involvement. Subjective: Vaughn Brewster denies chest pain, dyspnea. Has poor appetite. Discussed with RN events overnight. Review of Systems: 
 
Symptom Y/N Comments  Symptom Y/N Comments Fever/Chills N   Chest Pain N Poor Appetite N   Edema N   
Cough N   Abdominal Pain N Sputum N   Joint Pain N   
SOB/GALINDO N   Pruritis/Rash N   
Nausea/vomit N   Tolerating PT/OT Y Diarrhea N   Tolerating Diet Y Constipation N   Other Could NOT obtain due to:   
 
Objective:  
  
Physical Exam: 
 
Last 24hrs VS reviewed since prior progress note. Most recent are: 
 
Visit Vitals /87 Pulse 83 Temp 98.2 °F (36.8 °C) Resp 22 Ht 5' 3\" (1.6 m) Wt 59 kg (130 lb) SpO2 95% BMI 23.03 kg/m² No intake or output data in the 24 hours ending 02/09/21 1133 General Appearance: Thinly built female, awake, alert; no acute distress. Ears/Nose/Mouth/Throat: Hearing grossly normal; moist mucous membranes Neck: Supple. Chest: Lungs clear to auscultation bilaterally. Cardiovascular:  irregular rate and rhythm, S1S2 normal, no murmur. Abdomen: Soft, non-tender, bowel sounds are active. Extremities: No edema bilaterally. Skin: Warm and dry. []         Post-cath site without hematoma, bruit, tenderness, or thrill. Distal pulses intact. PMH/SH reviewed - no change compared to H&P Data Review Telemetry:  
 
EKG:  
 []  No new EKG for review 
1/25/21 echo with normal LV function 
 
Lab Data Personally Reviewed: 
 
Recent Labs  
  02/07/21 
1235  
WBC 12.6*  
HGB 14.1  
HCT 46.0  
*  
 
No results for input(s): INR, PTP, APTT, INREXT in the last 72 hours.  
Recent Labs  
  02/07/21 
1235  
  
K 4.3  
  
CO2 25  
BUN 30*  
CREA 1.10*  
GLU 78  
CA 8.9  
 
Recent Labs  
  02/07/21 
1235  
TROIQ <0.05  
 
No results found for: CHOL, CHOLX, CHLST, CHOLV, HDL, HDLP, LDL, LDLC, DLDLP, TGLX, TRIGL, TRIGP, CHHD, CHHDX 
 
Recent Labs  
  02/07/21 
1235  
*  
TP 5.9*  
ALB 1.4*  
GLOB 4.5*  
 
No results for input(s): PH, PCO2, PO2 in the last 72 hours. 
 
Medications Personally Reviewed: 
 
Current Facility-Administered Medications  
Medication Dose Route Frequency  
• ferrous sulfate tablet 325 mg  325 mg Oral DAILY  
• mirtazapine (REMERON SOL-TAB) disintegrating tablet 15 mg  15 mg Oral QHS  
• atorvastatin (LIPITOR) tablet 40 mg  40 mg Oral QHS  
• metoprolol tartrate (LOPRESSOR) tablet 12.5 mg  12.5 mg Oral Q12H  
• amiodarone (CORDARONE) tablet 200 mg  200 mg Oral BID  
• amoxicillin-clavulanate (AUGMENTIN) 875-125 mg per tablet 1 Tab  1 Tab Oral BID  
• apixaban (ELIQUIS) tablet 2.5 mg  2.5 mg Oral BID  
 
Current Outpatient Medications  
Medication Sig  
• carvediloL (COREG) 6.25 mg tablet Take 1 Tab by mouth two (2) times daily (with meals).  
• aspirin delayed-release 81 mg tablet Take 1 Tab by mouth daily.  
• furosemide (Lasix) 40 mg tablet Take 1 Tab by mouth daily.  
• apixaban (ELIQUIS) 2.5 mg tablet Take 1 Tab by mouth two (2) times a day. Indications: treatment to prevent blood clots in chronic atrial fibrillation, treatment to prevent a blood clot in the lung  
• mirtazapine (REMERON SOL-TAB) 15 mg disintegrating tablet Take 1 Tab by mouth nightly.  
• mometasone-formoterol (DULERA) 200-5 mcg/actuation HFA inhaler Take 2 Puffs by inhalation two (2) times a day.  
  amoxicillin-clavulanate (Augmentin) 875-125 mg per tablet Take 1 Tab by mouth two (2) times a day.  ferrous sulfate (IRON) 325 mg (65 mg iron) EC tablet Take 325 mg by mouth two (2) times a day.  spironolactone (Aldactone) 25 mg tablet Take 25 mg by mouth daily. Take 1/2 daily  atorvastatin (Lipitor) 40 mg tablet Take 40 mg by mouth daily.   
   
 
Paola Soto MD

## 2021-02-09 NOTE — ROUTINE PROCESS
TRANSFER - OUT REPORT: 
 
Verbal report given to Donna(name) on Laura Alonso  being transferred to [facility](unit) for routine progression of care Report consisted of patients Situation, Background, Assessment and  
Recommendations(SBAR). Information from the following report(s) SBAR, ED Summary, MAR, Recent Results and Cardiac Rhythm Afib was reviewed with the receiving nurse. Lines:  
Peripheral IV 02/07/21 Posterior;Right Hand (Active) Site Assessment Clean, dry, & intact 02/07/21 1237 Phlebitis Assessment 0 02/07/21 1238 Infiltration Assessment 0 02/07/21 1238 Dressing Status Clean, dry, & intact 02/07/21 1238 Dressing Type Transparent 02/07/21 1238 Hub Color/Line Status Blue 02/07/21 1238 Action Taken Blood drawn 02/07/21 1238 Alcohol Cap Used Yes 02/07/21 1238 Opportunity for questions and clarification was provided. Patient transported with: 
 Monitor O2 @ 2 liters Tech

## 2021-02-10 NOTE — PROGRESS NOTES
Comprehensive Nutrition Assessment Type and Reason for Visit: Initial(poor PO) Nutrition Recommendations/Plan:  
Continue regular cardiac diet Ensure Enlive Daily (350kcals, 20g pro) Ensure Clear TID (480kcals, 16g pro) Allow snacks as desired 
  
Continue appetite stimulant Please document % of all meal/snack consumed Nutrition Assessment:  Admitted for a-fib, also +COVID-19. RD familiar with pt from previous admits, generally eats very poorly of 0% of meals. Per RN, pt ate 0% of B this AM. RD to add Ensure Clear TID per pt preferences at prior admit. Will f/u for tolerance and need for adjustments to nutrition interventions, noted pt will likely require heavy encouragement to eat. Labs: BUN 30, Cr 1.10, Alk Phos 176. Meds: Amiodarone, amoxacillin, statin, FeSu, mirtazapine. Malnutrition Assessment: 
Malnutrition Status:  Severe malnutrition Context:  Chronic illness Findings of the 6 clinical characteristics of malnutrition:  
Energy Intake:  7 - 75% or less est energy requirements for 1 month or longer(intakes <25% at admit) Weight Loss:  7.0 - Greater than 7.5% over 3 months(11.8kg x2 months (23%)) Body Fat Loss:  No significant body fat loss, Muscle Mass Loss:  1 - Mild muscle mass loss, Calf (gastrocnemius), Thigh (quadraceps) Fluid Accumulation:  No significant fluid accumulation,   
 
Estimated Daily Nutrient Needs: 
Energy (kcal): 151kcals (30kcals/kg); Weight Used for Energy Requirements: (wt at last admit 50.5kg) Protein (g): 66g (1.3g/kg); Weight Used for Protein Requirements: (wt at last admit 50.5kg) Fluid (ml/day): 1515ml; Method Used for Fluid Requirements: 1 ml/kcal 
 Needs for cancer Nutrition Related Findings:  Unable to complete NFPE d/t COVID-19 precautions, at last admit NFPE found moderate muscle wasting. No edema. No N/V/D/C, last BM 2/7. +Edentulism, pt has dentures, however pt previously stated no issues with c/s. Wounds: None    
 
Current Nutrition Therapies: DIET CARDIAC Regular DIET NUTRITIONAL SUPPLEMENTS Lunch; Ensure [employer] DIET NUTRITIONAL SUPPLEMENTS Breakfast, Lunch, Dinner; [employer] Anthropometric Measures: 
· Height:  5' 2.99\" (160 cm) · Current Body Wt:  59 kg (130 lb 1.1 oz)(2/7, estimated) · Usual Body Wt:  63.5 kg (139 lb 15.9 oz)(per pt at last admit, noted unreliable historian) · Ideal Body Wt:  115 lbs:  113.1 % · BMI Category:  Underweight (BMI less than 22) age over 65(per last admit measured wt of 50.5kg) Wt hx: 50.5kg (1/26), 60.9kg (11/23), 62.3kg (11/9), 59.9kg (11/5), 63.6kg (11/3), 63.5kg 910/23) Pt endorses wt stability, however severe wt loss noted per EMR wts, 11.8kg x2 months (23%). No measured wt at this admit. Nutrition Diagnosis:  
· Inadequate oral intake, Inadequate protein-energy intake related to catabolic illness, cognitive or neurological impairment(lung cancer and dementia) as evidenced by intake 0-25% Nutrition Interventions:  
Food and/or Nutrient Delivery: Continue current diet, Start oral nutrition supplement(Ensure clear and Ensure enlive) Nutrition Education and Counseling: No recommendations at this time Coordination of Nutrition Care: Continue to monitor while inpatient Goals: 
Intakes >/=50% of EENs Wt gain of 0.5kg in 7 days     
 
Nutrition Monitoring and Evaluation:  
Behavioral-Environmental Outcomes: None identified Food/Nutrient Intake Outcomes: Food and nutrient intake, Supplement intake Physical Signs/Symptoms Outcomes: Meal time behavior, Nutrition focused physical findings, Weight Discharge Planning: Too soon to determine Electronically signed by Sandra Ortega RD on 2/10/2021 at 1:25 PM 
 
Contact: Ext [phone], or via [employer]

## 2021-02-10 NOTE — DISCHARGE SUMMARY
Discharge Summary Patient: Rutherford Kocher MRN: [id number]  SSN: [id number] YOB: 1940  Age: 79 y.o. Sex: female Admit Date: 2/7/2021 Discharge Date: 2/10/2021 Admission Diagnoses: Atrial fibrillation ([facility] [address] [I48.91] Discharge Diagnoses:  
Problem List as of 2/10/2021 Never Reviewed Codes Class Noted - Resolved Atrial fibrillation [facility]) ICD-10-CM: I48.91 
ICD-9-CM: 427.31  2/7/2021 - Present Cardiac arrhythmia ICD-10-CM: I49.9 ICD-9-CM: 427.9  1/24/2021 - Present Hyponatremia ICD-10-CM: E87.1 ICD-9-CM: 276.1  11/3/2020 - Present Chronic atrial fibrillation COVID-19 infection Volume depletion Discharge Condition: Good Hospital Course: Patient was sent to the hospital because of irregular heart rate from his skilled nursing facility. She was COVID-19 positive saturation on 2 L was 99%. Patient was seen by cardiologist and started on multiple medications. Denies any shortness of breath no dizziness Consults: Cardiology Significant Diagnostic Studies: labs: No results found for this or any previous visit (from the past 24 hour(s)). XR CHEST SNGL V Final Result FINDINGS: IMPRESSION: Frontal single view chest.  
  
Right port distal tip SVC/RA region. Unchanged cardiomediastinal silhouette. No vascular congestion or pulmonary  
edema. Unchanged right lung volume loss, airspace disease, pleural thickening. No  
evident pneumothorax. Left lung clear. No free air under the diaphragm. XR CHEST SNGL V Final Result There is a questionable lucency in the right lung apex and in the  
proper clinical setting could indicate a small pneumothorax. Correlate with side  
of attempted line placement and/or further evaluate with CT if clinically  
appropriate. . Otherwise stable appearance with persistent interstitial and  
airspace disease throughout the near entirety of the right lung with only aeration remaining in the right upper lobe and presumed sizable right pleural  
effusion. Cardiac silhouette is enlarged. Left lung remains clear. Anna Washington Results called to Dr. Marcin Freeman on 2/8/2021 3:23 AM. XR CHEST PORT Final Result Stable appearance of the chest compared to 24 January 2021 Disposition: SNF Discharge Medications:  
Current Discharge Medication List  
  
START taking these medications Details  
metoprolol tartrate (LOPRESSOR) 25 mg tablet Take 0.5 Tabs by mouth every twelve (12) hours. Qty: 60 Tab, Refills: 0  
  
dexAMETHasone (DECADRON) 4 mg tablet Take 4 mg by mouth Daily (before breakfast) for 10 days. Qty: 10 Tab, Refills: 0 CONTINUE these medications which have CHANGED Details  
amiodarone (CORDARONE) 200 mg tablet Take 1 Tab by mouth two (2) times a day. Qty: 60 Tab, Refills: 0 CONTINUE these medications which have NOT CHANGED Details  
apixaban (ELIQUIS) 2.5 mg tablet Take 1 Tab by mouth two (2) times a day. Indications: treatment to prevent blood clots in chronic atrial fibrillation, treatment to prevent a blood clot in the lung 
Qty: 30 Tab, Refills: 1  
  
mirtazapine (REMERON SOL-TAB) 15 mg disintegrating tablet Take 1 Tab by mouth nightly. Qty: 30 Tab, Refills: 0  
  
amoxicillin-clavulanate (Augmentin) 875-125 mg per tablet Take 1 Tab by mouth two (2) times a day. Qty: 20 Tab, Refills: 0  
  
ferrous sulfate (IRON) 325 mg (65 mg iron) EC tablet Take 325 mg by mouth two (2) times a day. atorvastatin (Lipitor) 40 mg tablet Take 40 mg by mouth daily. STOP taking these medications  
  
 carvediloL (COREG) 6.25 mg tablet Comments:  
Reason for Stopping:   
   
 aspirin delayed-release 81 mg tablet Comments:  
Reason for Stopping:   
   
 furosemide (Lasix) 40 mg tablet Comments:  
Reason for Stopping:   
   
 mometasone-formoterol (DULERA) 200-5 mcg/actuation HFA inhaler Comments:  
Reason for Stopping: escitalopram oxalate (LEXAPRO) 5 mg tablet Comments:  
Reason for Stopping:   
   
 albuterol-ipratropium (DUO-NEB) 2.5 mg-0.5 mg/3 ml nebu Comments:  
Reason for Stopping:   
   
 spironolactone (Aldactone) 25 mg tablet Comments:  
Reason for Stopping:   
   
  
 
 
Activity: Activity as tolerated Diet: Cardiac Diet Wound Care: As directed Follow-up Appointments Procedures  FOLLOW UP VISIT Appointment in: 3 - 5 Days Standing Status:   Standing Number of Occurrences:   1 Order Specific Question:   Appointment in Answer:   3 - 5 Days 35 minutes discharge time Signed By: Vivek Castaneda MD   
 February 10, 2021

## 2021-02-11 NOTE — PROGRESS NOTES
LEON Plan: 
 
-d/c to SNF [facility]) when medically stable/pending bed availability 
-PCP follow up Uploaded COVID results uploaded via [facility]. SRINIVAS [place] 
 RONAL spoke w/patient's sister Eyal Dumont @ [phone]. Patient identifier's (name and ) verified per HIPAA policy. RONAL informed patient has 24/ round the clock caregivers. Patient has been accepted to [facility]). Requires Medicare 2nd IM letter prior to discharge. SRINIVAS [place]

## 2021-02-12 NOTE — PROGRESS NOTES
Patient had a temp of 100.2 called MD who ordered blood cultures, UA with culture and tylenol 650mg q6h for elevated temp.

## 2021-02-12 NOTE — PROGRESS NOTES
Inbound call from [facility]). No bed available today. SW to follow up re: bed availability. Baby Harpreet, SRINIVAS

## 2021-02-12 NOTE — PROGRESS NOTES
Progress Note Patient: Rutherford Kocher MRN: [id number]  SSN: [id number] YOB: 1940  Age: 79 y.o. Sex: female Admit Date: 2/7/2021 LOS: 5 days Subjective:  
 
Patient spiked temperature low-grade. Sepsis work-up ongoing. She will can to be discharged yesterday being treated for atrial fibrillation rapid rate Objective:  
 
Vitals:  
 02/12/21 0541 02/12/21 0800 02/12/21 0831 02/12/21 1200 BP: (!) 151/91  (!) 156/93 Pulse: (!) 49 89 65 78 Resp: 16  17 Temp: 97.9 °F (36.6 °C)  100.3 °F (37.9 °C) SpO2: 97%  96% Weight:      
Height:      
  
 
Intake and Output: 
Current Shift: No intake/output data recorded. Last three shifts: No intake/output data recorded. Physical Exam:  
General:  Alert, cooperative, no distress, appears stated age. Eyes:  Conjunctivae/corneas clear. PERRL, EOMs intact. Fundi benign Ears:  Normal TMs and external ear canals both ears. Nose: Nares normal. Septum midline. Mucosa normal. No drainage or sinus tenderness. Mouth/Throat: Lips, mucosa, and tongue normal. Teeth and gums normal.  
Neck: Supple, symmetrical, trachea midline, no adenopathy, thyroid: no enlargment/tenderness/nodules, no carotid bruit and no JVD. Back:   Symmetric, no curvature. ROM normal. No CVA tenderness. Lungs:   Clear to auscultation bilaterally. Heart:  Regular rate and rhythm, S1, S2 normal, no murmur, click, rub or gallop. Abdomen:   Soft, non-tender. Bowel sounds normal. No masses,  No organomegaly. Extremities: Extremities normal, atraumatic, no cyanosis or edema. Pulses: 2+ and symmetric all extremities. Skin: Skin color, texture, turgor normal. No rashes or lesions Lymph nodes: Cervical, supraclavicular, and axillary nodes normal.  
Neurologic: CNII-XII intact. Normal strength, sensation and reflexes throughout. Lab/Data Review: All lab results for the last 24 hours reviewed. Recent Results (from the past 24 hour(s)) URINALYSIS W/ REFLEX CULTURE Collection Time: 02/12/21  8:25 AM  
 Specimen: Urine Result Value Ref Range Color Yellow/Straw Appearance Turbid (A) Clear Specific gravity 1.018 1.003 - 1.030    
 pH (UA) 5.0 5.0 - 8.0 Protein Negative Negative mg/dL Glucose Negative Negative mg/dL Ketone Negative Negative mg/dL Bilirubin Negative Negative Blood Negative Negative Urobilinogen 0.1 0.1 - 1.0 EU/dL Nitrites Negative Negative Leukocyte Esterase Trace (A) Negative UA:UC IF INDICATED Urine Culture Ordered (A) Culture not indicated by UA result WBC 0-5 0 - 4 /hpf  
 RBC 0-5 0 - 5 /hpf Bacteria Negative Negative /hpf Assessment:  
 
Active Problems: 
  Atrial fibrillation ([facility] [address] (2/7/2021) Low-grade fever etiology unknown Patient has Covid pneumonia Plan:  
 
Hold discharge 
pepcid Signed By: Phyllis Zavala MD   
 February 12, 2021

## 2021-02-13 NOTE — PROGRESS NOTES
Problem: Falls - Risk of 
Goal: *Absence of Falls Description: Document [address] Fall Risk and appropriate interventions in the flowsheet. Outcome: Progressing Towards Goal 
Note: Fall Risk Interventions: 
Mobility Interventions: Bed/chair exit alarm, Patient to call before getting OOB Mentation Interventions: Bed/chair exit alarm, Toileting rounds, More frequent rounding Medication Interventions: Bed/chair exit alarm, Patient to call before getting OOB, Teach patient to arise slowly Elimination Interventions: Call light in reach, Bed/chair exit alarm, Patient to call for help with toileting needs Problem: Patient Education: Go to Patient Education Activity Goal: Patient/Family Education Outcome: Progressing Towards Goal

## 2021-02-13 NOTE — PROGRESS NOTES
Progress Note Date:2021       Room:[facility] [place] Patient Name:Jackie Tomlinson     YOB: 1940     Age:80 y.o. Subjective Subjective: 
Symptoms:  She reports shortness of breath and cough. Diet:  Poor intake. Activity level: Impaired due to weakness. Pain:  She reports no pain. Patient lying in bed slightly  labored breathing some sob and productive cough noted. Review of Systems Constitutional: Negative. Respiratory: Positive for cough and shortness of breath. Cardiovascular: Negative. Gastrointestinal: Negative. Genitourinary: Negative. Musculoskeletal: Negative. Neurological: Negative. Objective Vitals Last 24 Hours: TEMPERATURE:  Temp  Av.3 °F (36.8 °C)  Min: 97.5 °F (36.4 °C)  Max: 99.8 °F (37.7 °C) RESPIRATIONS RANGE: Resp  Av  Min: 16  Max: 18 PULSE OXIMETRY RANGE: SpO2  Av.2 %  Min: 95 %  Max: 98 % PULSE RANGE: Pulse  Av  Min: 43  Max: 89 BLOOD PRESSURE RANGE: Systolic (85JDI), NRP:662 , Min:131 , XKM:021  
; Diastolic (56PUC), GEC:93, Min:84, Max:103 
 
I/O (24Hr): No intake or output data in the 24 hours ending 21 1010 Objective: 
General Appearance:  Ill-appearing and not in pain. Vital signs: (most recent): Blood pressure (!) 133/103, pulse 62, temperature 97.5 °F (36.4 °C), resp. rate 18, height 5' 2.99\" (1.6 m), weight 59 kg (130 lb), SpO2 96 %, not currently breastfeeding. Vital signs are normal.   
Output: Producing urine. HEENT: Normal HEENT exam.   
Lungs: Tachypnea. There are rhonchi. Heart: Irregular rhythm. S1 normal and S2 normal.   
Abdomen: Abdomen is soft. Bowel sounds are normal.   There is no abdominal tenderness. Extremities: Normal range of motion. Pulses: Distal pulses are intact. Neurological: Patient is alert. Pupils:  Pupils are equal, round, and reactive to light. Skin:  Warm and dry. (Dry lips) Labs/Imaging/Diagnostics Labs: 
CBC:No results for input(s): WBC, RBC, HGB, HCT, MCV, RDW, PLT, HGBEXT, HCTEXT, PLTEXT in the last 72 hours. CHEMISTRIES:No results for input(s): NA, K, CL, CO2, BUN, CA, PHOS, MG in the last 72 hours. No lab exists for component: CREATININE, GLUCOSEPT/INR:No results for input(s): INR, INREXT in the last 72 hours. No lab exists for component: PROTIME APTT:No results for input(s): APTT in the last 72 hours. LIVER PROFILE:No results for input(s): AST, ALT in the last 72 hours. No lab exists for component: BILIDIR, BILITOT, ALKPHOS Lab Results Component Value Date/Time ALT (SGPT) 25 02/07/2021 12:35 PM  
 AST (SGOT) 35 02/07/2021 12:35 PM  
 Alk. phosphatase 176 (H) 02/07/2021 12:35 PM  
 Bilirubin, total 0.4 02/07/2021 12:35 PM  
 
 
Imaging Last 24 Hours: 
No results found. Assessment//Plan Active Problems: 
  Atrial fibrillation ([address] (2/7/2021) Assessment: 
(Cough with congested lungs, check cxr add mucinex check bnp, bmp today Af ib on amiodarone and Eliquis and metoprolol 
covid pneumonia on decadron and augmentin Leukocytosis urine and blood cultures pending recheck cbc in am 
 
). Plan:  
(Continue current regimen Case discussed with Dr. Jovi Melissa).   
 
 
Electronically signed by Bakari Gallardo NP on 2/13/2021 at 10:10 AM

## 2021-02-13 NOTE — PROGRESS NOTES
Received patient awake in bed, no signs of any distress. Vital signs taken and recorded. No complaints. Will continue to monitor.

## 2021-02-13 NOTE — PROGRESS NOTES
Shift change report given to Zeke Scruggs (oncoming nurse) by Goldie Lang RN (offgoing nurse). Report included the following information SBAR. No further questions at this time.

## 2021-02-14 NOTE — PROGRESS NOTES
Physician Progress Note Sandy Hurtado 
CSN #:                  [id number] :                       1940 ADMIT DATE:       2021 10:59 AM 
[address] [place] DATE: 
RESPONDING 
PROVIDER #:        Ana Aiken MD 
 
 
 
 
QUERY TEXT: 
 
Dear Dr. Day Hint: 
 
Pt admitted with Covid-19, volume depletion, afib and has malnutrition documented in Nutrition assessment completed 2/10/2021. Please further specify type of malnutrition with documentation in the medical record. The medical record reflects the following: 
Risk Factors: 79 F admitted from nursing home with Covid-19, afib, volume depletion, dementia Clinical Indicators: 2/10 Nutrrition assessment notes severe malnutrition AEB energy intake 75% or less est energy requirements for 1 month or longer, weight loss >than 7.5% over 3 months, mild muscle mass loss; total protein 5.4, albumin 1.9, BMI 23.03 Treatment: labs, Nutrition consult, Ensure Enlive daily, Ensure Clear TID , snack as desired, Remeron Thank you, Dyan Cortes BSN, RN Clinical  
[phone] Options provided: 
-- Severe Malnutrition -- Severe Protein calorie malnutrition -- Other - I will add my own diagnosis -- Disagree - Not applicable / Not valid -- Disagree - Clinically unable to determine / Unknown 
-- Refer to Clinical Documentation Reviewer PROVIDER RESPONSE TEXT: 
 
underweight Query created by: Naresh Heredia on 2021 11:01 AM 
 
 
Electronically signed by:  Ana Aiken MD 2021 3:38 PM

## 2021-02-14 NOTE — CONSULTS
PULMONARY ICU CONSULT 
[facility] Name: Estella Jordan MRN: [id number] : 1940 Hospital: [facility] Date: 2021  Admission date: 2021 Hospital Day: 8 HPI:  
 
Hospital Problems  Never Reviewed Codes Class Noted POA Atrial fibrillation [facility]) ICD-10-CM: I48.91 
ICD-9-CM: 427.31  2021 Unknown  
   
  
 
 
 
  
[x] High complexity decision making was performed [x] See my orders for details Subjective/Initial History:  
 
I was asked by Phyllis Zavala MD to see Estella Jordan  a 79 y.o.  female in consultation Excerpts from admission 2021 or consult notes as follows:  
64-year-old lady came in because of shortness of breath dyspnea past medical history of atrial fibrillation congestive heart failure lung cancer COPD dementia she is a resident nursing home she was found to be in tachycardia with atrial flutter at present she is in acute respiratory distress on noninvasive ventilator BiPAP machine unable to get any history out of the patient she is in 100% FiO2 if she is COVID-19 positive CAT scan of the chest shows complete whiteout because of massive right pleural effusion because of severe respiratory distress pulmonary consult was called and patient to be transferred to ICU No Known Allergies MAR reviewed and pertinent medications noted or modified as needed Current Facility-Administered Medications Medication  metoprolol tartrate (LOPRESSOR) tablet 25 mg  
 guaiFENesin ER (MUCINEX) tablet 1,200 mg  
 acetaminophen (TYLENOL) tablet 650 mg  
 dexAMETHasone (DECADRON) tablet 4 mg  ferrous sulfate tablet 325 mg  
 mirtazapine (REMERON SOL-TAB) disintegrating tablet 15 mg  
 atorvastatin (LIPITOR) tablet 40 mg  
 amiodarone (CORDARONE) tablet 200 mg  
 amoxicillin-clavulanate (AUGMENTIN) 875-125 mg per tablet 1 Tab  apixaban (ELIQUIS) tablet 2.5 mg  
  
Patient PCP: Naseem Castanon MD 
 PMH:  has a past medical history of Anemia, Anxiety, Atrial fibrillation (HCC) (.), Chronic obstructive pulmonary disease ([address] Dementia ([address] Dementia ([address] Depression, Heart failure ([address] High cholesterol, Hypertension, Low blood potassium, Lung cancer ([address] Pleural effusion, and Respiratory failure ([address] PSH:   has a past surgical history that includes ir thoracentesis ndl punc asp w image (2020). FHX: family history is not on file. SHX:  reports that she has never smoked. She has never used smokeless tobacco. She reports previous alcohol use. She reports that she does not use drugs. ROS: 
Unable to obtain as patient is obtunded on noninvasive ventilator BiPAP machine Objective:  
 
Vital Signs: Telemetry:    AFIB Intake/Output:  
Visit Vitals /86 (BP 1 Location: Left upper arm, BP Patient Position: At rest) Pulse (!) 135 Temp 98.2 °F (36.8 °C) Resp 22 Ht 5' 2.99\" (1.6 m) Wt 59 kg (130 lb) SpO2 94% Breastfeeding No  
BMI 23.03 kg/m² Temp (24hrs), Av.6 °F (36.4 °C), Min:97 °F (36.1 °C), Max:98.2 °F (36.8 °C) O2 Device: Nasal cannula O2 Flow Rate (L/min): 3 l/min Wt Readings from Last 4 Encounters:  
21 59 kg (130 lb)  
21 50.3 kg (110 lb 14.3 oz) 21 63.5 kg (140 lb) 20 63.5 kg (140 lb) No intake or output data in the 24 hours ending 21 1357 Last shift:      No intake/output data recorded. Last 3 shifts: No intake/output data recorded. Physical Exam:  
 
Physical Exam  
Constitutional: She appears distressed. HENT:  
Head: Normocephalic and atraumatic. Eyes: Pupils are equal, round, and reactive to light. Conjunctivae are normal.  
Neck: Normal range of motion. Neck supple. Cardiovascular:  
Irregular heart rate Pulmonary/Chest: She is in respiratory distress. She has rales. Abdominal: Soft. Musculoskeletal: Normal range of motion. Neurological: In respiratory distress Skin: She is diaphoretic. Labs: 
 
Recent Labs  
  02/14/21 
0830 WBC 16.6* HGB 13.3  No results for input(s): NA, K, CL, CO2, GLU, BUN, CREA, CA, MG, PHOS, LAC, ALB, TBIL, ALT, AML, LPSE in the last 72 hours. No lab exists for component: SGOT No results for input(s): PH, PCO2, PO2, HCO3, FIO2 in the last 72 hours. No results for input(s): CPK, CKNDX, TROIQ in the last 72 hours. No lab exists for component: CPKMB No results found for: BNPP, BNP Lab Results Component Value Date/Time Culture result: Gram Negative Rods (A) 02/12/2021 08:25 AM  
 Culture result: POSSIBLE Enterococcus species 02/12/2021 08:25 AM  
 Culture result: No growth 6 days 01/24/2021 05:30 PM  
No results found for: TSH, TSHEXT Imaging: CXR Results  (Last 48 hours) 02/13/21 1119  XR CHEST PORT Final result Impression:  1. Worsening, now complete opacification of right lung. 2. Slight streaky opacities at the left lung base likely represent atelectasis,  
although underlying infiltrate not excluded. Narrative:  EXAM: XR CHEST PORT INDICATION: congestion COMPARISON: 2/7/2021, 2/8/2021 FINDINGS: A portable AP radiograph of the chest was obtained at 1114 hours. Demonstrate complete opacification of the right lung. There is minimal streaky  
opacity at the left lung base and silhouetting of left hemidiaphragm. Right Mediport is noted. The cardiac and mediastinal contours and pulmonary  
vascularity are normal.  There is slight irregularity of the right ribs,  
suggesting old rib fractures. [place] from [facility] [place]07/21 XR CHEST PORT Narrative EXAM: XR CHEST PORT INDICATION: congestion COMPARISON: 2/7/2021, 2/8/2021 FINDINGS: A portable AP radiograph of the chest was obtained at 1114 hours. Demonstrate complete opacification of the right lung. There is minimal streaky opacity at the left lung base and silhouetting of left hemidiaphragm. Right Mediport is noted. The cardiac and mediastinal contours and pulmonary 
vascularity are normal.  There is slight irregularity of the right ribs, 
suggesting old rib fractures. Impression 1. Worsening, now complete opacification of right lung. 2. Slight streaky opacities at the left lung base likely represent atelectasis, 
although underlying infiltrate not excluded. XR CHEST SNGL V  
 Narrative Repeat, pneumothorax. Comparison chest x-ray 2/8/2021 at 0236 hours. Impression FINDINGS: IMPRESSION: Frontal single view chest. 
 
Right port distal tip SVC/RA region. Unchanged cardiomediastinal silhouette. No vascular congestion or pulmonary 
edema. Unchanged right lung volume loss, airspace disease, pleural thickening. No 
evident pneumothorax. Left lung clear. No free air under the diaphragm. XR CHEST SNGL V  
 Narrative HISTORY:  rule out pneumothorax; status post failed central line attempt to 
right IJ 
 
TECHNIQUE:  AP chest radiograph COMPARISON: 15 hours prior LIMITATIONS: None TUBES/LINES: Right chest wall Port-A-Cath present with tip to the distal SVC LUNG PARENCHYMA: Diffuse interstitial marking prominence and airspace disease 
throughout the right lung with only aeration in the right upper lobe remaining. The left lung remains grossly clear TRACHEA/BRONCHI: Normal 
PULMONARY VESSELS: Normal 
PLEURA: Right pleural effusion persists. There is a questionable elliptical 
shaped lucency at the right lung apex projected medial to the scapula and above 
the turn of the Port-A-Cath. HEART: Stable enlargement AORTIC SHADOW:Normal.   
MEDIASTINUM: Normal 
BONE/SOFT TISSUES: No acute abnormality. OTHER: None Impression There is a questionable lucency in the right lung apex and in the 
proper clinical setting could indicate a small pneumothorax. Correlate with side of attempted line placement and/or further evaluate with CT if clinically 
appropriate. . Otherwise stable appearance with persistent interstitial and 
airspace disease throughout the near entirety of the right lung with only 
aeration remaining in the right upper lobe and presumed sizable right pleural 
effusion. Cardiac silhouette is enlarged. Left lung remains clear. Anil Confer Results called to Dr. Nabeel Goldsmith on 2/8/2021 3:23 AM. Results from Hospital Encounter encounter on 11/03/20 CT CHEST WO CONT Narrative CT dose reduction was achieved through use of a standardized protocol tailored 
for this examination and automatic exposure control for dose modulation. Noncontrast study shows large right pleural effusion. Right middle and lower 
lobe are completely collapsed, with very few air bronchograms. The upper lobe is 
almost completely collapsed, sparingly anterior segment only. Mainstem bronchus 
is open. Proximal lobar bronchi are open. Left lung is clear. Multiple calcified 
mediastinal hilar lymph nodes. Small left pleural effusion. Small pericardial 
effusion. Normal caliber aorta. No significant upper abdominal or chest wall 
finding. Pronounced soft tissue edema in the visualized portion of the left arm Impression IMPRESSION: Near complete collapse of the right lung secondary to large pleural 
effusion IMPRESSION:  
1. Acute hypoxic hypercapnic respiratory failure 2. COVID-19 pneumonia 3. Atrial fibrillation 4. Congestive heart failure with diastolic dysfunction 5. History of malignant right pleural effusion 6. History of adenocarcinoma metastatic disease history of right lung collapse in the past with thoracentesis done on 11/20 7. Pt is requiring Drug therapy requiring intensive monitoring for toxicity 8. Pt is unstable, unpredictable needing inpatient monitoring; is acutely ill and at high risk of sudden decline and decompensation with severe consequenses and continued end organ dysfunction and failure 9. Prognosis guarded RECOMMENDATIONS/PLAN:  
 
1. Patient is multiple admissions the past with similar complaint of atrial fibrillation with RVR and fluid overload had previous thoracentesis done 2. Try noninvasive ventilator BiPAP machine 100% FiO2 she is still hypoxic tachypneic tachycardic we will transfer the patient to ICU and we will intubate she is a full code 3. Intubate and place on vent if NIV fails 4. Agree with Empir[facility] IV antibiotics pending culture results 5. Follow culture results 6. Arterial blood gases done which shows elevated PCO2 patient is obtunded dyspneic even on 100% FiO2 saturation is around 80 she is a full code we will transfer the patient to ICU and intubate and start patient on assist control mode 7. Will get IR for possible thoracentesis or chest tube placement for malignant pleural effusion 8. Supplemental O2 to keep sats > 93% 9. Aspiration precautions 10. Labs to follow electrolytes, renal function and and blood counts 11. Glucose monitoring and SSI 12. Bronchial hygiene with respiratory therapy techniques, bronchodilators 13. DVT, SUP prophylaxis 14. We will discussed with the family and the patient with poor prognosis possible need hospice care This care involved high complexity medical decision making: I personally: · Reviewed the flowsheet and previous days notes · Reviewed and summarized records or history from previous days note or discussions with staff, family · High Risk Drug therapy requiring intensive monitoring for toxicity: eg steroids, pressors, antibiotics · Reviewed and/or ordered Clinical lab tests · Reviewed images and/or ordered Radiology tests · Time spent in patient care 55-minute Gómez Garcia MD

## 2021-02-14 NOTE — WOUND CARE
At 1350, patient being prepared to transfer to ICU for respiratory distress. Too unstable to turn per [address] Respiratory and  Dr. Rachelle Farias present. WCN will follow up tomorrow.

## 2021-02-14 NOTE — PROGRESS NOTES
Progress Note 2/14/2021 11:33 AM 
NAME: Juan Manuel Barron MRN:  [id number] Admit Diagnosis: Atrial fibrillation ([facility] [address] [I48.91] Assessment/Plan:  
Atrial fibrillation with variable rate control on anticoagulation with Eliquis Continue on amiodarone and metoprolol Try IV push of digoxin for better rate control, today her heart rate is in the 130 Covid + Respiratory failure Dementia History of heart failure with preserved EF  
 
  
 []       High complexity decision making was performed in this patient at high risk for decompensation with multiple organ involvement. Subjective: Juan Manuel Barron denies chest pain, mouth breathing, denies shortness of breath. Discussed with RN events overnight. Objective:  
  
Physical Exam: 
 
Last 24hrs VS reviewed since prior progress note. Most recent are: 
 
Visit Vitals /86 (BP 1 Location: Left upper arm, BP Patient Position: At rest) Pulse (!) 135 Temp 98.2 °F (36.8 °C) Resp 22 Ht 5' 2.99\" (1.6 m) Wt 59 kg (130 lb) SpO2 94% Breastfeeding No  
BMI 23.03 kg/m² No intake or output data in the 24 hours ending 02/14/21 1354 General Appearance: Thinly built female, no acute distress. Ears/Nose/Mouth/Throat: Hearing grossly normal; moist mucous membranes Neck: Supple. Chest: Lungs clear to auscultation bilaterally. Cardiovascular:  irregular rate and rhythm, S1S2 normal 
Abdomen: Soft, non-tender, bowel sounds are active. Extremities: No edema bilaterally. Skin: Warm and dry. []         Post-cath site without hematoma, bruit, tenderness, or thrill. Distal pulses intact. PMH/SH reviewed - no change compared to H&P Telemetry: Atrial fibrillation with heart rate 120s130s EKG: Atrial fibrillation with LVH by voltage 
 
[]  No new EKG for review 1/25/21 echo with normal LV function Lab Data Personally Reviewed: 
 
Recent Labs  
  02/14/21 
0830 WBC 16.6* HGB 13.3 HCT 43.2  No results for input(s): INR, PTP, APTT, INREXT, INREXT in the last 72 hours. No results for input(s): NA, K, CL, CO2, BUN, CREA, GLU, CA, MG in the last 72 hours. No results for input(s): CPK, CKNDX, TROIQ in the last 72 hours. No lab exists for component: CPKMB No results found for: CHOL, CHOLX, CHLST, CHOLV, HDL, HDLP, LDL, LDLC, DLDLP, TGLX, TRIGL, TRIGP, CHHD, CHHDX No results for input(s): AP, TBIL, TP, ALB, GLOB, GGT, AML, LPSE in the last 72 hours. No lab exists for component: SGOT, GPT, AMYP, HLPSE No results for input(s): PH, PCO2, PO2 in the last 72 hours. Medications Personally Reviewed: 
 
Current Facility-Administered Medications Medication Dose Route Frequency  metoprolol tartrate (LOPRESSOR) tablet 25 mg  25 mg Oral Q12H  
 guaiFENesin ER (MUCINEX) tablet 1,200 mg  1,200 mg Oral Q12H  
 acetaminophen (TYLENOL) tablet 650 mg  650 mg Oral Q6H PRN  
 dexAMETHasone (DECADRON) tablet 4 mg  4 mg Oral DAILY  ferrous sulfate tablet 325 mg  325 mg Oral DAILY  mirtazapine (REMERON SOL-TAB) disintegrating tablet 15 mg  15 mg Oral QHS  atorvastatin (LIPITOR) tablet 40 mg  40 mg Oral QHS  amiodarone (CORDARONE) tablet 200 mg  200 mg Oral BID  amoxicillin-clavulanate (AUGMENTIN) 875-125 mg per tablet 1 Tab  1 Tab Oral BID  
 apixaban (ELIQUIS) tablet 2.5 mg  2.5 mg Oral BID Giana Flores MD

## 2021-02-14 NOTE — PROGRESS NOTES
Progress Note Date:2021       Room:[facility] Patient Name:Jackie Bertrand     YOB: 1940     Age:80 y.o. Subjective Subjective Notified by nursing of patient with increase shortness of breath. Hr earlier in the 130's received dg and extra metoprolol. Stat abg's ordered ct for pe, pco2 83, respiratory placed on bipap. Patient unresponsive sat on bipap 83%. Patietn taken to ICU intubated by anesthesia. Review of Systems Unable to perform ROS: Patient unresponsive Objective Vitals Last 24 Hours: TEMPERATURE:  Temp  Av °F (36.7 °C)  Min: 97.7 °F (36.5 °C)  Max: 98.2 °F (36.8 °C) RESPIRATIONS RANGE: Resp  Av  Min: 20  Max: 22 PULSE OXIMETRY RANGE: SpO2  Av.6 %  Min: 90 %  Max: 94 % PULSE RANGE: Pulse  Av.8  Min: 110  Max: 135 BLOOD PRESSURE RANGE: Systolic (17TMX), XBR:129 , Min:116 , PVC:958  
; Diastolic (24UGP), EXN:96, Min:86, Max:88 I/O (24Hr): No intake or output data in the 24 hours ending 21 1504 Objective: 
General Appearance:  Ill-appearing and in distress. Vital signs: (most recent): Blood pressure 118/86, pulse (!) 135, temperature 98.2 °F (36.8 °C), resp. rate 22, height 5' 2.99\" (1.6 m), weight 59 kg (130 lb), SpO2 94 %, not currently breastfeeding. (Hypotensive, hypoxia). Output: Producing urine. Lungs:  She is in respiratory distress. There are rales and rhonchi. Heart: Normal rate. Regular rhythm. S1 normal and S2 normal.   
Abdomen: Abdomen is soft and flat. Bowel sounds are normal.    
Extremities: Decreased range of motion. Neurological: (Unresponsive). Skin:  Cool. Labs/Imaging/Diagnostics Labs: CBC: 
Recent Labs  
  21 
0830 WBC 16.6*  
RBC 4.95  
HGB 13.3 HCT 43.2 MCV 87.3 RDW 18.5*  CHEMISTRIES:No results for input(s): NA, K, CL, CO2, BUN, CA, PHOS, MG in the last 72 hours.  
 
No lab exists for component: CREATININE, GLUCOSEPT/INR:No results for input(s): INR, INREXT in the last 72 hours. No lab exists for component: PROTIME APTT:No results for input(s): APTT in the last 72 hours. LIVER PROFILE:No results for input(s): AST, ALT in the last 72 hours. No lab exists for component: BILIDIR, BILITOT, ALKPHOS Lab Results Component Value Date/Time ALT (SGPT) 25 02/07/2021 12:35 PM  
 AST (SGOT) 35 02/07/2021 12:35 PM  
 Alk. phosphatase 176 (H) 02/07/2021 12:35 PM  
 Bilirubin, total 0.4 02/07/2021 12:35 PM  
 
 
Imaging Last 24 Hours: 
No results found. Assessment//Plan Active Problems: 
  Atrial fibrillation ([address] (2/7/2021) Assessment: (Hypoxic hypercapneic respiratory failure transferred to icu placed on Vent Suspect sepsis wbc 16.6 covid  ondecadron 
hypotension ivf bolus start levophed Leukocytosis  Check urine and blood cultures cxr  Start zosyn iv q6h 
afib in nsr at this time Copd 
chf lasix x1 
 
 
 
). Plan:  
(Spoke with Patient's sister Gabby Garcia regarding patient's condition. Repeat labs in am 
Continue current regimen Case discussed with Dr. Raheel Oden).   
 
 
Electronically signed by Cynthia Goodman NP on 2/14/2021 at 3:04 PM

## 2021-02-14 NOTE — INTERDISCIPLINARY ROUNDS
Came in this morning patient was short of breath. Oxygen level was 94% and pulse was sustaining in the 130s. Patient has a history of atrial fibrillation and had been in atrial fibrillation since she was admitted. Patient had been taking Amiodarone and Metoprolol PO. I called Dr. Ce Rodriges to see what she wanted to do about her pulse sustaining in the 130s. Dr. Ce Rodriges informed me to give Metorpolol 12.5 as well as Digoxin 0.25mg IV. After that patient became disoriented and her oxygen level had dropped but was still above 90%. I called Dr. Fiona Garcia and informed him of pt status and pulmonology consult ordered as well as ABGs. CTA of chest ordered by Chapis Davidson NP. Respiratory therapist at bedside. CO2 was 83.8 per ABG. Pt placed on bipap. Patient then become unresponsive and continued to desat  on BIPAP. Dr. Gabriel Zuniga and NP Phuong Corley agreed to transfer the patient to ICU and intubate. Sister Anna Pollack was informed as well as nursing supervisor. Pt transported by bed with respiratory therapist to  287. Report given at bedside.

## 2021-02-15 NOTE — PROGRESS NOTES
responded to referral from RN due to notification of patient's death.  consulted with RN Camila Diane who joined  at bedside. Patient's family will not be arriving.  and RN reflected on the effects of COVID-19.  provided prayer of commendation for patient, for God's presence to be with patient's family. In addition  provided prayer of appreciation for RN's care and caring.  advised staff of availability of chaplains for follow up upon further referrals. Visited by Feng Su, [facility]  can be contacted by calling  and requesting  on call

## 2021-02-15 NOTE — PROGRESS NOTES
Patient has . Sister & MD notified as well as  & nursing supervisor. Family has decided not to visit.

## 2021-02-15 NOTE — PROGRESS NOTES
responded to [employer], consulted with RN, following Dr's consult with family patient's code status has been changed to DNR.  advised RN of chaplains availability for follow up upon further referrals. Visited by Aaliyah Su [facility], [facility]  can be contacted by calling  and requesting  on call

## 2021-02-15 NOTE — PROGRESS NOTES
Code blue was called for PEA. ACLS protocol initiated. ROSC obtained after 2 minutes of CPR and one dose of epinephrine. Chart reviewed. Order STAT CXR, ABG, EKG, troponin, lactic acid, BMP, Mg, CBC, coags. Nursing staff to update attending, Dr. David Kelly. I performed 30 minutes of critical care time which does not include time spent on separately billable procedures.

## 2021-02-15 NOTE — PROGRESS NOTES
Arrived on unit 1415 unresponsive, resp distress. Anesthesia at bedside emergently intubated patient. Carlos Valle gave orders for levophed and okay to access right portacath attempted access mireya unsuccessful. Zee placed. Heels boggy bilat, buttocks and sacrum has open wound with red base and areas of yellow. Patient tongue noted to be black and dry.

## 2021-02-17 LAB
BACTERIA SPEC CULT: ABNORMAL
COLONY COUNT,CNT: ABNORMAL
COLONY COUNT,CNT: ABNORMAL
SPECIAL REQUESTS,SREQ: ABNORMAL

## 2021-08-02 LAB
ARTERIAL PATENCY WRIST A: POSITIVE
BASE DEFICIT BLDA-SCNC: 1.8 MMOL/L (ref 0–2)
BDY SITE: ABNORMAL
EPAP/CPAP/PEEP, PAPEEP: 10
FIO2 ON VENT: 100 %
GAS FLOW.O2 SETTING OXYMISER: 12 L/MIN
HCO3 BLDA-SCNC: 23 MMOL/L (ref 22–26)
IPAP/PIP, IPAPIP: 28
PCO2 BLDA: 54 MMHG (ref 35–45)
PH BLDA: 7.29 [PH] (ref 7.35–7.45)
PO2 BLDA: 76 MMHG (ref 75–100)
SAO2 % BLD: 96 %
SAO2% DEVICE SAO2% SENSOR NAME: ABNORMAL
SERVICE CMNT-IMP: ABNORMAL
VENTILATION MODE VENT: ABNORMAL

## 2022-03-26 NOTE — ED TRIAGE NOTES
Pt discharged from [facility] and [address] [place] [address] yesterday, home health came by today and noticed urine to be dark, cloudy, and discolored. Home health spoke with MD who sent pt to ED. Pt currently at baseline.
Yes

## 2022-05-05 NOTE — DISCHARGE SUMMARY
Discharge Summary Patient: Jaspal Lambert MRN: [id number]  SSN: [id number] YOB: 1940  Age: 79 y.o. Sex: female Admit Date: 2/7/2021 Discharge Date: 2/11/2021 Admission Diagnoses: Atrial fibrillation ([facility] [address] [I48.91] Discharge Diagnoses:  
Problem List as of 2/11/2021 Never Reviewed Codes Class Noted - Resolved Atrial fibrillation [facility]) ICD-10-CM: I48.91 
ICD-9-CM: 427.31  2/7/2021 - Present Cardiac arrhythmia ICD-10-CM: I49.9 ICD-9-CM: 427.9  1/24/2021 - Present Hyponatremia ICD-10-CM: E87.1 ICD-9-CM: 276.1  11/3/2020 - Present Chronic atrial fibrillation COVID-19 infection Volume depletion Discharge Condition: Good Hospital Course: Patient was sent to the hospital because of irregular heart rate from his skilled nursing facility. She was COVID-19 positive saturation on 2 L was 99%. Patient was seen by cardiologist and started on multiple medications. Denies any shortness of breath no dizziness Consults: Cardiology Significant Diagnostic Studies: labs: No results found for this or any previous visit (from the past 24 hour(s)). XR CHEST SNGL V Final Result FINDINGS: IMPRESSION: Frontal single view chest.  
  
Right port distal tip SVC/RA region. Unchanged cardiomediastinal silhouette. No vascular congestion or pulmonary  
edema. Unchanged right lung volume loss, airspace disease, pleural thickening. No  
evident pneumothorax. Left lung clear. No free air under the diaphragm. XR CHEST SNGL V Final Result There is a questionable lucency in the right lung apex and in the  
proper clinical setting could indicate a small pneumothorax. Correlate with side  
of attempted line placement and/or further evaluate with CT if clinically  
appropriate. . Otherwise stable appearance with persistent interstitial and  
airspace disease throughout the near entirety of the right lung with only aeration remaining in the right upper lobe and presumed sizable right pleural  
effusion. Cardiac silhouette is enlarged. Left lung remains clear. [employer] Results called to Dr. Micky Salmon on 2/8/2021 3:23 AM. XR CHEST PORT Final Result Stable appearance of the chest compared to 24 January 2021 Disposition: SNF Discharge Medications:  
Current Discharge Medication List  
  
START taking these medications Details  
metoprolol tartrate (LOPRESSOR) 25 mg tablet Take 0.5 Tabs by mouth every twelve (12) hours. Qty: 60 Tab, Refills: 0  
  
dexAMETHasone (DECADRON) 4 mg tablet Take 4 mg by mouth Daily (before breakfast) for 10 days. Qty: 10 Tab, Refills: 0 CONTINUE these medications which have CHANGED Details  
amiodarone (CORDARONE) 200 mg tablet Take 1 Tab by mouth two (2) times a day. Qty: 60 Tab, Refills: 0 CONTINUE these medications which have NOT CHANGED Details  
apixaban (ELIQUIS) 2.5 mg tablet Take 1 Tab by mouth two (2) times a day. Indications: treatment to prevent blood clots in chronic atrial fibrillation, treatment to prevent a blood clot in the lung 
Qty: 30 Tab, Refills: 1  
  
mirtazapine (REMERON SOL-TAB) 15 mg disintegrating tablet Take 1 Tab by mouth nightly. Qty: 30 Tab, Refills: 0  
  
amoxicillin-clavulanate (Augmentin) 875-125 mg per tablet Take 1 Tab by mouth two (2) times a day. Qty: 20 Tab, Refills: 0  
  
ferrous sulfate (IRON) 325 mg (65 mg iron) EC tablet Take 325 mg by mouth two (2) times a day. atorvastatin (Lipitor) 40 mg tablet Take 40 mg by mouth daily. STOP taking these medications  
  
 carvediloL (COREG) 6.25 mg tablet Comments:  
Reason for Stopping:   
   
 aspirin delayed-release 81 mg tablet Comments:  
Reason for Stopping:   
   
 furosemide (Lasix) 40 mg tablet Comments:  
Reason for Stopping:   
   
 mometasone-formoterol (DULERA) 200-5 mcg/actuation HFA inhaler Comments:  
Reason for Stopping: escitalopram oxalate (LEXAPRO) 5 mg tablet Comments:  
Reason for Stopping:   
   
 albuterol-ipratropium (DUO-NEB) 2.5 mg-0.5 mg/3 ml nebu Comments:  
Reason for Stopping:   
   
 spironolactone (Aldactone) 25 mg tablet Comments:  
Reason for Stopping:   
   
  
 
 
Activity: Activity as tolerated Diet: Cardiac Diet Wound Care: As directed Follow-up Appointments Procedures  FOLLOW UP VISIT Appointment in: 3 - 5 Days Standing Status:   Standing Number of Occurrences:   1 Order Specific Question:   Appointment in Answer:   3 - 5 Days 35 minutes discharge time Signed By: Emma De La Cruz MD   
 February 11, 2021 Bexarotene Pregnancy And Lactation Text: This medication is Pregnancy Category X and should not be given to women who are pregnant or may become pregnant. This medication should not be used if you are breast feeding.

## 2022-12-08 NOTE — PROGRESS NOTES
CM called contact listed, her sister Pattie Morfin [phone], to discuss DCP, but this number does not go through. CM called PCP listed Dr. Priscilla Richardson to attempt to find another  for the patient. Per  @ Dr. Cesar Blanchard office, this patient has never been seen by them. CM will continue to follow. yes